# Patient Record
Sex: MALE | Race: BLACK OR AFRICAN AMERICAN | NOT HISPANIC OR LATINO | Employment: OTHER | URBAN - METROPOLITAN AREA
[De-identification: names, ages, dates, MRNs, and addresses within clinical notes are randomized per-mention and may not be internally consistent; named-entity substitution may affect disease eponyms.]

---

## 2017-04-06 ENCOUNTER — SURGERY - HEALTHEAST (OUTPATIENT)
Dept: GASTROENTEROLOGY | Facility: HOSPITAL | Age: 52
End: 2017-04-06

## 2017-04-06 ENCOUNTER — RECORDS - HEALTHEAST (OUTPATIENT)
Dept: ADMINISTRATIVE | Facility: OTHER | Age: 52
End: 2017-04-06

## 2017-04-06 ASSESSMENT — MIFFLIN-ST. JEOR
SCORE: 1377.86
SCORE: 1421.86

## 2017-04-10 ENCOUNTER — TELEPHONE (OUTPATIENT)
Dept: FAMILY MEDICINE | Facility: CLINIC | Age: 52
End: 2017-04-10

## 2017-04-10 NOTE — TELEPHONE ENCOUNTER
Date of discharge: 4/7/2017  Facility of discharge: [facility]  Patient concerns about condition: No concerns at this time.  Patient concerns about medications: No concerns at this time.  Full med reconciliation will be completed at clinic visit.  Patient concerns about transitioning: No concerns at this time.  Clinic office visit appointment date: 4/12/2017  Dr Carter and Estella  Patient reminded to bring all medications (prescription and over-the-counter) to clinic appointment: Yes   Discharging and preferred pharmacy added to [place]    Using the date of discharge as day 1, the 30th day post discharge is 5/7/2017.

## 2017-04-18 ENCOUNTER — OFFICE VISIT (OUTPATIENT)
Dept: FAMILY MEDICINE | Facility: CLINIC | Age: 52
End: 2017-04-18

## 2017-04-18 VITALS
HEART RATE: 94 BPM | DIASTOLIC BLOOD PRESSURE: 102 MMHG | TEMPERATURE: 97.8 F | SYSTOLIC BLOOD PRESSURE: 170 MMHG | BODY MASS INDEX: 20.46 KG/M2 | WEIGHT: 135 LBS | OXYGEN SATURATION: 97 % | HEIGHT: 68 IN

## 2017-04-18 DIAGNOSIS — F10.231 ALCOHOL DEPENDENCE WITH WITHDRAWAL DELIRIUM (H): Primary | ICD-10-CM

## 2017-04-18 DIAGNOSIS — R42 DIZZINESS: ICD-10-CM

## 2017-04-18 DIAGNOSIS — I16.0 HYPERTENSIVE URGENCY: ICD-10-CM

## 2017-04-18 LAB
ALBUMIN SERPL-MCNC: 4.1 G/DL (ref 3.3–4.6)
ALP SERPL-CCNC: 66 U/L (ref 40–150)
ALT SERPL-CCNC: 24 U/L (ref 0–45)
AST SERPL-CCNC: 31 U/L (ref 0–55)
BILIRUB SERPL-MCNC: 0.6 MG/DL (ref 0.2–1.3)
BUN SERPL-MCNC: 10 MG/DL (ref 7–30)
CALCIUM SERPL-MCNC: 9.7 MG/DL (ref 8.5–10.4)
CHLORIDE SERPLBLD-SCNC: 98 MMOL/L (ref 94–109)
CHOLEST SERPL-MCNC: 210 MG/DL
CHOLEST/HDLC SERPL: 2.3 RATIO
CO2 SERPL-SCNC: 28 MMOL/L (ref 20–32)
CREAT SERPL-MCNC: 1.2 MG/DL (ref 0.8–1.5)
EGFR CALCULATED (BLACK REFERENCE): 82.1 ML/MIN
EGFR CALCULATED (NON BLACK REFERENCE): 67.8 ML/MIN
GLUCOSE SERPL-MCNC: 100 MG/DL (ref 60–109)
HBA1C MFR BLD: 5.7 % (ref 4.1–5.7)
HCT VFR BLD AUTO: 40.4 % (ref 40–53)
HDLC SERPL-MCNC: 93 MG/DL
HEMOGLOBIN: 12.6 G/DL (ref 13.3–17.7)
LDLC SERPL CALC-MCNC: 104 MG/DL (ref 0–99)
MCH RBC QN AUTO: 31.8 PG (ref 26.5–35)
MCHC RBC AUTO-ENTMCNC: 31.2 G/DL (ref 32–36)
MCV RBC AUTO: 102 FL (ref 78–100)
PLATELET # BLD AUTO: 212 K/UL (ref 150–450)
POTASSIUM SERPL-SCNC: 3.7 MMOL/L (ref 3.4–5.3)
PROT SERPL-MCNC: 8.2 G/DL (ref 6.6–8.8)
RBC # BLD AUTO: 3.96 M/UL (ref 4.4–5.9)
SODIUM SERPL-SCNC: 138 MMOL/L (ref 133–144)
TRIGL SERPL-MCNC: 64 MG/DL
VLDL-CHOLESTEROL: 13 MG/DL (ref 7–32)
WBC # BLD AUTO: 7.2 K/UL (ref 4–11)

## 2017-04-18 RX ORDER — NALTREXONE HYDROCHLORIDE 50 MG/1
50 TABLET, FILM COATED ORAL
COMMUNITY
Start: 2017-04-07 | End: 2017-06-12

## 2017-04-18 RX ORDER — AMLODIPINE BESYLATE 10 MG/1
10 TABLET ORAL
COMMUNITY
Start: 2017-04-07 | End: 2017-08-25 | Stop reason: DRUGHIGH

## 2017-04-18 NOTE — MR AVS SNAPSHOT
After Visit Summary   4/18/2017    Piter Gaines    MRN: [id number]           Patient Information     Date Of Birth          1965        Visit Information        Provider Department      4/18/2017 10:20 AM Germania Stratton APRN CNP [facility]        Today's Diagnoses     Alcohol dependence with withdrawal delirium (H)    -  1    Hypertensive urgency        Dizziness          Care Instructions      Established High Blood Pressure    High blood pressure (hypertension) is a chronic disease. Often health care providers don t know what causes it. But it can be caused by certain health conditions and medicines.  If you have high blood pressure, you may not have any symptoms. If you do have symptoms, they may include headache, dizziness, changes in your vision, chest pain, and shortness of breath. But even without symptoms, high blood pressure that s not treated raises your risk for heart attack and stroke. High blood pressure is a serious health risk and shouldn t be ignored.  A blood pressure reading is made up of two numbers: a higher number over a lower number. The top number is the systolic pressure. The bottom number is the diastolic pressure. A normal blood pressure is less than 120 over less than 80.  High blood pressure is when either the top number is 140 or higher, or the bottom number is 90 or higher. This must be the result when taking your blood pressure a number of times. The blood pressures between normal and high are called prehypertension.  Home care  If you have high blood pressure, you should do what is listed below to lower your blood pressure. If you are taking medicines for high blood pressure, these methods may reduce or end your need for medicines in the future.    Begin a weight-loss program if you are overweight.    Cut back on how much salt you get in your diet. Here s how to do this:    Don t eat foods that have a lot of salt. These include olives, pickles,  smoked meats, and salted potato chips.    Don t add salt to your food at the table.    Use only small amounts of salt when cooking.    Begin an exercise program. Talk with your health care provider about the type of exercise program that would be best for you. It doesn't have to be hard. Even brisk walking for 20 minutes 3 times a week is a good form of exercise.    Don t take medicines that have heart stimulants. This includes many cold and sinus decongestant pills and sprays, as well as diet pills. Check the warnings about hypertension on the label. Stimulants such as amphetamine or cocaine could be lethal for someone with high blood pressure. Never take these.    Limit how much caffeine you get in your diet. Switch to caffeine-free products.    Stop smoking. If you are a long-time smoker, this can be hard. Enroll in a stop-smoking program to make it more likely that you will quit for good.    Learn how to handle stress. This is an important part of any program to lower blood pressure. Learn about relaxation methods like meditation, yoga, or biofeedback.    If your provider prescribed medicines, take them exactly as directed. Missing doses may cause your blood pressure get out of control.    Consider buying an automatic blood pressure machine. You can get one of these at most pharmacies. Use this to watch your blood pressure at home. Give the results to your provider.  Follow-up care  You will need to make regular visits to your health care provider. This is to check your blood pressure and to make changes to your medicines. Make a follow-up appointment as directed.  When to seek medical advice  Call your health care provider right away if any of these occur:    Chest pain or shortness of breath    Severe headache    Throbbing or rushing sound in the ears    Nosebleed    Sudden severe pain in your belly (abdomen)    Extreme drowsiness, confusion, or fainting    Dizziness or dizziness with a spinning sensation  "(vertigo)    Weakness of an arm or leg or one side of the face    You have problems speaking or seeing     4981-6745 [employer]. [address] [place] [address], [place] [address]. All rights reserved. This information is not intended as a substitute for professional medical care. Always follow your healthcare professional's instructions.        Alcohol Abuse  Alcoholic drinks are harmful when you have too many of them. There is no set number of drinks that defines too much. Drinking that disrupts your life or your health is called alcohol abuse. Alcohol abuse can hurt your relationships with others. You may lose friends, a spouse, or even your job. You may be abusing alcohol if any of the following are true for you:    Duties at home or with  suffer because of drinking.    Duties at work or in school suffer because of drinking.    You have missed work or school because of drinking.    You use alcohol while driving or operating machinery.    You have legal problems such as arrests due to drinking.    You keep drinking even though it causes serious problems in your life.  Health effects  Alcohol abuse causes health problems. Sometimes this can happen after only drinking a  little.\" There is no set number of drinks or amount of alcohol that defines too much. The more you drink at one time, and the more often you drink determine both the short-term and long-term health effects. It affects all parts of your body and your health, including your:    Brain. Alcohol is a central nervous system depressant. It can damage parts of the brain that affect your balance, memory, thinking, and emotions. It can cause memory loss, blackouts, depression, agitation, sleep cycle changes, and seizures. These changes may or may not be reversible.    Heart and vascular system. Alcohol affects multiple areas. It can damage heart muscle causing cardiomyopathy, which is a weakening and stretching of the heart muscle. This can " lead to trouble breathing, an irregular heartbeat, atrial fibrillation, leg swelling, and heart failure. Alcohol use makes the blood vessels stiffen causing hypertension (high blood pressure). All of these problems increase your risk of having heart attacks or strokes.    Liver. Alcohol causes fat to build up in the liver, affecting its normal function. This increases the risk for hepatitis, leading to abdominal pain, appetite loss, jaundice, bleeding problems, liver fibrosis, and cirrhosis. This, in turn, can affect your ability to fight off infections, and can cause diabetes. The liver changes prevent it from removing toxins in your blood that can cause encephalopathy which may show with confusion, altered level of consciousness, personality changes, memory loss, seizures, coma, and death.    Pancreas. Alcohol can cause inflammation of the pancreas, or pancreatitis. This can cause abdominal pain, fever, and diabetes.    Immune system. Alcohol weakens your immune system in a number of ways. It suppresses your immune system making it harder to fight infections and colds. It also increases the chance of getting pneumonia and tuberculosis.    Cancer. Alcohol is a risk factor for developing cancer of the mouth, esophagus, pharynx, larynx, liver, and breast.    Sexual function. Alcohol can lead to sexual problems.  Home care  The following guidelines will help you deal with alcohol abuse:    Admit you have a problem with alcohol.    Ask for help from your health care provider and trusted family members or close friends.    Get help from people trained in dealing with alcohol abuse. This may be individual counseling or group therapy, or it may be a supervised alcohol treatment program.    Join a self-help group for alcohol abuse such as Alcoholics Anonymous (AA).    Avoid people who abuse alcohol or tempt you to drink.  Follow-up care  Follow up as advised by the doctor or our staff. Contact these groups to get  help:    Alcoholics Anonymous (AA): Go to www.aa.org or check the phone book for meetings near you.    National Alcohol and Substance Abuse Information Center (NASAIC): 149.535.4051 www.addictionSynup.Eyenalyze    National [place] on Alcoholism and Drug Dependence (NCADD): 400-FDB-TKEL (158-5330) www.ncadd.org    Al-Anon: 672-4OT-MMCF (059-8145) www.al-anon.org  Call 911  Call 911 if any of these occur:    Trouble breathing or slow irregular breathing    Chest pain    Sudden weakness on one side of your body or sudden trouble speaking    Heavy bleeding or vomiting blood    Very drowsy or trouble awakening    Fainting or loss of consciousness    Rapid heart rate    Seizure  When to seek medical care  Get prompt medical attention if you have:    Confusion    Hallucinations (seeing, hearing, or feeling things that aren t there)    Pain in your upper abdomen that gets worse    Repeated vomiting or black or tarry stools    Severe shakiness    9396-4128 [employer]. [address] [place] [address]. All rights reserved. This information is not intended as a substitute for professional medical care. Always follow your healthcare professional's instructions.              Follow-ups after your visit        Follow-up notes from your care team     Return in about 1 week (around 4/25/2017) for BP Recheck.      Your next 10 appointments already scheduled     May 08, 2017  9:40 AM CDT   Return Visit with Castillo Carter MD   [facility]    [address] [place] [address]   [phone]              Future tests that were ordered for you today     Open Future Orders        Priority Expected Expires Ordered    Urinalysis([facility]) Routine  5/3/2017 4/25/2017    Fecal Occult Blood - FIT, iFOB ([facility]) Routine  5/2/2017 4/25/2017            Who to contact     Please call your clinic at [phone] to:    Ask questions about your health    Make or cancel  "appointments    Discuss your medicines    Learn about your test results    Speak to your doctor   If you have compliments or concerns about an experience at your clinic, or if you wish to file a complaint, please contact [facility] Patient Relations at [phone] or email us at [email or web address]         Additional Information About Your Visit        THE ICONIChart Information     [employer] is an electronic gateway that provides easy, online access to your medical records. With [employer], you can request a clinic appointment, read your test results, renew a prescription or communicate with your care team.     To sign up for [employer] visit the website at [email or web address]   You will be asked to enter the access code listed below, as well as some personal information. Please follow the directions to create your username and password.     Your access code is: [email or web address]  Expires: 2017  3:12 AM     Your access code will  in 90 days. If you need help or a new code, please contact your [facility] or call [phone] for assistance.        Care EveryWhere ID     This is your Care EveryWhere ID. This could be used by other organizations to access your [place] medical records  [id number]        Your Vitals Were     Pulse Temperature Height Pulse Oximetry BMI (Body Mass Index)       94 97.8  F (36.6  C) (Oral) 5' 8\" (172.7 cm) 97% 20.53 kg/m2        Blood Pressure from Last 3 Encounters:   17 (!) 149/104   17 (!) 170/102    Weight from Last 3 Encounters:   17 133 lb 6.4 oz (60.5 kg)   17 135 lb (61.2 kg)              We Performed the Following     CBC with Plt ([facility])     Comprehensive Metabolic Panel ([facility]) - results <1hr Protocol     EKG 12-lead complete w/read - Clinics     Hemoglobin A1c ([facility])     Lipid Panel ([facility])        Primary Care Provider Office Phone # Fax #    Castillo Carter MD " [phone]       [facility] [address] [facility] [address]        Thank you!     Thank you for choosing [facility]  for your care. Our goal is always to provide you with excellent care. Hearing back from our patients is one way we can continue to improve our services. Please take a few minutes to complete the written survey that you may receive in the mail after your visit with us. Thank you!             Your Updated Medication List - Protect others around you: Learn how to safely use, store and throw away your medicines at www.disposemymeds.org.          This list is accurate as of: 4/18/17 11:59 PM.  Always use your most recent med list.                   Brand Name Dispense Instructions for use    amLODIPine 10 MG tablet    NORVASC     Take 10 mg by mouth       naltrexone 50 MG tablet    DEPADE;REVIA     Take 50 mg by mouth       omeprazole 20 MG CR capsule    priLOSEC     Take 20 mg by mouth

## 2017-04-18 NOTE — PROGRESS NOTES
"HOSPITAL FOLLOW-UP: The patient presents today for follow-up of recent hospitalization at  [facility].17-17. Dehydration, Renal insufficiency, hematuria. Acute hyperactive alcohol withdrawal. Delirium. Discharge dx: Acute alcohol withdrawal delirium, Hematemesis with nausea, Acute kidney injury, GERD,   Esophagitis,HTN, tobacco use, moderate malnutrition.   Had an EGD-Dx Severe reflux erosive esophagitis related to ETOH abuse.  esophagus, no active bleeding, no varices.    Discharge on PPI, abstain from ETOH  JESSICA, FU PCP.    Medications ordered with Discharge 17:  Amlodipine 10 mg po daily  MVI 1 po daily  Omeprazole 20 mg 1 po BID  Naltrexone 50 mg 1 po daily  Clonidine HCL 0.2mg po BID    i PMH:PUD many years, esophageal varices in [place]. ETOH abuse.  Heart flutters and chest pain [place], was on a heart medicine.  Migraine headaches. JARRETT, trouble with stairs, long hx of this.  FH Father : renal failure. Has 2 brothers  before 50-MI, 2 dx heart disease and the other kidney failure  Here from [place], moved end of January. Here with Sister in law, has nieces and nephews. The patient was hospitalized with dizziness, diarrhea, vomiting and dehydrated, blood in the urine.Problem with kidney functionng of moderate severity. The patient was evaluated with  The patient was asked to follow up today.   Advised to abstain from ETOH.by hospital providers and Gastroenterology, At this time the patient reports some improvement of the original symptoms.   The patient reports the following new symptoms:-   Pain in left lower back and notes weakness and pain down his left leg/side of his body. Started to flare up a day or so ago.  Hads had ETOH treatment last about 6 years ago, had been through it before.  Is 50:50 on his willingness to abstain from ETOH. Vodka last \" a couple of days ago, a couple of shots.\"  Wants to do from habit. Naltrexone not helping with shakiness at " "all.=+4.CAGE-  Has been to the hospital \"a lot\" in the past 2 years. Vomiting blood , HTN, stress are the reasons.Alcohol may play a part.  Feels shaky and headache.Chest feels a little tight.  Headache is slight, frontal, hasn't/doesn't take medications.  Sometimes gets a headache behind his eye. Doesn't take OTC medications for headache as \"nothing helps.\"  Goal is to have regular check ups and have med refills, someone to call if he needs help    SH:   and assembly in factory, in past. Disabled x 2 years.  Smoker- cigarettes, 1/3-1 PPD x most of his adult life   marijuana       ROS:    Constitutional, HEENT, cardiovascular, pulmonary, gi and gu systems are negative, except as otherwise noted.      OBJECTIVE:                                                    BP (!) 170/102 (Cuff Size: Adult Regular)  Pulse 94  Temp 97.8  F (36.6  C) (Oral)  Ht 5' 8\" (172.7 cm)  Wt 135 lb (61.2 kg)  SpO2 97%  BMI 20.53 kg/m2    EYES: Eyes grossly normal to inspection, PERRL and conjunctivae and sclerae normal  HENT: ear canals and TM's normal, nose and mouth without ulcers or lesions  NECK: no adenopathy, no asymmetry, masses, or scars and thyroid normal to palpation  RESP: lungs clear to auscultation - no rales, or wheezes POSITIVE for: scattered rhonchi   CV: regular rate and rhythm, normal S1 S2, no S3 or S4, no murmur, click or rub, no peripheral edema and peripheral pulses strong  ABDOMEN: soft, generally tender,  no masses and bowel sounds normal  MS: no gross musculoskeletal defects noted, no edema    Diagnostic Test Results:  Results for orders placed or performed in visit on 04/18/17   Comprehensive Metabolic Panel ([facility]) - results <1hr Protocol   Result Value Ref Range    Glucose 100.0 60.0 - 109.0 mg/dL    Urea Nitrogen 10.0 7.0 - 30.0 mg/dL    Creatinine 1.2 0.8 - 1.5 mg/dL    Sodium 138.0 133.0 - 144.0 mmol/L    Potassium 3.7 3.4 - 5.3 mmol/L    Chloride 98.0 94.0 - 109.0 mmol/L    Carbon " Dioxide 28.0 20.0 - 32.0 mmol/L    Calcium 9.7 8.5 - 10.4 mg/dL    Protein Total 8.2 6.6 - 8.8 g/dL    Albumin 4.1 3.3 - 4.6 g/dL    Alkaline Phosphatase 66.0 40.0 - 150.0 U/L    ALT 24.0 0.0 - 45.0 U/L    AST 31.0 0.0 - 55.0 U/L    Bilirubin Total 0.6 0.2 - 1.3 mg/dL    eGFR Calculated (Non Black Reference) 67.8 >60.0 mL/min    eGFR Calculated (Black Reference) 82.1 >60.0 mL/min   CBC with Plt ([facility])   Result Value Ref Range    WBC 7.2 4.0 - 11.0 K/uL    RBC 3.96 (L) 4.40 - 5.90 M/uL    Hemoglobin 12.6 (L) 13.3 - 17.7 g/dL    Hematocrit 40.4 40.0 - 53.0 %    .0 (H) 78.0 - 100.0 fL    MCH 31.8 26.5 - 35.0 pg    MCHC 31.2 (L) 32.0 - 36.0 g/dL    Platelets 212.0 150.0 - 450.0 K/uL   Hemoglobin A1c ([facility])   Result Value Ref Range    Hemoglobin A1C 5.7 4.1 - 5.7 %   Lipid Panel ([facility])   Result Value Ref Range    Cholesterol 210.0 (H) <200.0 mg/dL    Triglycerides 64.0 <150.0 mg/dL    HDL Cholesterol 93.0 >40.0 mg/dL    VLDL-Cholesterol 13.0 7.0 - 32.0 mg/dL    LDL Cholesterol Direct 104.0 (H) 0.0 - 99.0 mg/dL    Cholesterol/HDL Ratio 2.3 <5.0 RATIO        ASSESSMENT/PLAN:                                                      (F10.231) Alcohol dependence with withdrawal delirium (H)  (primary encounter diagnosis)  Comment: Being treated with Naltrexone 50 mg po daily. Continues to feel dizzy and shaky  Plan: Recommend he continue his withdrawal medicine as directed.           Recommend supervision at home.Patient should be watched closely to insure his DTs do not worsen  Along with his symptoms including HTN  (I16.0) Hypertensive urgency  Comment: Patient needs to be compliant with his medicines.  Plan: Provided a med box and described how to fill, may well require assistance       (R42) Dizziness  Comment: Symptoms mild-moderate, worsens at times.No recent syncope.  Plan: Comprehensive Metabolic Panel ([facility]) -         results <1hr Protocol, CBC with Plt ([facility]),         Hemoglobin A1c ([facility]), Lipid  Panel ([facility]),         EKG 12-lead complete w/read - Clinics          Comment: Mild abnormalities in CBC-include mildly depressed Hemoglobin and RBC, normal hematocrit.  Additionally mildly elevated cholesterol and and LDL with protective HDL, borderline prediabetes noted in A1C  None explain dizziness or ongoing problems with refractory HTN.  Plan: Comprehensive Metabolic Panel ([facility]) -         results <1hr Protocol, CBC with Plt ([facility]),         Hemoglobin A1c ([facility]), Lipid Panel ([facility]),         EKG 12-lead complete w/read - Clinics       No acute changes noted-reviewed with Faculty Preceptor                                                                      MEDICATIONS:  Current medications reviewed-unclear whether patient taking his meds correctly  And he is unable to reassure provider he is, seems uncertain at the present time. Patient's Goals are to have a provider he  Can call on for refills and any acute or sudden needs he may develop. Does not state an intent or desire to stop drinking   In the near future.Encouraged him to avoid ETOH due to bleeding varices history and recent esophagitis, DTs, acute kidney damage   and effect on blood pressure. Also encouraged abstinence from cigarette smoking due to symptoms of chest tightness, dizziness and   HTN. Asking patient to return in 2-3 days to have his B/P rechecked and to bring his med bottles in for reconciliation with MRs.  Patient agreed to the above plan.    45 minutes was spent on this visit, >50% counseling and coordination of care re:alcohol withdrawal, and cigarette abstinence,med compliance, HTN      CASEY Dawson CNP [facility]

## 2017-04-25 ENCOUNTER — OFFICE VISIT (OUTPATIENT)
Dept: FAMILY MEDICINE | Facility: CLINIC | Age: 52
End: 2017-04-25

## 2017-04-25 VITALS
TEMPERATURE: 98 F | BODY MASS INDEX: 20.28 KG/M2 | OXYGEN SATURATION: 97 % | SYSTOLIC BLOOD PRESSURE: 149 MMHG | HEART RATE: 124 BPM | WEIGHT: 133.4 LBS | DIASTOLIC BLOOD PRESSURE: 104 MMHG

## 2017-04-25 DIAGNOSIS — Z76.89 ESTABLISHING CARE WITH NEW DOCTOR, ENCOUNTER FOR: ICD-10-CM

## 2017-04-25 DIAGNOSIS — F10.10 ALCOHOL ABUSE: ICD-10-CM

## 2017-04-25 DIAGNOSIS — I10 BENIGN ESSENTIAL HYPERTENSION: Primary | ICD-10-CM

## 2017-04-25 DIAGNOSIS — R31.9 HEMATURIA: ICD-10-CM

## 2017-04-25 DIAGNOSIS — K92.1 BLOOD IN STOOL: ICD-10-CM

## 2017-04-25 RX ORDER — LISINOPRIL 10 MG/1
10 TABLET ORAL DAILY
Qty: 30 TABLET | Refills: 1 | Status: SHIPPED | OUTPATIENT
Start: 2017-04-25 | End: 2017-06-12

## 2017-04-25 NOTE — MR AVS SNAPSHOT
After Visit Summary   4/25/2017    Piter Gaines    MRN: [id number]           Patient Information     Date Of Birth          1965        Visit Information        Provider Department      4/25/2017 10:00 AM Palla, Misbah, MD [facility]        Today's Diagnoses     Benign essential hypertension    -  1    Hematuria        Blood in stool        Alcohol abuse        Establishing care with new doctor, encounter for          Care Instructions    Referral for Gastroenterology faxed to [facility] and they will contact pt to schedule appointment.    LK        Follow-ups after your visit        Additional Services     GASTROENTEROLOGY ADULT REF CONSULT ONLY       Preferred Location: [facility] [phone]    H/o stage D esophagitis, bleeding ulcers.  Screening colonoscopy    Please be aware that coverage of these services is subject to the terms and limitations of your health insurance plan.  Call member services at your health plan with any benefit or coverage questions.  Any procedures must be performed at a [facility] facility OR coordinated by your clinic's referral office.    Please bring the following with you to your appointment:    (1) Any X-Rays, CTs or MRIs which have been performed.  Contact the facility where they were done to arrange for  prior to your scheduled appointment.    (2) List of current medications   (3) This referral request   (4) Any documents/labs given to you for this referral            Mental Health Referral - [facility]       Use this form for behavioral health consults and assessments. The referral coordinator will help to determine whether patients are best served by clinic behavioral health staff or by community providers.    Type of referral(s) requested (indicate all that apply):  Adult Psychotherapy--for diagnosis and non-pharmacological treatment    Reason for referral: Alcohol abuse    Currently receiving mental health services (if 'Yes', what services and  why today's referral?): No  Currently having suicidal thoughts: No  Previous psych hospitalization: No    Please provide data for below screening tools if available.    needed: No  Language: English                  Your next 10 appointments already scheduled     May 08, 2017  9:40 AM CDT   Return Visit with Castillo Carter MD   [facility] ([facility])    [address] [place] [address]   [phone]              Who to contact     Please call your clinic at [phone] to:    Ask questions about your health    Make or cancel appointments    Discuss your medicines    Learn about your test results    Speak to your doctor   If you have compliments or concerns about an experience at your clinic, or if you wish to file a complaint, please contact [facility] Patient Relations at [phone] or email us at [email or web address]         Additional Information About Your Visit        [employer] Information     [employer] is an electronic gateway that provides easy, online access to your medical records. With [employer], you can request a clinic appointment, read your test results, renew a prescription or communicate with your care team.     To sign up for [employer] visit the website at [email or web address]   You will be asked to enter the access code listed below, as well as some personal information. Please follow the directions to create your username and password.     Your access code is: [email or web address]  Expires: 2017  3:12 AM     Your access code will  in 90 days. If you need help or a new code, please contact your [facility] or call [phone] for assistance.        Care EveryWhere ID     This is your Care EveryWhere ID. This could be used by other organizations to access your [facility] medical records  [id number]        Your Vitals Were     Pulse Temperature Pulse Oximetry BMI (Body Mass Index)           124 98  F (36.7  C) (Oral) 97% 20.28 kg/m2         Blood Pressure from Last 3 Encounters:   04/25/17 (!) 149/104   04/18/17 (!) 170/102    Weight from Last 3 Encounters:   04/25/17 133 lb 6.4 oz (60.5 kg)   04/18/17 135 lb (61.2 kg)              We Performed the Following     GASTROENTEROLOGY ADULT REF CONSULT ONLY     Mental Health Referral - [facility]          Today's Medication Changes          These changes are accurate as of: 4/25/17 11:59 PM.  If you have any questions, ask your nurse or doctor.               Start taking these medicines.        Dose/Directions    lisinopril 10 MG tablet   Commonly known as:  PRINIVIL/ZESTRIL   Used for:  Benign essential hypertension   Started by:  Palla, Misbah, MD        Dose:  10 mg   Take 1 tablet (10 mg) by mouth daily   Quantity:  30 tablet   Refills:  1            Where to get your medicines      These medications were sent to [id number] - [place], [place] - [address], [place] [address]     Phone:  [phone]     lisinopril 10 MG tablet                Primary Care Provider Office Phone # Fax #    Castillo Carter [phone]       [facility] [address] [place] [address]        Thank you!     Thank you for choosing [facility]  for your care. Our goal is always to provide you with excellent care. Hearing back from our patients is one way we can continue to improve our services. Please take a few minutes to complete the written survey that you may receive in the mail after your visit with us. Thank you!             Your Updated Medication List - Protect others around you: Learn how to safely use, store and throw away your medicines at www.disposemymeds.org.          This list is accurate as of: 4/25/17 11:59 PM.  Always use your most recent med list.                   Brand Name Dispense Instructions for use     amLODIPine 10 MG tablet    NORVASC     Take 10 mg by mouth       lisinopril 10 MG tablet    PRINIVIL/ZESTRIL    30 tablet    Take 1 tablet (10 mg) by mouth daily       naltrexone 50 MG tablet    DEPADE;REVIA     Take 50 mg by mouth       omeprazole 20 MG CR capsule    priLOSEC     Take 20 mg by mouth

## 2017-04-25 NOTE — PROGRESS NOTES
"       HPI:       Piter Gaines is a 51 year old male presenting for:    1. Hypertension follow up   - headaches, blurry vision - 2-3x/week for last 2-3 years  - denies symptoms currently   - does feel chest congestion but does not have left sided chest pain  - no radiation to left arm, neck, or back  - does not endorse any palpitations  - no diaphoresis  - occasional numbness in fingers and toes (\"been happening for months now)  - no motor weakness    2. Blood in urine  - not sure if it is blood but says \"it looks orange-moses\"  - states he has noticed it 3-4 times in past year  - no dysuria, no difficulty initiating, no weak stream    3. Blood in stool  - h/o bleeding ulcers 2 years ago  - states he has noticed blood in his stool several times over last 3-4 months  - no abdominal pain       PMH  Medical conditions - anxiety, ulcers, kidney disease, hypertension, no h/o MI and/or CVA   (has not had a PCP, but has been diagnosed with above mentioned conditions through various ER visits)  Medications - was on several medications which he has not taken for months now  Surgeries - left arm surgery  Allergies - NKDA  Recent Hospitalizations - 1 month ago, [facility]  Mom - hypertension, breast cancer  Dad - gout, hypertension, renal failure     SH  Lives with sister in law. Smoking for last 5 years - \"sometimes 0 cigarettes to 1-1.5 ppd\". Alcohol - 3/4 days, 12 pack of beer through week (3-4 beers at a time). Will also have 1/2 pint.          Physical Exam:     Vitals:    04/25/17 1017 04/25/17 1034   BP: (!) 152/115 (!) 149/104   Pulse: 124    Temp: 98  F (36.7  C)    TempSrc: Oral    SpO2: 97%    Weight: 133 lb 6.4 oz (60.5 kg)      Body mass index is 20.28 kg/(m^2).    Vitals reviewed  General: No acute distress  Ears: canals patent, TM within normal limits  Eyes: EOMI, PERRLA, fundoscopic exam wnl  Nose: nasal mucosa moist, no rhinorrhea  Oral cavity: moist mucosa, no tonsillar exudates, no oropharyngeal " erythema/swelling  Neck: good ROM, supple, no apparent tracheal deviation  Respiratory: CTA bilaterally, no wheezes/rhonchi/rhales appreciated, no respiratory distress  Chest wall: No chest wall tenderness  Back: no paraspinal tenderness, no spinal tenderness, no vertebral step offs appreciated  Abdomen: soft, non-distended, non-tender, normoactive bowel sounds  Extremities: no cyanosis, no edema, capillary refill <2 seconds, well perfused  Neuro: no focal deficits noted, reflexes within normal limites  Psych: appropriate affect      Assessment and Plan   #Hypertension  - hypertensive, currently asymptomatic   - on amlodipine, will start lisinopril 10 mg in addition to amlodipine  - follow up in 2 weeks    - BMP pending     #Reports of blood in urine  - UA pending  - no signs/symptoms of obstruction and/or infection    #Hematochezia  - reports several instances over past several months  - continue PPI  - history of bleeding ulcer  - no abdominal pain   - referral to GI for screening colonoscopy, possible EGD  - CBC pending    #Health maintenance/Establish care  - lipid panel pending, CBC and BMP pending  - colonoscopy referral       Options for treatment and follow-up care were reviewed with the patient and/or guardian. Piter Gaines and/or guardian engaged in the decision making process and verbalized understanding of the options discussed and agreed with the final plan.    Misbah Palla, MD      Precepted today with: Gildardo Alejandre MD

## 2017-04-25 NOTE — PATIENT INSTRUCTIONS
Referral for Gastroenterology faxed to [facility] and they will contact pt to schedule appointment.    SILVIA

## 2017-04-26 DIAGNOSIS — I10 BENIGN ESSENTIAL HYPERTENSION: ICD-10-CM

## 2017-04-26 LAB
BILIRUBIN UR: ABNORMAL
BLOOD UR: NEGATIVE
GLUCOSE URINE: NEGATIVE
KETONES UR QL: ABNORMAL
LEUKOCYTE ESTERASE UR: NEGATIVE
NITRITE UR QL STRIP: NEGATIVE
PH UR STRIP: 6 [PH] (ref 5–7)
PROTEIN UR: ABNORMAL
SP GR UR STRIP: 1.02
UROBILINOGEN UR STRIP-ACNC: ABNORMAL

## 2017-04-26 NOTE — PATIENT INSTRUCTIONS
Established High Blood Pressure    High blood pressure (hypertension) is a chronic disease. Often health care providers don t know what causes it. But it can be caused by certain health conditions and medicines.  If you have high blood pressure, you may not have any symptoms. If you do have symptoms, they may include headache, dizziness, changes in your vision, chest pain, and shortness of breath. But even without symptoms, high blood pressure that s not treated raises your risk for heart attack and stroke. High blood pressure is a serious health risk and shouldn t be ignored.  A blood pressure reading is made up of two numbers: a higher number over a lower number. The top number is the systolic pressure. The bottom number is the diastolic pressure. A normal blood pressure is less than 120 over less than 80.  High blood pressure is when either the top number is 140 or higher, or the bottom number is 90 or higher. This must be the result when taking your blood pressure a number of times. The blood pressures between normal and high are called prehypertension.  Home care  If you have high blood pressure, you should do what is listed below to lower your blood pressure. If you are taking medicines for high blood pressure, these methods may reduce or end your need for medicines in the future.    Begin a weight-loss program if you are overweight.    Cut back on how much salt you get in your diet. Here s how to do this:    Don t eat foods that have a lot of salt. These include olives, pickles, smoked meats, and salted potato chips.    Don t add salt to your food at the table.    Use only small amounts of salt when cooking.    Begin an exercise program. Talk with your health care provider about the type of exercise program that would be best for you. It doesn't have to be hard. Even brisk walking for 20 minutes 3 times a week is a good form of exercise.    Don t take medicines that have heart stimulants. This includes many  cold and sinus decongestant pills and sprays, as well as diet pills. Check the warnings about hypertension on the label. Stimulants such as amphetamine or cocaine could be lethal for someone with high blood pressure. Never take these.    Limit how much caffeine you get in your diet. Switch to caffeine-free products.    Stop smoking. If you are a long-time smoker, this can be hard. Enroll in a stop-smoking program to make it more likely that you will quit for good.    Learn how to handle stress. This is an important part of any program to lower blood pressure. Learn about relaxation methods like meditation, yoga, or biofeedback.    If your provider prescribed medicines, take them exactly as directed. Missing doses may cause your blood pressure get out of control.    Consider buying an automatic blood pressure machine. You can get one of these at most pharmacies. Use this to watch your blood pressure at home. Give the results to your provider.  Follow-up care  You will need to make regular visits to your health care provider. This is to check your blood pressure and to make changes to your medicines. Make a follow-up appointment as directed.  When to seek medical advice  Call your health care provider right away if any of these occur:    Chest pain or shortness of breath    Severe headache    Throbbing or rushing sound in the ears    Nosebleed    Sudden severe pain in your belly (abdomen)    Extreme drowsiness, confusion, or fainting    Dizziness or dizziness with a spinning sensation (vertigo)    Weakness of an arm or leg or one side of the face    You have problems speaking or seeing     8350-9414 [employer]. [address] [place] [address], [place] [address]. All rights reserved. This information is not intended as a substitute for professional medical care. Always follow your healthcare professional's instructions.        Alcohol Abuse  Alcoholic drinks are harmful when you have too many of them. There is no  "set number of drinks that defines too much. Drinking that disrupts your life or your health is called alcohol abuse. Alcohol abuse can hurt your relationships with others. You may lose friends, a spouse, or even your job. You may be abusing alcohol if any of the following are true for you:    Duties at home or with  suffer because of drinking.    Duties at work or in school suffer because of drinking.    You have missed work or school because of drinking.    You use alcohol while driving or operating machinery.    You have legal problems such as arrests due to drinking.    You keep drinking even though it causes serious problems in your life.  Health effects  Alcohol abuse causes health problems. Sometimes this can happen after only drinking a  little.\" There is no set number of drinks or amount of alcohol that defines too much. The more you drink at one time, and the more often you drink determine both the short-term and long-term health effects. It affects all parts of your body and your health, including your:    Brain. Alcohol is a central nervous system depressant. It can damage parts of the brain that affect your balance, memory, thinking, and emotions. It can cause memory loss, blackouts, depression, agitation, sleep cycle changes, and seizures. These changes may or may not be reversible.    Heart and vascular system. Alcohol affects multiple areas. It can damage heart muscle causing cardiomyopathy, which is a weakening and stretching of the heart muscle. This can lead to trouble breathing, an irregular heartbeat, atrial fibrillation, leg swelling, and heart failure. Alcohol use makes the blood vessels stiffen causing hypertension (high blood pressure). All of these problems increase your risk of having heart attacks or strokes.    Liver. Alcohol causes fat to build up in the liver, affecting its normal function. This increases the risk for hepatitis, leading to abdominal pain, appetite loss, " jaundice, bleeding problems, liver fibrosis, and cirrhosis. This, in turn, can affect your ability to fight off infections, and can cause diabetes. The liver changes prevent it from removing toxins in your blood that can cause encephalopathy which may show with confusion, altered level of consciousness, personality changes, memory loss, seizures, coma, and death.    Pancreas. Alcohol can cause inflammation of the pancreas, or pancreatitis. This can cause abdominal pain, fever, and diabetes.    Immune system. Alcohol weakens your immune system in a number of ways. It suppresses your immune system making it harder to fight infections and colds. It also increases the chance of getting pneumonia and tuberculosis.    Cancer. Alcohol is a risk factor for developing cancer of the mouth, esophagus, pharynx, larynx, liver, and breast.    Sexual function. Alcohol can lead to sexual problems.  Home care  The following guidelines will help you deal with alcohol abuse:    Admit you have a problem with alcohol.    Ask for help from your health care provider and trusted family members or close friends.    Get help from people trained in dealing with alcohol abuse. This may be individual counseling or group therapy, or it may be a supervised alcohol treatment program.    Join a self-help group for alcohol abuse such as Alcoholics Anonymous (AA).    Avoid people who abuse alcohol or tempt you to drink.  Follow-up care  Follow up as advised by the doctor or our staff. Contact these groups to get help:    Alcoholics Anonymous (AA): Go to www.aa.org or check the phone book for meetings near you.    National Alcohol and Substance Abuse Information Center (NASAIC): 619.967.1066 www.addictioncareoptions.com    National [place] on Alcoholism and Drug Dependence (NCADD): 161-WCS-NTYC (133-1519) www.ncadd.org    Al-Anon: 326-6BC-QIJW (233-3091) www.al-anon.org  Call 911  Call 911 if any of these occur:    Trouble breathing or slow irregular  breathing    Chest pain    Sudden weakness on one side of your body or sudden trouble speaking    Heavy bleeding or vomiting blood    Very drowsy or trouble awakening    Fainting or loss of consciousness    Rapid heart rate    Seizure  When to seek medical care  Get prompt medical attention if you have:    Confusion    Hallucinations (seeing, hearing, or feeling things that aren t there)    Pain in your upper abdomen that gets worse    Repeated vomiting or black or tarry stools    Severe shakiness    8418-3972 [employer]. [address] [place] [address], [place] [address]. All rights reserved. This information is not intended as a substitute for professional medical care. Always follow your healthcare professional's instructions.

## 2017-04-27 NOTE — PROGRESS NOTES
"Preceptor Attestation:  Patient's case reviewed and discussed with Dr. Palla. Patient seen and discussed with the resident.\"}.  I agree with written assessment and plan of care.  Supervising Physician:  Gildardo Alejandre MD  [facility]    "

## 2017-05-02 ENCOUNTER — TELEPHONE (OUTPATIENT)
Dept: FAMILY MEDICINE | Facility: CLINIC | Age: 52
End: 2017-05-02

## 2017-05-02 NOTE — TELEPHONE ENCOUNTER
Called patient, specimen misplaced, I will mail him a new one today.  He plans to bring it with him to his next appointment.  Patient had question about his next appointment, if he should make an appointment with [facility] before he returns for follow-up here.  I advised him that if he is still having significant issues with bleeding that he should come into clinic otherwise he should call and schedule his referral appointment then follow-up with us after his consult. Lasha, CMA

## 2017-05-02 NOTE — TELEPHONE ENCOUNTER
[facility] phone call message- general phone call:    Reason for call: Pt has some concerns, and questions regarding a kit for PC Sample. Please call, and advise.    Return call needed: Yes    OK to leave a message on voice mail? Yes    Primary language: English      needed? No    Call taken on May 2, 2017 at 1:59 PM by Afshan Layne

## 2017-05-04 ENCOUNTER — DOCUMENTATION ONLY (OUTPATIENT)
Dept: FAMILY MEDICINE | Facility: CLINIC | Age: 52
End: 2017-05-04

## 2017-05-04 NOTE — PROGRESS NOTES
Procedure Requested        9035     Mental Health Referral - [facility]* [#[id number]]         Priority: Routine  Class: External referral         Comment:Use this form for behavioral health consults and assessments. The                  referral coordinator will help to determine whether patients are                  best served by clinic behavioral health staff or by community                  providers.                                    Type of referral(s) requested (indicate all that apply):                  Adult Psychotherapy--for diagnosis and non-pharmacological                   treatment                                    Reason for referral: Alcohol abuse                                    Currently receiving mental health services (if 'Yes', what                   services and why today's referral?): No                  Currently having suicidal thoughts: No                  Previous psych hospitalization: No                                    Please provide data for below screening tools if available.                    needed: No                  Language: English       Associated Diagnoses         F10.10 Alcohol abuse               NAYELY REGAN               [id number]               : 1965  M      [address]                                     PCP: [id number], ROBERT* [place] [address]                                CTR: [facility]

## 2017-05-04 NOTE — PROGRESS NOTES
Referral for (Test): Psychology   Location/Place/Provider: [facility] [address], [place] [address]  Date/Time:    Phone: [phone]  Fax:   Additional information/prep.: I called to help the pt set up this appointment, pt stated that he has some appointments set up.  Pt stated that he needs to check his schedule and see.  I told the pt that I can call to register the pt, and he can call them to schedule the appointment that works best for him.  Pt agreed to set it up himself, and I gave him Leidy phone number to call.  I told the pt that when I can done registering him, I will call him and he can then schedule the appointment.   Scheduled by: BRITTNEY Biswas

## 2017-05-08 NOTE — NURSING NOTE
30th day post-discharge.  Chart reviewed and patient called.    PT has not been readmitted in 30 day period  All protocol met for SHAMAR  Ok to radha Leung

## 2017-05-23 DIAGNOSIS — K92.1 BLOOD IN STOOL: ICD-10-CM

## 2017-05-23 LAB — HEMOCCULT STL QL IA: POSITIVE

## 2017-05-26 ENCOUNTER — TELEPHONE (OUTPATIENT)
Dept: FAMILY MEDICINE | Facility: CLINIC | Age: 52
End: 2017-05-26

## 2017-05-26 NOTE — TELEPHONE ENCOUNTER
Patient called clinic, not able to come in, no vehicle. Last bowel movement was 4 days ago, c/o lower, intermittent, cramping (rate cramping as pain 7-8 out 10) mid abdomen. No fevers/chills. No nausea or vomiting. Cramping when flares up lasts about 5-10 mins, relieved on its own. Drinks about 40 oz of fluid a day. Advised if Piter is not able to come into clinic, he should still be seen at Urgent Care or ED. Piter states understanding will seek medical evaluation when he is able. Beni LOPEZ

## 2017-05-26 NOTE — TELEPHONE ENCOUNTER
[facility] phone call message- general phone call:    Reason for call: Patient called stating he can't come in clinic anymore due to no car. He is having stomach pain and thinks it's because of not being able to go use the restroom. Patient is concerned and would like to speak with a nurse or doctor. Valorie LOPEZ informed and took call.     Return call needed: Yes    OK to leave a message on voice mail? Yes    Primary language: English      needed? No    Call taken on May 26, 2017 at 3:41 PM by Kelsey Patel

## 2017-05-26 NOTE — TELEPHONE ENCOUNTER
[facility] phone call message- general phone call:    Reason for call: Patient is complaining of constipation. Haven't had bowel movement since 5/23. He stated that he is having some stomach pain and believe it could be because he hasn't gone for days. I offered apt for him to come in today. He agree to come in. He will be seeing Dr. Dubose at 3:20. He isn't expecting a call back but if a doctor or nurse has any questions for him, feel free to call him before apt is okay.     Return call needed: Yes    OK to leave a message on voice mail? Yes    Primary language: English      needed? No    Call taken on May 26, 2017 at 1:28 PM by Kelsey Patel

## 2017-06-07 NOTE — PROGRESS NOTES
Please let patient know that his FIT test was positive. A referral to GI was placed on his last visit for screening colonoscopy and possible EGD given his history of bleeding ulcers. Please let patient know he should schedule his GI appointment at his earliest convenience.

## 2017-06-08 ENCOUNTER — TELEPHONE (OUTPATIENT)
Dept: FAMILY MEDICINE | Facility: CLINIC | Age: 52
End: 2017-06-08

## 2017-06-08 NOTE — TELEPHONE ENCOUNTER
[facility] phone call message- patient requesting results:    Test:     Date of test:     Additional Comments: Patient states he spoke to Cee NAQVI Yesterday about his results and he would like to speak to someone about it again. He states he wants to make sure it is what he heard from yesterday's phone call about his colonoscopy results. Please call and advise.     OK to leave a message on voice mail? Yes    Primary language: English      needed? No    Call taken on June 8, 2017 at 9:45 AM by Everette Wood

## 2017-06-08 NOTE — TELEPHONE ENCOUNTER
Spoke with Piter who was concerned as he had spoken to someone from the clinic and feared he had colon cancer. I had informed Piter his FIT screen came back positive, indicating there was blood in his stool. With this initial screening he will need to have further testing done, such as a colonoscopy and or EGD with his hx of bleeding ulcers. After our discussion Piter was more at ease in the mind. Would like to schedule a follow up in clinic as [facility] soonest availability was 4 more weeks out. Would like to come in and further discuss with physician. Beni LOPEZ

## 2017-06-12 ENCOUNTER — OFFICE VISIT (OUTPATIENT)
Dept: FAMILY MEDICINE | Facility: CLINIC | Age: 52
End: 2017-06-12

## 2017-06-12 VITALS
WEIGHT: 135 LBS | BODY MASS INDEX: 20.53 KG/M2 | HEART RATE: 101 BPM | SYSTOLIC BLOOD PRESSURE: 163 MMHG | OXYGEN SATURATION: 99 % | TEMPERATURE: 98.4 F | DIASTOLIC BLOOD PRESSURE: 123 MMHG

## 2017-06-12 DIAGNOSIS — F10.20 UNCOMPLICATED ALCOHOL DEPENDENCE (H): ICD-10-CM

## 2017-06-12 DIAGNOSIS — I10 BENIGN ESSENTIAL HYPERTENSION: Primary | ICD-10-CM

## 2017-06-12 DIAGNOSIS — K20.90 ESOPHAGITIS: ICD-10-CM

## 2017-06-12 PROBLEM — E46 CALORIC MALNUTRITION (H): Status: ACTIVE | Noted: 2017-04-07

## 2017-06-12 PROBLEM — K92.0 HEMATEMESIS: Status: ACTIVE | Noted: 2017-04-06

## 2017-06-12 LAB
BUN SERPL-MCNC: 11 MG/DL (ref 7–30)
CALCIUM SERPL-MCNC: 10.2 MG/DL (ref 8.5–10.4)
CHLORIDE SERPLBLD-SCNC: 95 MMOL/L (ref 94–109)
CO2 SERPL-SCNC: 30 MMOL/L (ref 20–32)
CREAT SERPL-MCNC: 1.2 MG/DL (ref 0.8–1.5)
EGFR CALCULATED (BLACK REFERENCE): 82.1 ML/MIN
EGFR CALCULATED (NON BLACK REFERENCE): 67.8 ML/MIN
GLUCOSE SERPL-MCNC: 100 MG/DL (ref 60–109)
POTASSIUM SERPL-SCNC: 4 MMOL/L (ref 3.4–5.3)
SODIUM SERPL-SCNC: 140 MMOL/L (ref 133–144)

## 2017-06-12 RX ORDER — NALTREXONE HYDROCHLORIDE 50 MG/1
50 TABLET, FILM COATED ORAL DAILY
Qty: 30 TABLET | Refills: 3 | Status: SHIPPED | OUTPATIENT
Start: 2017-06-12 | End: 2017-12-28

## 2017-06-12 RX ORDER — LISINOPRIL 20 MG/1
20 TABLET ORAL DAILY
Qty: 30 TABLET | Refills: 11 | Status: SHIPPED | OUTPATIENT
Start: 2017-06-12 | End: 2018-01-11 | Stop reason: ALTCHOICE

## 2017-06-12 RX ORDER — GABAPENTIN 300 MG/1
CAPSULE ORAL
Qty: 90 CAPSULE | Refills: 0 | Status: SHIPPED | OUTPATIENT
Start: 2017-06-12 | End: 2017-07-12

## 2017-06-12 NOTE — PROGRESS NOTES
Preceptor Attestation:  Patient's case reviewed and discussed with Castillo Carter MD Patient seen and discussed with the resident.. I agree with assessment and plan of care.  Supervising Physician:  Almas Thorpe MD  [facility]

## 2017-06-12 NOTE — PROGRESS NOTES
HPI:   Piter Gaines is a 51 year old male with PMH of alcohol abuse, recurrent UGIB d/t severe esophagitis who presents with:    F/U of a positive FIT test. He was seen here in clinic in April. I met him prior to that in the hospital for an episode of hematemesis and mild alcohol withdrawal. He had an EGD in April with [facility] and has LA Class D esophagitis. He does have f/u with [facility] for both repeat EGD and colonoscopy, so that will be his evaluation of his GI bleeding. He denies any current red blood in his stools.     Alcohol abuse: he was instructed to completely abstain from alcohol due to his esophagitis. However, he continues to drink but in less amounts. He reports having 1-3 beers and about a half pint of liquor per week. He was discharged from the hospital in April on Naltrexone to help with sobriety. He took this for about a month and didn't notice much effect. He has been to AA in the past and is not currently interested in going. He would like more medication help to quit drinking fully.     HTN: he was started on 10 mg Lisinopril in April. He has been taking this without side effects. He is asymptomatic with his HTN.          Review of Systems:   10 point ROS negative except as mentioned above              PMHX:     Patient Active Problem List   Diagnosis     Tobacco use     Caloric malnutrition (H)     Hematemesis     Essential hypertension     Esophagitis     Clostridium difficile infection     Alcohol abuse       Current Outpatient Prescriptions   Medication Sig Dispense Refill     omeprazole (PRILOSEC) 20 MG CR capsule Take 1 capsule (20 mg) by mouth 2 times daily 60 capsule 3     naltrexone (DEPADE;REVIA) 50 MG tablet Take 1 tablet (50 mg) by mouth daily 30 tablet 3     lisinopril (PRINIVIL/ZESTRIL) 20 MG tablet Take 1 tablet (20 mg) by mouth daily 30 tablet 11     gabapentin (NEURONTIN) 300 MG capsule Take 1 tablet (300 mg) every night for 1-3 days, then 1 tablet twice daily for 1-3 days,  "then 1 tablet three times daily 90 capsule 0     amLODIPine (NORVASC) 10 MG tablet Take 10 mg by mouth         No Known Allergies    Past medical, surgical, and family history reviewed and updated in Epic \"History\" tab and/or problem list.       Physical Exam:     Vitals:    06/12/17 1522 06/12/17 1528   BP: (!) 164/122 (!) 163/123   Pulse: 101    Temp: 98.4  F (36.9  C)    TempSrc: Oral    SpO2: 99%    Weight: 135 lb (61.2 kg)      Body mass index is 20.53 kg/(m^2).  VS reviewed    GEN: well appearing,  male, NAD  HEENT: well hydrated, NC  CV: RRR, no murmurs  RESP: CTAB  ABD: soft, NT, ND, thin  EXT: wwp, no edema  SKIN: clear  Psych: normal mood and affect, normal thought content.     Assessment and Plan     1. Benign essential hypertension: BP above goal today. Likely related to persistent alcohol abuse. We checked labs for monitoring on ACEI and they were normal. Recommended alcohol abstinence, healthy diet, and will increase lisinopril as below.   - lisinopril (PRINIVIL/ZESTRIL) 20 MG tablet; Take 1 tablet (20 mg) by mouth daily  Dispense: 30 tablet; Refill: 11  - Basic Metabolic Panel ([facility]) - Results < 1 hr    2. Uncomplicated alcohol dependence (H): He is motivated to quit drinking for health reasons. He was on naltrexone with side effects or perceived benefits and is OK with resuming. Will also add gabapentin as below.   - naltrexone (DEPADE;REVIA) 50 MG tablet; Take 1 tablet (50 mg) by mouth daily  Dispense: 30 tablet; Refill: 3  - gabapentin (NEURONTIN) 300 MG capsule; Take 1 tablet (300 mg) every night for 1-3 days, then 1 tablet twice daily for 1-3 days, then 1 tablet three times daily  Dispense: 90 capsule; Refill: 0    3. Esophagitis: LA class D. Continue PPI BID. F/U with [facility] as planned for repeat EGD sometime soon. Reviewed this pathology and the negative effects of alcohol in detail.   - omeprazole (PRILOSEC) 20 MG CR capsule; Take 1 capsule (20 mg) by mouth 2 times daily  " Dispense: 60 capsule; Refill: 3    F/U with me in about 2 weeks to recheck BP and alcoholism.     Options for treatment and follow-up care were reviewed with the patient and/or guardian. Piter Gaines and/or guardian engaged in the decision making process and verbalized understanding of the options discussed and agreed with the final plan.    Obed Carter MD  [facility] Resident  Pager # [phone]    Precepted with: Almas Thorpe MD

## 2017-06-12 NOTE — NURSING NOTE
FIT test done recently and was positive. Updated HM with this test. Will update again when colonoscopy done.   ---CThao,CMA

## 2017-06-12 NOTE — MR AVS SNAPSHOT
After Visit Summary   2017    Piter Gaines    MRN: [id number]           Patient Information     Date Of Birth          1965        Visit Information        Provider Department      2017 3:40 PM Castillo Carter MD [facility]        Today's Diagnoses     Benign essential hypertension    -  1    Uncomplicated alcohol dependence (H)        Esophagitis           Follow-ups after your visit        Who to contact     Please call your clinic at [phone] to:    Ask questions about your health    Make or cancel appointments    Discuss your medicines    Learn about your test results    Speak to your doctor   If you have compliments or concerns about an experience at your clinic, or if you wish to file a complaint, please contact [facility] Patient Relations at [phone] or email us at [email or web address]         Additional Information About Your Visit        MyChart Information     Storyzt is an electronic gateway that provides easy, online access to your medical records. With [employer], you can request a clinic appointment, read your test results, renew a prescription or communicate with your care team.     To sign up for Storyzt visit the website at [email or web address]   You will be asked to enter the access code listed below, as well as some personal information. Please follow the directions to create your username and password.     Your access code is: [email or web address]  Expires: 2017  3:12 AM     Your access code will  in 90 days. If you need help or a new code, please contact your [facility] or call [phone] for assistance.        Care EveryWhere ID     This is your Care EveryWhere ID. This could be used by other organizations to access your [place] medical records  [id number]        Your Vitals Were     Pulse Temperature Pulse Oximetry BMI (Body Mass Index)          101 98.4  F  (36.9  C) (Oral) 99% 20.53 kg/m2         Blood Pressure from Last 3 Encounters:   06/12/17 (!) 163/123   04/25/17 (!) 149/104   04/18/17 (!) 170/102    Weight from Last 3 Encounters:   06/12/17 135 lb (61.2 kg)   04/25/17 133 lb 6.4 oz (60.5 kg)   04/18/17 135 lb (61.2 kg)              We Performed the Following     Basic Metabolic Panel ([facility]) - Results < 1 hr          Today's Medication Changes          These changes are accurate as of: 6/12/17  4:23 PM.  If you have any questions, ask your nurse or doctor.               Start taking these medicines.        Dose/Directions    gabapentin 300 MG capsule   Commonly known as:  NEURONTIN   Used for:  Uncomplicated alcohol dependence (H)   Started by:  Castillo Carter MD        Take 1 tablet (300 mg) every night for 1-3 days, then 1 tablet twice daily for 1-3 days, then 1 tablet three times daily   Quantity:  90 capsule   Refills:  0         These medicines have changed or have updated prescriptions.        Dose/Directions    lisinopril 20 MG tablet   Commonly known as:  PRINIVIL/ZESTRIL   This may have changed:    - medication strength  - how much to take   Used for:  Benign essential hypertension   Changed by:  Castillo Carter MD        Dose:  20 mg   Take 1 tablet (20 mg) by mouth daily   Quantity:  30 tablet   Refills:  11       naltrexone 50 MG tablet   Commonly known as:  DEPADE;REVIA   This may have changed:  when to take this   Used for:  Uncomplicated alcohol dependence (H)   Changed by:  Castillo Carter MD        Dose:  50 mg   Take 1 tablet (50 mg) by mouth daily   Quantity:  30 tablet   Refills:  3       omeprazole 20 MG CR capsule   Commonly known as:  priLOSEC   This may have changed:  when to take this   Used for:  Esophagitis   Changed by:  Castillo Carter MD        Dose:  20 mg   Take 1 capsule (20 mg) by mouth 2 times daily   Quantity:  60 capsule   Refills:  3            Where to get your medicines      These medications  were sent to [id number] - [place], [place] - [address] AT [facility]  [address], [place] [address]     Phone:  [phone]     gabapentin 300 MG capsule    lisinopril 20 MG tablet    naltrexone 50 MG tablet    omeprazole 20 MG CR capsule                Primary Care Provider Office Phone # Fax #    Castillo Carter [phone]       [facility] [address] [facility] [address]        Thank you!     Thank you for choosing [facility]  for your care. Our goal is always to provide you with excellent care. Hearing back from our patients is one way we can continue to improve our services. Please take a few minutes to complete the written survey that you may receive in the mail after your visit with us. Thank you!             Your Updated Medication List - Protect others around you: Learn how to safely use, store and throw away your medicines at www.disposemymeds.org.          This list is accurate as of: 6/12/17  4:23 PM.  Always use your most recent med list.                   Brand Name Dispense Instructions for use    amLODIPine 10 MG tablet    NORVASC     Take 10 mg by mouth       gabapentin 300 MG capsule    NEURONTIN    90 capsule    Take 1 tablet (300 mg) every night for 1-3 days, then 1 tablet twice daily for 1-3 days, then 1 tablet three times daily       lisinopril 20 MG tablet    PRINIVIL/ZESTRIL    30 tablet    Take 1 tablet (20 mg) by mouth daily       naltrexone 50 MG tablet    DEPADE;REVIA    30 tablet    Take 1 tablet (50 mg) by mouth daily       omeprazole 20 MG CR capsule    priLOSEC    60 capsule    Take 1 capsule (20 mg) by mouth 2 times daily

## 2017-06-28 ENCOUNTER — CARE COORDINATION (OUTPATIENT)
Dept: FAMILY MEDICINE | Facility: CLINIC | Age: 52
End: 2017-06-28

## 2017-06-28 ENCOUNTER — OFFICE VISIT (OUTPATIENT)
Dept: FAMILY MEDICINE | Facility: CLINIC | Age: 52
End: 2017-06-28

## 2017-06-28 VITALS
HEART RATE: 80 BPM | SYSTOLIC BLOOD PRESSURE: 157 MMHG | OXYGEN SATURATION: 99 % | DIASTOLIC BLOOD PRESSURE: 105 MMHG | HEIGHT: 68 IN | BODY MASS INDEX: 21.98 KG/M2 | TEMPERATURE: 98.1 F | WEIGHT: 145 LBS

## 2017-06-28 DIAGNOSIS — K20.90 ESOPHAGITIS: ICD-10-CM

## 2017-06-28 DIAGNOSIS — F10.10 ALCOHOL ABUSE: ICD-10-CM

## 2017-06-28 DIAGNOSIS — I10 ESSENTIAL HYPERTENSION: Primary | ICD-10-CM

## 2017-06-28 DIAGNOSIS — F33.1 MODERATE EPISODE OF RECURRENT MAJOR DEPRESSIVE DISORDER (H): ICD-10-CM

## 2017-06-28 RX ORDER — CITALOPRAM HYDROBROMIDE 20 MG/1
TABLET ORAL
Qty: 30 TABLET | Refills: 3 | Status: SHIPPED | OUTPATIENT
Start: 2017-06-28 | End: 2017-08-25 | Stop reason: DRUGHIGH

## 2017-06-28 RX ORDER — AMLODIPINE BESYLATE 5 MG/1
5 TABLET ORAL DAILY
Qty: 90 TABLET | Refills: 3 | Status: SHIPPED | OUTPATIENT
Start: 2017-06-28 | End: 2017-12-27

## 2017-06-28 RX ORDER — MULTIPLE VITAMINS W/ MINERALS TAB 9MG-400MCG
1 TAB ORAL DAILY
Qty: 100 TABLET | Refills: 3 | Status: SHIPPED | OUTPATIENT
Start: 2017-06-28 | End: 2017-11-09

## 2017-06-28 NOTE — PROGRESS NOTES
Met with pt who had some questions about help with having a PCA  Offered pt assessment line phone number for [facility] - he currently has a friend who helps him, but wants to formalize it with a pca or paying friend for service    Needs help with cooking, cleaning, med reminders, laundry, and getting to appts    Talked about how he is getting medications now and was not using a med box, got him one for setting up meds 3x per day this could help him and pca    Also talked about his sobriety, so far he has not drank in 2 weeks, quit cold turkey, asked if he was in need of any supports- he said no, he has tried several support groups ect and would skip AA to go drinking  PCA( friend) is helping with presence, distraction, introducing new hobbies like gardening, he is choosing to stay away from people who he hung out with and drank with.  He watched his dad and brother go through dialysis and he himself is at risk. Right now is on track, but we talked about obstacles that can occur.  Encouraged him to call as needed for support or referrals.    Pt has an appt with [facility] next Thursday colonoscopy and another appt he reports    lbetz

## 2017-06-28 NOTE — PROGRESS NOTES
HPI:   Piter Gaines is a 51 year old male with PMH of alcoholism, HTN, esophagitis who presents with:    F/U of chronic problems. He was recently seen by me for alcoholism and HTN.     HTN: taking lisinopril without side effects. He complains of mild headaches, no other symptoms from HTN.     Alcoholism: Taking Naltrexone and gabapentin that were recently started. No alcohol for the past 2 weeks. He reports medications and his family friend is helping keep him sober. He is also tired of being sick often. He has been eating better and weight is up ten pounds. No further emesis.     Esophagitis: seeing [facility] July 14 for repeat EGD and colonoscopy. No signs of further bleeding.     He reports that he is depressed. He has decreased mood and anhedonia. No SI.     [place]          Review of Systems:   10 point ROS negative except as mentioned above              PMHX:     Patient Active Problem List   Diagnosis     Tobacco use     Caloric malnutrition (H)     Hematemesis     Essential hypertension     Esophagitis     Clostridium difficile infection     Alcohol abuse       Current Outpatient Prescriptions   Medication Sig Dispense Refill     citalopram (CELEXA) 20 MG tablet Take 1/2 tablet (10 mg) for 1-2 weeks, then increase to 1 tablet orally daily 30 tablet 3     multivitamin, therapeutic with minerals (MULTI-VITAMIN) TABS tablet Take 1 tablet by mouth daily 100 tablet 3     amLODIPine (NORVASC) 5 MG tablet Take 1 tablet (5 mg) by mouth daily 90 tablet 3     omeprazole (PRILOSEC) 20 MG CR capsule Take 1 capsule (20 mg) by mouth 2 times daily 60 capsule 3     naltrexone (DEPADE;REVIA) 50 MG tablet Take 1 tablet (50 mg) by mouth daily 30 tablet 3     lisinopril (PRINIVIL/ZESTRIL) 20 MG tablet Take 1 tablet (20 mg) by mouth daily 30 tablet 11     gabapentin (NEURONTIN) 300 MG capsule Take 1 tablet (300 mg) every night for 1-3 days, then 1 tablet twice daily for  "1-3 days, then 1 tablet three times daily 90 capsule 0     amLODIPine (NORVASC) 10 MG tablet Take 10 mg by mouth       SH: long history of alcohol and tobacco abuse. No use in past two weeks.     No Known Allergies    Past medical, surgical, and family history reviewed and updated in Epic \"History\" tab and/or problem list.       Physical Exam:     Vitals:    06/28/17 0946   BP: (!) 157/105   Pulse: 80   Temp: 98.1  F (36.7  C)   TempSrc: Oral   SpO2: 99%   Weight: 145 lb (65.8 kg)   Height: 5' 8\" (172.7 cm)     Body mass index is 22.05 kg/(m^2).  VS reviewed    GENERAL APPEARANCE: healthy, alert and no distress,  EYES: Eyes grossly normal to inspection,  PERRL  NECK: no adenopathy, no asymmetry, masses, or scars and thyroid normal to palpation  RESP: lungs clear to auscultation - no rales, rhonchi or wheezes  CV: regular rate and rhythm,  and no murmur, click,  rub or gallop  ABDOMEN: soft, nontender, without hepatosplenomegaly or masses  MS: extremities normal- no gross deformities noted  SKIN: no suspicious lesions or rashes  NEURO: Normal strength and tone, sensory exam grossly normal, mentation appears intact and speech normal  PSYCH: mood and affect normal/bright    Assessment and Plan     1. Essential hypertension: BP improved but still above goal. Add CCB to current regimen.   - amLODIPine (NORVASC) 5 MG tablet; Take 1 tablet (5 mg) by mouth daily  Dispense: 90 tablet; Refill: 3    2. Esophagitis: Continue f/u with [facility] for repeat EGD. Abstain from alcohol.     3. Alcohol abuse: Continue gabapentin and naltrexone. Recommended AA or similar support group. Will also have SW here provide resources for PCA services as able.   - multivitamin, therapeutic with minerals (MULTI-VITAMIN) TABS tablet; Take 1 tablet by mouth daily  Dispense: 100 tablet; Refill: 3    4. Moderate episode of recurrent major depressive disorder (H): Likely related to health problems and long term alcohol abuse. Start SSRI as below.   - " citalopram (CELEXA) 20 MG tablet; Take 1/2 tablet (10 mg) for 1-2 weeks, then increase to 1 tablet orally daily  Dispense: 30 tablet; Refill: 3    F/U here in about a month      Options for treatment and follow-up care were reviewed with the patient and/or guardian. Piter Gaines and/or guardian engaged in the decision making process and verbalized understanding of the options discussed and agreed with the final plan.    Obed Carter MD  [facility] Resident  Pager # [phone]    Precepted with: Rip Hagan MD

## 2017-06-28 NOTE — MR AVS SNAPSHOT
After Visit Summary   2017    Piter Gaines    MRN: [id number]           Patient Information     Date Of Birth          1965        Visit Information        Provider Department      2017 9:40 AM Castillo Carter MD [facility]        Today's Diagnoses     Essential hypertension    -  1    Esophagitis        Alcohol abuse        Moderate episode of recurrent major depressive disorder (H)           Follow-ups after your visit        Who to contact     Please call your clinic at [phone] to:    Ask questions about your health    Make or cancel appointments    Discuss your medicines    Learn about your test results    Speak to your doctor   If you have compliments or concerns about an experience at your clinic, or if you wish to file a complaint, please contact [facility] Patient Relations at [phone] or email us at [email or web address]         Additional Information About Your Visit        MyChart Information     [employer] is an electronic gateway that provides easy, online access to your medical records. With [employer], you can request a clinic appointment, read your test results, renew a prescription or communicate with your care team.     To sign up for [employer] visit the website at [email or web address]   You will be asked to enter the access code listed below, as well as some personal information. Please follow the directions to create your username and password.     Your access code is: [email or web address]  Expires: 2017  3:12 AM     Your access code will  in 90 days. If you need help or a new code, please contact your [facility] or call [phone] for assistance.        Care EveryWhere ID     This is your Care EveryWhere ID. This could be used by other organizations to access your [place] medical records  [id number]        Your Vitals Were     Pulse Temperature Height Pulse Oximetry  "BMI (Body Mass Index)       80 98.1  F (36.7  C) (Oral) 5' 8\" (172.7 cm) 99% 22.05 kg/m2        Blood Pressure from Last 3 Encounters:   06/28/17 (!) 157/105   06/12/17 (!) 163/123   04/25/17 (!) 149/104    Weight from Last 3 Encounters:   06/28/17 145 lb (65.8 kg)   06/12/17 135 lb (61.2 kg)   04/25/17 133 lb 6.4 oz (60.5 kg)              Today, you had the following     No orders found for display         Today's Medication Changes          These changes are accurate as of: 6/28/17 10:30 AM.  If you have any questions, ask your nurse or doctor.               Start taking these medicines.        Dose/Directions    citalopram 20 MG tablet   Commonly known as:  celeXA   Used for:  Moderate episode of recurrent major depressive disorder (H)   Started by:  Castillo Carter MD        Take 1/2 tablet (10 mg) for 1-2 weeks, then increase to 1 tablet orally daily   Quantity:  30 tablet   Refills:  3       multivitamin, therapeutic with minerals Tabs tablet   Used for:  Alcohol abuse, Esophagitis   Started by:  Castillo Carter MD        Dose:  1 tablet   Take 1 tablet by mouth daily   Quantity:  100 tablet   Refills:  3         These medicines have changed or have updated prescriptions.        Dose/Directions    * amLODIPine 10 MG tablet   Commonly known as:  NORVASC   This may have changed:  Another medication with the same name was added. Make sure you understand how and when to take each.   Changed by:  Germania Stratton APRN CNP        Dose:  10 mg   Take 10 mg by mouth   Refills:  0       * amLODIPine 5 MG tablet   Commonly known as:  NORVASC   This may have changed:  You were already taking a medication with the same name, and this prescription was added. Make sure you understand how and when to take each.   Used for:  Essential hypertension   Changed by:  Castillo Carter MD        Dose:  5 mg   Take 1 tablet (5 mg) by mouth daily   Quantity:  90 tablet   Refills:  3       * Notice:  This list " has 2 medication(s) that are the same as other medications prescribed for you. Read the directions carefully, and ask your doctor or other care provider to review them with you.         Where to get your medicines      These medications were sent to [id number] - [place], [place] - [address] [place] [address] AT [facility]  [address], [place] [address]     Phone:  [phone]     amLODIPine 5 MG tablet    citalopram 20 MG tablet    multivitamin, therapeutic with minerals Tabs tablet                Primary Care Provider Office Phone # Fax #    Castillo Raul Carter [phone]       [facility] [address] [facility] [address]        Equal Access to Services     NATHALIA CORDON : Hadii car ku hadasho Soomaali, waaxda luqadaha, qaybta kaalmada adeegyada, waxay idiin haybo younger. So [facility] [phone].    ATENCIÓN: Si habla español, tiene a castillo disposición servicios gratuitos de asistencia lingüística. [facility] [phone].    We comply with applicable federal civil rights laws and [place] laws. We do not discriminate on the basis of race, color, national origin, age, disability sex, sexual orientation or gender identity.            Thank you!     Thank you for choosing [facility]  for your care. Our goal is always to provide you with excellent care. Hearing back from our patients is one way we can continue to improve our services. Please take a few minutes to complete the written survey that you may receive in the mail after your visit with us. Thank you!             Your Updated Medication List - Protect others around you: Learn how to safely use, store and throw away your medicines at www.disposemymeds.org.          This list is accurate as of: 6/28/17 10:30 AM.  Always use your most recent med list.                   Brand Name Dispense Instructions for use Diagnosis    * amLODIPine 10 MG tablet    NORVASC      Take 10 mg by mouth        * amLODIPine 5 MG tablet    NORVASC    90 tablet    Take 1 tablet (5 mg) by mouth daily    Essential hypertension       citalopram 20 MG tablet    celeXA    30 tablet    Take 1/2 tablet (10 mg) for 1-2 weeks, then increase to 1 tablet orally daily    Moderate episode of recurrent major depressive disorder (H)       gabapentin 300 MG capsule    NEURONTIN    90 capsule    Take 1 tablet (300 mg) every night for 1-3 days, then 1 tablet twice daily for 1-3 days, then 1 tablet three times daily    Uncomplicated alcohol dependence (H)       lisinopril 20 MG tablet    PRINIVIL/ZESTRIL    30 tablet    Take 1 tablet (20 mg) by mouth daily    Benign essential hypertension       multivitamin, therapeutic with minerals Tabs tablet     100 tablet    Take 1 tablet by mouth daily    Alcohol abuse, Esophagitis       naltrexone 50 MG tablet    DEPADE;REVIA    30 tablet    Take 1 tablet (50 mg) by mouth daily    Uncomplicated alcohol dependence (H)       omeprazole 20 MG CR capsule    priLOSEC    60 capsule    Take 1 capsule (20 mg) by mouth 2 times daily    Esophagitis       * Notice:  This list has 2 medication(s) that are the same as other medications prescribed for you. Read the directions carefully, and ask your doctor or other care provider to review them with you.

## 2017-07-12 DIAGNOSIS — F10.20 UNCOMPLICATED ALCOHOL DEPENDENCE (H): ICD-10-CM

## 2017-07-12 RX ORDER — GABAPENTIN 300 MG/1
CAPSULE ORAL
Qty: 90 CAPSULE | Refills: 1 | Status: SHIPPED | OUTPATIENT
Start: 2017-07-12 | End: 2017-09-11

## 2017-07-12 NOTE — TELEPHONE ENCOUNTER
[facility] phone call message- patient requesting a refill:    Full Medication Name: Omeprazole 20mg, Naltrexone 50mg, & Gabapentin 300mg    Dose:     Pharmacy confirmed as   [id number] - [place], [place] - [address] AT [address] [place] [address]  Phone: [phone] Fax: [phone]  : Yes    Additional Comments: DON'T HAVE THE MED & ORDER OPTION. CHAIM ANDERSEN IS WORKING ON IT.      OK to leave a message on voice mail? Yes    Primary language: English      needed? No    Call taken on July 12, 2017 at 10:43 AM by Lizbeth Matias

## 2017-07-12 NOTE — TELEPHONE ENCOUNTER
Called and LM for patient to call back regarding refill and I did called and verify with [facility] and patient still has refill left for the Naltrexone 50 mg and Omeprazole at the pharmacy.The gabapentin 300 mg needs refill and will put through as refill and send to the provider to approve.

## 2017-07-24 ENCOUNTER — TRANSFERRED RECORDS (OUTPATIENT)
Dept: HEALTH INFORMATION MANAGEMENT | Facility: CLINIC | Age: 52
End: 2017-07-24

## 2017-07-25 ENCOUNTER — RECORDS - HEALTHEAST (OUTPATIENT)
Dept: ADMINISTRATIVE | Facility: OTHER | Age: 52
End: 2017-07-25

## 2017-07-25 ENCOUNTER — OFFICE VISIT (OUTPATIENT)
Dept: FAMILY MEDICINE | Facility: CLINIC | Age: 52
End: 2017-07-25

## 2017-07-25 VITALS
WEIGHT: 145.4 LBS | SYSTOLIC BLOOD PRESSURE: 138 MMHG | DIASTOLIC BLOOD PRESSURE: 91 MMHG | OXYGEN SATURATION: 95 % | BODY MASS INDEX: 20.81 KG/M2 | TEMPERATURE: 98.1 F | HEIGHT: 70 IN | HEART RATE: 83 BPM

## 2017-07-25 DIAGNOSIS — I10 ESSENTIAL HYPERTENSION: Primary | ICD-10-CM

## 2017-07-25 DIAGNOSIS — D64.9 ANEMIA, UNSPECIFIED TYPE: ICD-10-CM

## 2017-07-25 DIAGNOSIS — R07.9 CHEST PAIN, UNSPECIFIED TYPE: ICD-10-CM

## 2017-07-25 DIAGNOSIS — Z72.0 TOBACCO ABUSE: ICD-10-CM

## 2017-07-25 DIAGNOSIS — K20.90 ESOPHAGITIS: ICD-10-CM

## 2017-07-25 DIAGNOSIS — F33.9 RECURRENT MAJOR DEPRESSIVE DISORDER, REMISSION STATUS UNSPECIFIED (H): ICD-10-CM

## 2017-07-25 LAB
FOLATE SERPL-MCNC: 5.6 NG/ML
HCT VFR BLD AUTO: 38.5 % (ref 40–53)
HEMOGLOBIN: 12.2 G/DL (ref 13.3–17.7)
MCH RBC QN AUTO: 31.1 PG (ref 26.5–35)
MCHC RBC AUTO-ENTMCNC: 31.7 G/DL (ref 32–36)
MCV RBC AUTO: 98.1 FL (ref 78–100)
PLATELET # BLD AUTO: 164 K/UL (ref 150–450)
RBC # BLD AUTO: 3.92 M/UL (ref 4.4–5.9)
VIT B12 SERPL-MCNC: 431 PG/ML (ref 213–816)
WBC # BLD AUTO: 6 K/UL (ref 4–11)

## 2017-07-25 RX ORDER — NICOTINE 21 MG/24HR
1 PATCH, TRANSDERMAL 24 HOURS TRANSDERMAL EVERY 24 HOURS
Qty: 30 PATCH | Refills: 3 | Status: SHIPPED | OUTPATIENT
Start: 2017-07-25 | End: 2017-11-09

## 2017-07-25 NOTE — PROGRESS NOTES
HPI:       Piter Gaines is a 51 year old  male with a significant past medical history of depression, hypertension, alcoholism and tobacco abuse who presents for follow up of concern(s) listed below    Hypertension.   Hx of uncontrolled BP and was started on dual agent with Lisinopril 20mg and Amlodipine 5mg daily on 6/28. No issues with medications and has not had any more headaches that is typical with elevated BP. Denies any lightheadedness, dizziness, chest discomfort or other issues with medications. Does not check his BP regularly.    Depression.  Has been on Celexa 20mg since 6/28 without complications. Decrease mood and anhedonia which has unchanged. Denies any SI or HI. No issues with Celexa and denies any GI disturbances or intolerance to medications.    Smoking.  Smokes 1 pack every 2 days for the last year and has smoke for the last 7 years. Is interested in smoking cessation. Has tried Chantix but had difficulties due to nightmares and insomnia. Is willing to tried Chantix again but would like to try patches first. Does not like nicotine gum.    Chest Pain  Hx of two different type of chest pain. Intermittent chest pain associated with panic attack described as an achiness with dyspnea and tachycardiac. Would resolve without resolution of panic attack. No recent episodes. Secondary type of chest was described as intermittent sharp-achy pain localized to the left chest which last about 15-20 minutes and resolves with rest. Pain worsen with pressure on the chest wall and relief with abduction of the left shoulder. Is not associated with any panic attack or stressors. Has not tried any medications. Associated with exertional dyspnea and lightheadedness but no nausea, vomiting, left arm pain, numbness or weakness. Had a stress test in the past which did not shows any significance per Mr. Gaines. No history of heart disease but brother did die from MI at age 52. Smokes as detailed above.    EGD  Clearance  Was notified by Gastroenterology that he needed cardiac evaluation and possible stress test prior to undergoing EGD for ongoing GI bleed. Was diagnosed with esophagitis likely due to alcohol use. See details about chest pain as detailed above. Endorse ongoing dark-black stool without bright red blood or active bleeding. Has had normal looking stool but about 60% of them have been dark-blacken stools. Denies any hematemesis or hemoptysis. No lightheadedness without chest pain, no weakness or numbness.         PMHX:     Patient Active Problem List   Diagnosis     Tobacco use     Caloric malnutrition (H)     Hematemesis     Essential hypertension     Esophagitis     Clostridium difficile infection     Alcohol abuse       Current Outpatient Prescriptions   Medication Sig Dispense Refill     gabapentin (NEURONTIN) 300 MG capsule Take 1 tablet (300 mg) every night for 1-3 days, then 1 tablet twice daily for 1-3 days, then 1 tablet three times daily 90 capsule 1     citalopram (CELEXA) 20 MG tablet Take 1/2 tablet (10 mg) for 1-2 weeks, then increase to 1 tablet orally daily 30 tablet 3     multivitamin, therapeutic with minerals (MULTI-VITAMIN) TABS tablet Take 1 tablet by mouth daily 100 tablet 3     amLODIPine (NORVASC) 5 MG tablet Take 1 tablet (5 mg) by mouth daily 90 tablet 3     omeprazole (PRILOSEC) 20 MG CR capsule Take 1 capsule (20 mg) by mouth 2 times daily 60 capsule 3     naltrexone (DEPADE;REVIA) 50 MG tablet Take 1 tablet (50 mg) by mouth daily 30 tablet 3     lisinopril (PRINIVIL/ZESTRIL) 20 MG tablet Take 1 tablet (20 mg) by mouth daily 30 tablet 11     amLODIPine (NORVASC) 10 MG tablet Take 10 mg by mouth         Social History     Social History     Marital status:      Spouse name: N/A     Number of children: N/A     Years of education: N/A     Occupational History     Not on file.     Social History Main Topics     Smoking status: Current Some Day Smoker     Smokeless tobacco: Not on  "file     Alcohol use 8.4 oz/week     6 Cans of beer, 8 Shots of liquor per week     Drug use: No     Sexual activity: Not Currently     Other Topics Concern     Not on file     Social History Narrative        No Known Allergies    No results found for this or any previous visit (from the past 24 hour(s)).         Review of Systems:   C: NEGATIVE for fatigue, unexpected change in weight  I: NEGATIVE for worrisome rashes, moles or lesions  E: NEGATIVE for acute vision problems or changes  E/M: NEGATIVE for ear, mouth and throat problems  RESP: See HPI  CV: See HPI  GI: NEGATIVE for new onset or worsening nausea, vomiting or diarrhea  : NEGATIVE for frequency, dysuria, or hematuria  M: NEGATIVE for claudication, myalgias  N: NEGATIVE for weakness, dizziness, syncope, headaches  P: NEGATIVE for changes in mood or affect          Physical Exam:     Vitals:    07/25/17 0921   BP: (!) 138/91   Pulse: 83   Temp: 98.1  F (36.7  C)   TempSrc: Oral   SpO2: 95%   Weight: 145 lb 6.4 oz (66 kg)   Height: 5' 9.5\" (176.5 cm)     Body mass index is 21.16 kg/(m^2).    GENERAL APPEARANCE: healthy, alert and no distress,  EYES: Eyes grossly normal to inspection,  PERRL  NECK: no adenopathy, no asymmetry, masses, or scars and thyroid normal to palpation  RESP: lungs clear to auscultation - no rales, rhonchi or wheezes  CV: regular rate and rhythm,  and no murmur, click,  rub or gallop  ABDOMEN: soft, nontender, without hepatosplenomegaly or masses  MS: extremities normal- no gross deformities noted  SKIN: no suspicious lesions or rashes  NEURO: Normal strength and tone, sensory exam grossly normal, mentation appears intact and speech normal    Assessment and Plan     1. Essential hypertension  BP within goal today. Has tolerated antihypertensive (Lisinopril and Amlodipine) without complications. No side effects and denies any further episodes of headaches. Will continue current course and repeat BP at next visit.  - Continue " Lisinopril 20mg and Amlodipine 5mg daily    2. Recurrent major depressive disorder, remission status unspecified (H)  Endorse no significant changes in mood but denies any worsening episode or suicidal/homicidal ideations. Has been compliant with citalopram 20mg since initiated 4 weeks ago. PHQ9 score shows some improvement (3 points). Given good tolerance with some improvement objectively, will continue with current regiment and reassess in 4 weeks.  - Continue Citalopram 20mg daily  - Follow up in Clinic in 4 weeks for reassessment of depression  - May consider titration or changing to another SSRI for better treatment  - Provided Depression Self Care Plan and Survival Kit.    3. Chest pain, unspecified type  Likely related to panic attacks and musculoskeletal etiology given worsening with palpation and relief with particular left shoulder movements. MI or cardiac etiologies cannot be rule out and possible given exertional dyspnea and symptom relief with rest. Discuss with Mr. Cruz possibilities and plans for cardiac workup starting with Exercise stress test with close following afterwards.  - Exercise Stress test; Future    4. Esophagitis  Ongoing bleeding (melanoic stools) but subjectively improved. Has scheduled EGD but was deferred until cardiac workup is completed. Will plan on completing EGD following cardaic workup but hold off on rescheduling until results return. Continue to abstinence from EtOH use.   - Will referral to GI for EGD again once stress test is completed and cleared to procedure.    5. Anemia, unspecified type  Last CBC shows macrocytic anemia (hgb of 12.6 with MCV of 102). Likely due to nutritional deficiency secondary to long history of alcohol use. CBC, Vit B12 and Folate today shows proper nutritional levels of each with a normocytic anemia (hgb of 12.2) likely anemia of chronic disease with ongoing slow GI bleed secondary to esophagitis from alcohol use.  - Continue with plans for EGD  as above  - Continue to monitor for active bleed at each visit and repeat CBC.    6. Tobacco abuse  7 year smoking history with 0.5 ppd use for the last year. Would like to quit and has tried cessation in the past with nicotine gum and Chantix. While on chantix was having difficulties with nightmares and insomnia. Is willing to tried again but would like to try Nicotine replacement but the patch and not the gums. Provided support and encouragement with prescription for nicotine patches.  - nicotine (NICODERM CQ) 14 MG/24HR 24 hr patch; Place 1 patch onto the skin every 24 hours  Dispense: 30 patch; Refill: 3    Options for treatment and follow-up care were reviewed with the patient and/or guardian. Piter Gaines and/or guardian engaged in the decision making process and verbalized understanding of the options discussed and agreed with the final plan.      Edmund Dey MD  [facility] - PGY1  Pager: [phone]    Precepted today with: Shy Malloy MD

## 2017-07-25 NOTE — PATIENT INSTRUCTIONS
Follow up with Stress Test  Return to clinic 4 weeks  Will send letter about results    Depression Self Care Plan / Survival Kit    Self-Care for Depression  Here s the deal. Your body and mind are really not as separate as most people think.  What you do and think affects how you feel and how you feel influences what you do and think. This means if you do things that people who feel good do, it will help you feel better.  Sometimes this is all it takes.  There is also a place for medication and therapy depending on how severe your depression is, so be sure to consult with your medical provider and/ or Behavioral Health Consultant if your symptoms are worsening or not improving.     In order to better manage my stress, I will:    Exercise  Get some form of exercise, every day. This will help reduce pain and release endorphins, the  feel good  chemicals in your brain. This is almost as good as taking antidepressants!  This is not the same as joining a gym and then never going! (they count on that by the way ) It can be as simple as just going for a walk or doing some gardening, anything that will get you moving.      Hygiene   Maintain good hygiene (Get out of bed in the morning, Make your bed, Brush your teeth, Take a shower, and Get dressed like you were going to work, even if you are unemployed).  If your clothes don't fit try to get ones that do.    Diet  I will strive to eat foods that are good for me, drink plenty of water, and avoid excessive sugar, caffeine, alcohol, and other mood-altering substances.  Some foods that are helpful in depression are: complex carbohydrates, B vitamins, flaxseed, fish or fish oil, fresh fruits and vegetables.    Psychotherapy  I agree to participate in Individual Therapy (if recommended).    Medication  If prescribed medications, I agree to take them.  Missing doses can result in serious side effects.  I understand that drinking alcohol, or other illicit drug use, may cause  potential side effects.  I will not stop my medication abruptly without first discussing it with my provider.    Staying Connected With Others  I will stay in touch with my friends, family members, and my primary care provider/team.    Use your imagination  Be creative.  We all have a creative side; it doesn t matter if it s oil painting, sand castles, or mud pies! This will also kick up the endorphins.    Witness Beauty  (AKA stop and smell the roses) Take a look outside, even in mid-winter. Notice colors, textures. Watch the squirrels and birds.     Service to others  Be of service to others.  There is always someone else in need.  By helping others we can  get out of ourselves  and remember the really important things.  This also provides opportunities for practicing all the other parts of the program.    Humor  Laugh and be silly!  Adjust your TV habits for less news and crime-drama and more comedy.    Control your stress  Try breathing deep, massage therapy, biofeedback, and meditation. Find time to relax each day.     Referral for ( TEST )  :      Exercise Stress Test  LOCATION/PLACE/Provider :    [facility]  DATE & TIME :     8-1-2017 at 8:00am  PHONE :     [phone]  FAX :     [phone]  ADDITIONAL INFORMATION :     NPO 4 hours before test  Appointment made by clinic staff/:    SILVIA

## 2017-07-25 NOTE — LETTER
July 26, 2017      Piter Gaines  [address] [place] [address]        Dear Piter,    Thank you for visiting our clinic. The results of your blood work has return and was significant for low blood volume (anemia) which you chronic have due to history of alcohol use and ongoing gastrointestinal bleed. Your anemia is NOT any worse then previously testing. Your nutritional levels are also normal which indicates some improvement in your nutrition. Continue to stay away from alcohol use and follow up in our clinic as schedule.    Please see below for your test results.    Resulted Orders   CBC with Plt (P )   Result Value Ref Range    WBC 6.0 4.0 - 11.0 K/uL    RBC 3.92 (L) 4.40 - 5.90 M/uL    Hemoglobin 12.2 (L) 13.3 - 17.7 g/dL    Hematocrit 38.5 (L) 40.0 - 53.0 %    MCV 98.1 78.0 - 100.0 fL    MCH 31.1 26.5 - 35.0 pg    MCHC 31.7 (L) 32.0 - 36.0 g/dL    Platelets 164.0 150.0 - 450.0 K/uL   Vitamin B12 ([facility])   Result Value Ref Range    Vitamin B12 431 213 - 816 pg/mL    Narrative    Test performed by:  [facility] [address] [place] [address]   Folate  Serum ([facility])   Result Value Ref Range    Folate 5.6 >=3.5 ng/mL    Narrative    Test performed by:  [facility] [address] [place] [address]       If you have any questions, please call the clinic to make an appointment.    Sincerely,    Edmund Dey MD

## 2017-07-25 NOTE — PROGRESS NOTES
Preceptor Attestation:  Patient's case reviewed and discussed with  Patient seen and discussed with the resident..  I agree with written assessment and plan of care.  Supervising Physician:  Whit Malloy MD  [facility]

## 2017-07-25 NOTE — MR AVS SNAPSHOT
After Visit Summary   7/25/2017    Piter Gaines    MRN: [id number]           Patient Information     Date Of Birth          1965        Visit Information        Provider Department      7/25/2017 9:20 AM Edmund Dey MD [facility]        Today's Diagnoses     Essential hypertension    -  1    Tobacco abuse        Chest pain, unspecified type        Anemia, unspecified type          Care Instructions    Follow up with Stress Test  Return to clinic 4 weeks  Will send letter about results    Depression Self Care Plan / Survival Kit    Self-Care for Depression  Here s the deal. Your body and mind are really not as separate as most people think.  What you do and think affects how you feel and how you feel influences what you do and think. This means if you do things that people who feel good do, it will help you feel better.  Sometimes this is all it takes.  There is also a place for medication and therapy depending on how severe your depression is, so be sure to consult with your medical provider and/ or Behavioral Health Consultant if your symptoms are worsening or not improving.     In order to better manage my stress, I will:    Exercise  Get some form of exercise, every day. This will help reduce pain and release endorphins, the  feel good  chemicals in your brain. This is almost as good as taking antidepressants!  This is not the same as joining a gym and then never going! (they count on that by the way ) It can be as simple as just going for a walk or doing some gardening, anything that will get you moving.      Hygiene   Maintain good hygiene (Get out of bed in the morning, Make your bed, Brush your teeth, Take a shower, and Get dressed like you were going to work, even if you are unemployed).  If your clothes don't fit try to get ones that do.    Diet  I will strive to eat foods that are good for me, drink plenty of water, and avoid excessive sugar, caffeine, alcohol, and  other mood-altering substances.  Some foods that are helpful in depression are: complex carbohydrates, B vitamins, flaxseed, fish or fish oil, fresh fruits and vegetables.    Psychotherapy  I agree to participate in Individual Therapy (if recommended).    Medication  If prescribed medications, I agree to take them.  Missing doses can result in serious side effects.  I understand that drinking alcohol, or other illicit drug use, may cause potential side effects.  I will not stop my medication abruptly without first discussing it with my provider.    Staying Connected With Others  I will stay in touch with my friends, family members, and my primary care provider/team.    Use your imagination  Be creative.  We all have a creative side; it doesn t matter if it s oil painting, sand castles, or mud pies! This will also kick up the endorphins.    Witness Beauty  (AKA stop and smell the roses) Take a look outside, even in mid-winter. Notice colors, textures. Watch the squirrels and birds.     Service to others  Be of service to others.  There is always someone else in need.  By helping others we can  get out of ourselves  and remember the really important things.  This also provides opportunities for practicing all the other parts of the program.    Humor  Laugh and be silly!  Adjust your TV habits for less news and crime-drama and more comedy.    Control your stress  Try breathing deep, massage therapy, biofeedback, and meditation. Find time to relax each day.           Follow-ups after your visit        Follow-up notes from your care team     Return in about 4 weeks (around 8/22/2017), or Depression, Stress Test F/u.      Who to contact     Please call your clinic at [phone] to:    Ask questions about your health    Make or cancel appointments    Discuss your medicines    Learn about your test results    Speak to your doctor   If you have compliments or concerns about an experience at your clinic, or if you wish to  "file a complaint, please contact [facility] Patient Relations at [phone] or email us at [email or web address]         Additional Information About Your Visit        Berry Kitchen Information     Berry Kitchen is an electronic gateway that provides easy, online access to your medical records. With Berry Kitchen, you can request a clinic appointment, read your test results, renew a prescription or communicate with your care team.     To sign up for Berry Kitchen visit the website at [email or web address]   You will be asked to enter the access code listed below, as well as some personal information. Please follow the directions to create your username and password.     Your access code is: [email or web address]  Expires: 10/23/2017 10:27 AM     Your access code will  in 90 days. If you need help or a new code, please contact your [facility] or call [phone] for assistance.        Care EveryWhere ID     This is your Care EveryWhere ID. This could be used by other organizations to access your [facility] medical records  [id number]        Your Vitals Were     Pulse Temperature Height Pulse Oximetry BMI (Body Mass Index)       83 98.1  F (36.7  C) (Oral) 5' 9.5\" (176.5 cm) 95% 21.16 kg/m2        Blood Pressure from Last 3 Encounters:   17 (!) 138/91   17 (!) 157/105   17 (!) 163/123    Weight from Last 3 Encounters:   17 145 lb 6.4 oz (66 kg)   17 145 lb (65.8 kg)   17 135 lb (61.2 kg)              We Performed the Following     CBC with Plt ([facility])     Folate  Serum ([facility])     Vitamin B12 ([facility])          Today's Medication Changes          These changes are accurate as of: 17 10:29 AM.  If you have any questions, ask your nurse or doctor.               Start taking these medicines.        Dose/Directions    nicotine 14 MG/24HR 24 hr patch   Commonly known as:  NICODERM CQ   Used for:  Tobacco abuse   Started by:  " Edmund Dey MD        Dose:  1 patch   Place 1 patch onto the skin every 24 hours   Quantity:  30 patch   Refills:  3            Where to get your medicines      These medications were sent to [id number] - [place], [place] - [address] AT [facility]  [address], [place] [address]     Phone:  [phone]     nicotine 14 MG/24HR 24 hr patch                Primary Care Provider    Edmund Dey MD       No address on file        Equal Access to Services     [facility]: Hadii aad ku hadasho Soomaali, waaxda luqadaha, qaybta kaalmada adeegyada, waxay idiin hayaan adeeg kharaangel laVíctoraamariya . So [facility] [phone].    ATENCIÓN: Si habla español, tiene a catsillo disposición servicios gratuitos de asistencia lingüística. LlUniversity [facility] [phone].    We comply with applicable federal civil rights laws and [place] laws. We do not discriminate on the basis of race, color, national origin, age, disability sex, sexual orientation or gender identity.            Thank you!     Thank you for choosing [facility]  for your care. Our goal is always to provide you with excellent care. Hearing back from our patients is one way we can continue to improve our services. Please take a few minutes to complete the written survey that you may receive in the mail after your visit with us. Thank you!             Your Updated Medication List - Protect others around you: Learn how to safely use, store and throw away your medicines at www.disposemymeds.org.          This list is accurate as of: 7/25/17 10:29 AM.  Always use your most recent med list.                   Brand Name Dispense Instructions for use Diagnosis    * amLODIPine 10 MG tablet    NORVASC     Take 10 mg by mouth        * amLODIPine 5 MG tablet    NORVASC    90 tablet    Take 1 tablet (5 mg) by mouth daily    Essential hypertension       citalopram 20 MG tablet    celeXA    30 tablet    Take 1/2 tablet  (10 mg) for 1-2 weeks, then increase to 1 tablet orally daily    Moderate episode of recurrent major depressive disorder (H)       gabapentin 300 MG capsule    NEURONTIN    90 capsule    Take 1 tablet (300 mg) every night for 1-3 days, then 1 tablet twice daily for 1-3 days, then 1 tablet three times daily    Uncomplicated alcohol dependence (H)       lisinopril 20 MG tablet    PRINIVIL/ZESTRIL    30 tablet    Take 1 tablet (20 mg) by mouth daily    Benign essential hypertension       multivitamin, therapeutic with minerals Tabs tablet     100 tablet    Take 1 tablet by mouth daily    Alcohol abuse, Esophagitis       naltrexone 50 MG tablet    DEPADE;REVIA    30 tablet    Take 1 tablet (50 mg) by mouth daily    Uncomplicated alcohol dependence (H)       nicotine 14 MG/24HR 24 hr patch    NICODERM CQ    30 patch    Place 1 patch onto the skin every 24 hours    Tobacco abuse       omeprazole 20 MG CR capsule    priLOSEC    60 capsule    Take 1 capsule (20 mg) by mouth 2 times daily    Esophagitis       * Notice:  This list has 2 medication(s) that are the same as other medications prescribed for you. Read the directions carefully, and ask your doctor or other care provider to review them with you.

## 2017-07-28 ENCOUNTER — RECORDS - HEALTHEAST (OUTPATIENT)
Dept: ADMINISTRATIVE | Facility: OTHER | Age: 52
End: 2017-07-28

## 2017-08-01 ENCOUNTER — HOSPITAL ENCOUNTER (OUTPATIENT)
Dept: CARDIOLOGY | Facility: HOSPITAL | Age: 52
Discharge: HOME | End: 2017-08-01
Attending: STUDENT IN AN ORGANIZED HEALTH CARE EDUCATION/TRAINING PROGRAM

## 2017-08-01 DIAGNOSIS — R07.9 CHEST PAIN: ICD-10-CM

## 2017-08-01 LAB
CV STRESS CURRENT BP HE: NORMAL
CV STRESS CURRENT HR HE: 100
CV STRESS CURRENT HR HE: 102
CV STRESS CURRENT HR HE: 108
CV STRESS CURRENT HR HE: 110
CV STRESS CURRENT HR HE: 112
CV STRESS CURRENT HR HE: 114
CV STRESS CURRENT HR HE: 116
CV STRESS CURRENT HR HE: 116
CV STRESS CURRENT HR HE: 74
CV STRESS CURRENT HR HE: 79
CV STRESS CURRENT HR HE: 79
CV STRESS CURRENT HR HE: 80
CV STRESS CURRENT HR HE: 82
CV STRESS CURRENT HR HE: 83
CV STRESS CURRENT HR HE: 84
CV STRESS CURRENT HR HE: 86
CV STRESS CURRENT HR HE: 91
CV STRESS CURRENT HR HE: 95
CV STRESS CURRENT HR HE: 96
CV STRESS DEVIATION TIME HE: NORMAL
CV STRESS ECHO PERCENT HR HE: NORMAL
CV STRESS EXERCISE STAGE HE: NORMAL
CV STRESS EXERCISE STAGE REACHED HE: NORMAL
CV STRESS FINAL RESTING BP HE: NORMAL
CV STRESS FINAL RESTING HR HE: 82
CV STRESS MAX HR HE: 116
CV STRESS MAX TREADMILL GRADE HE: 12
CV STRESS MAX TREADMILL SPEED HE: 2.5
CV STRESS PEAK DIA BP HE: NORMAL
CV STRESS PEAK SYS BP HE: NORMAL
CV STRESS PHASE HE: NORMAL
CV STRESS PROTOCOL HE: NORMAL
CV STRESS RESTING PT POSITION HE: NORMAL
CV STRESS RESTING PT POSITION HE: NORMAL
CV STRESS ST DEVIATION AMOUNT HE: NORMAL
CV STRESS ST DEVIATION ELEVATION HE: NORMAL
CV STRESS ST EVELATION AMOUNT HE: NORMAL
CV STRESS TEST TYPE HE: NORMAL
CV STRESS TOTAL STAGE TIME MIN 1 HE: NORMAL
STRESS ECHO BASELINE BP: NORMAL
STRESS ECHO BASELINE HR: 82
STRESS ECHO CALCULATED PERCENT HR: 69 %
STRESS ECHO LAST STRESS BP: NORMAL
STRESS ECHO LAST STRESS HR: 112
STRESS ECHO POST ESTIMATED WORKLOAD: 4.6
STRESS ECHO POST EXERCISE DUR MIN: 3
STRESS ECHO POST EXERCISE DUR SEC: 7
STRESS ECHO TARGET HR: 116.61

## 2017-08-25 ENCOUNTER — OFFICE VISIT (OUTPATIENT)
Dept: FAMILY MEDICINE | Facility: CLINIC | Age: 52
End: 2017-08-25

## 2017-08-25 VITALS
DIASTOLIC BLOOD PRESSURE: 77 MMHG | WEIGHT: 149.8 LBS | HEART RATE: 89 BPM | RESPIRATION RATE: 18 BRPM | SYSTOLIC BLOOD PRESSURE: 128 MMHG | HEIGHT: 69 IN | BODY MASS INDEX: 22.19 KG/M2 | TEMPERATURE: 97.9 F | OXYGEN SATURATION: 97 %

## 2017-08-25 DIAGNOSIS — F33.1 MODERATE EPISODE OF RECURRENT MAJOR DEPRESSIVE DISORDER (H): Primary | ICD-10-CM

## 2017-08-25 DIAGNOSIS — R06.09 EXERTIONAL DYSPNEA: ICD-10-CM

## 2017-08-25 DIAGNOSIS — I10 ESSENTIAL HYPERTENSION: ICD-10-CM

## 2017-08-25 DIAGNOSIS — Z23 ENCOUNTER FOR IMMUNIZATION: ICD-10-CM

## 2017-08-25 DIAGNOSIS — R07.9 CHEST PAIN, UNSPECIFIED TYPE: ICD-10-CM

## 2017-08-25 RX ORDER — CITALOPRAM HYDROBROMIDE 40 MG/1
40 TABLET ORAL DAILY
Qty: 30 TABLET | Refills: 1 | Status: SHIPPED | OUTPATIENT
Start: 2017-08-25 | End: 2017-10-09

## 2017-08-25 NOTE — PROGRESS NOTES
Preceptor Attestation:  Patient's case reviewed and discussed with Edmund Dey MD resident and I evaluated the patient. I agree with written assessment and plan of care.  Supervising Physician:Ryan Rush MD    [facility]

## 2017-08-25 NOTE — PROGRESS NOTES
HPI:       Piter Gaines is a 51 year old  male with a significant past medical history of hypertension, depression,alcoholism, tobacco use, peptic ulcer disease, anemia and ongoing GI bleeding who presents for follow up of concern(s) listed below    Hypertension  BP has been stable and normal since starting Lisinopril 20mg and Amlodipine 5mg. No issues with taking medications. Denies any episode of elevated BP outside of stress test and any associated symptoms such as lightheadedness, dizziness, headaches, vision changes or hearing changes. No acute concerns.    Depression  Has been on Celexa for 2 months with no relief. Endorse persistent low mood with associated increase in sleep (more naps), intermittent feeling of guilt, decrease appetite and no motivation for daily activities. Denies any homicidal or suicidal ideations. No issues with medications. Has not been to counseling but is willing to try.    Chest Pain/SOB  Described one type of pain which is constant at baseline which is exacerbated by palpation or pressure of the left anterior chest. Has been taking tylenol without relief but has not tried any other medications. Has not tried ice/heat or alternative therapy. Denies any associated radiation of pain, nausea, vomiting, dizziness or lightheadedness. Did have some shortness of breath particularly with ambulating 1 city block. Has not improve since last visit. Had a stress EKG ordered but during testing, only lasted 2-3 minute before canceling due to mild dyspnea with severe bilateral lower extremity pain.     EGD Clearance  Was called from gastroenterology clinic and had schedule endoscopy and colonoscopy for upcoming month. Did notify them of the failed stress test and unclear why they wanted to proceed given instruction to have cardiac symptoms clear prior to procedure.         PMHX:     Patient Active Problem List   Diagnosis     Tobacco use     Caloric malnutrition (H)     Hematemesis      Essential hypertension     Esophagitis     Clostridium difficile infection     Alcohol abuse       Current Outpatient Prescriptions   Medication Sig Dispense Refill     nicotine (NICODERM CQ) 14 MG/24HR 24 hr patch Place 1 patch onto the skin every 24 hours 30 patch 3     gabapentin (NEURONTIN) 300 MG capsule Take 1 tablet (300 mg) every night for 1-3 days, then 1 tablet twice daily for 1-3 days, then 1 tablet three times daily 90 capsule 1     citalopram (CELEXA) 20 MG tablet Take 1/2 tablet (10 mg) for 1-2 weeks, then increase to 1 tablet orally daily 30 tablet 3     multivitamin, therapeutic with minerals (MULTI-VITAMIN) TABS tablet Take 1 tablet by mouth daily 100 tablet 3     amLODIPine (NORVASC) 5 MG tablet Take 1 tablet (5 mg) by mouth daily 90 tablet 3     omeprazole (PRILOSEC) 20 MG CR capsule Take 1 capsule (20 mg) by mouth 2 times daily 60 capsule 3     naltrexone (DEPADE;REVIA) 50 MG tablet Take 1 tablet (50 mg) by mouth daily 30 tablet 3     lisinopril (PRINIVIL/ZESTRIL) 20 MG tablet Take 1 tablet (20 mg) by mouth daily 30 tablet 11     amLODIPine (NORVASC) 10 MG tablet Take 10 mg by mouth         Social History     Social History     Marital status:      Spouse name: N/A     Number of children: N/A     Years of education: N/A     Occupational History     Not on file.     Social History Main Topics     Smoking status: Current Some Day Smoker     Smokeless tobacco: Not on file     Alcohol use 8.4 oz/week     6 Cans of beer, 8 Shots of liquor per week     Drug use: No     Sexual activity: Not Currently     Other Topics Concern     Not on file     Social History Narrative        No Known Allergies    No results found for this or any previous visit (from the past 24 hour(s)).         Review of Systems:   12-point system reviewed and is negative except for HPI.          Physical Exam:     Vitals:    08/25/17 0915   BP: 128/77   Pulse: 89   Resp: 18   Temp: 97.9  F (36.6  C)   TempSrc: Oral   SpO2: 97%  "  Weight: 149 lb 12.8 oz (67.9 kg)   Height: 5' 8.5\" (174 cm)     Body mass index is 22.45 kg/(m^2).    GENERAL APPEARANCE: healthy, alert and no distress,  NECK: no adenopathy, no asymmetry, masses, or scars and thyroid normal to palpation  RESP: lungs clear to auscultation - no rales, rhonchi or wheezes  CV: regular rate and rhythm,  and no murmur, click,  rub or gallop  ABDOMEN: soft, nontender, without hepatosplenomegaly or masses  MS: extremities normal- no gross deformities noted  SKIN: no suspicious lesions or rashes  NEURO: Normal strength and tone, sensory exam grossly normal, mentation appears intact and speech normal  PSYCH: mood and affect normal/bright    Assessment and Plan     1. Moderate episode of recurrent major depressive disorder (H)  Previously on 20mg of Citalopram for 2 month without relief of depressed mood. Endorse persistent low mood with associated no motivation, decrease appetite, increase sleep and intermittent guilt. Denies HI or SI. Discuss importance of counseling in addition to medication to help with depression. Willing to try counseling and okay with increasing medication before trying a different one.  - Increase Citalopram to 40mg daily  - Mental Health Referral    2. Chest pain, unspecified type.  Likely musculoskeletal in nature given worsening pain with palpation on exam and pressure at home. Worsen with certain arm movements and no radiation of pain. Has been taking tylenol without relief.  - Continue with tylenol as needed for discomfort  - Try alternating ice and heat therapy with some stretching    3. Exertional Dyspnea  Intermittent associated chest pain which worsen with palpation. No associated chest pain recently. Able to ambulated 1 city block before needing to stop for several minutes to catch his breath. Has exercise stress test which was not complete due to bilateral leg pain likely PVD. Given non-diagnostic stress test, stress cardiac MRI ordered. Will hold off on " GI procedure until stress test is complete. Patient instructed to call and update GI.   - MR Cardiac w/o Cont Flow Velcty & Stress; Future    4. Essential hypertension  BP normal and stable at today's visit. Has been compliant with Lisinopril and Amlodipine without complications.  - Continue Lisinopril 20mg and Amlodipine 5mg daily    5. Encounter for immunization  - ADMIN VACCINE, INITIAL  - TDAP VACCINE (BOOSTRIX)    Options for treatment and follow-up care were reviewed with the patient and/or guardian. Piter Gaines and/or guardian engaged in the decision making process and verbalized understanding of the options discussed and agreed with the final plan.    Edmund Dey MD  [facility]  Pager: [phone]    Precepted today with: Ryan Rush MD

## 2017-08-25 NOTE — MR AVS SNAPSHOT
After Visit Summary   8/25/2017    Piter Gaines    MRN: [id number]           Patient Information     Date Of Birth          1965        Visit Information        Provider Department      8/25/2017 9:20 AM Edmund Dey MD [facility]        Today's Diagnoses     Depression, unspecified depression type    -  1    Chest pain, unspecified type        Moderate episode of recurrent major depressive disorder (H)           Follow-ups after your visit        Additional Services     Mental Health Referral - [facility]       Use this form for behavioral health consults and assessments. The referral coordinator will help to determine whether patients are best served by clinic behavioral health staff or by community providers.    Type of referral(s) requested (indicate all that apply):  Adult Psychiatry--for diagnosis and medication management    Reason for referral: Depression    Currently receiving mental health services (if 'Yes', what services and why today's referral?): No  Currently having suicidal thoughts: No  Previous psych hospitalization: Unknown    Please provide data for below screening tools if available.      needed: No  Language: English                  Future tests that were ordered for you today     Open Future Orders        Priority Expected Expires Ordered    Dobutamine Stress Echocardiogram Routine  8/25/2018 8/25/2017            Who to contact     Please call your clinic at [phone] to:    Ask questions about your health    Make or cancel appointments    Discuss your medicines    Learn about your test results    Speak to your doctor   If you have compliments or concerns about an experience at your clinic, or if you wish to file a complaint, please contact [facility] Patient Relations at [phone] or email us at [email or web address]         Additional Information About Your Visit        MyChart Information      "Qminder is an electronic gateway that provides easy, online access to your medical records. With Qminder, you can request a clinic appointment, read your test results, renew a prescription or communicate with your care team.     To sign up for Qminder visit the website at [email or web address]   You will be asked to enter the access code listed below, as well as some personal information. Please follow the directions to create your username and password.     Your access code is: [email or web address]  Expires: 10/23/2017 10:27 AM     Your access code will  in 90 days. If you need help or a new code, please contact your [facility] or call [phone] for assistance.        Care EveryWhere ID     This is your Care EveryWhere ID. This could be used by other organizations to access your [place] medical records  [id number]        Your Vitals Were     Pulse Temperature Respirations Height Pulse Oximetry BMI (Body Mass Index)    89 97.9  F (36.6  C) (Oral) 18 5' 8.5\" (174 cm) 97% 22.45 kg/m2       Blood Pressure from Last 3 Encounters:   17 128/77   17 (!) 138/91   17 (!) 157/105    Weight from Last 3 Encounters:   17 149 lb 12.8 oz (67.9 kg)   17 145 lb 6.4 oz (66 kg)   17 145 lb (65.8 kg)              We Performed the Following     Mental Health Referral - [facility]          Today's Medication Changes          These changes are accurate as of: 17  9:59 AM.  If you have any questions, ask your nurse or doctor.               These medicines have changed or have updated prescriptions.        Dose/Directions    amLODIPine 5 MG tablet   Commonly known as:  NORVASC   This may have changed:  Another medication with the same name was removed. Continue taking this medication, and follow the directions you see here.   Used for:  Essential hypertension   Changed by:  Castillo Carter MD        Dose:  5 mg   Take 1 tablet (5 mg) by mouth daily "   Quantity:  90 tablet   Refills:  3       citalopram 40 MG tablet   Commonly known as:  celeXA   This may have changed:    - medication strength  - how much to take  - how to take this  - when to take this  - additional instructions   Used for:  Depression, unspecified depression type   Changed by:  Edmund Dey MD        Dose:  40 mg   Take 1 tablet (40 mg) by mouth daily   Quantity:  30 tablet   Refills:  1            Where to get your medicines      [id number] - [place], [place] - [address] AT [facility] & [address], [place] [address]     Phone:  [phone]     citalopram 40 MG tablet                Primary Care Provider Office Phone # Fax #    Edmund Dey [phone]       [address] [address]        Equal Access to Services     [facility] : Hadii aad ku hadasho Soomaali, waaxda luqadaha, qaybta kaalmada adeegyada, waxay idiin hayaan sean kharash blancan ah. So [facility] [phone].    ATENCIÓN: Si habla español, tiene a castillo disposición servicios gratuitos de asistencia lingüística. Tyra al [phone].    We comply with applicable federal civil rights laws and [place] laws. We do not discriminate on the basis of race, color, national origin, age, disability sex, sexual orientation or gender identity.            Thank you!     Thank you for choosing [facility]  for your care. Our goal is always to provide you with excellent care. Hearing back from our patients is one way we can continue to improve our services. Please take a few minutes to complete the written survey that you may receive in the mail after your visit with us. Thank you!             Your Updated Medication List - Protect others around you: Learn how to safely use, store and throw away your medicines at www.disposemymeds.org.          This list is accurate as of: 8/25/17  9:59  AM.  Always use your most recent med list.                   Brand Name Dispense Instructions for use Diagnosis    amLODIPine 5 MG tablet    NORVASC    90 tablet    Take 1 tablet (5 mg) by mouth daily    Essential hypertension       citalopram 40 MG tablet    celeXA    30 tablet    Take 1 tablet (40 mg) by mouth daily    Depression, unspecified depression type       gabapentin 300 MG capsule    NEURONTIN    90 capsule    Take 1 tablet (300 mg) every night for 1-3 days, then 1 tablet twice daily for 1-3 days, then 1 tablet three times daily    Uncomplicated alcohol dependence (H)       lisinopril 20 MG tablet    PRINIVIL/ZESTRIL    30 tablet    Take 1 tablet (20 mg) by mouth daily    Benign essential hypertension       multivitamin, therapeutic with minerals Tabs tablet     100 tablet    Take 1 tablet by mouth daily    Alcohol abuse, Esophagitis       naltrexone 50 MG tablet    DEPADE;REVIA    30 tablet    Take 1 tablet (50 mg) by mouth daily    Uncomplicated alcohol dependence (H)       nicotine 14 MG/24HR 24 hr patch    NICODERM CQ    30 patch    Place 1 patch onto the skin every 24 hours    Tobacco abuse       omeprazole 20 MG CR capsule    priLOSEC    60 capsule    Take 1 capsule (20 mg) by mouth 2 times daily    Esophagitis

## 2017-08-25 NOTE — PATIENT INSTRUCTIONS
- Continue current blood pressure medication  - Continue with Celexa (increase to 40mg daily)  - Follow up for stress echocardiogram  - Follow up for mental health referral       Referral for ( TEST )  :      Nuclear Med with Lexiscan stress test  LOCATION/PLACE/Provider :    [facility]  DATE & TIME :     9-1-2017 at 7:00am  PHONE :     [phone]  FAX :     [phone]  ADDITIONAL INFORMATION :       Appointment made by clinic staff/:    SILVIA

## 2017-08-28 ENCOUNTER — RECORDS - HEALTHEAST (OUTPATIENT)
Dept: ADMINISTRATIVE | Facility: OTHER | Age: 52
End: 2017-08-28

## 2017-08-31 ENCOUNTER — RECORDS - HEALTHEAST (OUTPATIENT)
Dept: ADMINISTRATIVE | Facility: OTHER | Age: 52
End: 2017-08-31

## 2017-08-31 ENCOUNTER — COMMUNICATION - HEALTHEAST (OUTPATIENT)
Dept: CARDIOLOGY | Facility: HOSPITAL | Age: 52
End: 2017-08-31

## 2017-09-01 ENCOUNTER — HOSPITAL ENCOUNTER (OUTPATIENT)
Dept: CARDIOLOGY | Facility: HOSPITAL | Age: 52
Discharge: HOME | End: 2017-09-01

## 2017-09-01 ENCOUNTER — HOSPITAL ENCOUNTER (OUTPATIENT)
Dept: NUCLEAR MEDICINE | Facility: HOSPITAL | Age: 52
Discharge: HOME | End: 2017-09-01

## 2017-09-01 DIAGNOSIS — R06.09 OTHER FORMS OF DYSPNEA: ICD-10-CM

## 2017-09-01 DIAGNOSIS — R06.09 EXERTIONAL DYSPNEA: ICD-10-CM

## 2017-09-01 DIAGNOSIS — R07.9 CHEST PAIN, UNSPECIFIED TYPE: ICD-10-CM

## 2017-09-01 LAB
CV STRESS CURRENT BP HE: NORMAL
CV STRESS CURRENT HR HE: 103
CV STRESS CURRENT HR HE: 103
CV STRESS CURRENT HR HE: 115
CV STRESS CURRENT HR HE: 115
CV STRESS CURRENT HR HE: 117
CV STRESS CURRENT HR HE: 126
CV STRESS CURRENT HR HE: 130
CV STRESS CURRENT HR HE: 130
CV STRESS CURRENT HR HE: 133
CV STRESS CURRENT HR HE: 137
CV STRESS CURRENT HR HE: 81
CV STRESS CURRENT HR HE: 82
CV STRESS CURRENT HR HE: 82
CV STRESS CURRENT HR HE: 89
CV STRESS CURRENT HR HE: 91
CV STRESS CURRENT HR HE: 93
CV STRESS CURRENT HR HE: 93
CV STRESS CURRENT HR HE: 94
CV STRESS DEVIATION TIME HE: NORMAL
CV STRESS ECHO PERCENT HR HE: NORMAL
CV STRESS EXERCISE STAGE HE: NORMAL
CV STRESS FINAL RESTING BP HE: NORMAL
CV STRESS FINAL RESTING HR HE: 89
CV STRESS MAX HR HE: 139
CV STRESS MAX TREADMILL GRADE HE: 0
CV STRESS MAX TREADMILL SPEED HE: 0
CV STRESS PEAK DIA BP HE: NORMAL
CV STRESS PEAK SYS BP HE: NORMAL
CV STRESS PHASE HE: NORMAL
CV STRESS PROTOCOL HE: NORMAL
CV STRESS RESTING PT POSITION HE: NORMAL
CV STRESS RESTING PT POSITION HE: NORMAL
CV STRESS ST DEVIATION AMOUNT HE: NORMAL
CV STRESS ST DEVIATION ELEVATION HE: NORMAL
CV STRESS ST EVELATION AMOUNT HE: NORMAL
CV STRESS TEST TYPE HE: NORMAL
CV STRESS TOTAL STAGE TIME MIN 1 HE: NORMAL
NUC STRESS EJECTION FRACTION: 68 %
STRESS ECHO BASELINE BP: NORMAL
STRESS ECHO BASELINE HR: 81
STRESS ECHO CALCULATED PERCENT HR: 82 %
STRESS ECHO LAST STRESS BP: NORMAL
STRESS ECHO LAST STRESS HR: 117

## 2017-09-05 DIAGNOSIS — R07.9 CHEST PAIN, UNSPECIFIED TYPE: ICD-10-CM

## 2017-09-05 DIAGNOSIS — R06.09 EXERTIONAL DYSPNEA: ICD-10-CM

## 2017-09-11 DIAGNOSIS — F10.20 UNCOMPLICATED ALCOHOL DEPENDENCE (H): ICD-10-CM

## 2017-09-11 RX ORDER — GABAPENTIN 300 MG/1
CAPSULE ORAL
Qty: 90 CAPSULE | Refills: 1 | Status: SHIPPED | OUTPATIENT
Start: 2017-09-11 | End: 2017-11-01

## 2017-09-22 ENCOUNTER — TELEPHONE (OUTPATIENT)
Dept: FAMILY MEDICINE | Facility: CLINIC | Age: 52
End: 2017-09-22

## 2017-09-22 NOTE — TELEPHONE ENCOUNTER
Called and spoke with Sherine. Pt has an upcoming appointment with DAWIT martinez for EGD. Informed Sherine only stool test result is FIT test back in May 2017. Sherine requested this be faxed over.   Faxed to [phone]

## 2017-09-26 DIAGNOSIS — K20.90 ESOPHAGITIS: ICD-10-CM

## 2017-09-27 ENCOUNTER — DOCUMENTATION ONLY (OUTPATIENT)
Dept: FAMILY MEDICINE | Facility: CLINIC | Age: 52
End: 2017-09-27

## 2017-09-27 NOTE — PROGRESS NOTES
Referral for (Test): Psychiatry  Location/Place/Provider: [facility], [address] [place] [address], [address]  [place] [address]  Date/Time:    Phone: [phone]  Fax:   Additional information/prep.: I registered the pt, he will call and make the appointment.  Scheduled by: BRITTNEY Biswas

## 2017-09-27 NOTE — PROGRESS NOTES
Procedure Requested        9035     Mental Health Referral - [facility] [#[id number]]         Priority: Routine  Class: External referral         Comment:Use this form for behavioral health consults and assessments. The                  referral coordinator will help to determine whether patients are                  best served by clinic behavioral health staff or by community                  providers.                                    Type of referral(s) requested (indicate all that apply):                  Adult Psychiatry--for diagnosis and medication management                                    Reason for referral: Depression                                    Currently receiving mental health services (if 'Yes', what                   services and why today's referral?): No                  Currently having suicidal thoughts: No                  Previous psych hospitalization: Unknown                                    Please provide data for below screening tools if available.                                      needed: No                  Language: English       Associated Diagnoses         F32.9 Depression, unspecified depression type               NAYELY REGAN               [id number]               : 1965  M      [address]                                     PCP: [id number] HERNANDEZ*     [place] [address]                                CTR: [facility]

## 2017-10-09 DIAGNOSIS — F32.A DEPRESSION, UNSPECIFIED DEPRESSION TYPE: Primary | ICD-10-CM

## 2017-10-09 RX ORDER — CITALOPRAM HYDROBROMIDE 40 MG/1
40 TABLET ORAL DAILY
Qty: 30 TABLET | Refills: 3 | Status: SHIPPED | OUTPATIENT
Start: 2017-10-09 | End: 2017-12-28

## 2017-10-13 ENCOUNTER — TELEPHONE (OUTPATIENT)
Dept: FAMILY MEDICINE | Facility: CLINIC | Age: 52
End: 2017-10-13

## 2017-10-13 NOTE — TELEPHONE ENCOUNTER
Ed follow up     Back pain  Siatica  Spasm      Pt feeling very little relief even with pain medications  He will see a little easing of pain but overall still a lot of pain and would like to see pcp again soon  Scheduled for tues 17th with his pcp    Madhu

## 2017-10-17 ENCOUNTER — AMBULATORY - HEALTHEAST (OUTPATIENT)
Dept: ADMINISTRATIVE | Facility: REHABILITATION | Age: 52
End: 2017-10-17

## 2017-10-17 ENCOUNTER — OFFICE VISIT (OUTPATIENT)
Dept: FAMILY MEDICINE | Facility: CLINIC | Age: 52
End: 2017-10-17

## 2017-10-17 VITALS
TEMPERATURE: 97.9 F | DIASTOLIC BLOOD PRESSURE: 108 MMHG | SYSTOLIC BLOOD PRESSURE: 151 MMHG | OXYGEN SATURATION: 98 % | HEART RATE: 81 BPM | WEIGHT: 164.4 LBS | BODY MASS INDEX: 24.63 KG/M2

## 2017-10-17 DIAGNOSIS — G89.29 CHRONIC BILATERAL LOW BACK PAIN WITH LEFT-SIDED SCIATICA: Primary | ICD-10-CM

## 2017-10-17 DIAGNOSIS — M54.42 CHRONIC BILATERAL LOW BACK PAIN WITH LEFT-SIDED SCIATICA: ICD-10-CM

## 2017-10-17 DIAGNOSIS — I10 ESSENTIAL HYPERTENSION: ICD-10-CM

## 2017-10-17 DIAGNOSIS — G89.29 CHRONIC BILATERAL LOW BACK PAIN WITH LEFT-SIDED SCIATICA: ICD-10-CM

## 2017-10-17 DIAGNOSIS — Z23 NEED FOR PROPHYLACTIC VACCINATION AND INOCULATION AGAINST INFLUENZA: ICD-10-CM

## 2017-10-17 DIAGNOSIS — M54.42 CHRONIC BILATERAL LOW BACK PAIN WITH LEFT-SIDED SCIATICA: Primary | ICD-10-CM

## 2017-10-17 RX ORDER — DIAZEPAM 5 MG
5 TABLET ORAL EVERY 6 HOURS PRN
COMMUNITY
Start: 2017-10-08 | End: 2017-10-17

## 2017-10-17 RX ORDER — CYCLOBENZAPRINE HCL 5 MG
5 TABLET ORAL 3 TIMES DAILY PRN
Qty: 30 TABLET | Refills: 0 | Status: SHIPPED | OUTPATIENT
Start: 2017-10-17 | End: 2017-11-09

## 2017-10-17 RX ORDER — NAPROXEN 500 MG/1
500 TABLET ORAL 2 TIMES DAILY PRN
COMMUNITY
Start: 2017-10-08 | End: 2017-10-17

## 2017-10-17 ASSESSMENT — PATIENT HEALTH QUESTIONNAIRE - PHQ9: SUM OF ALL RESPONSES TO PHQ QUESTIONS 1-9: 13

## 2017-10-17 NOTE — PATIENT INSTRUCTIONS
- Take flexeril as needed for back spasm  - Use ice and heat as needed on your back  - May try ibuprofen as needed  - Attend physical therapy  - Return to clinic in 1 week for back pain follow up  - Stay active and try to walk often      Referral for ( TEST )  :      Physical Therapy  LOCATION/PLACE/Provider :    [facility]  [place]   DATE & TIME :     10- at 9:15am  PHONE :     [phone]  FAX :     [phone]  ADDITIONAL INFORMATION :     NA  Appointment made by clinic staff/:    SILVIA

## 2017-10-17 NOTE — NURSING NOTE
"Injectable Influenza Immunization Documentation    1.  Has the patient received the information for the injectable influenza vaccine? YES     2. Is the patient 6 months of age or older? YES     3. Does the patient have any of the following contraindications?         Severe allergy to eggs? No     Severe allergic reaction to previous influenza vaccines? No   Severe allergy to latex? No       History of Guillain-Lyons syndrome? No     Currently have a temperature greater than 100.4F? No        4.  Severely egg allergic patients should have flu vaccine eligibility assessed by an MD, RN, or pharmacist, and those who received flu vaccine should be observed for 15 min by an MD, RN, Pharmacist, Medical Technician, or member of clinic staff.\": YES    5. Latex-allergic patients should be given latex-free influenza vaccine Yes. Please reference the Vaccine latex table to determine if your clinic s product is latex-containing.       Vaccination given by Aaron Babb, CMA        "

## 2017-10-17 NOTE — PROGRESS NOTES
HPI:       Piter Gaines is a 51 year old  male with a significant past medical history of chronic low back pain, hypertension, depression, alcoholism, tobacco use, peptic ulcer disease and anemia who presents for follow up of concern(s) listed below:    Patient was seen on 10/8 at [facility] Emergency Department for low back pain worsened over the last 3-4 day prior to presentation. Low back pain radiated to both legs but was worse on the left. Had no fever, chills or urinary/bowel symptoms. No imaging was done due to lack of red flag symptoms and patient was diagnosed with bilateral sciatica with muscle spasm/strain. He received a dose of IV Toradol and Valium with improvement in pain and was subsequently sent home with PRN Valium, Naproxen and a 5-day course of prednisone 40 mg daily. Instructed to follow up with PCP in clinic.  Today he reports that pain is slightly better since the ED visit but he doesn't think the valium, naproxen or prednisone have help significantly. The slight improvement in pain occurred right after receiving Toradol in the ED. He continues to endorse low back pain that radiates to the bilateral lower extremities, worse on the left. Pain is worse with walking, lying down or movement of any kind. He denies fever, chills or urinary/bowel symptoms. No numbness in his groin but numbness and weakness of the lateral left thigh, leg and foot. Has had more trouble with ambulation and more difficulties with standing up due to pain. Low back pain started about 20 years ago after a sledding incident and has acute exacerbation has been getting more frequently over the last several months. No triggers or reason for increase frequency. No trauma or recent injuries. No alcohol use over the last 5 months and take gabapentin as needed for alcohol craving.         PMHX:     Patient Active Problem List   Diagnosis     Tobacco use     Caloric malnutrition (H)     Hematemesis     Essential  hypertension     Esophagitis     Clostridium difficile infection     Alcohol abuse     Moderate episode of recurrent major depressive disorder (H)     Current Outpatient Prescriptions   Medication Sig Dispense Refill     cyclobenzaprine (FLEXERIL) 5 MG tablet Take 1 tablet (5 mg) by mouth 3 times daily as needed for muscle spasms 30 tablet 0     Menthol-Camphor (ICY HOT ADVANCED RELIEF) 16-11 % CREA Externally apply 1 Application topically 3 times daily as needed 56 g 1     citalopram (CELEXA) 40 MG tablet Take 1 tablet (40 mg) by mouth daily 30 tablet 3     omeprazole (PRILOSEC) 20 MG CR capsule Take 1 capsule (20 mg) by mouth 2 times daily 60 capsule 3     gabapentin (NEURONTIN) 300 MG capsule Take 1 tablet (300 mg) every night for 1-3 days, then 1 tablet twice daily for 1-3 days, then 1 tablet three times daily 90 capsule 1     nicotine (NICODERM CQ) 14 MG/24HR 24 hr patch Place 1 patch onto the skin every 24 hours 30 patch 3     multivitamin, therapeutic with minerals (MULTI-VITAMIN) TABS tablet Take 1 tablet by mouth daily 100 tablet 3     amLODIPine (NORVASC) 5 MG tablet Take 1 tablet (5 mg) by mouth daily 90 tablet 3     naltrexone (DEPADE;REVIA) 50 MG tablet Take 1 tablet (50 mg) by mouth daily 30 tablet 3     lisinopril (PRINIVIL/ZESTRIL) 20 MG tablet Take 1 tablet (20 mg) by mouth daily 30 tablet 11       Social History     Social History     Marital status:      Spouse name: N/A     Number of children: N/A     Years of education: N/A     Occupational History     Not on file.     Social History Main Topics     Smoking status: Current Some Day Smoker     Smokeless tobacco: Not on file     Alcohol use No     Drug use: No     Sexual activity: Not Currently     Other Topics Concern     Not on file     Social History Narrative        No Known Allergies    No results found for this or any previous visit (from the past 24 hour(s)).         Review of Systems:   C: NEGATIVE for fatigue, unexpected change in  weight  E: NEGATIVE for acute vision problems or changes  R: NEGATIVE for significant cough or shortness of breath  CV: NEGATIVE for chest pain, palpitations or new or worsening peripheral edema  GI: NEGATIVE for new onset or worsening nausea, vomiting or diarrhea  : NEGATIVE for frequency, dysuria, or hematuria  MUSCULOSKELETAL: Positive for low back pain  NEURO: positive for numbness in the left leg  P: NEGATIVE for changes in mood or affect          Physical Exam:     Vitals:    10/17/17 0957 10/17/17 1034   BP: (!) 145/95 (!) 151/108   Pulse: 86 81   Temp: 97.9  F (36.6  C)    TempSrc: Oral    SpO2: 98%    Weight: 164 lb 6.4 oz (74.6 kg)      Body mass index is 24.63 kg/(m^2).    GENERAL APPEARANCE: healthy, alert and no distress,  RESP: lungs clear to auscultation - no rales, rhonchi or wheezes  CV: regular rate and rhythm,  and no murmur, click,  rub or gallop  ABDOMEN: soft, nontender, without hepatosplenomegaly or masses  MS: tenderness to palpation in the lumbar paraspinal and gluteal muscles bilaterally and, no spinal tenderness or vertebral step off appreciated, pain to calf squeeze and paraspinal muscle squeeze, difficult with standing up from chair due to pain  NEURO: No objective findings of numbness but patient notes subjective numbness along the entire lateral side of the left leg.   Left leg- 4/5 strength with foot plantar flexion, dorsiflexion, knee flexion, knee extension and hip flexion. All movement noted above associated with lumbar pain  Right leg- 5/5 strength at all joints with associated lumbar pain    Assessment and Plan     1. Chronic bilateral low back pain with left-sided sciatica  Likely muscle strain/spasm given HPI and exam. Other possibilities includes spinal stenosis, vertebral compression fracture, disc herniation or neuropathy. No back imaging available. Valium, naproxen and 5-day prednisone course with no improvement. No inciting trauma, fever, chills, urinary/bowel symptoms,  new neurological symptoms or other systemic symptoms. NO saddle anesthesia or objective weakness. Will proceed with conservative management but have a low threshold to image the back if symptoms worsen or do not improve.   - Prescription sent for Flexeril 5mg TID for 10 days  - Icy Hot Advance relief (muscle rub) to be applied as needed  - Alternate heat/ice on back  - Referral sent for Physical therapy  - Return to clinic in 1 week for follow up    2. Essential hypertension  BP slightly elevated at 145/95 today. No concerning symptoms today. Likely due to pain from back. Has been taking Lisinopril and Amlodipine without complications.  - Repeat BP at next visit  - Continue Lisinopril 20mg and Amlodipine 5mg daily  3. Need for prophylactic vaccination and inoculation against influenza  - ADMIN VACCINE, INITIAL  - FLU VAC PRESRV FREE QUAD SPLIT VIR IM, 0.5 mL dosage    I, Leda Colon am acting as scribe for Dr. Edmund Dey MD. The medical student acted as a scribe and the encounter documented above was performed completely by me and the documentation accurately reflects the work I have performed today.      Options for treatment and follow-up care were reviewed with the patient and/or guardian. Piter Gaines and/or guardian engaged in the decision making process and verbalized understanding of the options discussed and agreed with the final plan.    Edmund Dey MD  [facility]  Pager: [phone]    Precepted today with: Shy Malloy MD

## 2017-10-17 NOTE — MR AVS SNAPSHOT
After Visit Summary   10/17/2017    Piter Gaines    MRN: [id number]           Patient Information     Date Of Birth          1965        Visit Information        Provider Department      10/17/2017 10:00 AM Edmund Dey MD [facility]        Today's Diagnoses     Need for prophylactic vaccination and inoculation against influenza    -  1    Chronic bilateral low back pain with left-sided sciatica          Care Instructions    - Take flexeril as needed for back spasm  - Use ice and heat as needed on your back  - May try ibuprofen as needed  - Attend physical therapy  - Return to clinic in 1 week for back pain follow up  - Stay active and try to walk often          Follow-ups after your visit        Additional Services     PHYSICAL THERAPY REFERRAL       PT/OT REFERRAL  Piter Gaines  1965  Phone #: [phone] (home)    needed: No  Language: English    PT/OT  Facility:   [facility], P: [phone], F: [phone]    History: 20yr of Low back pain, spasm was on valium    Precautions/Contraindications: None    Imaging Studies: None    Surgical Procedure/Test Results: None    Treatment Goals:   Increase: Flexibility, Strength and ROM    Prognosis: good    Visits: Up to 12    Evaluate: Evaluate and treat    Plan: Manual Therapy, Flexibility Exercise and Strength Exercise                  Follow-up notes from your care team     Return in about 1 week (around 10/24/2017).      Future tests that were ordered for you today     Open Future Orders        Priority Expected Expires Ordered    PHYSICAL THERAPY REFERRAL Routine  10/17/2018 10/17/2017            Who to contact     Please call your clinic at [phone] to:    Ask questions about your health    Make or cancel appointments    Discuss your medicines    Learn about your test results    Speak to your doctor   If you have compliments or concerns about an experience at your clinic, or if  you wish to file a complaint, please contact [facility] Patient Relations at [phone] or email us at [email or web address]         Additional Information About Your Visit        [email or web address] Information     [email or web address] is an electronic gateway that provides easy, online access to your medical records. With [email or web address]@, you can request a clinic appointment, read your test results, renew a prescription or communicate with your care team.     To sign up for [email or web address]@ visit the website at [email or web address]   You will be asked to enter the access code listed below, as well as some personal information. Please follow the directions to create your username and password.     Your access code is: [email or web address]  Expires: 10/23/2017 10:27 AM     Your access code will  in 90 days. If you need help or a new code, please contact your [facility] or call [phone] for assistance.        Care EveryWhere ID     This is your Care EveryWhere ID. This could be used by other organizations to access your [place] medical records  [id number]        Your Vitals Were     Pulse Temperature Pulse Oximetry BMI (Body Mass Index)          81 97.9  F (36.6  C) (Oral) 98% 24.63 kg/m2         Blood Pressure from Last 3 Encounters:   10/17/17 (!) 151/108   17 128/77   17 (!) 138/91    Weight from Last 3 Encounters:   10/17/17 164 lb 6.4 oz (74.6 kg)   17 149 lb 12.8 oz (67.9 kg)   17 145 lb 6.4 oz (66 kg)              We Performed the Following     ADMIN VACCINE, INITIAL     FLU VAC PRESRV FREE QUAD SPLIT VIR IM, 0.5 mL dosage          Today's Medication Changes          These changes are accurate as of: 10/17/17 10:53 AM.  If you have any questions, ask your nurse or doctor.               Start taking these medicines.        Dose/Directions    cyclobenzaprine 5 MG tablet   Commonly known as:  FLEXERIL   Used for:  Chronic bilateral low back pain with  left-sided sciatica   Started by:  Edmund Dey MD        Dose:  5 mg   Take 1 tablet (5 mg) by mouth 3 times daily as needed for muscle spasms   Quantity:  30 tablet   Refills:  0       Menthol-Camphor 16-11 % Crea   Commonly known as:  ICY HOT ADVANCED RELIEF   Used for:  Chronic bilateral low back pain with left-sided sciatica   Started by:  Edmund Dey MD        Dose:  1 Application   Externally apply 1 Application topically 3 times daily as needed   Quantity:  56 g   Refills:  1            Where to get your medicines      These medications were sent to [id number] - [place], [place] - [address] AT [facility] & [address], [place] [address]     Phone:  [phone]     cyclobenzaprine 5 MG tablet    Menthol-Camphor 16-11 % Crea                Primary Care Provider Office Phone # Fax #    Edmund Dey [phone]       [address] [address]        Equal Access to Services     [facility]: Hadii aad ku hadasho Soomaali, waaxda luqadaha, qaybta kaalmada adeegyada, waxay niyain haybo isaacs . So [facility] [phone].    ATENCIÓN: Si habla español, tiene a castillo disposición servicios gratuitos de asistencia lingüística. [facility] [phone].    We comply with applicable federal civil rights laws and [place] laws. We do not discriminate on the basis of race, color, national origin, age, disability, sex, sexual orientation, or gender identity.            Thank you!     Thank you for choosing [facility]  for your care. Our goal is always to provide you with excellent care. Hearing back from our patients is one way we can continue to improve our services. Please take a few minutes to complete the written survey that you may receive in the mail after your visit with us. Thank you!             Your Updated Medication List - Protect others around you: Learn how to  safely use, store and throw away your medicines at www.disposemymeds.org.          This list is accurate as of: 10/17/17 10:53 AM.  Always use your most recent med list.                   Brand Name Dispense Instructions for use Diagnosis    amLODIPine 5 MG tablet    NORVASC    90 tablet    Take 1 tablet (5 mg) by mouth daily    Essential hypertension       citalopram 40 MG tablet    celeXA    30 tablet    Take 1 tablet (40 mg) by mouth daily    Depression, unspecified depression type       cyclobenzaprine 5 MG tablet    FLEXERIL    30 tablet    Take 1 tablet (5 mg) by mouth 3 times daily as needed for muscle spasms    Chronic bilateral low back pain with left-sided sciatica       gabapentin 300 MG capsule    NEURONTIN    90 capsule    Take 1 tablet (300 mg) every night for 1-3 days, then 1 tablet twice daily for 1-3 days, then 1 tablet three times daily    Uncomplicated alcohol dependence (H)       lisinopril 20 MG tablet    PRINIVIL/ZESTRIL    30 tablet    Take 1 tablet (20 mg) by mouth daily    Benign essential hypertension       Menthol-Camphor 16-11 % Crea    ICY HOT ADVANCED RELIEF    56 g    Externally apply 1 Application topically 3 times daily as needed    Chronic bilateral low back pain with left-sided sciatica       multivitamin, therapeutic with minerals Tabs tablet     100 tablet    Take 1 tablet by mouth daily    Alcohol abuse, Esophagitis       naltrexone 50 MG tablet    DEPADE;REVIA    30 tablet    Take 1 tablet (50 mg) by mouth daily    Uncomplicated alcohol dependence (H)       nicotine 14 MG/24HR 24 hr patch    NICODERM CQ    30 patch    Place 1 patch onto the skin every 24 hours    Tobacco abuse       omeprazole 20 MG CR capsule    priLOSEC    60 capsule    Take 1 capsule (20 mg) by mouth 2 times daily    Esophagitis

## 2017-10-23 ENCOUNTER — OFFICE VISIT - HEALTHEAST (OUTPATIENT)
Dept: PHYSICAL THERAPY | Facility: REHABILITATION | Age: 52
End: 2017-10-23

## 2017-10-23 DIAGNOSIS — M54.42 CHRONIC MIDLINE LOW BACK PAIN WITH LEFT-SIDED SCIATICA: ICD-10-CM

## 2017-10-23 DIAGNOSIS — M62.81 GENERALIZED MUSCLE WEAKNESS: ICD-10-CM

## 2017-10-23 DIAGNOSIS — R29.3 POOR POSTURE: ICD-10-CM

## 2017-10-23 DIAGNOSIS — G89.29 CHRONIC MIDLINE LOW BACK PAIN WITH LEFT-SIDED SCIATICA: ICD-10-CM

## 2017-10-23 DIAGNOSIS — R26.89 ANTALGIC GAIT: ICD-10-CM

## 2017-10-24 ENCOUNTER — OFFICE VISIT (OUTPATIENT)
Dept: FAMILY MEDICINE | Facility: CLINIC | Age: 52
End: 2017-10-24

## 2017-10-24 ENCOUNTER — RECORDS - HEALTHEAST (OUTPATIENT)
Dept: ADMINISTRATIVE | Facility: OTHER | Age: 52
End: 2017-10-24

## 2017-10-24 VITALS
WEIGHT: 167.4 LBS | DIASTOLIC BLOOD PRESSURE: 100 MMHG | BODY MASS INDEX: 24.79 KG/M2 | SYSTOLIC BLOOD PRESSURE: 174 MMHG | HEART RATE: 94 BPM | HEIGHT: 69 IN | OXYGEN SATURATION: 97 % | TEMPERATURE: 98 F

## 2017-10-24 DIAGNOSIS — M54.42 LEFT-SIDED LOW BACK PAIN WITH LEFT-SIDED SCIATICA, UNSPECIFIED CHRONICITY: Primary | ICD-10-CM

## 2017-10-24 DIAGNOSIS — R03.0 ELEVATED BLOOD PRESSURE, SITUATIONAL: ICD-10-CM

## 2017-10-24 DIAGNOSIS — M79.662 PAIN OF LEFT CALF: ICD-10-CM

## 2017-10-24 NOTE — PROGRESS NOTES
HPI:       1. Low back pain  Patient had a recent exacerbation of his chronic low back pain with no identifiable triggers. He was evaluated in the ED about 2 weeks ago and received a dose of IV toradol with slight improvement in pain. He was sent home with PRN Valium, Naproxen and prednisone x 5days with no significant improvement while taking these medications. He was seen in clinic a week ago on 10/17/17, at which time his pain was mainly in the bilateral lumbar paraspinal muscles with radiation to both legs (L>R). He was started on flexeril 5 mg TID and a referral was also sent for physical therapy.     Today,   - Taking every 2 tablets of flexeril 5 mg three times a day with no improvement in back pain. Pain is still worse with any sort of movement and radiates to both legs (L>>R). He hasn't tried any other pain medications.   - Went to physical therapy for the first time yesterday. States that he doesn't see the benefit in going so he probably wont attend his next appointment.  - He notes new swelling in left thigh, knee and calf. He first noticed this about 1-2 hours after his physical therapy appointment. No new trauma to the leg He also endorses worsening pain in the calf and new tingling the left toes since yesterday.   - No fever, chills, urinary/bowel symptoms or saddle anesthesia         PMHX:     Patient Active Problem List   Diagnosis     Tobacco use     Caloric malnutrition (H)     Hematemesis     Essential hypertension     Esophagitis     Clostridium difficile infection     Alcohol abuse     Moderate episode of recurrent major depressive disorder (H)     Current Outpatient Prescriptions   Medication Sig Dispense Refill     cyclobenzaprine (FLEXERIL) 5 MG tablet Take 1 tablet (5 mg) by mouth 3 times daily as needed for muscle spasms 30 tablet 0     Menthol-Camphor (ICY HOT ADVANCED RELIEF) 16-11 % CREA Externally apply 1 Application topically 3 times daily as needed 56 g 1     citalopram  (CELEXA) 40 MG tablet Take 1 tablet (40 mg) by mouth daily 30 tablet 3     omeprazole (PRILOSEC) 20 MG CR capsule Take 1 capsule (20 mg) by mouth 2 times daily 60 capsule 3     gabapentin (NEURONTIN) 300 MG capsule Take 1 tablet (300 mg) every night for 1-3 days, then 1 tablet twice daily for 1-3 days, then 1 tablet three times daily 90 capsule 1     nicotine (NICODERM CQ) 14 MG/24HR 24 hr patch Place 1 patch onto the skin every 24 hours 30 patch 3     multivitamin, therapeutic with minerals (MULTI-VITAMIN) TABS tablet Take 1 tablet by mouth daily 100 tablet 3     amLODIPine (NORVASC) 5 MG tablet Take 1 tablet (5 mg) by mouth daily 90 tablet 3     naltrexone (DEPADE;REVIA) 50 MG tablet Take 1 tablet (50 mg) by mouth daily 30 tablet 3     lisinopril (PRINIVIL/ZESTRIL) 20 MG tablet Take 1 tablet (20 mg) by mouth daily 30 tablet 11       Social History     Social History     Marital status:      Spouse name: N/A     Number of children: N/A     Years of education: N/A     Occupational History     Not on file.     Social History Main Topics     Smoking status: Current Some Day Smoker     Smokeless tobacco: Not on file     Alcohol use No     Drug use: No     Sexual activity: Not Currently     Other Topics Concern     Not on file     Social History Narrative        No Known Allergies    No results found for this or any previous visit (from the past 24 hour(s)).         Review of Systems:   C: NEGATIVE for fatigue, unexpected change in weight  E: NEGATIVE for acute vision problems or changes  R: NEGATIVE for significant cough or shortness of breath  CV: NEGATIVE for chest pain, palpitations or new or worsening peripheral edema  GI: NEGATIVE for new onset or worsening nausea, vomiting or diarrhea  : NEGATIVE for frequency, dysuria, or hematuria  MUSCULOSKELETAL: Positive for low back pain  NEURO: positive for numbness and swelling in the left leg  P: NEGATIVE for changes in mood or affect          Physical Exam:  "    Vitals:    10/24/17 0939   BP: (!) 174/100   Pulse: 94   Temp: 98  F (36.7  C)   TempSrc: Oral   SpO2: 97%   Weight: 167 lb 6.4 oz (75.9 kg)   Height: 5' 9\" (175.3 cm)     Body mass index is 24.72 kg/(m^2).    GENERAL APPEARANCE: healthy, alert and no distress,  RESP: lungs clear to auscultation - no rales, rhonchi or wheezes  CV: regular rate and rhythm,  and no murmur, click,  rub or gallop  ABDOMEN: soft, nontender, without hepatosplenomegaly or masses  MSK: tenderness to palpation in the bilateral lumbar paraspinal muscles with some spinal tenderness. No vertebral step offs. Tenderness to palpation of the calf and thigh muscles on the left.   Left knee- moderate effusion over the medial joint line  NEURO:   Left leg- 3/5 strength with foot plantar flexion, dorsiflexion, knee flexion, knee extension and hip flexion. All above movement  associated with lumbar pain  Right leg- 4/5 strength at all joints with associated lumbar pain    Assessment and Plan     # Chronic bilateral low back pain with left-sided sciatica  History of low back pain with recent exacerbation of symptoms. No significant improvement with naproxen, valium, flexeril or 5-day prednisone course. No previous imaging on file. No red flag symptoms like fever, chills, urinary/bowel symptoms, saddle anesthesia. There is objective weakness on the left side today which could be due to poor effort but cannot rule out progression of neurological symptoms. Will obtain imaging of the back at this time.   - MRI lumbar spine w/o contrast  - Apply ice/heat to knee as needed    # Left lower extremity swelling   Patient noticed swelling in the left thigh, knee and calf 1-2 hours after physical therapy yesterday (10/23/17), associated with worsening of medial thigh and calf pain. Moderated effusion noted at the left medial knee joint on exam today. Likely represents localized swelling after physical therapy exercises but need to rule out DVT given acute, " unilateral nature of swelling and worsening of calf pain. Patient has also been less mobile as a result of his back pain which increases risk for DVT.    - US Lower Extremity Venous Duplex Left    # Essential hypertension  BP today 174/100, was slightly elevated at 145/95 a week ago. No concerning symptoms today. Likely 2/2 ongoing pain.   - Repeat BP at next visit  - Continue Lisinopril 20mg and Amlodipine 5mg daily likely 2/2 pain.       I, Leda Colon am acting as scribe for Dr. Edmund Dey MD. The medical student acted as a scribe and the encounter documented above was performed completely by me and the documentation accurately reflects the work I have performed today.      Options for treatment and follow-up care were reviewed with the patient and/or guardian. Piter Gaines and/or guardian engaged in the decision making process and verbalized understanding of the options discussed and agreed with the final plan.    Edmund Dey MD  [facility]  Pager: [phone]    Precepted today with: Shy Malloy MD

## 2017-10-24 NOTE — PATIENT INSTRUCTIONS
- Continue with Physical therapy  - Use ice over the knee  - Follow up after imaging  - Take Gabapentin 300mg every 8 hours until reevaluation      Referral for ( TEST )  :      US Lower Extremity Venous Duplex Left and MRI Lumbar Spine W/O Contrast   LOCATION/PLACE/Provider :    [facility]   DATE & TIME :     11-1-2017 at 5:45pm  PHONE :     [phone]  FAX :     [phone]  ADDITIONAL INFORMATION :     NA  Appointment made by clinic staff/:    SILVIA

## 2017-10-24 NOTE — PROGRESS NOTES
HPI:       Piter Gaines is a 51 year old male with a significant history of hypertension, depression, chronic lower back pain, tobacco abuse and alcohol abuse presenting for follow up of:    1. Low back pain  Patient had a recent exacerbation of his chronic low back pain with no identifiable triggers. He was evaluated in the ED about 2 weeks ago and received a dose of IV toradol with slight improvement in pain. He was sent home with PRN Valium, Naproxen and prednisone x 5days with no significant improvement while taking these medications. He was seen in clinic a week ago on 10/17/17, at which time his pain was mainly in the bilateral lumbar paraspinal muscles with radiation to both legs (L>R). He was started on flexeril 5 mg TID and a referral was also sent for physical therapy.     Today, He noted worsening of his back pain. Stated that pain worsened with any sort of movement and radiates to both legs (left > right). He had been taking 10mg of Flexeril multiple times per day without relief. Has been taking gabapentin without relief. Attended physical therapy for the first time where he states that the pulled on his leg stretching out his muscle but did not endorse rigorous exercise.  Denies any recent injuries or fall.  Did noted that his left thigh, knee and calf looks slightly more swollen and unclear if this occur before or after physical therapy. Denies any trauma or injuries. He also endorses worsening pain in the calf and new tingling the left toes since yesterday. No fever, chills, urinary/bowel symptoms or saddle anesthesia.          PMHX:     Patient Active Problem List   Diagnosis     Tobacco use     Caloric malnutrition (H)     Hematemesis     Essential hypertension     Esophagitis     Clostridium difficile infection     Alcohol abuse     Moderate episode of recurrent major depressive disorder (H)     Current Outpatient Prescriptions   Medication Sig Dispense Refill     cyclobenzaprine (FLEXERIL) 5  MG tablet Take 1 tablet (5 mg) by mouth 3 times daily as needed for muscle spasms 30 tablet 0     Menthol-Camphor (ICY HOT ADVANCED RELIEF) 16-11 % CREA Externally apply 1 Application topically 3 times daily as needed 56 g 1     citalopram (CELEXA) 40 MG tablet Take 1 tablet (40 mg) by mouth daily 30 tablet 3     omeprazole (PRILOSEC) 20 MG CR capsule Take 1 capsule (20 mg) by mouth 2 times daily 60 capsule 3     gabapentin (NEURONTIN) 300 MG capsule Take 1 tablet (300 mg) every night for 1-3 days, then 1 tablet twice daily for 1-3 days, then 1 tablet three times daily 90 capsule 1     nicotine (NICODERM CQ) 14 MG/24HR 24 hr patch Place 1 patch onto the skin every 24 hours 30 patch 3     multivitamin, therapeutic with minerals (MULTI-VITAMIN) TABS tablet Take 1 tablet by mouth daily 100 tablet 3     amLODIPine (NORVASC) 5 MG tablet Take 1 tablet (5 mg) by mouth daily 90 tablet 3     naltrexone (DEPADE;REVIA) 50 MG tablet Take 1 tablet (50 mg) by mouth daily 30 tablet 3     lisinopril (PRINIVIL/ZESTRIL) 20 MG tablet Take 1 tablet (20 mg) by mouth daily 30 tablet 11       Social History     Social History     Marital status:      Spouse name: N/A     Number of children: N/A     Years of education: N/A     Occupational History     Not on file.     Social History Main Topics     Smoking status: Current Some Day Smoker     Smokeless tobacco: Not on file     Alcohol use No     Drug use: No     Sexual activity: Not Currently     Other Topics Concern     Not on file     Social History Narrative        No Known Allergies    No results found for this or any previous visit (from the past 24 hour(s)).         Review of Systems:   C: NEGATIVE for fatigue, unexpected change in weight  E: NEGATIVE for acute vision problems or changes  R: NEGATIVE for significant cough or shortness of breath  CV: NEGATIVE for chest pain, palpitations or new or worsening peripheral edema  GI: NEGATIVE for new onset or worsening nausea,  "vomiting or diarrhea  : NEGATIVE for frequency, dysuria, or hematuria  MUSCULOSKELETAL: Positive for low back pain: see HPI  NEURO: positive for numbness and swelling in the left leg: See HPI  P: NEGATIVE for changes in mood or affect          Physical Exam:     Vitals:    10/24/17 0939   BP: (!) 174/100   Pulse: 94   Temp: 98  F (36.7  C)   TempSrc: Oral   SpO2: 97%   Weight: 167 lb 6.4 oz (75.9 kg)   Height: 5' 9\" (175.3 cm)     Body mass index is 24.72 kg/(m^2).    GENERAL APPEARANCE: healthy, alert and no distress,  RESP: lungs clear to auscultation - no rales, rhonchi or wheezes  CV: regular rate and rhythm,  and no murmur, click,  rub or gallop  ABDOMEN: soft, nontender, without hepatosplenomegaly or masses  MSK: tenderness to palpation in the bilateral lumbar paraspinal muscles with spinal tenderness of the lumbar region. Point of max pain was near the left SI joint. No vertebral step offs. Tenderness to palpation of the calf (positive squeeze) and thigh muscles on the left. Tense muscle of the left extremity. No edema of the feet or ankle. Left knee moderate effusion over the medial joint line without erythema.  NEURO: Left leg- 3/5 strength with foot plantar flexion, dorsiflexion, knee flexion, knee extension and hip flexion. All above movement  associated with lumbar pain  Right leg- 4/5 strength at all joints with associated lumbar pain    Assessment and Plan     # Chronic bilateral low back pain with left-sided sciatica  History of low back pain with recent exacerbation of symptoms and presenting with worsening pain and radiculopathy. Endorse associated numbness and tingling of the posterior left lower leg. No improvement with flexeril. Recently tried naproxen, valium, flexeril or 5-day prednisone course without relief. No previous imaging. Unlikley cauda equina syndrome given no saddle anesthesia or urinary symptoms. There is objective weakness on the left side today which could be due to poor effort " but cannot rule out progression of neurological symptoms. Will obtain imaging of the back at this time.   - MRI lumbar spine w/o contrast  - Apply ice/heat to knee as needed  - Return to clinic after imaging completed    # Left lower extremity swelling   Patient noticed swelling in the left thigh, knee and calf. Moderated effusion noted at the left medial knee joint on exam today. Likely represents localized swelling after physical therapy exercises but cannot rule out DVT given unilateral nature of swelling and worsening of calf pain. Patient has also been less mobile as a result of his back pain which increases risk for DVT. Unlikely septic joint given no erythema or skin lesions. Unlikely trauma given no history.  - US Lower Extremity Venous Duplex Left    # Essential hypertension  BP today 174/100, was slightly elevated at 145/95 a week ago. Likely 2/2 ongoing pain.   - Repeat BP at next visit  - Continue Lisinopril 20mg and Amlodipine 5mg daily    I, Leda Colon am acting as scribe for Dr. Edmund Dey MD. The medical student acted as a scribe and the encounter documented above was performed completely by me and the documentation accurately reflects the work I have performed today.    Options for treatment and follow-up care were reviewed with the patient and/or guardian. Piter Tuttlekins and/or guardian engaged in the decision making process and verbalized understanding of the options discussed and agreed with the final plan.    Edmund Dey MD  [facility]  Pager: [phone]    Precepted today with: Shy Malloy MD

## 2017-10-24 NOTE — MR AVS SNAPSHOT
After Visit Summary   10/24/2017    Piter Gaines    MRN: [id number]           Patient Information     Date Of Birth          1965        Visit Information        Provider Department      10/24/2017 9:40 AM Edmund Dey MD [facility]        Today's Diagnoses     Left-sided low back pain with left-sided sciatica, unspecified chronicity    -  1    Pain of left calf          Care Instructions    - Continue with Physical therapy  - Use ice over the knee  - Follow up after imaging  - Take Gabapentin 300mg every 8 hours until reevaluation          Follow-ups after your visit        Future tests that were ordered for you today     Open Future Orders        Priority Expected Expires Ordered    MRI LUMBAR SPINE W/O CONTRAST Routine  10/24/2018 10/24/2017    US Lower Extremity Venous Duplex Left Routine  10/24/2018 10/24/2017            Who to contact     Please call your clinic at [phone] to:    Ask questions about your health    Make or cancel appointments    Discuss your medicines    Learn about your test results    Speak to your doctor   If you have compliments or concerns about an experience at your clinic, or if you wish to file a complaint, please contact [facility] Patient Relations at [phone] or email us at [email or web address]         Additional Information About Your Visit        MyChart Information     [employer] is an electronic gateway that provides easy, online access to your medical records. With [employer], you can request a clinic appointment, read your test results, renew a prescription or communicate with your care team.     To sign up for [employer] visit the website at [email or web address]   You will be asked to enter the access code listed below, as well as some personal information. Please follow the directions to create your username and password.     Your access code is: [email or web address]  Expires: 1/22/2018 10:26 AM    "  Your access code will  in 90 days. If you need help or a new code, please contact your [facility] or call [phone] for assistance.        Care EveryWhere ID     This is your Care EveryWhere ID. This could be used by other organizations to access your [facility] medical records  [id number]        Your Vitals Were     Pulse Temperature Height Pulse Oximetry BMI (Body Mass Index)       94 98  F (36.7  C) (Oral) 5' 9\" (175.3 cm) 97% 24.72 kg/m2        Blood Pressure from Last 3 Encounters:   10/24/17 (!) 174/100   10/17/17 (!) 151/108   17 128/77    Weight from Last 3 Encounters:   10/24/17 167 lb 6.4 oz (75.9 kg)   10/17/17 164 lb 6.4 oz (74.6 kg)   17 149 lb 12.8 oz (67.9 kg)               Primary Care Provider Office Phone # Fax #    Edmund Yousif Dey [phone]       [address] [address]        Equal Access to Services     YEIMI CORDON AH: Hadii aad ku hadasho Soomaali, waaxda luqadaha, qaybta kaalmada adeegyada, leslie cavazos haysarahn sean isaacs . So [facility] [phone].    ATENCIÓN: Si habla español, tiene a castillo disposición servicios gratuitos de asistencia lingüística. Llame al [phone].    We comply with applicable federal civil rights laws and [place] laws. We do not discriminate on the basis of race, color, national origin, age, disability, sex, sexual orientation, or gender identity.            Thank you!     Thank you for choosing [facility]  for your care. Our goal is always to provide you with excellent care. Hearing back from our patients is one way we can continue to improve our services. Please take a few minutes to complete the written survey that you may receive in the mail after your visit with us. Thank you!             Your Updated Medication List - Protect others around you: Learn how to safely use, store and throw away your medicines at www.cliniq.lyemymeds.org.        "   This list is accurate as of: 10/24/17 10:26 AM.  Always use your most recent med list.                   Brand Name Dispense Instructions for use Diagnosis    amLODIPine 5 MG tablet    NORVASC    90 tablet    Take 1 tablet (5 mg) by mouth daily    Essential hypertension       citalopram 40 MG tablet    celeXA    30 tablet    Take 1 tablet (40 mg) by mouth daily    Depression, unspecified depression type       cyclobenzaprine 5 MG tablet    FLEXERIL    30 tablet    Take 1 tablet (5 mg) by mouth 3 times daily as needed for muscle spasms    Chronic bilateral low back pain with left-sided sciatica       gabapentin 300 MG capsule    NEURONTIN    90 capsule    Take 1 tablet (300 mg) every night for 1-3 days, then 1 tablet twice daily for 1-3 days, then 1 tablet three times daily    Uncomplicated alcohol dependence (H)       lisinopril 20 MG tablet    PRINIVIL/ZESTRIL    30 tablet    Take 1 tablet (20 mg) by mouth daily    Benign essential hypertension       Menthol-Camphor 16-11 % Crea    ICY HOT ADVANCED RELIEF    56 g    Externally apply 1 Application topically 3 times daily as needed    Chronic bilateral low back pain with left-sided sciatica       multivitamin, therapeutic with minerals Tabs tablet     100 tablet    Take 1 tablet by mouth daily    Alcohol abuse, Esophagitis       naltrexone 50 MG tablet    DEPADE;REVIA    30 tablet    Take 1 tablet (50 mg) by mouth daily    Uncomplicated alcohol dependence (H)       nicotine 14 MG/24HR 24 hr patch    NICODERM CQ    30 patch    Place 1 patch onto the skin every 24 hours    Tobacco abuse       omeprazole 20 MG CR capsule    priLOSEC    60 capsule    Take 1 capsule (20 mg) by mouth 2 times daily    Esophagitis

## 2017-10-26 ENCOUNTER — OFFICE VISIT - HEALTHEAST (OUTPATIENT)
Dept: PHYSICAL THERAPY | Facility: REHABILITATION | Age: 52
End: 2017-10-26

## 2017-10-26 DIAGNOSIS — R29.3 POOR POSTURE: ICD-10-CM

## 2017-10-26 DIAGNOSIS — M62.81 GENERALIZED MUSCLE WEAKNESS: ICD-10-CM

## 2017-10-26 DIAGNOSIS — M54.42 CHRONIC MIDLINE LOW BACK PAIN WITH LEFT-SIDED SCIATICA: ICD-10-CM

## 2017-10-26 DIAGNOSIS — G89.29 CHRONIC MIDLINE LOW BACK PAIN WITH LEFT-SIDED SCIATICA: ICD-10-CM

## 2017-10-26 DIAGNOSIS — R26.89 ANTALGIC GAIT: ICD-10-CM

## 2017-11-01 ENCOUNTER — HOSPITAL ENCOUNTER (OUTPATIENT)
Dept: ULTRASOUND IMAGING | Facility: HOSPITAL | Age: 52
Discharge: HOME | End: 2017-11-01
Attending: STUDENT IN AN ORGANIZED HEALTH CARE EDUCATION/TRAINING PROGRAM

## 2017-11-01 ENCOUNTER — HOSPITAL ENCOUNTER (OUTPATIENT)
Dept: MRI IMAGING | Facility: HOSPITAL | Age: 52
Discharge: HOME | End: 2017-11-01
Attending: STUDENT IN AN ORGANIZED HEALTH CARE EDUCATION/TRAINING PROGRAM

## 2017-11-01 DIAGNOSIS — M54.41 LEFT-SIDED LOW BACK PAIN WITH BILATERAL SCIATICA: ICD-10-CM

## 2017-11-01 DIAGNOSIS — M79.662 PAIN OF LEFT CALF: ICD-10-CM

## 2017-11-01 DIAGNOSIS — M54.42 LEFT-SIDED LOW BACK PAIN WITH BILATERAL SCIATICA: ICD-10-CM

## 2017-11-01 DIAGNOSIS — F10.20 UNCOMPLICATED ALCOHOL DEPENDENCE (H): ICD-10-CM

## 2017-11-01 RX ORDER — GABAPENTIN 300 MG/1
CAPSULE ORAL
Qty: 90 CAPSULE | Refills: 1 | Status: SHIPPED | OUTPATIENT
Start: 2017-11-01 | End: 2017-11-09

## 2017-11-09 ENCOUNTER — OFFICE VISIT (OUTPATIENT)
Dept: FAMILY MEDICINE | Facility: CLINIC | Age: 52
End: 2017-11-09

## 2017-11-09 VITALS
BODY MASS INDEX: 23.91 KG/M2 | WEIGHT: 167 LBS | OXYGEN SATURATION: 100 % | HEART RATE: 83 BPM | TEMPERATURE: 98 F | SYSTOLIC BLOOD PRESSURE: 163 MMHG | DIASTOLIC BLOOD PRESSURE: 110 MMHG | HEIGHT: 70 IN | RESPIRATION RATE: 20 BRPM

## 2017-11-09 DIAGNOSIS — F33.1 MODERATE EPISODE OF RECURRENT MAJOR DEPRESSIVE DISORDER (H): ICD-10-CM

## 2017-11-09 DIAGNOSIS — I10 BENIGN ESSENTIAL HYPERTENSION: ICD-10-CM

## 2017-11-09 DIAGNOSIS — I10 ESSENTIAL HYPERTENSION: ICD-10-CM

## 2017-11-09 DIAGNOSIS — G89.29 CHRONIC BILATERAL LOW BACK PAIN WITH LEFT-SIDED SCIATICA: Primary | ICD-10-CM

## 2017-11-09 DIAGNOSIS — M54.42 CHRONIC BILATERAL LOW BACK PAIN WITH LEFT-SIDED SCIATICA: Primary | ICD-10-CM

## 2017-11-09 DIAGNOSIS — F10.20 UNCOMPLICATED ALCOHOL DEPENDENCE (H): ICD-10-CM

## 2017-11-09 RX ORDER — GABAPENTIN 300 MG/1
300 CAPSULE ORAL 3 TIMES DAILY
Qty: 90 CAPSULE | Refills: 1 | Status: SHIPPED | OUTPATIENT
Start: 2017-11-09 | End: 2017-12-19

## 2017-11-09 RX ORDER — CYCLOBENZAPRINE HCL 5 MG
10 TABLET ORAL 3 TIMES DAILY PRN
Qty: 60 TABLET | Refills: 0 | Status: SHIPPED | OUTPATIENT
Start: 2017-11-09 | End: 2017-12-28

## 2017-11-09 NOTE — PROGRESS NOTES
HPI:       Piter Gaines is a 51 year old male with a significant past medical history hypertension, depression, alcoholism, tobacco use, peptic ulcer disease who presents for follow up of concern(s) listed below    Chronic bilateral low back pain with left-sided sciatica  Was last seen in clinic on 10/24/17 for ongoing back pain which has not gotten any better since. Pain most prominent at the left lower back which radiates to the left leg and worsen with movement. He was given flexeril at the last visit which did not seem to help. Was given Valium, Toradol, and steroid several weeks ago with some relief but very short-lived. Was told to increase his gabapentin during the lat visit which he did but has been taking more than instructed. He has been taking 300mg 5x per day for the last several days due to pain. Has not tried any OTC. Had one visit with physical therapy but given worsening knee pain likely due to increase activity has not been back. He had an MRI of his lumbar spin which was remarkable for very mild central canal stenosis and bilateral lateral recess stenosis due to degenerative change and a small shallow disc protrusion on 11/1. Has localized weakness due to pain but denies any numbness, recent injuries, fall, MVA, saddle anesthesia, changes in urination or bowel movement.    Left Knee Pain:  Attended physical therapy once and noted that his left thigh, knee and calf looks slightly more swollen the days following PT.  Now he notices that his left knee is still swollen but improved since onset. Pain worsen with walking. Denies any trauma or injuries.  No fever, chills, urinary/bowel symptoms or saddle anesthesia. Had US DVT at [place] which was negative. No issues with the right knee.    Depression:  Piter states that his mood has not improved. Attributes his mood to the pain. Still enjoys doing puzzles but less motivated for other hobbies and feels more isolated. No suicidal or homicidal  ideations. He is currently taking 40 mg Celexa for several weeks and 300 mg of Gabapentin 5x per day for several days.         PMHX:     Patient Active Problem List   Diagnosis     Tobacco use     Caloric malnutrition (H)     Hematemesis     Essential hypertension     Esophagitis     Clostridium difficile infection     Alcohol abuse     Moderate episode of recurrent major depressive disorder (H)     Family History   Problem Relation Age of Onset     Bone Cancer Mother      Breast Cancer Mother      DIABETES Father      KIDNEY DISEASE Father      DIABETES Sister      CEREBROVASCULAR DISEASE Brother          Current Outpatient Prescriptions   Medication Sig Dispense Refill     gabapentin (NEURONTIN) 300 MG capsule Take 1 tablet (300 mg) every night for 1-3 days, then 1 tablet twice daily for 1-3 days, then 1 tablet three times daily (Patient taking differently: 3 times daily Take 1 tablet (300 mg) every night for 1-3 days, then 1 tablet twice daily for 1-3 days, then 1 tablet three times daily) 90 capsule 1     cyclobenzaprine (FLEXERIL) 5 MG tablet Take 1 tablet (5 mg) by mouth 3 times daily as needed for muscle spasms 30 tablet 0     Menthol-Camphor (ICY HOT ADVANCED RELIEF) 16-11 % CREA Externally apply 1 Application topically 3 times daily as needed 56 g 1     citalopram (CELEXA) 40 MG tablet Take 1 tablet (40 mg) by mouth daily 30 tablet 3     omeprazole (PRILOSEC) 20 MG CR capsule Take 1 capsule (20 mg) by mouth 2 times daily 60 capsule 3     amLODIPine (NORVASC) 5 MG tablet Take 1 tablet (5 mg) by mouth daily 90 tablet 3     naltrexone (DEPADE;REVIA) 50 MG tablet Take 1 tablet (50 mg) by mouth daily 30 tablet 3     lisinopril (PRINIVIL/ZESTRIL) 20 MG tablet Take 1 tablet (20 mg) by mouth daily 30 tablet 11     nicotine (NICODERM CQ) 14 MG/24HR 24 hr patch Place 1 patch onto the skin every 24 hours (Patient not taking: Reported on 11/9/2017) 30 patch 3     multivitamin, therapeutic with minerals (MULTI-VITAMIN)  "TABS tablet Take 1 tablet by mouth daily (Patient not taking: Reported on 11/9/2017) 100 tablet 3       Social History     Social History     Marital status:      Spouse name: N/A     Number of children: N/A     Years of education: N/A     Occupational History     Not on file.     Social History Main Topics     Smoking status: Current Some Day Smoker     Smokeless tobacco: Not on file      Comment: 1/2 pack a day, currently on patches.       Alcohol use No     Drug use: No     Sexual activity: Not Currently     Other Topics Concern     Not on file     Social History Narrative        No Known Allergies    No results found for this or any previous visit (from the past 24 hour(s)).         Review of Systems:   See HPI          Physical Exam:     Vitals:    11/09/17 1354 11/09/17 1402   BP: (!) 157/105 (!) 163/110   Pulse: 83    Resp: 20    Temp: 98  F (36.7  C)    TempSrc: Oral    SpO2: 100%    Weight: 167 lb (75.8 kg)    Height: 5' 9.5\" (176.5 cm)      Body mass index is 24.31 kg/(m^2).    GENERAL APPEARANCE: healthy, alert and no distress,  EYES: Eyes grossly normal to inspection,  PERRL  HENT: ear canals and TM's normal and nose and mouth without ulcers or lesions  RESP: lungs clear to auscultation - no rales, rhonchi or wheezes  CV: regular rate and rhythm,  and no murmur, click,  rub or gallop  ABDOMEN: soft, nontender, without hepatosplenomegaly or masses  MS: normal muscle tone and decreased range of motion due to pain. Pain in back at 60 deg right leg and 20 deg left leg. Positive left leg raise test given reproducible pain in lumbar back. Tenderness to palpation on left medial knee. Slight effusion of the left knee with erythema.   NEURO: Normal strength and tone, sensory exam grossly normal, mentation appears intact and speech normal  PSYCH: mood and affect normal/bright    MRI 11/1 Spine  CONCLUSION:  1.  Degenerative changes in the lumbar spine as described above.  2.  At L3-L4 there is very mild " central canal stenosis and bilateral lateral recess stenosis due to degenerative change and a small shallow disc protrusion.  3.  No high-grade central canal or foraminal stenosis. No clear evidence of neural impingement.    US Venous Leg Left  CONCLUSION:  1.  Left leg veins are negative for DVT.    Assessment and Plan     1. Chronic bilateral low back pain with left-sided sciatica  Ongoing chronic back pain unchanged despite flexeril. MRI show no overt reason for back pain. Had attend physical therapy but has not been back due to knee pain. Discuss option of pool therapy with patient was interested and will try after returning from vacation in [place].  - Decrease Gabapentin use to what was prescribed (300mg 3x per day and no more)  - Increase cyclobenzaprine (FLEXERIL) to 10mg TID, refills given  - PHYSICAL THERAPY REFERRAL, with indication for pool therapy  - F/u after returning from [place]    2. Knee Pain, Left Sided  Unclear etiology but likely due to injury associated with physical therapy/increase activity. No trauma or recent falls. Has improved without treatment over the last several weeks. US DVT was negative for DVT  - Continue with supportive treatment  - Pool physical therapy    3. Depression  Currently on 40mg citalopram with no improvement in mood. Per history, depression likely worsen by uncontrolled chronic pain.  Discuss importance of therapy/counseling which patient declines at this time but will think about it. Will be addressed at next visit.   - Continue 40 mg citalopram  - May consider transition to Prozac or other SSRI if not improved with pain control  - Would benefit greatly from therapy, continue discussions at next visit    4. Hypertension.  Elevated BP during today visit. Likely due to uncontrolled chronic pain. Currently on Amlodipine 5 mg daily and Lisinopril 20mg daily. Will not change anti-hypertensive medication at this time in the setting of uncontrolled pain, but will  reassess at next visit.  - Consider increasing lisinopril for hypertension if BP remain elevated despite pain controlled    Options for treatment and follow-up care were reviewed with the patient and/or guardian. Piter Gaines and/or guardian engaged in the decision making process and verbalized understanding of the options discussed and agreed with the final plan.    This note was scribed by Joey Larson, MS3, on behalf of Dr. Edmund Dey MD. The medical student acted as a scribe and the encounter documented above was performed completely by me and the documentation accurately reflects the work I have performed today.      Edmund Dey MD  [facility]  Pager: [phone]    Precepted today with: Lupillo Epstein MD

## 2017-11-09 NOTE — PROGRESS NOTES
Preceptor Attestation:  Patient's case reviewed and discussed with Edmund Dey MD Patient seen and discussed with the resident.. I agree with assessment and plan of care.  Supervising Physician:  Lupillo Epstein MD  [facility]

## 2017-11-09 NOTE — MR AVS SNAPSHOT
After Visit Summary   11/9/2017    Piter Gaines    MRN: [id number]           Patient Information     Date Of Birth          1965        Visit Information        Provider Department      11/9/2017 2:00 PM Edmund Dey MD [facility]        Today's Diagnoses     Uncomplicated alcohol dependence (H)        Chronic bilateral low back pain with left-sided sciatica          Care Instructions    - Start Pool therapy when returning from [place]  - Take flexeril 3x a day  - Stay active  - Continue taking 300 mg gabapentin x3 a day with meals  - f/u after returning from [place]            Follow-ups after your visit        Additional Services     PHYSICAL THERAPY REFERRAL       PT/OT REFERRAL  Piter Gaines  1965  Phone #: [phone] (home)    needed: No  Language: English    PT/OT  Facility:   [facility], P: [phone], F: [phone]    History: Back Pain needs pool therapy    Precautions/Contraindications: None    Imaging Studies: MRI    Surgical Procedure/Test Results: None    Treatment Goals:   Increase: Flexibility, Strength and ROM    Prognosis: good    Visits: Up to 12    Evaluate: Evaluate and treat    Plan: Manual Therapy, Flexibility Exercise, Strength Exercise and pool therapy                  Future tests that were ordered for you today     Open Future Orders        Priority Expected Expires Ordered    PHYSICAL THERAPY REFERRAL Routine  11/9/2018 11/9/2017            Who to contact     Please call your clinic at [phone] to:    Ask questions about your health    Make or cancel appointments    Discuss your medicines    Learn about your test results    Speak to your doctor   If you have compliments or concerns about an experience at your clinic, or if you wish to file a complaint, please contact [facility] Relations at [phone] or email us at [email or web address]          "Additional Information About Your Visit        [employer] Information     [employer] is an electronic gateway that provides easy, online access to your medical records. With [employer], you can request a clinic appointment, read your test results, renew a prescription or communicate with your care team.     To sign up for [employer] visit the website at [email or web address]   You will be asked to enter the access code listed below, as well as some personal information. Please follow the directions to create your username and password.     Your access code is: [email or web address]  Expires: 2018  9:26 AM     Your access code will  in 90 days. If you need help or a new code, please contact your [facility] or call [phone] for assistance.        Care EveryWhere ID     This is your Care EveryWhere ID. This could be used by other organizations to access your [place] medical records  [id number]        Your Vitals Were     Pulse Temperature Respirations Height Pulse Oximetry BMI (Body Mass Index)    83 98  F (36.7  C) (Oral) 20 5' 9.5\" (176.5 cm) 100% 24.31 kg/m2       Blood Pressure from Last 3 Encounters:   17 (!) 163/110   10/24/17 (!) 174/100   10/17/17 (!) 151/108    Weight from Last 3 Encounters:   17 167 lb (75.8 kg)   10/24/17 167 lb 6.4 oz (75.9 kg)   10/17/17 164 lb 6.4 oz (74.6 kg)                 Today's Medication Changes          These changes are accurate as of: 17  2:45 PM.  If you have any questions, ask your nurse or doctor.               These medicines have changed or have updated prescriptions.        Dose/Directions    cyclobenzaprine 5 MG tablet   Commonly known as:  FLEXERIL   This may have changed:  how much to take   Used for:  Chronic bilateral low back pain with left-sided sciatica   Changed by:  Edmund Dey MD        Dose:  10 mg   Take 2 tablets (10 mg) by mouth 3 times daily as needed for muscle spasms   Quantity:  60 tablet "   Refills:  0       gabapentin 300 MG capsule   Commonly known as:  NEURONTIN   This may have changed:    - when to take this  - additional instructions   Used for:  Uncomplicated alcohol dependence (H)        Take 1 tablet (300 mg) every night for 1-3 days, then 1 tablet twice daily for 1-3 days, then 1 tablet three times daily   Quantity:  90 capsule   Refills:  1            Where to get your medicines      These medications were sent to [id number] - [place], [place] - [address] AT [facility] & [address], [place] [address]     Phone:  [phone]     cyclobenzaprine 5 MG tablet                Primary Care Provider Office Phone # Fax #    Edmund Dey [phone]       [address] [address]        Equal Access to Services     NATHALIA CORDON : Hadii aad ku hadasho Soyohan, waaxda luqadaha, qaybta kaalmada adeegyada, leslie younger. So [facility] [phone].    ATENCIÓN: Si habla español, tiene a castillo disposición servicios gratuitos de asistencia lingüística. Tyra al [phone].    We comply with applicable federal civil rights laws and [place] laws. We do not discriminate on the basis of race, color, national origin, age, disability, sex, sexual orientation, or gender identity.            Thank you!     Thank you for choosing [facility]  for your care. Our goal is always to provide you with excellent care. Hearing back from our patients is one way we can continue to improve our services. Please take a few minutes to complete the written survey that you may receive in the mail after your visit with us. Thank you!             Your Updated Medication List - Protect others around you: Learn how to safely use, store and throw away your medicines at www.disposemymeds.org.          This list is accurate as of: 11/9/17  2:45 PM.  Always use your most recent med list.                    Brand Name Dispense Instructions for use Diagnosis    amLODIPine 5 MG tablet    NORVASC    90 tablet    Take 1 tablet (5 mg) by mouth daily    Essential hypertension       citalopram 40 MG tablet    celeXA    30 tablet    Take 1 tablet (40 mg) by mouth daily    Depression, unspecified depression type       cyclobenzaprine 5 MG tablet    FLEXERIL    60 tablet    Take 2 tablets (10 mg) by mouth 3 times daily as needed for muscle spasms    Chronic bilateral low back pain with left-sided sciatica       gabapentin 300 MG capsule    NEURONTIN    90 capsule    Take 1 tablet (300 mg) every night for 1-3 days, then 1 tablet twice daily for 1-3 days, then 1 tablet three times daily    Uncomplicated alcohol dependence (H)       lisinopril 20 MG tablet    PRINIVIL/ZESTRIL    30 tablet    Take 1 tablet (20 mg) by mouth daily    Benign essential hypertension       Menthol-Camphor 16-11 % Crea    ICY HOT ADVANCED RELIEF    56 g    Externally apply 1 Application topically 3 times daily as needed    Chronic bilateral low back pain with left-sided sciatica       multivitamin, therapeutic with minerals Tabs tablet     100 tablet    Take 1 tablet by mouth daily    Alcohol abuse, Esophagitis       naltrexone 50 MG tablet    DEPADE;REVIA    30 tablet    Take 1 tablet (50 mg) by mouth daily    Uncomplicated alcohol dependence (H)       nicotine 14 MG/24HR 24 hr patch    NICODERM CQ    30 patch    Place 1 patch onto the skin every 24 hours    Tobacco abuse       omeprazole 20 MG CR capsule    priLOSEC    60 capsule    Take 1 capsule (20 mg) by mouth 2 times daily    Esophagitis

## 2017-11-09 NOTE — PATIENT INSTRUCTIONS
- Start Pool therapy when returning from [place]  - Take flexeril 3x a day  - Stay active  - Continue taking 300 mg gabapentin x3 a day with meals  - f/u after returning from [place]      Referral for Pool Therapy faxed to [facility] and pt will be contacted to schedule appointment.

## 2017-12-19 DIAGNOSIS — F10.20 UNCOMPLICATED ALCOHOL DEPENDENCE (H): ICD-10-CM

## 2017-12-19 RX ORDER — GABAPENTIN 300 MG/1
300 CAPSULE ORAL 3 TIMES DAILY
Qty: 90 CAPSULE | Refills: 3 | Status: SHIPPED | OUTPATIENT
Start: 2017-12-19 | End: 2018-05-22

## 2017-12-27 DIAGNOSIS — I10 ESSENTIAL HYPERTENSION: ICD-10-CM

## 2017-12-27 RX ORDER — AMLODIPINE BESYLATE 5 MG/1
5 TABLET ORAL DAILY
Qty: 90 TABLET | Refills: 3 | Status: SHIPPED | OUTPATIENT
Start: 2017-12-27 | End: 2017-12-28

## 2017-12-28 ENCOUNTER — OFFICE VISIT (OUTPATIENT)
Dept: FAMILY MEDICINE | Facility: CLINIC | Age: 52
End: 2017-12-28

## 2017-12-28 VITALS
WEIGHT: 171.8 LBS | TEMPERATURE: 97.7 F | OXYGEN SATURATION: 98 % | HEIGHT: 70 IN | BODY MASS INDEX: 24.6 KG/M2 | HEART RATE: 81 BPM | SYSTOLIC BLOOD PRESSURE: 142 MMHG | DIASTOLIC BLOOD PRESSURE: 99 MMHG | RESPIRATION RATE: 18 BRPM

## 2017-12-28 DIAGNOSIS — M25.562 LEFT KNEE PAIN, UNSPECIFIED CHRONICITY: ICD-10-CM

## 2017-12-28 DIAGNOSIS — I10 ESSENTIAL HYPERTENSION: ICD-10-CM

## 2017-12-28 DIAGNOSIS — R55 COUGH SYNCOPE: Primary | ICD-10-CM

## 2017-12-28 DIAGNOSIS — R05.4 COUGH SYNCOPE: Primary | ICD-10-CM

## 2017-12-28 DIAGNOSIS — M54.42 LEFT-SIDED LOW BACK PAIN WITH LEFT-SIDED SCIATICA, UNSPECIFIED CHRONICITY: ICD-10-CM

## 2017-12-28 RX ORDER — BENZONATATE 200 MG/1
200 CAPSULE ORAL 3 TIMES DAILY PRN
Qty: 21 CAPSULE | Refills: 0 | Status: SHIPPED | OUTPATIENT
Start: 2017-12-28 | End: 2018-02-28

## 2017-12-28 RX ORDER — AMLODIPINE BESYLATE 5 MG/1
10 TABLET ORAL DAILY
Qty: 90 TABLET | Refills: 3 | Status: SHIPPED | OUTPATIENT
Start: 2017-12-28 | End: 2018-06-18

## 2017-12-28 NOTE — PROGRESS NOTES
HPI:       Piter Gaines is a 52 year old  male with a significant past medical history of hypertension, depression, alcoholism, tobacco use, peptic ulcer disease who presents for follow up of concern(s) listed below    Coughing/Syncope  Approximately 2 months ago, still has noted that whenever he has a very hard cough, he would feel lightheadedness and feels like he was about to pass out.  On several occasion has woken up and did not know exactly what happened. Occurs once every 2 days.  Endorse associated lightheadedness and chest Lodder but denies any nausea, vomiting, chest pain, shortness of breath, dizziness, neck pain, falls, injuries, abdominal pain, lost urinary continence or bowel continence.  Never had anything like this before.  Unclear what is happening now.  No prior history of syncope in the past. Still continue to smoking but has cut back to 1 pack per 2 days.    Knee pain  Noted that his knee pain which started several months ago after attending physical therapy for first time has been getting worse.  Noted that over the holiday weekend, he had worsening swelling in his knee but has since improved and is currently back to baseline.  Endorsed persistent pain which remains unchanged despite medication such as Flexeril.  Has not taking any over-the-counter medications such as ibuprofen or Tylenol given no relief and does not like taking medication for no reason.  Noted associated stiffness of the left leg as well as difficulty walking.  Lower back pain has been persistent with radiation down the left leg. Has upcoming physical therapy appointment but did miss two appointment and was told that he was no longer welcome.  Denies any recent injury, numbness, weakness, knee instability.  Did try using a knee sleeve which provided some relief.           PMHX:     Patient Active Problem List   Diagnosis     Tobacco use     Caloric malnutrition (H)     Hematemesis     Essential hypertension      Esophagitis     Clostridium difficile infection     Alcohol abuse     Moderate episode of recurrent major depressive disorder (H)       Current Outpatient Prescriptions   Medication Sig Dispense Refill     amLODIPine (NORVASC) 5 MG tablet Take 1 tablet (5 mg) by mouth daily 90 tablet 3     gabapentin (NEURONTIN) 300 MG capsule Take 1 capsule (300 mg) by mouth 3 times daily 90 capsule 3     citalopram (CELEXA) 40 MG tablet Take 1 tablet (40 mg) by mouth daily 30 tablet 3     omeprazole (PRILOSEC) 20 MG CR capsule Take 1 capsule (20 mg) by mouth 2 times daily 60 capsule 3     lisinopril (PRINIVIL/ZESTRIL) 20 MG tablet Take 1 tablet (20 mg) by mouth daily 30 tablet 11     cyclobenzaprine (FLEXERIL) 5 MG tablet Take 2 tablets (10 mg) by mouth 3 times daily as needed for muscle spasms (Patient not taking: Reported on 12/28/2017) 60 tablet 0     Menthol-Camphor (ICY HOT ADVANCED RELIEF) 16-11 % CREA Externally apply 1 Application topically 3 times daily as needed (Patient not taking: Reported on 12/28/2017) 56 g 1     naltrexone (DEPADE;REVIA) 50 MG tablet Take 1 tablet (50 mg) by mouth daily (Patient not taking: Reported on 12/28/2017) 30 tablet 3       Social History     Social History     Marital status:      Spouse name: N/A     Number of children: N/A     Years of education: N/A     Occupational History     Not on file.     Social History Main Topics     Smoking status: Current Some Day Smoker     Smokeless tobacco: Never Used      Comment: 1/2 pack a day, currently on patches.       Alcohol use No     Drug use: No     Sexual activity: Not Currently     Other Topics Concern     Not on file     Social History Narrative      No Known Allergies    No results found for this or any previous visit (from the past 24 hour(s)).         Review of Systems:   C: NEGATIVE for fatigue, unexpected change in weight  E: NEGATIVE for acute vision problems or changes  E/M: NEGATIVE for ear, mouth and throat problems  RESP:  "coughing: see HPI  CV: NEGATIVE for chest pain, palpitations or new or worsening peripheral edema  GI: NEGATIVE for new onset or worsening nausea, vomiting or diarrhea  : NEGATIVE for frequency, dysuria, or hematuria  MUSCULOSKELETAL: knee pain: See HPI  NEURO: syncope: see HPI   P: NEGATIVE for changes in mood or affect          Physical Exam:     Vitals:    12/28/17 1317   BP: (!) 142/99   BP Location: Right arm   Patient Position: Sitting   Cuff Size: Adult Regular   Pulse: 81   Resp: 18   Temp: 97.7  F (36.5  C)   TempSrc: Oral   SpO2: 98%   Weight: 171 lb 12.8 oz (77.9 kg)   Height: 5' 9.5\" (176.5 cm)     Body mass index is 25.01 kg/(m^2).    GENERAL APPEARANCE: healthy, alert and no distress,  RESP: lungs clear to auscultation - no rales, rhonchi or wheezes  CV: regular rate and rhythm,  and no murmur, click,  rub or gallop  BACK/EXTREMITIES: Tenderness to deep palpation of the left SI joint, positive straight leg raise with crossover, significant discomfort in the knee with knee flexion, no left knee effusion, patella hypermobile laterally and medially, difficulty with gait due to discomfort, no tenderness on palpation of the right back and no acute issues with the right leg, pelvic rotated  NEURO: mentation intact and speech normal  PSYCH: mood and affect normal/bright      Assessment and Plan     1. Cough syncope  Given history. Unlikely seizure disorder or cardiac ischemia etiology given no lost of bowel/urinary continence with no chest pain. Recent NM stress test was negative.  Discussed with patient likely etiology and plans for symptomatic treatment including cough suppressant.  Discussed with patient that if syncope continues despite coughing being under control, would consider other etiology including cardiac arrhythmia.  - Continue with good hydration  - benzonatate (TESSALON) 200 MG capsule; Take 1 capsule (200 mg) by mouth 3 times daily as needed for cough  Dispense: 21 capsule; Refill: 0  - " Follow up if worsening    2. Left knee pain, unspecified chronicity/3. Left-sided low back pain with left-sided sciatica, unspecified chronicity  Likely secondary to lower back pain and rotated pelvis given exam. NO recent injury or effusion on exam concerning for ligamentous injury of the knee. Recent MRI of the back shows mild degenerative disk disease but no overt reason for back pain. Discuss benefits of physical therapy and hopefully with repositioning of the pelvis, back pain and knee pain would improve. Has tried Flexeril in the past with no relief. Continue with support therapy and return to physical therapy.   - PHYSICAL THERAPY REFERRAL; Future    4. Essential hypertension  Elevated at today's visit. Has compliant with amlodipine and lisinopril. Discuss importance of blood pressure control.  - Increase amlodipine from 5mg to 10mg daily  - amLODIPine (NORVASC) 5 MG tablet; Take 2 tablets (10 mg) by mouth daily  Dispense: 90 tablet; Refill: 3  - Follow up in 2 weeks for BP recheck    Options for treatment and follow-up care were reviewed with the patient and/or guardian. Piter Gaines and/or guardian engaged in the decision making process and verbalized understanding of the options discussed and agreed with the final plan.    Edmund Dey MD  [facility]  Pager: [phone]    Precepted today with: Lupillo Epstein MD

## 2017-12-28 NOTE — PATIENT INSTRUCTIONS
- Take Tessalon Perles for coughing and syncope  - Follow up if syncope worsen  - Follow up with Physical Therapy  - Start staking Amlodipine 10 mg      Referral for Physical Therapy faxed to [facility] and they will contact pt to set up appointment.  [place]

## 2017-12-28 NOTE — MR AVS SNAPSHOT
After Visit Summary   12/28/2017    Piter Gaines    MRN: [id number]           Patient Information     Date Of Birth          1965        Visit Information        Provider Department      12/28/2017 1:40 PM Edmund Dey MD [facility]        Today's Diagnoses     Cough syncope    -  1    Essential hypertension        Left knee pain, unspecified chronicity        Left-sided low back pain with left-sided sciatica, unspecified chronicity          Care Instructions    - Take Tessalon Perles for coughing and syncope  - Follow up if syncope worsen  - Follow up with Physical Therapy  - Start staking Amlodipine 10 mg          Follow-ups after your visit        Additional Services     PHYSICAL THERAPY REFERRAL       PT/OT REFERRAL  Piter Gaines  1965  Phone #: [phone] (home)    needed: No  Language: English    PT/OT  Facility:   [facility], P: [phone], F: [phone]    History: Lower back pain with knee pain, focus on realigning pelvic    Precautions/Contraindications: None    Imaging Studies: MRI    Surgical Procedure/Test Results: None    Treatment Goals:   Increase: Flexibility, Strength and ROM    Prognosis: excellent    Visits: Up to 12    Evaluate: Evaluate and treat    Plan: Manual Therapy, Flexibility Exercise and Strength Exercise                  Follow-up notes from your care team     Return in about 1 week (around 1/4/2018).      Future tests that were ordered for you today     Open Future Orders        Priority Expected Expires Ordered    PHYSICAL THERAPY REFERRAL Routine  12/28/2018 12/28/2017            Who to contact     Please call your clinic at [phone] to:    Ask questions about your health    Make or cancel appointments    Discuss your medicines    Learn about your test results    Speak to your doctor   If you have compliments or concerns about an experience at your clinic, or if you wish to file a complaint,  "please contact [facility] Patient Relations at [phone] or email us at [email or web address]         Additional Information About Your Visit        Mobile2Win India[employer] Information     [employer] is an electronic gateway that provides easy, online access to your medical records. With [employer]oc [employer], you can request a clinic appointment, read your test results, renew a prescription or communicate with your care team.     To sign up for [employer]oc [employer] visit the website at [email or web address]   You will be asked to enter the access code listed below, as well as some personal information. Please follow the directions to create your username and password.     Your access code is: [email or web address]  Expires: 2018  9:26 AM     Your access code will  in 90 days. If you need help or a new code, please contact your [facility] or call [phone] for assistance.        Care EveryWhere ID     This is your Care EveryWhere ID. This could be used by other organizations to access your [facility] medical records  [id number]        Your Vitals Were     Pulse Temperature Respirations Height Pulse Oximetry BMI (Body Mass Index)    81 97.7  F (36.5  C) (Oral) 18 5' 9.5\" (176.5 cm) 98% 25.01 kg/m2       Blood Pressure from Last 3 Encounters:   17 (!) 142/99   17 (!) 163/110   10/24/17 (!) 174/100    Weight from Last 3 Encounters:   17 171 lb 12.8 oz (77.9 kg)   17 167 lb (75.8 kg)   10/24/17 167 lb 6.4 oz (75.9 kg)                 Today's Medication Changes          These changes are accurate as of: 17  1:50 PM.  If you have any questions, ask your nurse or doctor.               Start taking these medicines.        Dose/Directions    benzonatate 200 MG capsule   Commonly known as:  TESSALON   Used for:  Cough syncope   Started by:  Edmund Dey MD        Dose:  200 mg   Take 1 capsule (200 mg) by mouth 3 times daily as needed for cough "   Quantity:  21 capsule   Refills:  0         These medicines have changed or have updated prescriptions.        Dose/Directions    amLODIPine 5 MG tablet   Commonly known as:  NORVASC   This may have changed:  how much to take   Used for:  Essential hypertension   Changed by:  Edmund Dey MD        Dose:  10 mg   Take 2 tablets (10 mg) by mouth daily   Quantity:  90 tablet   Refills:  3            Where to get your medicines      These medications were sent to [id number] - [place], [place] - [address] AT [address], [place] [address]     Phone:  [phone]     amLODIPine 5 MG tablet    benzonatate 200 MG capsule                Primary Care Provider Office Phone # Fax #    Edmund Dey [phone]       [address] [address]        Equal Access to Services     [facility]: Hadii aad ku hadasho Soomaali, waaxda luqadaha, qaybta kaalmada adeegyada, waxay idiin hayaan adeeg kharaangel isaacs . So [facility] [phone].    ATENCIÓN: Si habla español, tiene a castillo disposición servicios gratuitos de asistencia lingüística. Tyra al [phone].    We comply with applicable federal civil rights laws and [place] laws. We do not discriminate on the basis of race, color, national origin, age, disability, sex, sexual orientation, or gender identity.            Thank you!     Thank you for choosing [facility]  for your care. Our goal is always to provide you with excellent care. Hearing back from our patients is one way we can continue to improve our services. Please take a few minutes to complete the written survey that you may receive in the mail after your visit with us. Thank you!             Your Updated Medication List - Protect others around you: Learn how to safely use, store and throw away your medicines at www.disposemymeds.org.          This list is accurate as of:  12/28/17  1:50 PM.  Always use your most recent med list.                   Brand Name Dispense Instructions for use Diagnosis    amLODIPine 5 MG tablet    NORVASC    90 tablet    Take 2 tablets (10 mg) by mouth daily    Essential hypertension       benzonatate 200 MG capsule    TESSALON    21 capsule    Take 1 capsule (200 mg) by mouth 3 times daily as needed for cough    Cough syncope       gabapentin 300 MG capsule    NEURONTIN    90 capsule    Take 1 capsule (300 mg) by mouth 3 times daily    Uncomplicated alcohol dependence (H)       lisinopril 20 MG tablet    PRINIVIL/ZESTRIL    30 tablet    Take 1 tablet (20 mg) by mouth daily    Benign essential hypertension       omeprazole 20 MG CR capsule    priLOSEC    60 capsule    Take 1 capsule (20 mg) by mouth 2 times daily    Esophagitis

## 2017-12-29 ENCOUNTER — AMBULATORY - HEALTHEAST (OUTPATIENT)
Dept: ADMINISTRATIVE | Facility: REHABILITATION | Age: 52
End: 2017-12-29

## 2017-12-29 ENCOUNTER — TELEPHONE (OUTPATIENT)
Dept: FAMILY MEDICINE | Facility: CLINIC | Age: 52
End: 2017-12-29

## 2017-12-29 DIAGNOSIS — M25.562 LEFT KNEE PAIN: ICD-10-CM

## 2017-12-29 DIAGNOSIS — M54.42 LEFT-SIDED LOW BACK PAIN WITH LEFT-SIDED SCIATICA: ICD-10-CM

## 2017-12-29 RX ORDER — CYCLOBENZAPRINE HCL 5 MG
5 TABLET ORAL 3 TIMES DAILY PRN
Qty: 30 TABLET | Status: CANCELLED | OUTPATIENT
Start: 2017-12-29

## 2017-12-29 ASSESSMENT — PATIENT HEALTH QUESTIONNAIRE - PHQ9: SUM OF ALL RESPONSES TO PHQ QUESTIONS 1-9: 12

## 2017-12-29 NOTE — TELEPHONE ENCOUNTER
[facility] phone call message- medication clarification/question:    Full Medication Name: Cyclobenzaprine  Question: Pt would like this medication re prescribed for his knee pain, and hip pain.    Pharmacy confirmed as [id number] - [place], [place] - [address] [facility]: Yes    OK to leave a message on voice mail? Yes    Primary language: English      needed? No    Call taken on December 29, 2017 at 10:54 AM by Afshan Layne

## 2017-12-29 NOTE — TELEPHONE ENCOUNTER
Please verify this medication D/C in 12/28/17 stopped by patient and now patient is requesting again.

## 2017-12-29 NOTE — TELEPHONE ENCOUNTER
Patient is calling back to see if Dr. Dey is going to prescribe him medication below. Told him that the doctor is not in clinic today and that it could take up to two business days for a response. Patient uses [facility] by the clinic.

## 2018-01-02 NOTE — TELEPHONE ENCOUNTER
During his last clinic visit, patient was stating that Flexeril did not help and he has not taking it in some time without significant changes. At this time, I would like him to follow up with physical therapy and see how he does. If he feels that he wants the flexeril to help at night, I am okay with it but would like him to try physical therapy first.

## 2018-01-03 ENCOUNTER — OFFICE VISIT - HEALTHEAST (OUTPATIENT)
Dept: PHYSICAL THERAPY | Facility: REHABILITATION | Age: 53
End: 2018-01-03

## 2018-01-03 DIAGNOSIS — M54.42 CHRONIC MIDLINE LOW BACK PAIN WITH LEFT-SIDED SCIATICA: ICD-10-CM

## 2018-01-03 DIAGNOSIS — G89.29 CHRONIC MIDLINE LOW BACK PAIN WITH LEFT-SIDED SCIATICA: ICD-10-CM

## 2018-01-03 DIAGNOSIS — R29.3 POOR POSTURE: ICD-10-CM

## 2018-01-03 DIAGNOSIS — R26.89 ANTALGIC GAIT: ICD-10-CM

## 2018-01-03 DIAGNOSIS — M62.81 GENERALIZED MUSCLE WEAKNESS: ICD-10-CM

## 2018-01-11 ENCOUNTER — OFFICE VISIT (OUTPATIENT)
Dept: FAMILY MEDICINE | Facility: CLINIC | Age: 53
End: 2018-01-11
Payer: COMMERCIAL

## 2018-01-11 VITALS
HEART RATE: 85 BPM | OXYGEN SATURATION: 100 % | SYSTOLIC BLOOD PRESSURE: 128 MMHG | BODY MASS INDEX: 25.71 KG/M2 | DIASTOLIC BLOOD PRESSURE: 92 MMHG | TEMPERATURE: 97.9 F | WEIGHT: 176.6 LBS

## 2018-01-11 DIAGNOSIS — R05.9 COUGH: ICD-10-CM

## 2018-01-11 DIAGNOSIS — I10 ESSENTIAL HYPERTENSION: Primary | ICD-10-CM

## 2018-01-11 DIAGNOSIS — N52.9 ERECTILE DYSFUNCTION, UNSPECIFIED ERECTILE DYSFUNCTION TYPE: ICD-10-CM

## 2018-01-11 DIAGNOSIS — M54.9 RIGHT-SIDED BACK PAIN, UNSPECIFIED BACK LOCATION, UNSPECIFIED CHRONICITY: ICD-10-CM

## 2018-01-11 DIAGNOSIS — M25.562 LEFT KNEE PAIN, UNSPECIFIED CHRONICITY: ICD-10-CM

## 2018-01-11 DIAGNOSIS — R05.4 SYNCOPE, TUSSIVE: ICD-10-CM

## 2018-01-11 DIAGNOSIS — R55 SYNCOPE, TUSSIVE: ICD-10-CM

## 2018-01-11 RX ORDER — LOSARTAN POTASSIUM 50 MG/1
50 TABLET ORAL DAILY
Qty: 30 TABLET | Refills: 1 | Status: SHIPPED | OUTPATIENT
Start: 2018-01-11 | End: 2018-03-01

## 2018-01-11 RX ORDER — CYCLOBENZAPRINE HCL 5 MG
5 TABLET ORAL 3 TIMES DAILY PRN
Qty: 42 TABLET | Refills: 0 | Status: SHIPPED | OUTPATIENT
Start: 2018-01-11 | End: 2018-06-07

## 2018-01-11 RX ORDER — GUAIFENESIN/DEXTROMETHORPHAN 100-10MG/5
5 SYRUP ORAL EVERY 4 HOURS PRN
Qty: 560 ML | Refills: 0 | Status: SHIPPED | OUTPATIENT
Start: 2018-01-11 | End: 2018-02-28

## 2018-01-11 RX ORDER — SILDENAFIL CITRATE 20 MG/1
TABLET ORAL
Qty: 5 TABLET | Refills: 0 | Status: SHIPPED | OUTPATIENT
Start: 2018-01-11 | End: 2018-02-28

## 2018-01-11 NOTE — PATIENT INSTRUCTIONS
- Stop Lisinopril and start Losartan  - Continue with the rest of your medications  - Take Robitussin-DM for cough  - Take Flexeril for muscle cramps and pain  - Follow up with physical therapy

## 2018-01-11 NOTE — MR AVS SNAPSHOT
After Visit Summary   2018    Piter Gaines    MRN: [id number]           Patient Information     Date Of Birth          1965        Visit Information        Provider Department      2018 2:40 PM Edmund Dye MD [facility]        Today's Diagnoses     Cough    -  1    Essential hypertension        Right-sided back pain, unspecified back location, unspecified chronicity          Care Instructions    - Stop Lisinopril and start Losartan  - Continue with the rest of your medications  - Take Robitussin-DM for cough  - Take Flexeril for muscle cramps and pain  - Follow up with physical therapy          Follow-ups after your visit        Follow-up notes from your care team     Return in about 4 weeks (around 2018).      Who to contact     Please call your clinic at [phone] to:    Ask questions about your health    Make or cancel appointments    Discuss your medicines    Learn about your test results    Speak to your doctor   If you have compliments or concerns about an experience at your clinic, or if you wish to file a complaint, please contact [facility] Patient Relations at [phone] or email us at [email or web address]         Additional Information About Your Visit        MyChart Information     [email or web address].[email or web address] is an electronic gateway that provides easy, online access to your medical records. With [employer]il[employer], you can request a clinic appointment, read your test results, renew a prescription or communicate with your care team.     To sign up for [email or web address] visit the website at [email or web address]   You will be asked to enter the access code listed below, as well as some personal information. Please follow the directions to create your username and password.     Your access code is: [email or web address]  Expires: 2018  9:26 AM     Your access code will  in 90 days. If you need help or a new code, please contact your [facility] or call [phone] for assistance.        Care EveryWhere ID     This is your Care EveryWhere ID. This could be used by other organizations to access your [facility] medical records  [id number]        Your Vitals Were     Pulse Temperature Pulse Oximetry BMI (Body Mass Index)          85 97.9  F (36.6  C) (Oral) 100% 25.71 kg/m2         Blood Pressure from Last 3 Encounters:   01/11/18 (!) 128/92   12/28/17 (!) 142/99   11/09/17 (!) 163/110    Weight from Last 3 Encounters:   01/11/18 176 lb 9.6 oz (80.1 kg)   12/28/17 171 lb 12.8 oz (77.9 kg)   11/09/17 167 lb (75.8 kg)              Today, you had the following     No orders found for display         Today's Medication Changes          These changes are accurate as of: 1/11/18  3:18 PM.  If you have any questions, ask your nurse or doctor.               Start taking these medicines.        Dose/Directions    cyclobenzaprine 5 MG tablet   Commonly known as:  FLEXERIL   Used for:  Right-sided back pain, unspecified back location, unspecified chronicity   Started by:  Edmund Dey MD        Dose:  5 mg   Take 1 tablet (5 mg) by mouth 3 times daily as needed for muscle spasms   Quantity:  42 tablet   Refills:  0       guaiFENesin-dextromethorphan 100-10 MG/5ML syrup   Commonly known as:  ROBITUSSIN DM   Used for:  Cough   Started by:  Edmund Dey MD        Dose:  5 mL   Take 5 mLs by mouth every 4 hours as needed for cough   Quantity:  560 mL   Refills:  0       losartan 50 MG tablet   Commonly known as:  COZAAR   Used for:  Essential hypertension   Started by:  Edmund Dey MD        Dose:  50 mg   Take 1 tablet (50 mg) by mouth daily   Quantity:  30 tablet   Refills:  1         Stop taking these medicines if you haven't already. Please contact your care team if you have questions.     lisinopril 20 MG tablet   Commonly known as:  PRINIVIL/ZESTRIL   Stopped by:  Edmund Dey MD                Where to get  your medicines      These medications were sent to [id number] - [place], [place] - [address] [place] [address] AT [facility]  [address] [place], [place] [address] , [place] [address]     Phone:  [phone]     cyclobenzaprine 5 MG tablet    guaiFENesin-dextromethorphan 100-10 MG/5ML syrup    losartan 50 MG tablet                Primary Care Provider Office Phone # Fax #    Edmundpan Dey [phone]       [address] [address]        Equal Access to Services     [facility]: Hadii aad ku hadasho Soomaali, waaxda luqadaha, qaybta kaalmada adeegyada, waxay idiin hayaan adeeg khchristiano isaacs . So [facility] [phone].    ATENCIÓN: Si habla español, tiene a castillo disposición servicios gratuitos de asistencia lingüística. [facility] [phone].    We comply with applicable federal civil rights laws and [place] laws. We do not discriminate on the basis of race, color, national origin, age, disability, sex, sexual orientation, or gender identity.            Thank you!     Thank you for choosing [facility]  for your care. Our goal is always to provide you with excellent care. Hearing back from our patients is one way we can continue to improve our services. Please take a few minutes to complete the written survey that you may receive in the mail after your visit with us. Thank you!             Your Updated Medication List - Protect others around you: Learn how to safely use, store and throw away your medicines at www.disposemymeds.org.          This list is accurate as of: 1/11/18  3:18 PM.  Always use your most recent med list.                   Brand Name Dispense Instructions for use Diagnosis    amLODIPine 5 MG tablet    NORVASC    90 tablet    Take 2 tablets (10 mg) by mouth daily    Essential hypertension       benzonatate 200 MG capsule    TESSALON    21 capsule    Take 1 capsule (200 mg) by mouth 3 times daily as needed for cough     Cough syncope       cyclobenzaprine 5 MG tablet    FLEXERIL    42 tablet    Take 1 tablet (5 mg) by mouth 3 times daily as needed for muscle spasms    Right-sided back pain, unspecified back location, unspecified chronicity       gabapentin 300 MG capsule    NEURONTIN    90 capsule    Take 1 capsule (300 mg) by mouth 3 times daily    Uncomplicated alcohol dependence (H)       guaiFENesin-dextromethorphan 100-10 MG/5ML syrup    ROBITUSSIN DM    560 mL    Take 5 mLs by mouth every 4 hours as needed for cough    Cough       losartan 50 MG tablet    COZAAR    30 tablet    Take 1 tablet (50 mg) by mouth daily    Essential hypertension       omeprazole 20 MG CR capsule    priLOSEC    60 capsule    Take 1 capsule (20 mg) by mouth 2 times daily    Esophagitis

## 2018-01-11 NOTE — PROGRESS NOTES
Preceptor Attestation:  Patient's case reviewed and discussed with Edmund Dey MD Patient seen and discussed with the resident.. I agree with assessment and plan of care.  Supervising Physician:  Almas Thorpe MD  [facility]

## 2018-01-11 NOTE — PROGRESS NOTES
HPI:       Piter Gaines is a 52 year old  male with a significant past medical history of hypertension, depression, alcohol abuse, tobacco abuse, peptic, peptic ulcer disease who presents for follow up of concern(s) listed below    Hypertension  No acute concerns. Has been compliant with antihypertensive (Amlodipine 10 mg and Lisinopril 20 mg daily). No complication with medications. Denies any associated lightheadedness, dizziness, chest pain, dyspnea, changes in urination, weakness or numbness. Does not normally check his blood pressure at home due to busy schedule.    Coughing/Posttussive Syncope  Chronic cough which started about 3 months ago which has not gotten any better. Was given a prescription for Tessalon Perles which he did not  due to insurance non-coverage. Stated that he experience 3-4 episode of post-tussive presyncope which passes fairly quickly but since his last clinic visit, he has had 2 episode of post-tussive syncope where he had a severe coughing episode and blacked out. Next thing he knew, he was being help up by his friends. Denies any injuries or prolong confusion. Denies any lost of urinary or bowel continence, lightheadedness, chest pain or dyspnea prior to incident. No other associated symptoms.    Back Pain/Leg Pain  Ongoing chronic back pain with associated left knee pain. Was doing physical therapy in the past and restarted therapy several weeks ago. Noted that since then, pain has been unchanged but did developed new right sided upper back pain described as a cramping sensation. Started several days after therapy. Has not tried any medication for pain. Pain is relieved with positional changes and worsen with activities. No numbness or weakness. Denied any recent trauma or injuries. Has only been given exercise for knee and left lower back pain. Used Flexeril in the past without relief but willing to try again.    Erectile Dysfunction  Ongoing issues for years and would  like medication to help. Had used Cialis in the past and would like a refill. No complication when taking Cialis in the past.         PMHX:     Patient Active Problem List   Diagnosis     Tobacco use     Caloric malnutrition (H)     Hematemesis     Essential hypertension     Esophagitis     Clostridium difficile infection     Alcohol abuse     Moderate episode of recurrent major depressive disorder (H)     Current Outpatient Prescriptions   Medication Sig Dispense Refill     amLODIPine (NORVASC) 5 MG tablet Take 2 tablets (10 mg) by mouth daily 90 tablet 3     gabapentin (NEURONTIN) 300 MG capsule Take 1 capsule (300 mg) by mouth 3 times daily 90 capsule 3     omeprazole (PRILOSEC) 20 MG CR capsule Take 1 capsule (20 mg) by mouth 2 times daily 60 capsule 3     lisinopril (PRINIVIL/ZESTRIL) 20 MG tablet Take 1 tablet (20 mg) by mouth daily 30 tablet 11     benzonatate (TESSALON) 200 MG capsule Take 1 capsule (200 mg) by mouth 3 times daily as needed for cough (Patient not taking: Reported on 1/11/2018) 21 capsule 0     Social History     Social History     Marital status:      Spouse name: N/A     Number of children: N/A     Years of education: N/A     Occupational History     Not on file.     Social History Main Topics     Smoking status: Current Some Day Smoker     Smokeless tobacco: Never Used      Comment: 1/2 pack a day, currently on patches.       Alcohol use No     Drug use: No     Sexual activity: Not Currently     Other Topics Concern     Not on file     Social History Narrative        No Known Allergies    No results found for this or any previous visit (from the past 24 hour(s)).         Review of Systems:   E: NEGATIVE for acute vision problems or changes  E/M: NEGATIVE for ear, mouth and throat problems  RESP: Coughing: See HPI  CV: NEGATIVE for chest pain, palpitations or new or worsening peripheral edema  GI: NEGATIVE for new onset or worsening nausea, vomiting or diarrhea  : NEGATIVE for  frequency, dysuria, or hematuria   male : Erectile Dysfunction  MUSCULOSKELETAL: Back Pain: See HPI  NEURO: Syncope  P: NEGATIVE for changes in mood or affect          Physical Exam:     Vitals:    01/11/18 1445   BP: (!) 165/97   Pulse: 85   Temp: 97.9  F (36.6  C)   TempSrc: Oral   SpO2: 100%   Weight: 80.1 kg (176 lb 9.6 oz)     Body mass index is 25.71 kg/(m^2).    GENERAL APPEARANCE: alert, mild distress and cooperative  RESP: lungs clear to auscultation - no rales, rhonchi or wheezes  CV: regular rate and rhythm,  and no murmur, click,  rub or gallop  ABDOMEN: soft, nontender, without hepatosplenomegaly or masses  MS: Tenderness to exam of the right back (muscle tightness palpable), tenderness to palpation of the Left SI joint, reproducible pain with straight leg raise, no knee effusion and good ROM of bilateral knees.  SKIN: no suspicious lesions or rashes  NEURO: Normal strength and tone, sensory exam grossly normal, mentation appears intact and speech normal  PSYCH: mood and affect normal/bright      Assessment and Plan     1. Essential hypertension  Within target goal at today's visit. Given complication with cough and associated post-tussive syncope, may benefit from discontinuation of Lisinopril given association with cough. Discuss with patient good control but risk of medication and plans to transition to ARBs which is less likely to cause coughs which patient has agreed to.  - losartan (COZAAR) 50 MG tablet; Take 1 tablet (50 mg) by mouth daily  Dispense: 30 tablet; Refill: 1  - Return in 4 weeks for BP recheck    2. Cough   3. Syncope, tussive  Unclear what coughing is due to but given duration is concerning. Etiologies include medication (lisinopril use), underlying COPD/Asthma, post-viral syndrome, uncontrolled GERD or vocal cord dysfunction. Discuss with patient importance of controlling cough to help prevent further episode of syncope/presyncope. Was given Tessalon Perles but due to insurance  coverage, unable to pick them up.  - guaiFENesin-dextromethorphan (ROBITUSSIN DM) 100-10 MG/5ML syrup; Take 5 mLs by mouth every 4 hours as needed for cough  Dispense: 560 mL; Refill: 0  - Follow up with Pharmacy to determined coverage for alternatives to Tessalon Perles    4. Right-sided back pain, unspecified back location, unspecified chronicity  5. Left Knee Pain, unspecified chronicity  Likely multifactorial including pelvis misalignment and musculoskeletal strain from physical therapy. Discuss importance of stretching and following with physical therapy. Patient had tried Flexeril in the past without relief but would like to retried medication.  - Trial Flexeril (avoid taking it with Robitussin DM given sedation)   - cyclobenzaprine (FLEXERIL) 5 MG tablet; Take 1 tablet (5 mg) by mouth 3 times daily as needed for muscle spasms  Dispense: 42 tablet; Refill: 0  - Follow up with Physical Therapy for exercise to help with knee and back  - Follow up in clinic as needed.    6. Erectile dysfunction, unspecified erectile dysfunction type  Has been on Cialis in the past and would like refills. Some concerns about cost and thus will provide generic version. Discuss with patient that dysfunction is likely secondary to acute illness and uncontrolled chronic issues and would resolved with better controlled. Patient understood but would like to tried medication to help with immediate concerns. Discuss safety with use of medication and patient has agrees to avoid using vasodilating agents such as nitroglycerine.  - sildenafil (REVATIO) 20 MG tablet; Take 1 tablet (20 mg) by mouth three times daily for pulmonary hypertension.  Never use with nitroglycerin, terazosin or doxazosin.  Dispense: 5 tablet; Refill: 0    Options for treatment and follow-up care were reviewed with the patient and/or guardian. Piter Gaines and/or guardian engaged in the decision making process and verbalized understanding of the options discussed and  agreed with the final plan.    Edmund Dey MD  [facility]  Pager: [phone]    Precepted today with: Almas Thorpe MD

## 2018-01-15 DIAGNOSIS — K20.90 ESOPHAGITIS: ICD-10-CM

## 2018-01-17 ENCOUNTER — TRANSFERRED RECORDS (OUTPATIENT)
Dept: HEALTH INFORMATION MANAGEMENT | Facility: CLINIC | Age: 53
End: 2018-01-17

## 2018-02-23 ENCOUNTER — OFFICE VISIT (OUTPATIENT)
Dept: FAMILY MEDICINE | Facility: CLINIC | Age: 53
End: 2018-02-23
Payer: COMMERCIAL

## 2018-02-23 VITALS
RESPIRATION RATE: 20 BRPM | HEIGHT: 70 IN | BODY MASS INDEX: 25.25 KG/M2 | SYSTOLIC BLOOD PRESSURE: 126 MMHG | TEMPERATURE: 97.9 F | DIASTOLIC BLOOD PRESSURE: 87 MMHG | WEIGHT: 176.4 LBS | HEART RATE: 86 BPM | OXYGEN SATURATION: 96 %

## 2018-02-23 DIAGNOSIS — I10 ESSENTIAL HYPERTENSION: ICD-10-CM

## 2018-02-23 DIAGNOSIS — M79.642 PAIN OF LEFT HAND: Primary | ICD-10-CM

## 2018-02-23 LAB
CRP SERPL-MCNC: 0.6 MG/DL (ref 0–0.8)
ERYTHROCYTE [SEDIMENTATION RATE] IN BLOOD: 19 MM/HR (ref 0–15)
RHEUMATOID FACT SERPL-ACNC: <15 IU/ML (ref 0–30)
URATE SERPL-MCNC: 7.9 MG/DL (ref 3–8)

## 2018-02-23 RX ORDER — PREDNISONE 20 MG/1
40 TABLET ORAL DAILY
Qty: 10 TABLET | Refills: 0 | Status: SHIPPED | OUTPATIENT
Start: 2018-02-23 | End: 2018-02-28

## 2018-02-23 NOTE — MR AVS SNAPSHOT
"              After Visit Summary   2/23/2018    Piter Gaines    MRN: [id number]           Patient Information     Date Of Birth          1965        Visit Information        Provider Department      2/23/2018 4:20 PM Waleska Thorpe MD [facility]        Today's Diagnoses     Pain of left hand    -  1    Essential hypertension           Follow-ups after your visit        Who to contact     Please call your clinic at [phone] to:    Ask questions about your health    Make or cancel appointments    Discuss your medicines    Learn about your test results    Speak to your doctor            Additional Information About Your Visit        Care EveryWhere ID     This is your Care EveryWhere ID. This could be used by other organizations to access your [facility] medical records  [id number]        Your Vitals Were     Pulse Temperature Respirations Height Pulse Oximetry BMI (Body Mass Index)    86 97.9  F (36.6  C) (Oral) 20 5' 9.5\" (176.5 cm) 96% 25.68 kg/m2       Blood Pressure from Last 3 Encounters:   02/28/18 138/89   02/23/18 126/87   01/11/18 (!) 128/92    Weight from Last 3 Encounters:   02/28/18 176 lb 9.6 oz (80.1 kg)   02/23/18 176 lb 6.4 oz (80 kg)   01/11/18 176 lb 9.6 oz (80.1 kg)              We Performed the Following     Antinuclear Ab [place] ([facility])     C-Reactive Protein ([facility])     Erythrocyte Sed Rate ([facility])     Rheumatoid Factor Quant ([facility])     Uric Acid ([facility])          Today's Medication Changes          These changes are accurate as of 2/23/18 11:59 PM.  If you have any questions, ask your nurse or doctor.               Start taking these medicines.        Dose/Directions    predniSONE 20 MG tablet   Commonly known as:  DELTASONE   Used for:  Pain of left hand   Started by:  Waleska Thopre MD        Dose:  40 mg   Take 2 tablets (40 mg) by mouth daily for 5 days   Quantity:  10 tablet   Refills:  0            Where to get your " medicines      These medications were sent to [id number] - [place], [place] - [address] [facility]  [address] [place] [address], [place] [address]     Phone:  [phone]     predniSONE 20 MG tablet                Primary Care Provider Office Phone # Fax #    Edmund oYusif Dey [phone]       [address] [address]        Equal Access to Services     NATHALIA CORDON : Hadii aad ku hadasho Soomaali, waaxda luqadaha, qaybta kaalmada adeegyada, waxay idiin hayaan adeeg kharash la'bo younger. So [facility] [phone].    ATENCIÓN: Si habla español, tiene a castillo disposición servicios gratuitos de asistencia lingüística. [facility] [phone].    We comply with applicable federal civil rights laws and [place] laws. We do not discriminate on the basis of race, color, national origin, age, disability, sex, sexual orientation, or gender identity.            Thank you!     Thank you for choosing [facility]  for your care. Our goal is always to provide you with excellent care. Hearing back from our patients is one way we can continue to improve our services. Please take a few minutes to complete the written survey that you may receive in the mail after your visit with us. Thank you!             Your Updated Medication List - Protect others around you: Learn how to safely use, store and throw away your medicines at www.disposemymeds.org.          This list is accurate as of 2/23/18 11:59 PM.  Always use your most recent med list.                   Brand Name Dispense Instructions for use Diagnosis    amLODIPine 5 MG tablet    NORVASC    90 tablet    Take 2 tablets (10 mg) by mouth daily    Essential hypertension       cyclobenzaprine 5 MG tablet    FLEXERIL    42 tablet    Take 1 tablet (5 mg) by mouth 3 times daily as needed for muscle spasms    Right-sided back pain, unspecified back location, unspecified chronicity        gabapentin 300 MG capsule    NEURONTIN    90 capsule    Take 1 capsule (300 mg) by mouth 3 times daily    Uncomplicated alcohol dependence (H)       omeprazole 20 MG CR capsule    priLOSEC    60 capsule    Take 1 capsule (20 mg) by mouth 2 times daily    Esophagitis       predniSONE 20 MG tablet    DELTASONE    10 tablet    Take 2 tablets (40 mg) by mouth daily for 5 days    Pain of left hand

## 2018-02-23 NOTE — LETTER
February 28, 2018      Piter Gaines  [address] [place] [address]        Dear Piter,    At your recent clinic visit, we checked your labs for inflammatory markers. Overall these were very reassuring.   The uric acid level is on the high end of normal. This can actually be falsely lowered in a gout flare, so I recommend we recheck this in 1 month to see where the uric acid level sits normally. You may benefit from medication that lowers this level.   The CRP level is normal. The sedimentation rate (ESR) is just mildly elevated which we would expect with the acute gout flare you are experiencing. The HAYDER and rheumatoid factor screens are normal, making it less likely that you have an autoimmune type joint problem such as rheumatoid arthritis.     Please call our clinic with any questions. We look forward to seeing you again in about 1 month.     Please see below for your test results.    Resulted Orders   Erythrocyte Sed Rate ([facility])   Result Value Ref Range    Sed Rate 19 (H) 0 - 15 mm/hr    Narrative    Test performed by:  [facility]  [address] [place] [address]   C-Reactive Protein ([facility])   Result Value Ref Range    C-Reactive Protein 0.6 0.0 - 0.8 mg/dL    Narrative    Test performed by:  [facility] [address] [place] [address]   Antinuclear Ab [place] ([facility])   Result Value Ref Range    HAYDER Screen [place] 0.2 <=2.9 U    Narrative    Test performed by:  [facility] [address] [place] [address]  <1.0 negative  1.1-2.9 weakly positive  3.0-5.9 positive ( reflex)  > or=6.0 strongly positive   Uric Acid ([facility])   Result Value Ref Range    Uric Acid 7.9 3.0 - 8.0 mg/dL    Narrative    Test performed by:  [facility] [address] [place] [address]   Rheumatoid Factor Quant ([facility])   Result Value Ref Range    RA,Quantitative <15.0 0 - 30 IU/mL    Narrative    Test performed by:  [facility]  [facility]  [address] [place] [address]       If you have any questions, please call the clinic to make an appointment.    Sincerely,    Waleska Thorpe MD

## 2018-02-23 NOTE — PROGRESS NOTES
"[facility]        Subjective     HPI:  Piter Gaines is a 52 year old right handed male pt of Dr. Dey's who presents for the following concerns:    Constant left hand pain. For past 2 weeks, worse in past week. Describes an extreme sensitivity to any light touch if anything touches his hand from the wrist distally to the fingers- such as sheets, holding a cigarette, or trying to  things. No particular area of max pain.    Very sensitive to touch.   Feels numb/achy.   No fevers, chills.   No hand swelling or redness, though sometimes will be slightly swollen.    Tylenol doesn't work. Ibuprofen doesn't help. Takes muscle relaxer for back but causes him to sleep.     He is right handed.   Rheumatoid arthritis and Gout runs in the family in several family members each.    Has had this happen in both hands several times in the past (maybe 5 times total). Also happens in his ankles. No specific knee pains. Not usually associated with swelling. Just pain. Usually just rests the hand and takes OTC pain meds.     No bug bites, no injury to the hand.     BP f/u: BPs look good today and when at his friend's house, but doesn't bring BP recordings. Tolerating the his losartan at 50mg now per Dr. Dey. Also taking amlodipine 10 daily.  No LH/dizziness. No LE edema.       ROS: constitutional, cardiovascular, respiratory, GI, , MSK systems review  ed and negative except as noted above.    Social:  Smokes 1/2 ppd. On disability for BP/bleeding ulcers per pt.           Objective   /87  Pulse 86  Temp 97.9  F (36.6  C) (Oral)  Resp 20  Ht 5' 9.5\" (176.5 cm)  Wt 176 lb 6.4 oz (80 kg)  SpO2 96%  BMI 25.68 kg/m2 Body mass index is 25.68 kg/(m^2).    Wt Readings from Last 3 Encounters:   02/23/18 176 lb 6.4 oz (80 kg)   01/11/18 176 lb 9.6 oz (80.1 kg)   12/28/17 171 lb 12.8 oz (77.9 kg)       Gen: healthy, alert and no distress  HEENT: No asymmetry, MMM, No erythema of oropharynx.   CV: RRR, no " murmurs. 2+ peripheral pulses  Lungs: CTAB, no wheezing or crackles, normal effort  Extremities: left hand from wrist to distal fingertips is held gingerly during entire visit; mild hyperpigmentation c/w very faint erythema on  skin tone; very subtle swelling affects the entire left hand, 1st MTP mildly swollen. Tenderness to palpation without any area of maximum pain. Reduced ROM due to pain. Normal pulses. No synovitis of the MTPs or wrist noted.       Labs/Imaging this visit:  Recent Results (from the past 100 hour(s))   Erythrocyte Sed Rate ([facility])    Collection Time: 02/23/18  4:54 PM   Result Value Ref Range    Sed Rate 19 (H) 0 - 15 mm/hr   C-Reactive Protein ([facility])    Collection Time: 02/23/18  4:54 PM   Result Value Ref Range    C-Reactive Protein 0.6 0.0 - 0.8 mg/dL   Uric Acid ([facility])    Collection Time: 02/23/18  4:54 PM   Result Value Ref Range    Uric Acid 7.9 3.0 - 8.0 mg/dL   Rheumatoid Factor Quant ([facility])    Collection Time: 02/23/18  4:54 PM   Result Value Ref Range    RA,Quantitative <15.0 0 - 30 IU/mL            Assessment & Plan     52 year old male with:  To pain  1. Pain of left hand  We discussed given family hx and his clinical sx of extreme sensitivity, pain, mild swelling and slight erythema this is most likely c/w with a gout flare vs rheumatoid arthritis (though asymmetrical and unilateral variable sjoint makes this unusual presentation). Since this is the first time he has presented this concern to medical attention, we will begin with basic labs understanding that uric acid may be falsely lower in setting of acute flare. We will also provide a steroid course to treat his pain sx. Avoiding indomethacin/nsaids due to his HTN and has h/o GI bleed.   - Erythrocyte Sed Rate ([facility])  - C-Reactive Protein ([facility])  - Start predniSONE (DELTASONE) 20 MG tablet; Take 2 tablets (40 mg) by mouth daily for 5 days  Dispense: 10 tablet; Refill: 0  -  Antinuclear Ab [place] ([facility])  - Uric Acid ([facility])  - Rheumatoid Factor Quant ([facility])    2. Essential hypertension  Ok to continue meds as prescribed per Dr. Dey. Would not change regimen at this time based on above acute complaint and well controlled BPs. He will attempt to check BPs at home for better data analysis at next visit.        Follow up: 1 month to recheck uric acid post flare up, consider allopurinol   Return to care as needed if symptoms worsen or fail to improve.    Waleska Thorpe MD    Precepted today with: Opal Mohr MD        -------  PMH:  Patient Active Problem List   Diagnosis     Tobacco use     Caloric malnutrition (H)     Hematemesis     Essential hypertension     Esophagitis     Clostridium difficile infection     Alcohol abuse     Moderate episode of recurrent major depressive disorder (H)      Current Outpatient Prescriptions   Medication     predniSONE (DELTASONE) 20 MG tablet     omeprazole (PRILOSEC) 20 MG CR capsule     guaiFENesin-dextromethorphan (ROBITUSSIN DM) 100-10 MG/5ML syrup     losartan (COZAAR) 50 MG tablet     cyclobenzaprine (FLEXERIL) 5 MG tablet     sildenafil (REVATIO) 20 MG tablet     amLODIPine (NORVASC) 5 MG tablet     benzonatate (TESSALON) 200 MG capsule     gabapentin (NEURONTIN) 300 MG capsule     No current facility-administered medications for this visit.       ALLERGIES: Review of patient's allergies indicates no known allergies.    Options for treatment and follow-up care were reviewed with the patient. Piter Gaines engaged in the decision making process and verbalized understanding of the options discussed and agreed with the final plan.

## 2018-02-26 LAB — ANA SER QL: 0.2 U

## 2018-02-27 NOTE — PROGRESS NOTES
Please send result letter. (and include scanned imaging/report if it pertains).    Dear Piter    At your recent clinic visit, we checked your labs for inflammatory markers. Overall these were very reassuring.   The uric acid level is on the high end of normal. This can actually be falsely lowered in a gout flare, so I recommend we recheck this in 1 month to see where the uric acid level sits normally. You may benefit from medication that lowers this level.   The CRP level is normal. The sedimentation rate (ESR) is just mildly elevated which we would expect with the acute gout flare you are experiencing. The HAYDER and rheumatoid factor screens are normal, making it less likely that you have an autoimmune type joint problem such as rheumatoid arthritis.     Please call our clinic with any questions. We look forward to seeing you again in about 1 month.        Sincerely,   Waleska Thorpe MD  Family Medicine Resident, PGY-3    [facility] [address] [place] [address]  [phone]

## 2018-02-28 ENCOUNTER — RECORDS - HEALTHEAST (OUTPATIENT)
Dept: ADMINISTRATIVE | Facility: OTHER | Age: 53
End: 2018-02-28

## 2018-02-28 ENCOUNTER — OFFICE VISIT (OUTPATIENT)
Dept: FAMILY MEDICINE | Facility: CLINIC | Age: 53
End: 2018-02-28
Payer: COMMERCIAL

## 2018-02-28 ENCOUNTER — AMBULATORY - HEALTHEAST (OUTPATIENT)
Dept: PHYSICAL MEDICINE AND REHAB | Facility: CLINIC | Age: 53
End: 2018-02-28

## 2018-02-28 VITALS
HEART RATE: 91 BPM | WEIGHT: 176.6 LBS | SYSTOLIC BLOOD PRESSURE: 138 MMHG | TEMPERATURE: 98.2 F | HEIGHT: 70 IN | BODY MASS INDEX: 25.28 KG/M2 | OXYGEN SATURATION: 98 % | DIASTOLIC BLOOD PRESSURE: 89 MMHG | RESPIRATION RATE: 20 BRPM

## 2018-02-28 DIAGNOSIS — R29.898 LEFT HAND WEAKNESS: Primary | ICD-10-CM

## 2018-02-28 DIAGNOSIS — R29.898 WEAKNESS OF LEFT HAND: ICD-10-CM

## 2018-02-28 DIAGNOSIS — N52.9 ERECTILE DYSFUNCTION, UNSPECIFIED ERECTILE DYSFUNCTION TYPE: ICD-10-CM

## 2018-02-28 DIAGNOSIS — R05.8 POST-TUSSIVE SYNCOPE: ICD-10-CM

## 2018-02-28 RX ORDER — SILDENAFIL CITRATE 20 MG/1
TABLET ORAL
Qty: 5 TABLET | Refills: 0 | Status: SHIPPED | OUTPATIENT
Start: 2018-02-28 | End: 2018-05-25

## 2018-02-28 NOTE — MR AVS SNAPSHOT
"              After Visit Summary   2/28/2018    Piter Gaines    MRN: [id number]           Patient Information     Date Of Birth          1965        Visit Information        Provider Department      2/28/2018 9:20 AM Edmund Dey MD [facility]        Today's Diagnoses     Left hand weakness    -  1    Post-tussive syncope        Erectile dysfunction, unspecified erectile dysfunction type          Care Instructions    - Continue to take your coughing medication  - Follow up for EMG  - Follow up for event Monitoring      Referral for ( TEST )  :      Cardiac Event Monitor  LOCATION/PLACE/Provider :    [facility]  DATE & TIME :     3-7-2018 at 8:30am  PHONE :     [phone]  FAX :     [phone]  Appointment made by clinic staff/:    Germania            Follow-ups after your visit        Who to contact     Please call your clinic at [phone] to:    Ask questions about your health    Make or cancel appointments    Discuss your medicines    Learn about your test results    Speak to your doctor            Additional Information About Your Visit        Care EveryWhere ID     This is your Care EveryWhere ID. This could be used by other organizations to access your [facility] medical records  [id number]        Your Vitals Were     Pulse Temperature Respirations Height Pulse Oximetry BMI (Body Mass Index)    91 98.2  F (36.8  C) (Oral) 20 5' 9.5\" (176.5 cm) 98% 25.71 kg/m2       Blood Pressure from Last 3 Encounters:   02/28/18 138/89   02/23/18 126/87   01/11/18 (!) 128/92    Weight from Last 3 Encounters:   02/28/18 176 lb 9.6 oz (80.1 kg)   02/23/18 176 lb 6.4 oz (80 kg)   01/11/18 176 lb 9.6 oz (80.1 kg)              We Performed the Following     MOTOR NERVE CONDUCT TEST (FL)     XR HAND LT G/E 3 VW          Where to get your medicines      These medications were sent to [id number] - [place], [place] - [address] AT [facility]  " [address], [place] [address]     Phone:  [phone]     sildenafil 20 MG tablet          Primary Care Provider Office Phone # Fax #    Edmund Dey [phone]       [address] [address]        Equal Access to Services     NATHALIA CORDON : Hadii aad ku hadasho Soomaali, waaxda luqadaha, qaybta kaalmada adeegyada, waxay idiin hayaan adeeg kharash la'aan . So [facility] [phone].    ATENCIÓN: Si habla español, tiene a castillo disposición servicios gratuitos de asistencia lingüística. Tyra al [phone].    We comply with applicable federal civil rights laws and [place] laws. We do not discriminate on the basis of race, color, national origin, age, disability, sex, sexual orientation, or gender identity.            Thank you!     Thank you for choosing [facility]  for your care. Our goal is always to provide you with excellent care. Hearing back from our patients is one way we can continue to improve our services. Please take a few minutes to complete the written survey that you may receive in the mail after your visit with us. Thank you!             Your Updated Medication List - Protect others around you: Learn how to safely use, store and throw away your medicines at www.disposemymeds.org.          This list is accurate as of 2/28/18 11:59 PM.  Always use your most recent med list.                   Brand Name Dispense Instructions for use Diagnosis    amLODIPine 5 MG tablet    NORVASC    90 tablet    Take 2 tablets (10 mg) by mouth daily    Essential hypertension       cyclobenzaprine 5 MG tablet    FLEXERIL    42 tablet    Take 1 tablet (5 mg) by mouth 3 times daily as needed for muscle spasms    Right-sided back pain, unspecified back location, unspecified chronicity       gabapentin 300 MG capsule    NEURONTIN    90 capsule    Take 1 capsule (300 mg) by mouth 3 times daily    Uncomplicated alcohol dependence (H)       omeprazole  20 MG CR capsule    priLOSEC    60 capsule    Take 1 capsule (20 mg) by mouth 2 times daily    Esophagitis       sildenafil 20 MG tablet    REVATIO    5 tablet    Take 1 tablet (20 mg) by mouth three times daily for pulmonary hypertension.  Never use with nitroglycerin, terazosin or doxazosin.    Erectile dysfunction, unspecified erectile dysfunction type

## 2018-02-28 NOTE — PROGRESS NOTES
HPI:       Piter Gaines is a 52 year old  male with a significant past medical history of hypertension, tobacco abuse, alcohol abuse and depression who presents for follow up of concern(s) listed below    Left Hand Weakness  Started several weeks ago and has been persistent. Was seen in clinic on 2/23/18 and diagnosed with possible gout. Was given 5 day course of Prednisone which he completed and did not seem to help. His workup for RA was unremarkable. Since onset, he has not been able to  a bottle to open the cap and hand movement would cause significant pain. Has not tried any other medication and nothings to make it better or worse. Endorsed more numbness but denies any more swelling. Pain was diffusely throughout the whole hand and radiated up his distal forearm. Denied any trauma or falls. No pain with right hand but has been getting more weak after using it to write. Never had anything likely this before.    Post-Tussive Syncope  Continue to have episode of syncope lasting several seconds associated with coughing. No episode without coughing. Since his last visit with myself in January, he has had two more episode. He is able to predict when they would occur based on his symptoms and would try to sit down so he doesn't fall. Denied any lost of urinary continence or bowel continence, tongue biting, confusion, dizziness or vertigo, chest pain, shortness of breath or irregular heart rhythm.         PMHX:     Patient Active Problem List   Diagnosis     Tobacco use     Caloric malnutrition (H)     Hematemesis     Essential hypertension     Esophagitis     Clostridium difficile infection     Alcohol abuse     Moderate episode of recurrent major depressive disorder (H)       Current Outpatient Prescriptions   Medication Sig Dispense Refill     omeprazole (PRILOSEC) 20 MG CR capsule Take 1 capsule (20 mg) by mouth 2 times daily 60 capsule 1     losartan (COZAAR) 50 MG tablet Take 1 tablet (50 mg) by mouth  daily 30 tablet 1     cyclobenzaprine (FLEXERIL) 5 MG tablet Take 1 tablet (5 mg) by mouth 3 times daily as needed for muscle spasms 42 tablet 0     amLODIPine (NORVASC) 5 MG tablet Take 2 tablets (10 mg) by mouth daily 90 tablet 3     gabapentin (NEURONTIN) 300 MG capsule Take 1 capsule (300 mg) by mouth 3 times daily 90 capsule 3     predniSONE (DELTASONE) 20 MG tablet Take 2 tablets (40 mg) by mouth daily for 5 days (Patient not taking: Reported on 2/28/2018) 10 tablet 0     guaiFENesin-dextromethorphan (ROBITUSSIN DM) 100-10 MG/5ML syrup Take 5 mLs by mouth every 4 hours as needed for cough (Patient not taking: Reported on 2/28/2018) 560 mL 0     sildenafil (REVATIO) 20 MG tablet Take 1 tablet (20 mg) by mouth three times daily for pulmonary hypertension.  Never use with nitroglycerin, terazosin or doxazosin. (Patient not taking: Reported on 2/28/2018) 5 tablet 0     benzonatate (TESSALON) 200 MG capsule Take 1 capsule (200 mg) by mouth 3 times daily as needed for cough (Patient not taking: Reported on 1/11/2018) 21 capsule 0       Social History     Social History     Marital status:      Spouse name: N/A     Number of children: N/A     Years of education: N/A     Occupational History     Not on file.     Social History Main Topics     Smoking status: Current Some Day Smoker     Smokeless tobacco: Never Used      Comment: 1/2 pack a day       Alcohol use No     Drug use: No     Sexual activity: Not Currently     Other Topics Concern     Not on file     Social History Narrative        No Known Allergies    No results found for this or any previous visit (from the past 24 hour(s)).         Review of Systems:   C: NEGATIVE for fatigue, unexpected change in weight  E: NEGATIVE for acute vision problems or changes  E/M: NEGATIVE for ear, mouth and throat problems  R: NEGATIVE for significant cough or shortness of breath  CV: NEGATIVE for chest pain, palpitations or new or worsening peripheral edema  GI:  "NEGATIVE for new onset or worsening nausea, vomiting or diarrhea  : NEGATIVE for frequency, dysuria, or hematuria  MUSCULOSKELETAL: see HPI  NEURO:  See HPI  P: NEGATIVE for changes in mood or affect          Physical Exam:     Vitals:    02/28/18 0856 02/28/18 0858   BP: (!) 142/91 138/89   Pulse: 91    Resp: 20    Temp: 98.2  F (36.8  C)    TempSrc: Oral    SpO2: 98%    Weight: 176 lb 9.6 oz (80.1 kg)    Height: 5' 9.5\" (176.5 cm)      Body mass index is 25.71 kg/(m^2).    GENERAL APPEARANCE: healthy, alert and no distress,  EYES: Eyes grossly normal to inspection,  PERRL  RESP: lungs clear to auscultation - no rales, rhonchi or wheezes  CV: regular rate and rhythm,  and no murmur, click,  rub or gallop  MS: Pain with palpation of the left hand diffuse, no edema, no skin changes,  NEURO: sensory exam grossly normal, mentation appears intact and speech normal, 1+ strength of the left hand to all motion but full ROM on passive movement, good strength of the right hand  PSYCH: mood and affect normal/bright      Assessment and Plan     1. Left hand weakness  Unclear etiology but possible muscle weakness vs nerve injury/neuropathy given history and exam. Possible gout given recent visit but no improvement with prednisone burst makes it less likely. Unlikely RA given negative testing. X-ray was negative for fracture and no concerns about bone density.  - MOTOR NERVE CONDUCT TEST (FL)  - XR HAND LT G/E 3 VW    2. Post-tussive syncope  Ongoing issues for several months. Likely vasovagal in nature due to episodes of coughs. Unclear whether there is a component of cardiac arrhythmias. Recent stress test and cardiac testing was negative. Will continue with cardiac rhythm assessment. May consider cardiology referral if worsening rhythm issues. If cardiac work up is negative, may consider neurology referral for further evaluation.  - Cardiac Event Monitor - Peds/Adult; Future    3. Erectile dysfunction, unspecified erectile " dysfunction type  - sildenafil (REVATIO) 20 MG tablet; Take 1 tablet (20 mg) by mouth three times daily for pulmonary hypertension.  Never use with nitroglycerin, terazosin or doxazosin.  Dispense: 5 tablet; Refill: 0    Options for treatment and follow-up care were reviewed with the patient and/or guardian. Piter Gaines and/or guardian engaged in the decision making process and verbalized understanding of the options discussed and agreed with the final plan.    Edmund Dey MD  [facility]  Pager: [phone]    Precepted today with: Edu Hagan MD

## 2018-02-28 NOTE — PATIENT INSTRUCTIONS
- Continue to take your coughing medication  - Follow up for EMG  - Follow up for event Monitoring      Referral for ( TEST )  :      Cardiac Event Monitor  LOCATION/PLACE/Provider :    [facility]  DATE & TIME :     3-7-2018 at 8:30am  PHONE :     [phone]  FAX :     [phone]  Appointment made by clinic staff/:    Germania

## 2018-03-01 DIAGNOSIS — I10 ESSENTIAL HYPERTENSION: ICD-10-CM

## 2018-03-01 RX ORDER — LOSARTAN POTASSIUM 50 MG/1
50 TABLET ORAL DAILY
Qty: 30 TABLET | Refills: 3 | Status: SHIPPED | OUTPATIENT
Start: 2018-03-01 | End: 2018-05-18

## 2018-03-02 NOTE — PROGRESS NOTES
Preceptor Attestation:  Patient's case reviewed and discussed with Waleska Thorpe MD.  Patient seen and discussed with the resident.  I agree with assessment and plan of care.  Supervising Physician:  Opal Mohr MD  [facility]

## 2018-03-07 ENCOUNTER — HOSPITAL ENCOUNTER (OUTPATIENT)
Dept: CARDIOLOGY | Facility: HOSPITAL | Age: 53
Discharge: HOME | End: 2018-03-07

## 2018-03-07 DIAGNOSIS — R55 SYNCOPE, TUSSIVE: ICD-10-CM

## 2018-03-07 DIAGNOSIS — R05.4 SYNCOPE, TUSSIVE: ICD-10-CM

## 2018-03-07 NOTE — PROGRESS NOTES
Preceptor Attestation:  Patient's case reviewed and discussed with Edmund Dey MD resident and I evaluated the patient. I agree with written assessment and plan of care.  Supervising Physician:  Edu Hagan MD MD  [facility]

## 2018-03-08 ENCOUNTER — TELEPHONE (OUTPATIENT)
Dept: FAMILY MEDICINE | Facility: CLINIC | Age: 53
End: 2018-03-08

## 2018-03-08 NOTE — TELEPHONE ENCOUNTER
Called and spoke with pt to inform him of information below. Pt states understanding which he already purchase Rx last week since he was . --Roxanne, CMA

## 2018-03-08 NOTE — TELEPHONE ENCOUNTER
Received notification from pharmacy/insurance that Sildenafil will not be covered.     Recommend not pursuing prior authorization as generally will not be covered for indications other than pulmonary hypertension.     If medication desired, may purchase out of pocket. Resources to help reduce cost may include coupons through [employer]      Requested precribers delegate PCS call patient relaying above information.       Estella Ibarra March 8, 2018 at 10:44 AM

## 2018-03-12 DIAGNOSIS — K20.90 ESOPHAGITIS: ICD-10-CM

## 2018-03-12 NOTE — TELEPHONE ENCOUNTER
[facility] phone call message- patient requesting a refill:    Full Medication Name: omeprazole    Dose: 20mg    Pharmacy confirmed as   [id number] - [place], [place] - [address] AT [address] [place] [address]  Phone: [phone] Fax: [phone]  : Yes    Additional Comments:      OK to leave a message on voice mail? Yes    Primary language: English      needed? No    Call taken on March 12, 2018 at 9:06 AM by Lizbeth Matias

## 2018-04-05 DIAGNOSIS — K20.90 ESOPHAGITIS: ICD-10-CM

## 2018-05-18 DIAGNOSIS — I10 ESSENTIAL HYPERTENSION: ICD-10-CM

## 2018-05-18 RX ORDER — LOSARTAN POTASSIUM 50 MG/1
50 TABLET ORAL DAILY
Qty: 30 TABLET | Refills: 3 | Status: SHIPPED | OUTPATIENT
Start: 2018-05-18 | End: 2018-08-02

## 2018-05-22 DIAGNOSIS — F10.20 UNCOMPLICATED ALCOHOL DEPENDENCE (H): ICD-10-CM

## 2018-05-22 RX ORDER — GABAPENTIN 300 MG/1
300 CAPSULE ORAL 3 TIMES DAILY
Qty: 90 CAPSULE | Refills: 3 | Status: SHIPPED | OUTPATIENT
Start: 2018-05-22 | End: 2018-08-14

## 2018-05-22 NOTE — TELEPHONE ENCOUNTER
[facility] phone call message- medication clarification/question:    Full Medication Name: gabapentin (NEURONTIN) 300 MG capsule    Question: Need refill.    Pharmacy confirmed as [id number] - [place], [place] - [address] AT [facility]: Yes    OK to leave a message on voice mail? Yes    Primary language: English      needed? No    Call taken on May 22, 2018 at 10:33 AM by Kelsey Patel

## 2018-05-25 ENCOUNTER — RECORDS - HEALTHEAST (OUTPATIENT)
Dept: ADMINISTRATIVE | Facility: OTHER | Age: 53
End: 2018-05-25

## 2018-05-25 ENCOUNTER — OFFICE VISIT (OUTPATIENT)
Dept: FAMILY MEDICINE | Facility: CLINIC | Age: 53
End: 2018-05-25
Payer: COMMERCIAL

## 2018-05-25 VITALS
WEIGHT: 172.6 LBS | HEART RATE: 80 BPM | TEMPERATURE: 97.8 F | DIASTOLIC BLOOD PRESSURE: 90 MMHG | HEIGHT: 69 IN | BODY MASS INDEX: 25.56 KG/M2 | OXYGEN SATURATION: 96 % | SYSTOLIC BLOOD PRESSURE: 132 MMHG

## 2018-05-25 DIAGNOSIS — N52.9 ERECTILE DYSFUNCTION, UNSPECIFIED ERECTILE DYSFUNCTION TYPE: ICD-10-CM

## 2018-05-25 DIAGNOSIS — R05.8 POST-TUSSIVE SYNCOPE: ICD-10-CM

## 2018-05-25 DIAGNOSIS — R06.09 DYSPNEA ON EXERTION: Primary | ICD-10-CM

## 2018-05-25 DIAGNOSIS — F32.A DEPRESSION, UNSPECIFIED DEPRESSION TYPE: ICD-10-CM

## 2018-05-25 DIAGNOSIS — K20.90 ESOPHAGITIS: ICD-10-CM

## 2018-05-25 RX ORDER — SILDENAFIL CITRATE 20 MG/1
TABLET ORAL
Qty: 50 TABLET | Refills: 1 | Status: SHIPPED | OUTPATIENT
Start: 2018-05-25 | End: 2019-12-23

## 2018-05-25 RX ORDER — CITALOPRAM HYDROBROMIDE 40 MG/1
40 TABLET ORAL DAILY
Qty: 30 TABLET | Refills: 3 | Status: SHIPPED | OUTPATIENT
Start: 2018-05-25 | End: 2018-08-27

## 2018-05-25 NOTE — PATIENT INSTRUCTIONS
Referral for ( TEST )  :      Cardiology  LOCATION/PLACE/Provider :    [facility]  DATE & TIME :     6-1-2018 at 8:30am  PHONE :     [phone]  FAX :     [phone]  Appointment made by clinic staff/:    Germania    6.8.18 [facility] Cardiology consult notes 6.1.18 to Dr. Vail. CXiong, RMA.

## 2018-05-25 NOTE — PROGRESS NOTES
"[facility]        Subjective     CC: Patient presents with:  Hypertension: heart monitor resutls and refills BP check       HPI: Piter Gaines is a 52 year old male with history of tobacco use, HTN, alcohol use, depression:    1) Post-tussive syncope: would like to review monitor results. With a hard sneeze or a hard cough he will pass out. Says he head feels \"spongy\" and his eyes roll back in his head - if he is standing up he can't stop it, but if he is sitting down sometimes he can. Happened as recently as yesterday.     2) Dyspnea: reports significant dyspnea with only 1 block of ambulation; has to stop and rest. He has a remote history of asthma but does not use any inhalers for this. He says nebulizers and inhalers make his cough worse.     ROS: constitutional, cardiovascular, respiratory, GI, , MSK systems reviewed and negative except as noted above.    Social:  Stopped drinking last year on fathers day.      Social History     Social History     Marital status:      Spouse name: N/A     Number of children: N/A     Years of education: N/A     Occupational History     Not on file.     Social History Main Topics     Smoking status: Current Some Day Smoker     Smokeless tobacco: Never Used      Comment: 1/2 pack a day       Alcohol use No     Drug use: No     Sexual activity: Not Currently     Other Topics Concern     Not on file     Social History Narrative            Objective   /90  Pulse 80  Temp 97.8  F (36.6  C) (Oral)  Ht 5' 9\" (175.3 cm)  Wt 172 lb 9.6 oz (78.3 kg)  SpO2 96%  BMI 25.49 kg/m2 Body mass index is 25.49 kg/(m^2).  Gen: appears older than stated age  HEENT: no adenopathy, no asymmetry, masses, or scars  Pulm: no wheezing today but prolonged expiratory phase  CV: regular rate and rhythm,  and no murmur, click,  rub or gallop  Abd: soft, nontender, without hepatosplenomegaly or masses  Back: nontender to palpation  Neuro: Normal strength and tone, sensory " exam grossly normal, mentation appears intact and speech normal  Psych: mood and affect normal/bright         Assessment & Plan   1. Dyspnea on exertion  Normal stress test last year. This is likely pulmonary in etiology. Unable to complete any spirometery here unfortunately - he coughed too much (even with albuterol). Will have him be seen by pulmonary so they can do PFTS.  - Spirometry Pre/Post Admin (Bronchodilation Response)  - PULMONARY MEDICINE REFERRAL  - INHALATION/NEBULIZER TREATMENT, INITIAL    2. Depression, unspecified depression type  Refilled.  - citalopram (CELEXA) 40 MG tablet; Take 1 tablet (40 mg) by mouth daily  Dispense: 30 tablet; Refill: 3    3. Erectile dysfunction, unspecified erectile dysfunction type  - sildenafil (REVATIO) 20 MG tablet; Take 1-2 tablet (20 mg) as needed once daily.  Never use with nitroglycerin, terazosin or doxazosin.  Dispense: 50 tablet; Refill: 1    4. Post-tussive syncope  Reviewed event monitor results with him. Given significance of his symptoms will have him see cardiology - per my reading beta blockers are not helpful here so will wait and see what they say.  - CARDIOLOGY EVAL ADULT REFERRAL; Future    Precepted today with: MD Michael Malone MD    -------  PMH:  Patient Active Problem List   Diagnosis     Tobacco use     Caloric malnutrition (H)     Hematemesis     Essential hypertension     Esophagitis     Clostridium difficile infection     Alcohol abuse     Moderate episode of recurrent major depressive disorder (H)      Current Outpatient Prescriptions   Medication     amLODIPine (NORVASC) 5 MG tablet     cyclobenzaprine (FLEXERIL) 5 MG tablet     gabapentin (NEURONTIN) 300 MG capsule     losartan (COZAAR) 50 MG tablet     omeprazole (PRILOSEC) 20 MG CR capsule     sildenafil (REVATIO) 20 MG tablet     No current facility-administered medications for this visit.       ALLERGIES: Review of patient's allergies indicates no known allergies.

## 2018-05-25 NOTE — PROGRESS NOTES
Preceptor Attestation:   Patient seen, evaluated and discussed with the resident. I have verified the content of the note, which accurately reflects my assessment of the patient and the plan of care.  Supervising Physician:Flor Agudelo MD  [facility]

## 2018-05-25 NOTE — MR AVS SNAPSHOT
After Visit Summary   5/25/2018    Piter Gaines    MRN: [id number]           Patient Information     Date Of Birth          1965        Visit Information        Provider Department      5/25/2018 10:00 AM Michael Vail MD [facility]        Today's Diagnoses     Dyspnea on exertion    -  1    Depression, unspecified depression type        Erectile dysfunction, unspecified erectile dysfunction type        Post-tussive syncope          Care Instructions    Referral for ( TEST )  :      Cardiology  LOCATION/PLACE/Provider :    [facility]  DATE & TIME :     6-1-2018 at 8:30am  PHONE :     [phone]  FAX :     [phone]  Appointment made by clinic staff/:    Germania            Follow-ups after your visit        Additional Services     CARDIOLOGY EVAL ADULT REFERRAL       Patient to stop at the [facility] Desk    Reason for Referral: reflex syncope, persistent  Referral Location: [facility] ([facility]): [phone], Fax: [phone]     needed: No  Language: English    May leave message on voicemail: No    ([facility] Only) Referral should be tracked (Yes/No)?            PULMONARY MEDICINE REFERRAL       Patient to stop at the [facility] Desk    Diagnosis/Reason for Referral: Persistent dyspnea on exertion, history of asthma, negative stress test last year, unable to complete spirometry in clinic due to cough (has post-tussive syncope). Try to schedule same day as cardiology visit.      needed: No  Language: English  May leave message on voicemail: No    Referral should be tracked? No                  Future tests that were ordered for you today     Open Future Orders        Priority Expected Expires Ordered    Fecal Occult Blood - FIT, iFOB ([facility]) Routine  6/14/2018 6/7/2018    PHYSICAL THERAPY REFERRAL Routine  6/7/2019 6/7/2018            Who to contact     Please call your clinic at [phone] to:    Ask questions  "about your health    Make or cancel appointments    Discuss your medicines    Learn about your test results    Speak to your doctor            Additional Information About Your Visit        Care EveryWhere ID     This is your Care EveryWhere ID. This could be used by other organizations to access your [place] medical records  [id number]        Your Vitals Were     Pulse Temperature Height Pulse Oximetry BMI (Body Mass Index)       80 97.8  F (36.6  C) (Oral) 5' 9\" (175.3 cm) 96% 25.49 kg/m2        Blood Pressure from Last 3 Encounters:   06/07/18 138/88   05/25/18 132/90   02/28/18 138/89    Weight from Last 3 Encounters:   06/07/18 175 lb 6.4 oz (79.6 kg)   05/25/18 172 lb 9.6 oz (78.3 kg)   02/28/18 176 lb 9.6 oz (80.1 kg)              We Performed the Following     INHALATION/NEBULIZER TREATMENT, INITIAL     PULMONARY MEDICINE REFERRAL     Spirometry Pre/Post Admin (Bronchodilation Response)          Today's Medication Changes          These changes are accurate as of 5/25/18 11:59 PM.  If you have any questions, ask your nurse or doctor.               Start taking these medicines.        Dose/Directions    citalopram 40 MG tablet   Commonly known as:  celeXA   Used for:  Depression, unspecified depression type   Started by:  Michael Vail MD        Dose:  40 mg   Take 1 tablet (40 mg) by mouth daily   Quantity:  30 tablet   Refills:  3         These medicines have changed or have updated prescriptions.        Dose/Directions    sildenafil 20 MG tablet   Commonly known as:  REVATIO   This may have changed:  additional instructions   Used for:  Erectile dysfunction, unspecified erectile dysfunction type   Changed by:  Michael Vail MD        Take 1-2 tablet (20 mg) as needed once daily.  Never use with nitroglycerin, terazosin or doxazosin.   Quantity:  50 tablet   Refills:  1            Where to get your medicines      These medications were sent to [id number] - [place], [place] - " [address], [place] [address]     Phone:  [phone]     citalopram 40 MG tablet    omeprazole 20 MG CR capsule    sildenafil 20 MG tablet                Primary Care Provider Office Phone # Fax #    Edmund Yousif Dey [phone]       [address] [address]        Equal Access to Services     NATHALIA CORDON : Hadii aad ku hadasho Soomaali, waaxda luqadaha, qaybta kaalmada adeegyada, waxay idiin hayaan adeeg kharash la'bo ah. So [facility] [phone].    ATENCIÓN: Si habla español, tiene a castillo disposición servicios gratuitos de asistencia lingüística. [facility] [phone].    We comply with applicable federal civil rights laws and [place] laws. We do not discriminate on the basis of race, color, national origin, age, disability, sex, sexual orientation, or gender identity.            Thank you!     Thank you for choosing [facility]  for your care. Our goal is always to provide you with excellent care. Hearing back from our patients is one way we can continue to improve our services. Please take a few minutes to complete the written survey that you may receive in the mail after your visit with us. Thank you!             Your Updated Medication List - Protect others around you: Learn how to safely use, store and throw away your medicines at www.disposemymeds.org.          This list is accurate as of 5/25/18 11:59 PM.  Always use your most recent med list.                   Brand Name Dispense Instructions for use Diagnosis    amLODIPine 5 MG tablet    NORVASC    90 tablet    Take 2 tablets (10 mg) by mouth daily    Essential hypertension       citalopram 40 MG tablet    celeXA    30 tablet    Take 1 tablet (40 mg) by mouth daily    Depression, unspecified depression type       gabapentin 300 MG capsule    NEURONTIN    90 capsule    Take 1 capsule (300 mg) by mouth 3 times daily     Uncomplicated alcohol dependence (H)       losartan 50 MG tablet    COZAAR    30 tablet    Take 1 tablet (50 mg) by mouth daily    Essential hypertension       omeprazole 20 MG CR capsule    priLOSEC    60 capsule    Take 1 capsule (20 mg) by mouth 2 times daily    Esophagitis       sildenafil 20 MG tablet    REVATIO    50 tablet    Take 1-2 tablet (20 mg) as needed once daily.  Never use with nitroglycerin, terazosin or doxazosin.    Erectile dysfunction, unspecified erectile dysfunction type

## 2018-05-25 NOTE — NURSING NOTE
The following nebulizer treatment was given:     MEDICATION: Albuterol Sulfate 2.5mg  : [employer]  LOT #: [id number]  EXPIRATION DATE:  05/2019  NDC #     administered by Codi carcamo CMA

## 2018-05-29 ENCOUNTER — AMBULATORY - HEALTHEAST (OUTPATIENT)
Dept: PULMONOLOGY | Facility: OTHER | Age: 53
End: 2018-05-29

## 2018-05-29 ENCOUNTER — COMMUNICATION - HEALTHEAST (OUTPATIENT)
Dept: PULMONOLOGY | Facility: OTHER | Age: 53
End: 2018-05-29

## 2018-05-29 DIAGNOSIS — R06.00 DYSPNEA: ICD-10-CM

## 2018-05-30 ENCOUNTER — TELEPHONE (OUTPATIENT)
Dept: FAMILY MEDICINE | Facility: CLINIC | Age: 53
End: 2018-05-30

## 2018-05-30 NOTE — TELEPHONE ENCOUNTER
We received notification from your pharmacy/insurance that Sildenafil will not be covered.     Our clinic policy is to not pursue prior authorization as these requests are generally denied. We recommend you purchase out of pocket if medicine desired.     Resources to help reduce cost may include coupons through [employer]    Plan:   1) Routed to delegate PCS to educate patient of above information.   2) Chart routed to prescriber as ARISTIDES.    Magali Spring May 30, 2018 at 11:29 AM

## 2018-06-01 ENCOUNTER — TRANSFERRED RECORDS (OUTPATIENT)
Dept: HEALTH INFORMATION MANAGEMENT | Facility: CLINIC | Age: 53
End: 2018-06-01

## 2018-06-01 ENCOUNTER — OFFICE VISIT - HEALTHEAST (OUTPATIENT)
Dept: CARDIOLOGY | Facility: CLINIC | Age: 53
End: 2018-06-01

## 2018-06-01 DIAGNOSIS — Z72.0 TOBACCO ABUSE: ICD-10-CM

## 2018-06-01 DIAGNOSIS — R06.09 DYSPNEA ON EXERTION: ICD-10-CM

## 2018-06-01 DIAGNOSIS — R55 SYNCOPE: ICD-10-CM

## 2018-06-01 LAB
ATRIAL RATE - MUSE: 78 BPM
DIASTOLIC BLOOD PRESSURE - MUSE: NORMAL MMHG
INTERPRETATION ECG - MUSE: NORMAL
P AXIS - MUSE: 51 DEGREES
PR INTERVAL - MUSE: 186 MS
QRS DURATION - MUSE: 92 MS
QT - MUSE: 406 MS
QTC - MUSE: 462 MS
R AXIS - MUSE: 48 DEGREES
SYSTOLIC BLOOD PRESSURE - MUSE: NORMAL MMHG
T AXIS - MUSE: 45 DEGREES
VENTRICULAR RATE- MUSE: 78 BPM

## 2018-06-01 ASSESSMENT — MIFFLIN-ST. JEOR: SCORE: 1597.63

## 2018-06-07 ENCOUNTER — OFFICE VISIT (OUTPATIENT)
Dept: FAMILY MEDICINE | Facility: CLINIC | Age: 53
End: 2018-06-07

## 2018-06-07 VITALS
OXYGEN SATURATION: 96 % | DIASTOLIC BLOOD PRESSURE: 88 MMHG | WEIGHT: 175.4 LBS | TEMPERATURE: 98.1 F | HEIGHT: 69 IN | RESPIRATION RATE: 20 BRPM | HEART RATE: 80 BPM | BODY MASS INDEX: 25.98 KG/M2 | SYSTOLIC BLOOD PRESSURE: 138 MMHG

## 2018-06-07 DIAGNOSIS — G89.29 CHRONIC LEFT-SIDED LOW BACK PAIN WITH LEFT-SIDED SCIATICA: Primary | ICD-10-CM

## 2018-06-07 DIAGNOSIS — M54.42 CHRONIC LEFT-SIDED LOW BACK PAIN WITH LEFT-SIDED SCIATICA: Primary | ICD-10-CM

## 2018-06-07 DIAGNOSIS — Z12.11 SCREENING FOR COLON CANCER: ICD-10-CM

## 2018-06-07 RX ORDER — CYCLOBENZAPRINE HCL 5 MG
5 TABLET ORAL
Qty: 30 TABLET | Refills: 1 | Status: SHIPPED | OUTPATIENT
Start: 2018-06-07 | End: 2018-11-15

## 2018-06-07 RX ORDER — NAPROXEN 500 MG/1
500 TABLET ORAL 2 TIMES DAILY WITH MEALS
Qty: 60 TABLET | Refills: 1 | Status: SHIPPED | OUTPATIENT
Start: 2018-06-07 | End: 2018-07-31

## 2018-06-07 NOTE — PROGRESS NOTES
Subjective       Piter Gaines is a 52 year old  male with a significant past medical history of HTN who presents for    Chief Complaint   Patient presents with     Pain     on left side hip     Back Pain  - Left sided back pain  - Onset about 4 years ago after hurting his back while sledding (hit ice patch hard)  - Not constant, but intermittent  - Progressively becoming worse, more frequent  - In addition, starting to have radiating pain down his left thigh to both his knee and left pinky toe  - Denies saddle anesthesia, urinary or stool incontinence, or weight loss  - Not currently working    Pertinent ROS: Constitutional, HEENT, cardiovascular, pulmonary, gi and gu systems are negative, except as otherwise noted.    Social History:   History   Smoking Status     Current Some Day Smoker   Smokeless Tobacco     Never Used     Comment: 1/2 pack a day         Current Medications:   Current Outpatient Prescriptions   Medication Sig Dispense Refill     amLODIPine (NORVASC) 5 MG tablet Take 2 tablets (10 mg) by mouth daily 90 tablet 3     citalopram (CELEXA) 40 MG tablet Take 1 tablet (40 mg) by mouth daily 30 tablet 3     cyclobenzaprine (FLEXERIL) 5 MG tablet Take 1 tablet (5 mg) by mouth nightly as needed for muscle spasms 30 tablet 1     gabapentin (NEURONTIN) 300 MG capsule Take 1 capsule (300 mg) by mouth 3 times daily 90 capsule 3     losartan (COZAAR) 50 MG tablet Take 1 tablet (50 mg) by mouth daily 30 tablet 3     naproxen (NAPROSYN) 500 MG tablet Take 1 tablet (500 mg) by mouth 2 times daily (with meals) 60 tablet 1     omeprazole (PRILOSEC) 20 MG CR capsule Take 1 capsule (20 mg) by mouth 2 times daily 60 capsule 1     sildenafil (REVATIO) 20 MG tablet Take 1-2 tablet (20 mg) as needed once daily.  Never use with nitroglycerin, terazosin or doxazosin. 50 tablet 1            Objective     Vitals:    06/07/18 0841   BP: 138/88   Pulse: 80   Resp: 20   Temp: 98.1  F (36.7  C)   SpO2: 96%   Weight: 175 lb  "6.4 oz (79.6 kg)   Height: 5' 9\" (175.3 cm)     Body mass index is 25.9 kg/(m^2).    GENERAL APPEARANCE: healthy, alert and no distress  NECK: no adenopathy, no asymmetry, masses, or scars and thyroid normal to palpation  RESP: lungs clear to auscultation - no rales, rhonchi or wheezes  CV: regular rates and rhythm, normal S1 S2, no S3 or S4, no murmur, click or rub, no peripheral edema and peripheral pulses strong  BACK: Tender to palpation over left paraspinal muscles, + straight leg test on left side, + slump test on left side  SKIN: no suspicious lesions or rashes  NEURO: Strength 5/5 in lower extremities. Sensation intact.         Assessment/Plan       (M54.42,  G89.29) Chronic left-sided low back pain with left-sided sciatica  (primary encounter diagnosis)  Comment:   Plan: naproxen (NAPROSYN) 500 MG tablet,         cyclobenzaprine (FLEXERIL) 5 MG tablet,         PHYSICAL THERAPY REFERRAL        Exam consistent with left sided back pain with sciatica. Likely some miscommunication regarding \"steroid shots\" to be done at this clinic. After further discussion, Piter isn't ready to have any epidural corticosteroid injections. Considering he hasn't started any conservative treatments, will start with naproxen 500mg BID, flexeril at bedtime, and start physical therapy. RTC in 6-8 weeks.    (Z12.11) Screening for colon cancer  Comment:   Plan: Fecal Occult Blood - FIT, iFOB ([facility])            Options for treatment and follow-up care were reviewed with the patient and/or guardian. Piter Gaines and/or guardian engaged in the decision making process and verbalized understanding of the options discussed and agreed with the final plan.    Michael Rhodes MD      Precepted today with: Adebayo Dubose MD    This note was created using Dragon Dictation software. Any grammatical errors or word substitutions are unintentional, despite proofreading.      Preceptor Attestation:  I saw and evaluated the patient.  I reviewed " the resident physician's history, exam, and treatment plan; and I agree with the documentation by the resident physician.  Supervising Physician:  Adebayo Dubose MD

## 2018-06-07 NOTE — MR AVS SNAPSHOT
After Visit Summary   6/7/2018    Piter Gaines    MRN: [id number]           Patient Information     Date Of Birth          1965        Visit Information        Provider Department      6/7/2018 8:40 AM Michael Rhodes MD [facility]        Today's Diagnoses     Chronic left-sided low back pain with left-sided sciatica    -  1    Screening for colon cancer           Follow-ups after your visit        Additional Services     PHYSICAL THERAPY REFERRAL       PT/OT REFERRAL  Piter Gaines  1965  Phone #: [phone] (home)    needed: No  Language: English    PT/OT  Facility:   [facility], P: [phone], F: [phone]    History: Chronic low back pain after an injury 4 years ago. Has left sided back pain that radiates to both his knee and left pinky toe. Has only done 2 visits with physical therapy in the past, no home exercises. Only 2 SI joint provocation tests positive, so I do not think SI joint dysfunction is the main problem, though can be contributing. Leg length seemed symmetrical to me today.    Precautions/Contraindications: None    Imaging Studies: none    Surgical Procedure/Test Results: None    Treatment Goals:   Increase: Strength and ROM  Decrease: Pain and Spasm    Prognosis: good    Visits: Up to 12    Evaluate: Evaluate and treat    Plan: Manual Therapy, Flexibility Exercise and Strength Exercise                  Follow-up notes from your care team     Return in about 6 weeks (around 7/19/2018).      Who to contact     Please call your clinic at [phone] to:    Ask questions about your health    Make or cancel appointments    Discuss your medicines    Learn about your test results    Speak to your doctor            Additional Information About Your Visit        Care EveryWhere ID     This is your Care EveryWhere ID. This could be used by other organizations to access your [place] medical records  [id number]        Your Vitals  "Were     Pulse Temperature Respirations Height Pulse Oximetry BMI (Body Mass Index)    80 98.1  F (36.7  C) 20 5' 9\" (175.3 cm) 96% 25.9 kg/m2       Blood Pressure from Last 3 Encounters:   06/07/18 138/88   05/25/18 132/90   02/28/18 138/89    Weight from Last 3 Encounters:   06/07/18 175 lb 6.4 oz (79.6 kg)   05/25/18 172 lb 9.6 oz (78.3 kg)   02/28/18 176 lb 9.6 oz (80.1 kg)                 Today's Medication Changes          These changes are accurate as of 6/7/18 11:59 PM.  If you have any questions, ask your nurse or doctor.               Start taking these medicines.        Dose/Directions    naproxen 500 MG tablet   Commonly known as:  NAPROSYN   Used for:  Chronic left-sided low back pain with left-sided sciatica   Started by:  Michael Rhodes MD        Dose:  500 mg   Take 1 tablet (500 mg) by mouth 2 times daily (with meals)   Quantity:  60 tablet   Refills:  1         These medicines have changed or have updated prescriptions.        Dose/Directions    cyclobenzaprine 5 MG tablet   Commonly known as:  FLEXERIL   This may have changed:  when to take this   Used for:  Chronic left-sided low back pain with left-sided sciatica   Changed by:  Michael Rhodes MD        Dose:  5 mg   Take 1 tablet (5 mg) by mouth nightly as needed for muscle spasms   Quantity:  30 tablet   Refills:  1            Where to get your medicines      These medications were sent to [id number] - [place], [place] - [address], [place] [address]     Phone:  [phone]     cyclobenzaprine 5 MG tablet    naproxen 500 MG tablet                Primary Care Provider Office Phone # Fax #    Edmund Dey [phone]       [address] [address]        Equal Access to Services     NATHALIA CORDON AH: Hadii car gerbero Soomaali, waaxda luqadaha, qaybta kaalmada adeegyada, leslie cavazos " yadiel siaroc christianchristiano lakylemariya ah. So [facility] [phone].    ATENCIÓN: Si edouardla aleksey, tiene a castillo disposición servicios gratuitos de asistencia lingüística. [facility] [phone].    We comply with applicable federal civil rights laws and [place] laws. We do not discriminate on the basis of race, color, national origin, age, disability, sex, sexual orientation, or gender identity.            Thank you!     Thank you for choosing [facility]  for your care. Our goal is always to provide you with excellent care. Hearing back from our patients is one way we can continue to improve our services. Please take a few minutes to complete the written survey that you may receive in the mail after your visit with us. Thank you!             Your Updated Medication List - Protect others around you: Learn how to safely use, store and throw away your medicines at www.disposemymeds.org.          This list is accurate as of 6/7/18 11:59 PM.  Always use your most recent med list.                   Brand Name Dispense Instructions for use Diagnosis    amLODIPine 5 MG tablet    NORVASC    90 tablet    Take 2 tablets (10 mg) by mouth daily    Essential hypertension       citalopram 40 MG tablet    celeXA    30 tablet    Take 1 tablet (40 mg) by mouth daily    Depression, unspecified depression type       cyclobenzaprine 5 MG tablet    FLEXERIL    30 tablet    Take 1 tablet (5 mg) by mouth nightly as needed for muscle spasms    Chronic left-sided low back pain with left-sided sciatica       gabapentin 300 MG capsule    NEURONTIN    90 capsule    Take 1 capsule (300 mg) by mouth 3 times daily    Uncomplicated alcohol dependence (H)       losartan 50 MG tablet    COZAAR    30 tablet    Take 1 tablet (50 mg) by mouth daily    Essential hypertension       naproxen 500 MG tablet    NAPROSYN    60 tablet    Take 1 tablet (500 mg) by mouth 2 times daily (with meals)    Chronic left-sided low back pain with left-sided sciatica        omeprazole 20 MG CR capsule    priLOSEC    60 capsule    Take 1 capsule (20 mg) by mouth 2 times daily    Esophagitis       sildenafil 20 MG tablet    REVATIO    50 tablet    Take 1-2 tablet (20 mg) as needed once daily.  Never use with nitroglycerin, terazosin or doxazosin.    Erectile dysfunction, unspecified erectile dysfunction type

## 2018-06-08 PROBLEM — G89.29 CHRONIC LEFT-SIDED LOW BACK PAIN WITH LEFT-SIDED SCIATICA: Status: ACTIVE | Noted: 2018-06-08

## 2018-06-08 PROBLEM — M54.42 CHRONIC LEFT-SIDED LOW BACK PAIN WITH LEFT-SIDED SCIATICA: Status: RESOLVED | Noted: 2018-06-08 | Resolved: 2018-06-08

## 2018-06-08 PROBLEM — G89.29 CHRONIC LEFT-SIDED LOW BACK PAIN WITH LEFT-SIDED SCIATICA: Status: RESOLVED | Noted: 2018-06-08 | Resolved: 2018-06-08

## 2018-06-08 PROBLEM — M54.42 CHRONIC LEFT-SIDED LOW BACK PAIN WITH LEFT-SIDED SCIATICA: Status: ACTIVE | Noted: 2018-06-08

## 2018-06-18 DIAGNOSIS — I10 ESSENTIAL HYPERTENSION: ICD-10-CM

## 2018-06-18 DIAGNOSIS — K20.90 ESOPHAGITIS: ICD-10-CM

## 2018-06-18 RX ORDER — AMLODIPINE BESYLATE 5 MG/1
10 TABLET ORAL DAILY
Qty: 90 TABLET | Refills: 1 | Status: SHIPPED | OUTPATIENT
Start: 2018-06-18 | End: 2018-11-08 | Stop reason: ALTCHOICE

## 2018-07-17 ENCOUNTER — RECORDS - HEALTHEAST (OUTPATIENT)
Dept: ADMINISTRATIVE | Facility: OTHER | Age: 53
End: 2018-07-17

## 2018-07-17 ENCOUNTER — RECORDS - HEALTHEAST (OUTPATIENT)
Dept: PULMONOLOGY | Facility: OTHER | Age: 53
End: 2018-07-17

## 2018-07-17 DIAGNOSIS — R06.00 DYSPNEA, UNSPECIFIED: ICD-10-CM

## 2018-07-19 ENCOUNTER — OFFICE VISIT - HEALTHEAST (OUTPATIENT)
Dept: PULMONOLOGY | Facility: OTHER | Age: 53
End: 2018-07-19

## 2018-07-19 ENCOUNTER — TRANSFERRED RECORDS (OUTPATIENT)
Dept: HEALTH INFORMATION MANAGEMENT | Facility: CLINIC | Age: 53
End: 2018-07-19

## 2018-07-19 DIAGNOSIS — R06.09 DYSPNEA ON EXERTION: ICD-10-CM

## 2018-07-19 DIAGNOSIS — J44.9 COPD (CHRONIC OBSTRUCTIVE PULMONARY DISEASE) (H): ICD-10-CM

## 2018-07-19 DIAGNOSIS — J98.4 RESTRICTIVE LUNG DISEASE: ICD-10-CM

## 2018-07-19 DIAGNOSIS — F17.200 TOBACCO DEPENDENCE SYNDROME: ICD-10-CM

## 2018-07-31 DIAGNOSIS — G89.29 CHRONIC LEFT-SIDED LOW BACK PAIN WITH LEFT-SIDED SCIATICA: ICD-10-CM

## 2018-07-31 DIAGNOSIS — M54.42 CHRONIC LEFT-SIDED LOW BACK PAIN WITH LEFT-SIDED SCIATICA: ICD-10-CM

## 2018-07-31 RX ORDER — NAPROXEN 500 MG/1
500 TABLET ORAL 2 TIMES DAILY WITH MEALS
Qty: 60 TABLET | Refills: 1 | Status: SHIPPED | OUTPATIENT
Start: 2018-07-31 | End: 2018-09-28

## 2018-08-02 DIAGNOSIS — I10 ESSENTIAL HYPERTENSION: ICD-10-CM

## 2018-08-02 RX ORDER — LOSARTAN POTASSIUM 50 MG/1
50 TABLET ORAL DAILY
Qty: 30 TABLET | Refills: 1 | Status: SHIPPED | OUTPATIENT
Start: 2018-08-02 | End: 2018-11-08

## 2018-08-13 RX ORDER — NICOTINE 21 MG/24HR
1 PATCH, TRANSDERMAL 24 HOURS TRANSDERMAL DAILY
COMMUNITY
Start: 2018-07-19 | End: 2019-06-04

## 2018-08-13 RX ORDER — BUPROPION HYDROCHLORIDE 150 MG/1
150 TABLET, EXTENDED RELEASE ORAL 2 TIMES DAILY
COMMUNITY
Start: 2018-07-19 | End: 2019-02-04

## 2018-08-14 ENCOUNTER — OFFICE VISIT (OUTPATIENT)
Dept: FAMILY MEDICINE | Facility: CLINIC | Age: 53
End: 2018-08-14

## 2018-08-14 VITALS
WEIGHT: 177 LBS | OXYGEN SATURATION: 97 % | TEMPERATURE: 98.4 F | HEART RATE: 77 BPM | SYSTOLIC BLOOD PRESSURE: 127 MMHG | BODY MASS INDEX: 26.22 KG/M2 | DIASTOLIC BLOOD PRESSURE: 86 MMHG | RESPIRATION RATE: 16 BRPM | HEIGHT: 69 IN

## 2018-08-14 DIAGNOSIS — I10 ESSENTIAL HYPERTENSION: ICD-10-CM

## 2018-08-14 DIAGNOSIS — Z86.2 HISTORY OF ANEMIA: ICD-10-CM

## 2018-08-14 DIAGNOSIS — D23.5 BENIGN NEOPLASM OF SKIN OF TRUNK, EXCEPT SCROTUM: Primary | ICD-10-CM

## 2018-08-14 DIAGNOSIS — F10.20 UNCOMPLICATED ALCOHOL DEPENDENCE (H): ICD-10-CM

## 2018-08-14 DIAGNOSIS — N52.9 ERECTILE DYSFUNCTION, UNSPECIFIED ERECTILE DYSFUNCTION TYPE: ICD-10-CM

## 2018-08-14 LAB
BUN SERPL-MCNC: 16 MG/DL (ref 7–30)
CALCIUM SERPL-MCNC: 9.2 MG/DL (ref 8.5–10.4)
CHLORIDE SERPLBLD-SCNC: 105 MMOL/L (ref 94–109)
CO2 SERPL-SCNC: 26 MMOL/L (ref 20–32)
CREAT SERPL-MCNC: 1.6 MG/DL (ref 0.8–1.5)
EGFR CALCULATED (BLACK REFERENCE): 58.7 ML/MIN
EGFR CALCULATED (NON BLACK REFERENCE): 48.5 ML/MIN
GLUCOSE SERPL-MCNC: 112 MG/DL (ref 60–109)
HCT VFR BLD AUTO: 37.3 % (ref 40–53)
HEMOGLOBIN: 11.8 G/DL (ref 13.3–17.7)
MCH RBC QN AUTO: 27.5 PG (ref 26.5–35)
MCHC RBC AUTO-ENTMCNC: 31.6 G/DL (ref 32–36)
MCV RBC AUTO: 86.9 FL (ref 78–100)
PLATELET # BLD AUTO: 169 K/UL (ref 150–450)
POTASSIUM SERPL-SCNC: 3.7 MMOL/L (ref 3.4–5.3)
RBC # BLD AUTO: 4.29 M/UL (ref 4.4–5.9)
SODIUM SERPL-SCNC: 139 MMOL/L (ref 133–144)
WBC # BLD AUTO: 5.5 K/UL (ref 4–11)

## 2018-08-14 RX ORDER — GABAPENTIN 300 MG/1
300 CAPSULE ORAL 3 TIMES DAILY
Qty: 90 CAPSULE | Refills: 3 | Status: SHIPPED | OUTPATIENT
Start: 2018-08-14 | End: 2019-02-05

## 2018-08-14 ASSESSMENT — ANXIETY QUESTIONNAIRES
GAD7 TOTAL SCORE: 15
3. WORRYING TOO MUCH ABOUT DIFFERENT THINGS: MORE THAN HALF THE DAYS
5. BEING SO RESTLESS THAT IT IS HARD TO SIT STILL: MORE THAN HALF THE DAYS
7. FEELING AFRAID AS IF SOMETHING AWFUL MIGHT HAPPEN: MORE THAN HALF THE DAYS
IF YOU CHECKED OFF ANY PROBLEMS ON THIS QUESTIONNAIRE, HOW DIFFICULT HAVE THESE PROBLEMS MADE IT FOR YOU TO DO YOUR WORK, TAKE CARE OF THINGS AT HOME, OR GET ALONG WITH OTHER PEOPLE: SOMEWHAT DIFFICULT
1. FEELING NERVOUS, ANXIOUS, OR ON EDGE: NEARLY EVERY DAY
6. BECOMING EASILY ANNOYED OR IRRITABLE: MORE THAN HALF THE DAYS
2. NOT BEING ABLE TO STOP OR CONTROL WORRYING: MORE THAN HALF THE DAYS

## 2018-08-14 ASSESSMENT — PATIENT HEALTH QUESTIONNAIRE - PHQ9: 5. POOR APPETITE OR OVEREATING: MORE THAN HALF THE DAYS

## 2018-08-14 NOTE — PROGRESS NOTES
[facility] - Family Medicine    Piter Gaines is a 52 year old  male with past medical history significant of alcohol abuse, tobacco use, esophagitis/hematemesis, HTN, and MDD presenting for chief complaint of:     Patient presents with:  Abdominal Swelling: bump on right side of abdomen  Medication Reconciliation: complete. would like to try something new in replacement of sildenafil  Refill Request: gabapentin      SUBJECTIVE:        HPI:    BUMP -   - Dark pigmented lesion right inguinal region   - Started off as small smooth spot, has increased in size and now rough textured  - Doesn't have any other spots   - Started 4-5 months ago    GABAPENTIN -   - States takes this for his anxiety and leg pain - helpful for anxiety, less so for left sided radicular symptoms  - GAD7: 15/21, somewhat difficult, finds gabapentin helpful    SILDENAFIL -   - Isn't working for him  - Would like to try something different  - unable to get an erection with or without medication   - denies exertional chest pain or claudication    HX ANEMIA -   - Denies any recurrence of hematemesis or dark tarry stools    HEALTH MAINTENANCE -   - has FIT test at home, has not yet completed   - states has had HIV and hep c testing in the past   - PHQ-9: 12/27, somewhat difficult, no active suicidal ideation    History provided by Piter FRY:   See HPI above.     HISTORY:     Patient Active Problem List   Diagnosis     Tobacco use     Caloric malnutrition (H)     Hematemesis     Essential hypertension     Esophagitis     Alcohol abuse     Moderate episode of recurrent major depressive disorder (H)        Past Medical History:   Diagnosis Date     Depression      Hypertension      Tobacco abuse        No past surgical history on file.       Allergies   Allergen Reactions     No Known Allergies        Current Outpatient Prescriptions   Medication     amLODIPine (NORVASC) 5 MG tablet     buPROPion (WELLBUTRIN SR) 150 MG 12 hr tablet      "citalopram (CELEXA) 40 MG tablet     cyclobenzaprine (FLEXERIL) 5 MG tablet     fluticasone-salmeterol (ADVAIR DISKUS) 250-50 MCG/DOSE diskus inhaler     gabapentin (NEURONTIN) 300 MG capsule     losartan (COZAAR) 50 MG tablet     naproxen (NAPROSYN) 500 MG tablet     nicotine (NICODERM CQ) 21 MG/24HR 24 hr patch     omeprazole (PRILOSEC) 20 MG CR capsule     sildenafil (REVATIO) 20 MG tablet     No current facility-administered medications for this visit.      OBJECTIVE:      PHYSICAL EXAM:  VITALS: /86  Pulse 77  Temp 98.4  F (36.9  C) (Oral)  Resp 16  Ht 5' 9\" (175.3 cm)  Wt 177 lb (80.3 kg)  SpO2 97%  BMI 26.14 kg/m2   GENERAL: Piter is a pleasant middle aged black man, appears stated age, in no acute distress.   HEENT: Sclera white, face symmetric  HEART: Regular rate and rhythm, no murmurs.   LUNGS: Clear to auscultation bilaterally, unlabored.  DERM: 3mm x 6mm oval shaped brown pigmented raised papule with rough texture, uniform color located on right inguinal region   PSYCH:  Normal affect, good eye contact, appropriately groomed     LABS:  BMP: Na 139 / K 3.7 / Cl 105 / CO2 26 / BUN 16 / Cr 1.6 / GFR 58.7 / Glu 112 / Ca 9.2  CBC: Hgb 11.8 / MCV 86.9 / WBC 5.5 /        ASSESSMENT & PLAN:      Piter Gaines is a 52 year old  male who presented for multiple complaints. Discussed that this is a superficial overview, and each topic deserves greater attention to detail at upcoming appointments.      1. Seborrheic Keratosis  Discussed benign nature. If changing, may return for further evaluation. If bothersome, may return for removal.     2. Uncomplicated alcohol dependence (H)  - gabapentin (NEURONTIN) 300 MG capsule; Take 1 capsule (300 mg) by mouth 3 times daily  Dispense: 90 capsule; Refill: 3    Requests refill for gabapentin, is helpful for \"anxiety\". Will renew today. Needs to follow up to address current alcohol use and anxiety. May consider referral for psychotherapy to further address " anxiety symptoms.     3. Erectile dysfunction, unspecified erectile dysfunction type  - UROLOGY ADULT REFERRAL; Future    Does not have symptoms consistent with peripheral vascular disease such as exertional chest pain or peripheral vascular disease. Reportedly has failed sildenafil. Discussed urology may be able to offer alternative treatments such as injectables, vacuums, and prosthetics.     Unclear if this has been formally worked up. May need to consider alternative etiology such as psychologic. Consider further workup with PCP.     Referral for ( TEST )  :      Urology   LOCATION/PLACE/Provider :    [facility] [address] [facility] [place], [place]   DATE & TIME :     9-6-2018 at 10:10am    4. Essential hypertension  - Basic Metabolic Panel ([facility]) - Results < 1 hr    Blood pressure is at goal. Continue losartan and amlodipine. Continue GFR has decreased. Will need to recheck renal function.     5. History of anemia  - CBC with Plt ([facility])     Hemoglobin continues to trend downwards. Is normocytic. Recommend completing FIT test per below. Return to clinic for further evaluation.     Health Maintenance: Encouraged to bring FIT test back for colon cancer screening. Reports completing HIV and Hep C screening in the past and declines today.     RTC: 3 months - blood pressure follow up    - Will get Piter back in sooner to follow up on anxiety, erectile dysfunction, and anemia.     Options for treatment and follow-up care were reviewed with the patient and/or guardian. Piter Gaines and/or guardian engaged in the decision making process and verbalized understanding of the options discussed and agreed with the final plan.    Precepted today with: Dr. Rufino Mendoza DO   [facility] Family Medicine Resident, PGY-3

## 2018-08-14 NOTE — MR AVS SNAPSHOT
After Visit Summary   8/14/2018    Piter Gaines    MRN: [id number]           Patient Information     Date Of Birth          1965        Visit Information        Provider Department      8/14/2018 8:40 AM Shaina Mendoza,  [facility]        Today's Diagnoses     Essential hypertension    -  1    Uncomplicated alcohol dependence (H)        History of anemia        Erectile dysfunction, unspecified erectile dysfunction type        Benign neoplasm of skin of trunk, except scrotum          Care Instructions      Understanding Seborrheic Keratosis  Seborrheic keratoses are wart-like growths on the skin. These growths are not cancer. Many people get them, especially after age 30.  How to say it  [id number]   What causes seborrheic keratoses?  Doctors do not know what causes seborrheic keratoses. They may run in families. In addition, they become more common as people get older.  What are the symptoms of seborrheic keratoses?  Here is what seborrheic keratoses often look like:    They tend to be round or oval in shape. They can be very small or about as big across as a quarter.    They can appear singly or in clusters.    They tend to be tan, brown, or black in color.    The edges may be scalloped or uneven-looking.    They can have a waxy or scaly look.    They can be a bit raised, appearing to be  stuck on  the skin.    They may become red and irritated if scratched or rubbed by clothing  Sebhorreic keratoses are not painful, but they may be itchy. They can become irritated if they are continually rubbed by skin or clothing. Seborrheic keratoses most often appear on the face, arms, chest, back, or belly.  How are seborrheic keratoses treated?  Seborrheic keratoses don t need to be treated unless they bother you. You may choose to have them removed because you find them unattractive. You may also want them removed because they get irritated and uncomfortable. A healthcare  provider can remove them in an office visit. Ways that seborrheic keratoses can be removed include:    Freezing them off with liquid nitrogen    Cutting them off with a scalpel    Burning them off with electricity  What are the complications of seborrheic keratoses?  Seborrheic keratoses are not cancer, but they can look like some types of skin cancer. So it s a good idea to ask your healthcare provider to check any new skin growths. Ask your healthcare provider about any skin problem that concerns you.  When should I call my healthcare provider?  Call your healthcare provider right away if any of these occur:    You develop a lot of seborrheic keratoses very quickly    You have a sore that does not heal within a few weeks, or heals and then comes back    You have a mole or skin growth that is changing in size, shape, or color    You have a mole or skin growth that looks different on one side from the other    You have a mole or skin growth that is not the same color throughout   Date Last Reviewed: 5/1/2016 2000-2017 [employer]. [address] [place] [address]. All rights reserved. This information is not intended as a substitute for professional medical care. Always follow your healthcare professional's instructions.                Follow-ups after your visit        Additional Services     UROLOGY ADULT REFERRAL       Reason for Referral: erectile dysfunction evaluation, reports failing sildenafil     needed: No  Language: English    May leave message on voicemail: ask patient    ([facility] Only) Referral should be tracked (Yes/No)? - NO                  Future tests that were ordered for you today     Open Future Orders        Priority Expected Expires Ordered    UROLOGY ADULT REFERRAL Routine  8/14/2019 8/14/2018            Who to contact     Please call your clinic at [phone] to:    Ask questions about your health    Make or cancel appointments    Discuss your  "medicines    Learn about your test results    Speak to your doctor            Additional Information About Your Visit        Care EveryWhere ID     This is your Care EveryWhere ID. This could be used by other organizations to access your [facility] medical records  [id number]        Your Vitals Were     Pulse Temperature Respirations Height Pulse Oximetry BMI (Body Mass Index)    77 98.4  F (36.9  C) (Oral) 16 5' 9\" (175.3 cm) 97% 26.14 kg/m2       Blood Pressure from Last 3 Encounters:   08/14/18 127/86   06/07/18 138/88   05/25/18 132/90    Weight from Last 3 Encounters:   08/14/18 177 lb (80.3 kg)   06/07/18 175 lb 6.4 oz (79.6 kg)   05/25/18 172 lb 9.6 oz (78.3 kg)              We Performed the Following     Basic Metabolic Panel ([facility]) - Results < 1 hr     CBC with Plt ([facility])          Where to get your medicines      These medications were sent to [id number] - [place], [place] - [address], [place] [address]     Phone:  [phone]     gabapentin 300 MG capsule          Primary Care Provider Office Phone # Fax #    Edmund Yousif Dey [phone]       [address] [address]        Equal Access to Services     NATHALIA CORDON AH: Hadii aad ku hadasho Sokatiuskaali, waaxda luqadaha, qaybta kaalmada adeegyada, leslie isaacs . So [facility] [phone].    ATENCIÓN: Si habla español, tiene a castillo disposición servicios gratuitos de asistencia lingüística. Tyra al [phone].    We comply with applicable federal civil rights laws and [place] laws. We do not discriminate on the basis of race, color, national origin, age, disability, sex, sexual orientation, or gender identity.            Thank you!     Thank you for choosing [facility]  for your care. Our goal is always to provide you with excellent care. Hearing back from our patients is one way we " can continue to improve our services. Please take a few minutes to complete the written survey that you may receive in the mail after your visit with us. Thank you!             Your Updated Medication List - Protect others around you: Learn how to safely use, store and throw away your medicines at www.disposemymeds.org.          This list is accurate as of 8/14/18  9:18 AM.  Always use your most recent med list.                   Brand Name Dispense Instructions for use Diagnosis    ADVAIR DISKUS 250-50 MCG/DOSE diskus inhaler   Generic drug:  fluticasone-salmeterol      Inhale 1 puff into the lungs 2 times daily        amLODIPine 5 MG tablet    NORVASC    90 tablet    Take 2 tablets (10 mg) by mouth daily    Essential hypertension       buPROPion 150 MG 12 hr tablet    WELLBUTRIN SR     Take 150 mg by mouth 2 times daily        citalopram 40 MG tablet    celeXA    30 tablet    Take 1 tablet (40 mg) by mouth daily    Depression, unspecified depression type       cyclobenzaprine 5 MG tablet    FLEXERIL    30 tablet    Take 1 tablet (5 mg) by mouth nightly as needed for muscle spasms    Chronic left-sided low back pain with left-sided sciatica       gabapentin 300 MG capsule    NEURONTIN    90 capsule    Take 1 capsule (300 mg) by mouth 3 times daily    Uncomplicated alcohol dependence (H)       losartan 50 MG tablet    COZAAR    30 tablet    Take 1 tablet (50 mg) by mouth daily    Essential hypertension       naproxen 500 MG tablet    NAPROSYN    60 tablet    Take 1 tablet (500 mg) by mouth 2 times daily (with meals)    Chronic left-sided low back pain with left-sided sciatica       nicotine 21 MG/24HR 24 hr patch    NICODERM CQ     Place 1 patch onto the skin daily        omeprazole 20 MG CR capsule    priLOSEC    60 capsule    Take 1 capsule (20 mg) by mouth 2 times daily    Esophagitis       sildenafil 20 MG tablet    REVATIO    50 tablet    Take 1-2 tablet (20 mg) as needed once daily.  Never use with  nitroglycerin, terazosin or doxazosin.    Erectile dysfunction, unspecified erectile dysfunction type

## 2018-08-14 NOTE — PROGRESS NOTES
Please call Piter with his lab results. Please also schedule Piter an appointment with his primary physician within the next few weeks to discuss his kidney function, anemia, anxiety, and erectile dysfunction in greater detail. Thank you!    Juan Dumas,  Thanks for coming in to clinic today. Your lab results have returned.   1. Your kidney function has decreased compared to normal. We should recheck this lab in the next few weeks to make sure this decrease is real and follow up to further evaluate.   2. Your anemia, although mild, is continuing to worsen. We need to further evaluate this at the same appointment to discuss your kidney function. Please complete the FIT test as we discussed, as this will help in our workup for your anemia.   Please let me know if you have any questions or concerns.   Shaina Mendoza, DO

## 2018-08-14 NOTE — PATIENT INSTRUCTIONS
Understanding Seborrheic Keratosis  Seborrheic keratoses are wart-like growths on the skin. These growths are not cancer. Many people get them, especially after age 30.  How to say it  [id number]   What causes seborrheic keratoses?  Doctors do not know what causes seborrheic keratoses. They may run in families. In addition, they become more common as people get older.  What are the symptoms of seborrheic keratoses?  Here is what seborrheic keratoses often look like:    They tend to be round or oval in shape. They can be very small or about as big across as a quarter.    They can appear singly or in clusters.    They tend to be tan, brown, or black in color.    The edges may be scalloped or uneven-looking.    They can have a waxy or scaly look.    They can be a bit raised, appearing to be  stuck on  the skin.    They may become red and irritated if scratched or rubbed by clothing  Sebhorreic keratoses are not painful, but they may be itchy. They can become irritated if they are continually rubbed by skin or clothing. Seborrheic keratoses most often appear on the face, arms, chest, back, or belly.  How are seborrheic keratoses treated?  Seborrheic keratoses don t need to be treated unless they bother you. You may choose to have them removed because you find them unattractive. You may also want them removed because they get irritated and uncomfortable. A healthcare provider can remove them in an office visit. Ways that seborrheic keratoses can be removed include:    Freezing them off with liquid nitrogen    Cutting them off with a scalpel    Burning them off with electricity  What are the complications of seborrheic keratoses?  Seborrheic keratoses are not cancer, but they can look like some types of skin cancer. So it s a good idea to ask your healthcare provider to check any new skin growths. Ask your healthcare provider about any skin problem that concerns you.  When should I call my healthcare  provider?  Call your healthcare provider right away if any of these occur:    You develop a lot of seborrheic keratoses very quickly    You have a sore that does not heal within a few weeks, or heals and then comes back    You have a mole or skin growth that is changing in size, shape, or color    You have a mole or skin growth that looks different on one side from the other    You have a mole or skin growth that is not the same color throughout   Date Last Reviewed: 5/1/2016 2000-2017 [employer]. [address] [place] [address]. All rights reserved. This information is not intended as a substitute for professional medical care. Always follow your healthcare professional's instructions.      Referral for ( TEST )  :      Urology   LOCATION/PLACE/Provider :    [facility] [address] [place], [place] [id number] [place], [place]   DATE & TIME :     9-6-2018 at 10:10am  PHONE :     [phone]  FAX :     [phone]  Appointment made by clinic staff/:    [place]    9/17/18 [facility] consult notes 9/6/18 to Dr. Dey. MESERET Mora

## 2018-08-14 NOTE — PROGRESS NOTES
Preceptor Attestation:   Patient seen, evaluated and discussed with the resident. I have verified the content of the note, which accurately reflects my assessment of the patient and the plan of care.  Supervising Physician:Rufino Marrufo MD  [facility]

## 2018-08-14 NOTE — LETTER
August 14, 2018      Piter Gaines  [address] [place] [address]        Dear Piter,  Thanks for coming in to clinic today. Your lab results have returned.   1. Your kidney function has decreased compared to normal. We should recheck this lab in the next few weeks to make sure this decrease is real and follow up to further evaluate.   2. Your anemia, although mild, is continuing to worsen. We need to further evaluate this at the same appointment to discuss your kidney function. Please complete the FIT test as we discussed, as this will help in our workup for your anemia.    Please call to set an appointment with your primary physician within the next few weeks to discuss your kidney function, anemia, anxiety, and erectile dysfunction in greater detail. Thank you!   Please let me know if you have any questions or concerns.   Shaina   Please see below for your test results.    Resulted Orders   CBC with Plt ([facility])   Result Value Ref Range    WBC 5.5 4.0 - 11.0 K/uL    RBC 4.29 (L) 4.40 - 5.90 M/uL    Hemoglobin 11.8 (L) 13.3 - 17.7 g/dL    Hematocrit 37.3 (L) 40.0 - 53.0 %    MCV 86.9 78.0 - 100.0 fL    MCH 27.5 26.5 - 35.0 pg    MCHC 31.6 (L) 32.0 - 36.0 g/dL    Platelets 169.0 150.0 - 450.0 K/uL   Basic Metabolic Panel ([facility]) - Results < 1 hr   Result Value Ref Range    Glucose 112.0 (H) 60.0 - 109.0 mg/dL    Urea Nitrogen 16.0 7.0 - 30.0 mg/dL    Creatinine 1.6 (H) 0.8 - 1.5 mg/dL    Sodium 139.0 133.0 - 144.0 mmol/L    Potassium 3.7 3.4 - 5.3 mmol/L    Chloride 105.0 94.0 - 109.0 mmol/L    Carbon Dioxide 26.0 20.0 - 32.0 mmol/L    Calcium 9.2 8.5 - 10.4 mg/dL    eGFR Calculated (Non Black Reference) 48.5 (L) >60.0 mL/min    eGFR Calculated (Black Reference) 58.7 (L) >60.0 mL/min       If you have any questions, please call the clinic to make an appointment.    Sincerely,    Shaina Mendoza, DO

## 2018-08-16 ASSESSMENT — ANXIETY QUESTIONNAIRES: GAD7 TOTAL SCORE: 15

## 2018-08-16 ASSESSMENT — PATIENT HEALTH QUESTIONNAIRE - PHQ9: SUM OF ALL RESPONSES TO PHQ QUESTIONS 1-9: 12

## 2018-08-27 DIAGNOSIS — F32.A DEPRESSION, UNSPECIFIED DEPRESSION TYPE: ICD-10-CM

## 2018-08-27 RX ORDER — CITALOPRAM HYDROBROMIDE 40 MG/1
40 TABLET ORAL DAILY
Qty: 30 TABLET | Refills: 1 | Status: SHIPPED | OUTPATIENT
Start: 2018-08-27 | End: 2018-11-13

## 2018-09-06 ENCOUNTER — TRANSFERRED RECORDS (OUTPATIENT)
Dept: HEALTH INFORMATION MANAGEMENT | Facility: CLINIC | Age: 53
End: 2018-09-06

## 2018-09-06 ENCOUNTER — TELEPHONE (OUTPATIENT)
Dept: FAMILY MEDICINE | Facility: CLINIC | Age: 53
End: 2018-09-06

## 2018-09-06 NOTE — TELEPHONE ENCOUNTER
Ed visit to [facility]    Called to see how his sore throat and cough are and if he is feeling better     Had to leave a message - encouraged a call back, educated on 24 hr line and left our number    lbetz

## 2018-09-06 NOTE — TELEPHONE ENCOUNTER
Ed follow up    Pt returned my call to say he is much better, has gotten a lot of sleep and the sore throat and cough have improved.  He did not feel it was necessary to see dr at this time.  Will call if anything is needed.    joe

## 2018-09-28 DIAGNOSIS — G89.29 CHRONIC LEFT-SIDED LOW BACK PAIN WITH LEFT-SIDED SCIATICA: ICD-10-CM

## 2018-09-28 DIAGNOSIS — M54.42 CHRONIC LEFT-SIDED LOW BACK PAIN WITH LEFT-SIDED SCIATICA: ICD-10-CM

## 2018-09-28 NOTE — TELEPHONE ENCOUNTER
Message to physician:     Date of last visit: 8/14/2018     Date of next visit if scheduled: Visit date not found       Last Comprehensive Metabolic Panel:  Sodium   Date Value Ref Range Status   08/14/2018 139.0 133.0 - 144.0 mmol/L Final     Potassium   Date Value Ref Range Status   08/14/2018 3.7 3.4 - 5.3 mmol/L Final     Chloride   Date Value Ref Range Status   08/14/2018 105.0 94.0 - 109.0 mmol/L Final     Carbon Dioxide   Date Value Ref Range Status   08/14/2018 26.0 20.0 - 32.0 mmol/L Final     Glucose   Date Value Ref Range Status   08/14/2018 112.0 (H) 60.0 - 109.0 mg/dL Final     Urea Nitrogen   Date Value Ref Range Status   08/14/2018 16.0 7.0 - 30.0 mg/dL Final     Creatinine   Date Value Ref Range Status   08/14/2018 1.6 (H) 0.8 - 1.5 mg/dL Final     Calcium   Date Value Ref Range Status   08/14/2018 9.2 8.5 - 10.4 mg/dL Final       BP Readings from Last 3 Encounters:   08/14/18 127/86   06/07/18 138/88   05/25/18 132/90       Lab Results   Component Value Date    A1C 5.7 04/18/2017                Please complete refill and CLOSE ENCOUNTER.  Closing the encounter signifies the refill is complete.

## 2018-09-29 RX ORDER — NAPROXEN 500 MG/1
500 TABLET ORAL 2 TIMES DAILY WITH MEALS
Qty: 60 TABLET | Refills: 1 | Status: SHIPPED | OUTPATIENT
Start: 2018-09-29 | End: 2018-11-29

## 2018-11-08 ENCOUNTER — OFFICE VISIT (OUTPATIENT)
Dept: FAMILY MEDICINE | Facility: CLINIC | Age: 53
End: 2018-11-08

## 2018-11-08 VITALS
HEIGHT: 69 IN | WEIGHT: 183.2 LBS | OXYGEN SATURATION: 96 % | SYSTOLIC BLOOD PRESSURE: 138 MMHG | BODY MASS INDEX: 27.13 KG/M2 | TEMPERATURE: 97.9 F | HEART RATE: 83 BPM | RESPIRATION RATE: 18 BRPM | DIASTOLIC BLOOD PRESSURE: 88 MMHG

## 2018-11-08 DIAGNOSIS — R19.5 POSITIVE FIT (FECAL IMMUNOCHEMICAL TEST): ICD-10-CM

## 2018-11-08 DIAGNOSIS — R55 VASOVAGAL SYNCOPE: ICD-10-CM

## 2018-11-08 DIAGNOSIS — Z23 NEEDS FLU SHOT: ICD-10-CM

## 2018-11-08 DIAGNOSIS — Z48.02 ENCOUNTER FOR REMOVAL OF SUTURES: Primary | ICD-10-CM

## 2018-11-08 DIAGNOSIS — I10 ESSENTIAL HYPERTENSION: ICD-10-CM

## 2018-11-08 RX ORDER — LOSARTAN POTASSIUM 50 MG/1
50 TABLET ORAL DAILY
Qty: 30 TABLET | Refills: 1 | Status: SHIPPED | OUTPATIENT
Start: 2018-11-08 | End: 2019-01-03

## 2018-11-08 RX ORDER — AMLODIPINE BESYLATE 10 MG/1
10 TABLET ORAL DAILY
Qty: 90 TABLET | Refills: 1 | Status: SHIPPED | OUTPATIENT
Start: 2018-11-08 | End: 2019-03-13

## 2018-11-08 NOTE — NURSING NOTE
11/8/2018 [facility] Previsit Plan     DUE FOR:  Labs due: Offer HIV and Hep C screening?  - Declined   FIT test + in 2017----> Needs Colonoscopy - Declined   Flu shot - Agreed  Ernie Truong CMA      Injectable influenza vaccine documentation    1. Has the patient received the information for the influenza vaccine? YES    2. Does the patient have a severe allergy to eggs (Patients with a severe egg allergy should be assessed by a medical provider, RN, or clinical pharmacist. If they receive the influenza vaccine, please have them observed for 15 minutes.)? No    3. Has the patient had an allergic reaction to previous influenza vaccines? No    4. Has the patient had any severe allergic reactions to past influenza vaccines ? No       5. Does patient have a history of Guillain-[place] syndrome? No      Based on responses above, I administered the influenza vaccine.  Letty Leal

## 2018-11-08 NOTE — PROGRESS NOTES
Chief Complaint   Patient presents with     Suture Removal     Above right eyebrow x1 week, pt. was seen at [facility] then saw ENT ([facility] [place]) due to bone crushed near corner of eye     Medication Reconciliation     Needs attention, no changes per patient.             HPI:       Piter Gaines is a 52 year old  male with a significant past medical history of hypertension who presents for follow up of concern(s) listed below    1. Suture removal: Was seen at [place] on 11-1-18, it appears 5 simple sutures were placed in the laceration, has healed well, no complaints    2. Syncopal episodes: He states if he coughs or sneezes he will pass out, this is what occurred the night he needed sutures. He had been sitting on a bar stool and feel to the ground. He silvio suffered on infraorbital fracture about 6 months ago, he did see ENT and there is nothing that can be done with the injury at this point.  He did see cardiology in 6/2018. Per patient report he had a normal echocardiogram but I'm not sure this was completed. He also had a negative stress test in 7/2017    3. Elevated blood pressure: his BP was high at the hospital, it is better today and he has not had his medication yet today. He is on amlodipine and losartan without adverse side effects.    4. Positive FIT test is 2017. He is agreeable to colonoscopy and will schedule for the near future.    Labs from ER visit were reviewed, he does have Hgb of 11.7, Cr was 1.29 with normal electrolytes         PMHX:     Patient Active Problem List   Diagnosis     Tobacco use     Caloric malnutrition (H)     Hematemesis     Essential hypertension     Esophagitis     Alcohol abuse     Moderate episode of recurrent major depressive disorder (H)     Seborrheic Keratosis       Current Outpatient Prescriptions   Medication Sig Dispense Refill     amLODIPine (NORVASC) 10 MG tablet Take 1 tablet (10 mg) by mouth daily 90 tablet 1     losartan  (COZAAR) 50 MG tablet Take 1 tablet (50 mg) by mouth daily 30 tablet 1     buPROPion (WELLBUTRIN SR) 150 MG 12 hr tablet Take 150 mg by mouth 2 times daily       citalopram (CELEXA) 40 MG tablet Take 1 tablet (40 mg) by mouth daily 30 tablet 1     cyclobenzaprine (FLEXERIL) 5 MG tablet Take 1 tablet (5 mg) by mouth nightly as needed for muscle spasms 30 tablet 1     fluticasone-salmeterol (ADVAIR DISKUS) 250-50 MCG/DOSE diskus inhaler Inhale 1 puff into the lungs 2 times daily       gabapentin (NEURONTIN) 300 MG capsule Take 1 capsule (300 mg) by mouth 3 times daily 90 capsule 3     naproxen (NAPROSYN) 500 MG tablet Take 1 tablet (500 mg) by mouth 2 times daily (with meals) 60 tablet 1     nicotine (NICODERM CQ) 21 MG/24HR 24 hr patch Place 1 patch onto the skin daily       omeprazole (PRILOSEC) 20 MG CR capsule Take 1 capsule (20 mg) by mouth 2 times daily 60 capsule 3     sildenafil (REVATIO) 20 MG tablet Take 1-2 tablet (20 mg) as needed once daily.  Never use with nitroglycerin, terazosin or doxazosin. 50 tablet 1     [DISCONTINUED] amLODIPine (NORVASC) 5 MG tablet Take 2 tablets (10 mg) by mouth daily 90 tablet 1     [DISCONTINUED] losartan (COZAAR) 50 MG tablet Take 1 tablet (50 mg) by mouth daily 30 tablet 1       Social History     Social History     Marital status:      Spouse name: N/A     Number of children: N/A     Years of education: N/A     Occupational History     Not on file.     Social History Main Topics     Smoking status: Current Some Day Smoker     Smokeless tobacco: Never Used      Comment: 1/2 pack a day       Alcohol use No     Drug use: No     Sexual activity: Not Currently     Other Topics Concern     Not on file     Social History Narrative          Allergies   Allergen Reactions     No Known Allergies        No results found for this or any previous visit (from the past 24 hour(s)).         Review of Systems:   C: NEGATIVE for fatigue, unexpected change in weight  CV: NEGATIVE for  "chest pain, palpitations or new or worsening peripheral edema  GI: Positive for shortness of breath  P: NEGATIVE for changes in mood or affect          Physical Exam:     Vitals:    11/08/18 0905   BP: 138/88   Pulse: 83   Resp: 18   Temp: 97.9  F (36.6  C)   TempSrc: Oral   SpO2: 96%   Weight: 183 lb 3.2 oz (83.1 kg)   Height: 5' 8.75\" (174.6 cm)     Body mass index is 27.25 kg/(m^2).    GENERAL APPEARANCE: healthy, alert and no distress,  RESP: lungs clear to auscultation - no rales, rhonchi or wheezes  CV: regular rate and rhythm,  and no murmur, click,  rub or gallop  MS: extremities normal- no gross deformities noted  SKIN: linear laceration above right brow, has healed well, 3 sutures visualized    Piter Gaines presents to the clinic for removal of sutures and sutures,staples, steri strips. The patient has had sutures in place for 7 days. There has been no patient reported signs or symptoms of infection or drainage. 3  sutures and sutures,staples, staple, steri strips are seen and located on the forehead. All sutures were easily removed today.     Assessment and Plan     1. Encounter for removal of sutures  3 simple sutures removed. 2 sutures appeared to be missing    2. Vasovagal syncope  Discussed should still have the echocardiogram if never done in the past. If starting to sneeze of cough, should sit down    3. Essential hypertension  - losartan (COZAAR) 50 MG tablet; Take 1 tablet (50 mg) by mouth daily  Dispense: 30 tablet; Refill: 1  - amLODIPine (NORVASC) 10 MG tablet; Take 1 tablet (10 mg) by mouth daily  Dispense: 90 tablet; Refill: 1    4. Needs flu shot  - ADMIN VACCINE, INITIAL  - FLU VAC PRESRV FREE QUAD SPLIT VIR IM, 0.5 mL dosage    5. Positive FIT (fecal immunochemical test)  - GASTROENTEROLOGY ADULT REF PROCEDURE ONLY Other ([facility] w/ Dr. Rawls)      Options for treatment and follow-up care were reviewed with the patient and/or guardian. Piter Gaines and/or guardian " engaged in the decision making process and verbalized understanding of the options discussed and agreed with the final plan.    Waleska Loza, DO

## 2018-11-08 NOTE — MR AVS SNAPSHOT
After Visit Summary   11/8/2018    Piter Gaines    MRN: [id number]           Patient Information     Date Of Birth          1965        Visit Information        Provider Department      11/8/2018 9:20 AM Budd, Jennifer, DO [facility]        Today's Diagnoses     Encounter for removal of sutures    -  1    Vasovagal syncope        Essential hypertension        Needs flu shot        Positive FIT (fecal immunochemical test)          Care Instructions    Referral for ( TEST )  :      Colonoscopy   LOCATION/PLACE/Provider :    [facility]   DATE & TIME :     11- at 9:30 arriving by 8:30am  PHONE :     [phone]  FAX :     [phone]  Appointment made by clinic staff/:    Germania            Follow-ups after your visit        Additional Services     GASTROENTEROLOGY ADULT REF PROCEDURE ONLY Other ([facility] w/ Dr. Rawls)       Last Lab Result: Creatinine (mg/dL)       Date                     Value                 08/14/2018               1.6 (H)          ----------  Body mass index is 27.25 kg/(m^2).     Needed:  No  Language:  English    Patient will be contacted to schedule procedure.     Please be aware that coverage of these services is subject to the terms and limitations of your health insurance plan.  Call member services at your health plan with any benefit or coverage questions.  Any procedures must be performed at a [facility] facility OR coordinated by your clinic's referral office.    Please bring the following with you to your appointment:    (1) Any X-Rays, CTs or MRIs which have been performed.  Contact the facility where they were done to arrange for  prior to your scheduled appointment.    (2) List of current medications   (3) This referral request   (4) Any documents/labs given to you for this referral                  Who to contact     Please call your clinic at [phone] to:    Ask questions about your  "health    Make or cancel appointments    Discuss your medicines    Learn about your test results    Speak to your doctor            Additional Information About Your Visit        Care EveryWhere ID     This is your Care EveryWhere ID. This could be used by other organizations to access your [facility] medical records  [id number]        Your Vitals Were     Pulse Temperature Respirations Height Pulse Oximetry BMI (Body Mass Index)    83 97.9  F (36.6  C) (Oral) 18 5' 8.75\" (174.6 cm) 96% 27.25 kg/m2       Blood Pressure from Last 3 Encounters:   11/08/18 138/88   08/14/18 127/86   06/07/18 138/88    Weight from Last 3 Encounters:   11/08/18 183 lb 3.2 oz (83.1 kg)   08/14/18 177 lb (80.3 kg)   06/07/18 175 lb 6.4 oz (79.6 kg)              We Performed the Following     ADMIN VACCINE, INITIAL     FLU VAC PRESRV FREE QUAD SPLIT VIR IM, 0.5 mL dosage     GASTROENTEROLOGY ADULT REF PROCEDURE ONLY Other ([facility] w/ Dr. Rawls)     Suture Removal Only, charge          Today's Medication Changes          These changes are accurate as of 11/8/18 12:52 PM.  If you have any questions, ask your nurse or doctor.               These medicines have changed or have updated prescriptions.        Dose/Directions    amLODIPine 10 MG tablet   Commonly known as:  NORVASC   This may have changed:  medication strength   Used for:  Essential hypertension   Changed by:  Waleska Loza DO        Dose:  10 mg   Take 1 tablet (10 mg) by mouth daily   Quantity:  90 tablet   Refills:  1            Where to get your medicines      These medications were sent to [id number] - [place], [place] - [address], [place] [address]     Phone:  [phone]     amLODIPine 10 MG tablet    losartan 50 MG tablet                Primary Care Provider Office Phone # Fax #    Edmund Dey [phone]       [address] " [employer] [address]        Equal Access to Services     NATHALIA CORDON : Hadii car serrano buzzekta Soyohan, waaxda luqadaha, qaybta kaalmaleslie hobbsshenaangel younger. So [facility] [phone].    ATENCIÓN: Si habla español, tiene a castillo disposición servicios gratuitos de asistencia lingüística. Guruame al [phone].    We comply with applicable federal civil rights laws and [place] laws. We do not discriminate on the basis of race, color, national origin, age, disability, sex, sexual orientation, or gender identity.            Thank you!     Thank you for choosing [facility]  for your care. Our goal is always to provide you with excellent care. Hearing back from our patients is one way we can continue to improve our services. Please take a few minutes to complete the written survey that you may receive in the mail after your visit with us. Thank you!             Your Updated Medication List - Protect others around you: Learn how to safely use, store and throw away your medicines at www.disposemymeds.org.          This list is accurate as of 11/8/18 12:52 PM.  Always use your most recent med list.                   Brand Name Dispense Instructions for use Diagnosis    ADVAIR DISKUS 250-50 MCG/DOSE diskus inhaler   Generic drug:  fluticasone-salmeterol      Inhale 1 puff into the lungs 2 times daily        amLODIPine 10 MG tablet    NORVASC    90 tablet    Take 1 tablet (10 mg) by mouth daily    Essential hypertension       buPROPion 150 MG 12 hr tablet    WELLBUTRIN SR     Take 150 mg by mouth 2 times daily        citalopram 40 MG tablet    celeXA    30 tablet    Take 1 tablet (40 mg) by mouth daily    Depression, unspecified depression type       cyclobenzaprine 5 MG tablet    FLEXERIL    30 tablet    Take 1 tablet (5 mg) by mouth nightly as needed for muscle spasms    Chronic left-sided low back pain with left-sided sciatica       gabapentin 300 MG capsule    NEURONTIN    90  capsule    Take 1 capsule (300 mg) by mouth 3 times daily    Uncomplicated alcohol dependence (H)       losartan 50 MG tablet    COZAAR    30 tablet    Take 1 tablet (50 mg) by mouth daily    Essential hypertension       naproxen 500 MG tablet    NAPROSYN    60 tablet    Take 1 tablet (500 mg) by mouth 2 times daily (with meals)    Chronic left-sided low back pain with left-sided sciatica       nicotine 21 MG/24HR 24 hr patch    NICODERM CQ     Place 1 patch onto the skin daily        omeprazole 20 MG CR capsule    priLOSEC    60 capsule    Take 1 capsule (20 mg) by mouth 2 times daily    Esophagitis       sildenafil 20 MG tablet    REVATIO    50 tablet    Take 1-2 tablet (20 mg) as needed once daily.  Never use with nitroglycerin, terazosin or doxazosin.    Erectile dysfunction, unspecified erectile dysfunction type

## 2018-11-08 NOTE — PATIENT INSTRUCTIONS
Referral for ( TEST )  :      Colonoscopy   LOCATION/PLACE/Provider :    [facility]   DATE & TIME :     11- at 9:30 arriving by 8:30am  PHONE :     [phone]  FAX :     [phone]  Appointment made by clinic staff/:    Germania

## 2018-11-13 ENCOUNTER — HEALTH MAINTENANCE LETTER (OUTPATIENT)
Age: 53
End: 2018-11-13

## 2018-11-13 DIAGNOSIS — F32.A DEPRESSION, UNSPECIFIED DEPRESSION TYPE: ICD-10-CM

## 2018-11-13 RX ORDER — CITALOPRAM HYDROBROMIDE 40 MG/1
40 TABLET ORAL DAILY
Qty: 30 TABLET | Refills: 1 | Status: SHIPPED | OUTPATIENT
Start: 2018-11-13 | End: 2019-01-11

## 2018-11-13 NOTE — TELEPHONE ENCOUNTER
Message to physician:     Date of last visit: 11/8/2018     Date of next visit if scheduled: Visit date 11/15/18    Last Comprehensive Metabolic Panel:  Sodium   Date Value Ref Range Status   08/14/2018 139.0 133.0 - 144.0 mmol/L Final     Potassium   Date Value Ref Range Status   08/14/2018 3.7 3.4 - 5.3 mmol/L Final     Chloride   Date Value Ref Range Status   08/14/2018 105.0 94.0 - 109.0 mmol/L Final     Carbon Dioxide   Date Value Ref Range Status   08/14/2018 26.0 20.0 - 32.0 mmol/L Final     Glucose   Date Value Ref Range Status   08/14/2018 112.0 (H) 60.0 - 109.0 mg/dL Final     Urea Nitrogen   Date Value Ref Range Status   08/14/2018 16.0 7.0 - 30.0 mg/dL Final     Creatinine   Date Value Ref Range Status   08/14/2018 1.6 (H) 0.8 - 1.5 mg/dL Final     Calcium   Date Value Ref Range Status   08/14/2018 9.2 8.5 - 10.4 mg/dL Final       BP Readings from Last 3 Encounters:   11/08/18 138/88   08/14/18 127/86   06/07/18 138/88       Lab Results   Component Value Date    A1C 5.7 04/18/2017                Please complete refill and CLOSE ENCOUNTER.  Closing the encounter signifies the refill is complete.

## 2018-11-15 ENCOUNTER — OFFICE VISIT (OUTPATIENT)
Dept: FAMILY MEDICINE | Facility: CLINIC | Age: 53
End: 2018-11-15

## 2018-11-15 ENCOUNTER — RECORDS - HEALTHEAST (OUTPATIENT)
Dept: ADMINISTRATIVE | Facility: OTHER | Age: 53
End: 2018-11-15

## 2018-11-15 VITALS
RESPIRATION RATE: 18 BRPM | HEIGHT: 69 IN | TEMPERATURE: 97.9 F | DIASTOLIC BLOOD PRESSURE: 90 MMHG | WEIGHT: 183.2 LBS | BODY MASS INDEX: 27.13 KG/M2 | SYSTOLIC BLOOD PRESSURE: 136 MMHG | HEART RATE: 84 BPM | OXYGEN SATURATION: 96 %

## 2018-11-15 DIAGNOSIS — R55 VASOVAGAL SYNCOPE: ICD-10-CM

## 2018-11-15 DIAGNOSIS — Z72.0 TOBACCO ABUSE: ICD-10-CM

## 2018-11-15 DIAGNOSIS — I10 ESSENTIAL HYPERTENSION: Primary | ICD-10-CM

## 2018-11-15 RX ORDER — NICOTINE 21 MG/24HR
1 PATCH, TRANSDERMAL 24 HOURS TRANSDERMAL EVERY 24 HOURS
Qty: 30 PATCH | Refills: 11 | Status: SHIPPED | OUTPATIENT
Start: 2018-11-15 | End: 2019-07-03

## 2018-11-15 NOTE — MR AVS SNAPSHOT
"              After Visit Summary   11/15/2018    Piter Gaines    MRN: [id number]           Patient Information     Date Of Birth          1965        Visit Information        Provider Department      11/15/2018 10:00 AM Edmund Dey MD [facility]        Today's Diagnoses     Essential hypertension    -  1    Tobacco abuse        Vasovagal syncope          Care Instructions    Elevated Blood Pressure  - Continue taking you Amlodipine and Losartan  - Resume taking your blood pressure everyday and recording the results   - Bring the recordings to your next visit     Fainting Episodes  - Get an Echocardiogram at [facility] so that we can see the structure of your heart   - This will give us more information on why you might be passing out    Smoking Use   - Reduce your smoking from half-pack a day to none a day   - Resume your Wellbutrin  - Take your Nicotine Patches to help quit smoking        Referral for ( TEST )  :      Echocardiogram  LOCATION/PLACE/Provider :    [facility]  DATE & TIME :     11- at 8:00am  PHONE :     [phone]  FAX :     [phone]  Appointment made by clinic staff/:    Germania            Follow-ups after your visit        Follow-up notes from your care team     Return in about 2 weeks (around 11/29/2018).      Who to contact     Please call your clinic at [phone] to:    Ask questions about your health    Make or cancel appointments    Discuss your medicines    Learn about your test results    Speak to your doctor            Additional Information About Your Visit        Care EveryWhere ID     This is your Care EveryWhere ID. This could be used by other organizations to access your [facility] medical records  [id number]        Your Vitals Were     Pulse Temperature Respirations Height Pulse Oximetry BMI (Body Mass Index)    84 97.9  F (36.6  C) (Oral) 18 5' 8.7\" (174.5 cm) 96% 27.29 kg/m2       Blood Pressure from Last 3 Encounters:   11/15/18 " 136/90   11/08/18 138/88   08/14/18 127/86    Weight from Last 3 Encounters:   11/15/18 183 lb 3.2 oz (83.1 kg)   11/08/18 183 lb 3.2 oz (83.1 kg)   08/14/18 177 lb (80.3 kg)                 Today's Medication Changes          These changes are accurate as of 11/15/18 11:59 PM.  If you have any questions, ask your nurse or doctor.               These medicines have changed or have updated prescriptions.        Dose/Directions    * nicotine 21 MG/24HR 24 hr patch   Commonly known as:  NICODERM CQ   This may have changed:  Another medication with the same name was added. Make sure you understand how and when to take each.   Changed by:  Edmund Dey MD        Dose:  1 patch   Place 1 patch onto the skin daily   Refills:  0       * nicotine 14 MG/24HR 24 hr patch   Commonly known as:  NICODERM CQ   This may have changed:  You were already taking a medication with the same name, and this prescription was added. Make sure you understand how and when to take each.   Used for:  Tobacco abuse   Changed by:  Edmund Dey MD        Dose:  1 patch   Place 1 patch onto the skin every 24 hours   Quantity:  30 patch   Refills:  11       * Notice:  This list has 2 medication(s) that are the same as other medications prescribed for you. Read the directions carefully, and ask your doctor or other care provider to review them with you.      Stop taking these medicines if you haven't already. Please contact your care team if you have questions.     cyclobenzaprine 5 MG tablet   Commonly known as:  FLEXERIL   Stopped by:  Edmund Dey MD                Where to get your medicines      These medications were sent to [id number] - [place], [place] - [address] AT [facility] [address], [place] [address]     Phone:  [phone]     nicotine 14 MG/24HR 24 hr patch                Primary Care Provider Office Phone # Fax #    Edmund Dey MD  [phone]       [address] [facility] [address]        Equal Access to Services     NATHALIA CORDON : Hadii car serrano ascencion [place], wamarlenada luqtiffanie, qaybta kaashwinda cristal, leslie niyain hayaamariya stovallroc umanzor shital younger. So [facility] [phone].    ATENCIÓN: Si habla español, tiene a castillo disposición servicios gratuitos de asistencia lingüística. Tyra al [phone].    We comply with applicable federal civil rights laws and [place] laws. We do not discriminate on the basis of race, color, national origin, age, disability, sex, sexual orientation, or gender identity.            Thank you!     Thank you for choosing [facility]  for your care. Our goal is always to provide you with excellent care. Hearing back from our patients is one way we can continue to improve our services. Please take a few minutes to complete the written survey that you may receive in the mail after your visit with us. Thank you!             Your Updated Medication List - Protect others around you: Learn how to safely use, store and throw away your medicines at www.disposemymeds.org.          This list is accurate as of 11/15/18 11:59 PM.  Always use your most recent med list.                   Brand Name Dispense Instructions for use Diagnosis    ADVAIR DISKUS 250-50 MCG/DOSE diskus inhaler   Generic drug:  fluticasone-salmeterol      Inhale 1 puff into the lungs 2 times daily        amLODIPine 10 MG tablet    NORVASC    90 tablet    Take 1 tablet (10 mg) by mouth daily    Essential hypertension       buPROPion 150 MG 12 hr tablet    WELLBUTRIN SR     Take 150 mg by mouth 2 times daily        citalopram 40 MG tablet    celeXA    30 tablet    Take 1 tablet (40 mg) by mouth daily    Depression, unspecified depression type       gabapentin 300 MG capsule    NEURONTIN    90 capsule    Take 1 capsule (300 mg) by mouth 3 times daily    Uncomplicated alcohol dependence (H)       losartan 50 MG tablet    COZAAR    30  tablet    Take 1 tablet (50 mg) by mouth daily    Essential hypertension       naproxen 500 MG tablet    NAPROSYN    60 tablet    Take 1 tablet (500 mg) by mouth 2 times daily (with meals)    Chronic left-sided low back pain with left-sided sciatica       * nicotine 21 MG/24HR 24 hr patch    NICODERM CQ     Place 1 patch onto the skin daily        * nicotine 14 MG/24HR 24 hr patch    NICODERM CQ    30 patch    Place 1 patch onto the skin every 24 hours    Tobacco abuse       omeprazole 20 MG CR capsule    priLOSEC    60 capsule    Take 1 capsule (20 mg) by mouth 2 times daily    Esophagitis       sildenafil 20 MG tablet    REVATIO    50 tablet    Take 1-2 tablet (20 mg) as needed once daily.  Never use with nitroglycerin, terazosin or doxazosin.    Erectile dysfunction, unspecified erectile dysfunction type       * Notice:  This list has 2 medication(s) that are the same as other medications prescribed for you. Read the directions carefully, and ask your doctor or other care provider to review them with you.

## 2018-11-15 NOTE — PROGRESS NOTES
"       HPI       Piter Gaines is a 52 year old male who presents for:  Chief Complaint   Patient presents with     RECHECK     Follow up B/P     Hypertension   - BP readings at home in the 130s-140s, correctly taken, but has not check it in months   - Takes amlodipine 10 mg and losarton 50 mg daily and compliant with medications without missed doses  - Reproducible chest soreness with increased activity like walking up stairs, history of musculoskeletal chest pain with no significant CAD disease  - Stress test in 09/2017 was negative  - Has a history of passing out and hitting his chest   - Has headaches but no more than baseline     Syncopal Episodes  - Continues to have syncopal episodes whenever he has severe coughing or sneezing   - Most recent episode was yesterday while he was sitting but having coughing episode  - Had a 1 month event monitor that was reviewed by cardiology and found no abnormalities despite having syncopal episodes  - Was recommended echocardiogram but has completed it yet    Smoking Abuse   - Currently smoking 0.5 pack a day   - Has Wellbutrin to help quit, but has only been intermittently using it   - Previously used the Patch that was helpful   - Pulm had found that he has restrictive lung disease with dyspnea on exertion and was counseled to quit smoking  - Is motivated to quit     ROS: Complete 6 point ROS completed and negative other than stated above.         Physical Exam:     Vitals:    11/15/18 0955 11/15/18 1031   BP: (!) 150/110 136/90   Pulse: 84    Resp: 18    Temp: 97.9  F (36.6  C)    TempSrc: Oral    SpO2: 96%    Weight: 183 lb 3.2 oz (83.1 kg)    Height: 5' 8.7\" (174.5 cm)      Body mass index is 27.29 kg/(m^2).  Vital signs normal except First BP reading was elevated 150/110.     General: Alert and orientated in NAD. Pleasant and cooperative.   Chest: Reproducible chest pain when palpation  Cards: RRR, no murmurs, rubs or gallops. No lower extremity edema  Resp: clear " vesicular breath sounds bilaterally, no crackles or wheezes. No increased work of breathing  Psych: mood good, affect congruent    Assessment and Plan     1. Tobacco abuse  Patient has reduced smoking from 1 pack to 0.5 packs. He is motivated to stop smoking. Tobacco cessation will help with chronic cough and association with vasovagal syncope. He has Wellbutrin and plans on using that as prescribed. Is requesting nicotine patch to help with cessation.  - Encouraged smoking cessation  - Provided nicotine replacement therapy  - nicotine (NICODERM CQ) 14 MG/24HR 24 hr patch; Place 1 patch onto the skin every 24 hours  Dispense: 30 patch; Refill: 11    2. Vasovagal syncope  Patient has been having episodes of syncopal that is triggered whenever he has severe coughing episodes or sneezing. This is likely a vasovagal response. Previous cardiac event monitor was negative for any rhythm abnormalities. Cardiology had recommended getting a echocardiogram which has not been completed per chart review. If abnormal, will need to follow up with Cardiology.  - Echocardiogram Complete; Future  - Follow up in 2 weeks for reassessment    3. Essential Hypertension   Within target goal on repeat BP check  - Continue with home losartan and amlodipine  - Follow up as needed    Options for treatment and follow-up care were reviewed with the patient. Piter Gaines  engaged in the decision making process and verbalized understanding of the options discussed and agreed with the final plan.    Precepted with: MD Panchito Ricketts, MS3  [employer]     The medical student acted as a scribe and the encounter documented above was performed completely by me and the documentation accurately reflects the work I have performed today.      Edmund Dey MD  [facility]  Pager: [phone]

## 2018-11-15 NOTE — PATIENT INSTRUCTIONS
Elevated Blood Pressure  - Continue taking you Amlodipine and Losartan  - Resume taking your blood pressure everyday and recording the results   - Bring the recordings to your next visit     Fainting Episodes  - Get an Echocardiogram at [facility] so that we can see the structure of your heart   - This will give us more information on why you might be passing out    Smoking Use   - Reduce your smoking from half-pack a day to none a day   - Resume your Wellbutrin  - Take your Nicotine Patches to help quit smoking        Referral for ( TEST )  :      Echocardiogram  LOCATION/PLACE/Provider :    [facility]  DATE & TIME :     11- at 8:00am  PHONE :     [phone]  FAX :     [phone]  Appointment made by clinic staff/:    Germania

## 2018-11-20 NOTE — PROGRESS NOTES
I have personally reviewed the history and examination as documented by Dr. Dey.  I was present during key portions of the visit and agree with the assessment and plan as documented for 52 yr old male with hx of vasovagal syncope, HTN  here for follow-up. BP stable. Echo pending for syncopal episodes. Smoking cessation discussed. Precautions given. Anticipatory guidance given.     Shahzad Aponte MD  November 20, 2018  7:53 AM

## 2018-11-27 ENCOUNTER — HOSPITAL ENCOUNTER (OUTPATIENT)
Dept: CARDIOLOGY | Facility: HOSPITAL | Age: 53
Discharge: HOME | End: 2018-11-27

## 2018-11-27 DIAGNOSIS — R55 VASOVAGAL SYNCOPE: ICD-10-CM

## 2018-11-27 LAB
AORTIC ROOT: 3 CM
AORTIC VALVE MEAN VELOCITY: 87.5 CM/S
ASCENDING AORTA: 3.8 CM
AV DIMENSIONLESS INDEX VTI: 0.7
AV MEAN GRADIENT: 3 MMHG
AV PEAK GRADIENT: 5.6 MMHG
AV VALVE AREA: 3 CM2
BSA FOR ECHO PROCEDURE: 1.96 M2
CV BLOOD PRESSURE: ABNORMAL MMHG
CV ECHO HEIGHT: 69 IN
CV ECHO WEIGHT: 175 LBS
DOP CALC AO PEAK VEL: 118 CM/S
DOP CALC AO VTI: 27 CM
DOP CALC LVOT AREA: 4.15 CM2
DOP CALC LVOT DIAMETER: 2.3 CM
DOP CALC LVOT STROKE VOLUME: 81 CM3
DOP CALCLVOT PEAK VEL VTI: 19.5 CM
EJECTION FRACTION: 69 % (ref 55–75)
FRACTIONAL SHORTENING: 55.3 % (ref 28–44)
INTERVENTRICULAR SEPTUM IN END DIASTOLE: 1.3 CM (ref 0.6–1)
IVS/PW RATIO: 1.1
LA AREA 1: 21 CM2
LA AREA 2: 22.4 CM2
LEFT ATRIUM LENGTH: 5.68 CM
LEFT ATRIUM SIZE: 3.6 CM
LEFT ATRIUM VOLUME INDEX: 35.9 ML/M2
LEFT ATRIUM VOLUME: 70.4 ML
LEFT VENTRICLE CARDIAC INDEX: 3.1 L/MIN/M2
LEFT VENTRICLE CARDIAC OUTPUT: 6 L/MIN
LEFT VENTRICLE DIASTOLIC VOLUME INDEX: 33.2 CM3/M2 (ref 34–74)
LEFT VENTRICLE DIASTOLIC VOLUME: 65 CM3 (ref 62–150)
LEFT VENTRICLE HEART RATE: 74 BPM
LEFT VENTRICLE MASS INDEX: 83.2 G/M2
LEFT VENTRICLE SYSTOLIC VOLUME INDEX: 10.2 CM3/M2 (ref 11–31)
LEFT VENTRICLE SYSTOLIC VOLUME: 20 CM3 (ref 21–61)
LEFT VENTRICULAR INTERNAL DIMENSION IN DIASTOLE: 3.8 CM (ref 4.2–5.8)
LEFT VENTRICULAR INTERNAL DIMENSION IN SYSTOLE: 1.7 CM (ref 2.5–4)
LEFT VENTRICULAR MASS: 163 G
LEFT VENTRICULAR OUTFLOW TRACT MEAN GRADIENT: 2 MMHG
LEFT VENTRICULAR OUTFLOW TRACT MEAN VELOCITY: 71.9 CM/S
LEFT VENTRICULAR POSTERIOR WALL IN END DIASTOLE: 1.2 CM (ref 0.6–1)
LV STROKE VOLUME INDEX: 41.3 ML/M2
MITRAL VALVE E/A RATIO: 1
MV AVERAGE E/E' RATIO: 17.1 CM/S
MV DECELERATION TIME: 204 MS
MV E'TISSUE VEL-LAT: 5.46 CM/S
MV E'TISSUE VEL-MED: 4.29 CM/S
MV LATERAL E/E' RATIO: 15.3
MV MEDIAL E/E' RATIO: 19.4
MV PEAK A VELOCITY: 79.5 CM/S
MV PEAK E VELOCITY: 83.4 CM/S
NUC REST DIASTOLIC VOLUME INDEX: 2800 LBS
NUC REST SYSTOLIC VOLUME INDEX: 69 IN
PR MAX PG: 3 MMHG
PR PEAK VELOCITY: 80.8 CM/S
TRICUSPID REGURGITATION PEAK PRESSURE GRADIENT: 21 MMHG
TRICUSPID VALVE ANULAR PLANE SYSTOLIC EXCURSION: 1.9 CM
TRICUSPID VALVE PEAK REGURGITANT VELOCITY: 229 CM/S

## 2018-11-27 ASSESSMENT — MIFFLIN-ST. JEOR: SCORE: 1619.17

## 2018-11-28 DIAGNOSIS — R55 VASOVAGAL SYNCOPE: ICD-10-CM

## 2018-11-28 NOTE — LETTER
December 10, 2018      Piter Gaines  [address] [place] [address]        Dear Piter,    Thank you for visiting our clinic on Nov 28, 2018.    The result of your recent imaging (Echocardiogram) has returned and was remarkable for left ventricular hypertrophy and increase filling pressure. This indicates that the left side of you heart is slightly bigger compared to normal and may be a manifestation of long standing hypertension (high blood pressure). This would not cause your passing out episode. There are no signs of heart damage or any issues with heart functions. There is no need to follow up on this specifically but we look forward to seeing you at your next scheduled appointment.    If you have any questions or concerns, please feel free to give us a call.  If you have any questions, please call the clinic to make an appointment.        Sincerely,      Dr. Dey

## 2018-11-29 ENCOUNTER — RECORDS - HEALTHEAST (OUTPATIENT)
Dept: ADMINISTRATIVE | Facility: OTHER | Age: 53
End: 2018-11-29

## 2018-11-29 DIAGNOSIS — M54.42 CHRONIC LEFT-SIDED LOW BACK PAIN WITH LEFT-SIDED SCIATICA: ICD-10-CM

## 2018-11-29 DIAGNOSIS — G89.29 CHRONIC LEFT-SIDED LOW BACK PAIN WITH LEFT-SIDED SCIATICA: ICD-10-CM

## 2018-11-29 RX ORDER — NAPROXEN 500 MG/1
500 TABLET ORAL 2 TIMES DAILY WITH MEALS
Qty: 60 TABLET | Refills: 1 | Status: SHIPPED | OUTPATIENT
Start: 2018-11-29 | End: 2019-07-03

## 2018-11-29 NOTE — TELEPHONE ENCOUNTER
Message to physician: N/A    Date of last visit: 11/15/2018     Date of next visit if scheduled: Visit date not found      Last Comprehensive Metabolic Panel:  Sodium   Date Value Ref Range Status   08/14/2018 139.0 133.0 - 144.0 mmol/L Final     Potassium   Date Value Ref Range Status   08/14/2018 3.7 3.4 - 5.3 mmol/L Final     Chloride   Date Value Ref Range Status   08/14/2018 105.0 94.0 - 109.0 mmol/L Final     Carbon Dioxide   Date Value Ref Range Status   08/14/2018 26.0 20.0 - 32.0 mmol/L Final     Glucose   Date Value Ref Range Status   08/14/2018 112.0 (H) 60.0 - 109.0 mg/dL Final     Urea Nitrogen   Date Value Ref Range Status   08/14/2018 16.0 7.0 - 30.0 mg/dL Final     Creatinine   Date Value Ref Range Status   08/14/2018 1.6 (H) 0.8 - 1.5 mg/dL Final     Calcium   Date Value Ref Range Status   08/14/2018 9.2 8.5 - 10.4 mg/dL Final       BP Readings from Last 3 Encounters:   11/15/18 136/90   11/08/18 138/88   08/14/18 127/86       Lab Results   Component Value Date    A1C 5.7 04/18/2017                Please complete refill and CLOSE ENCOUNTER.  Closing the encounter signifies the refill is complete.

## 2018-12-07 DIAGNOSIS — K20.90 ESOPHAGITIS: ICD-10-CM

## 2018-12-07 NOTE — TELEPHONE ENCOUNTER
Message to physician: N/A    Date of last visit: 11/15/2018     Date of next visit if scheduled: Visit date not found      Last Comprehensive Metabolic Panel:  Sodium   Date Value Ref Range Status   08/14/2018 139.0 133.0 - 144.0 mmol/L Final     Potassium   Date Value Ref Range Status   08/14/2018 3.7 3.4 - 5.3 mmol/L Final     Chloride   Date Value Ref Range Status   08/14/2018 105.0 94.0 - 109.0 mmol/L Final     Carbon Dioxide   Date Value Ref Range Status   08/14/2018 26.0 20.0 - 32.0 mmol/L Final     Glucose   Date Value Ref Range Status   08/14/2018 112.0 (H) 60.0 - 109.0 mg/dL Final     Urea Nitrogen   Date Value Ref Range Status   08/14/2018 16.0 7.0 - 30.0 mg/dL Final     Creatinine   Date Value Ref Range Status   08/14/2018 1.6 (H) 0.8 - 1.5 mg/dL Final     Calcium   Date Value Ref Range Status   08/14/2018 9.2 8.5 - 10.4 mg/dL Final       BP Readings from Last 3 Encounters:   11/15/18 136/90   11/08/18 138/88   08/14/18 127/86       Lab Results   Component Value Date    A1C 5.7 04/18/2017                Please complete refill and CLOSE ENCOUNTER.  Closing the encounter signifies the refill is complete.  
Oriented - self; Oriented - place; Oriented - time

## 2018-12-19 ENCOUNTER — COMMUNICATION - HEALTHEAST (OUTPATIENT)
Dept: FAMILY MEDICINE | Facility: CLINIC | Age: 53
End: 2018-12-19

## 2019-01-03 DIAGNOSIS — I10 ESSENTIAL HYPERTENSION: ICD-10-CM

## 2019-01-03 RX ORDER — LOSARTAN POTASSIUM 50 MG/1
50 TABLET ORAL DAILY
Qty: 30 TABLET | Refills: 1 | Status: SHIPPED | OUTPATIENT
Start: 2019-01-03 | End: 2019-04-29

## 2019-01-11 DIAGNOSIS — F32.A DEPRESSION, UNSPECIFIED DEPRESSION TYPE: ICD-10-CM

## 2019-01-11 RX ORDER — CITALOPRAM HYDROBROMIDE 40 MG/1
40 TABLET ORAL DAILY
Qty: 30 TABLET | Refills: 1 | Status: SHIPPED | OUTPATIENT
Start: 2019-01-11 | End: 2019-02-28

## 2019-02-04 ENCOUNTER — OFFICE VISIT (OUTPATIENT)
Dept: FAMILY MEDICINE | Facility: CLINIC | Age: 54
End: 2019-02-04
Payer: COMMERCIAL

## 2019-02-04 VITALS
RESPIRATION RATE: 18 BRPM | WEIGHT: 181 LBS | DIASTOLIC BLOOD PRESSURE: 88 MMHG | HEIGHT: 69 IN | TEMPERATURE: 98 F | HEART RATE: 78 BPM | BODY MASS INDEX: 26.81 KG/M2 | OXYGEN SATURATION: 98 % | SYSTOLIC BLOOD PRESSURE: 137 MMHG

## 2019-02-04 DIAGNOSIS — R55 VASOVAGAL SYNCOPE: Primary | ICD-10-CM

## 2019-02-04 DIAGNOSIS — D49.2 SKIN GROWTH: ICD-10-CM

## 2019-02-04 DIAGNOSIS — M62.830 BACK MUSCLE SPASM: ICD-10-CM

## 2019-02-04 DIAGNOSIS — Z12.11 SCREEN FOR COLON CANCER: ICD-10-CM

## 2019-02-04 RX ORDER — CYCLOBENZAPRINE HCL 5 MG
5 TABLET ORAL
Qty: 15 TABLET | Refills: 0 | Status: SHIPPED | OUTPATIENT
Start: 2019-02-04 | End: 2019-06-05

## 2019-02-04 RX ORDER — NALTREXONE HYDROCHLORIDE 50 MG/1
50 TABLET, FILM COATED ORAL
COMMUNITY
Start: 2017-04-07 | End: 2019-11-18

## 2019-02-04 RX ORDER — SUCRALFATE ORAL 1 G/10ML
1 SUSPENSION ORAL
COMMUNITY
Start: 2016-11-13 | End: 2019-11-18

## 2019-02-04 ASSESSMENT — MIFFLIN-ST. JEOR: SCORE: 1648.51

## 2019-02-04 NOTE — PROCEDURES
Skin Biopsy Procedure Note    Piter Gaines is a patient of Edmund Jacome here for Skin Growth    Consent: Affirmation of informed consent was signed and scanned into the medical record. Risks, benefits and alternatives were discussed. Patient's questions were elicited and answered.   Procedure safety checklist was completed:  Yes  Time Out (Pause for the Cause) completed: Yes    Preoperative Diagnosis: Skin Growth   Location: Right Inguinal Crease   Size:  7 mm  Postoperative Diagnosis: same    Technique:   Skin prep Betadine  Anesthesia .75 cc 1% lidocaine, with epi   Biopsy size 7 mm  Biopsy taken via (shave, punch, incisional) shaved  Suture   None  Hemostasis  Pressure    EBL:    Minimal  Complications:  No  Tolerance:   Pt tolerated procedure well and was in stable condition.   Pathology sent Yes    Instructions:    Pt should keep dressing in place for 24 hours, then may change and apply antibiotic ointment and simple bandage. May shower after 24 hours.  Pt was instructed to call if bleeding, severe pain or foul smell.       Resident: Edmund Dey  Faculty: Adebayo Dubose MD present for and supervised this entire procedure.

## 2019-02-04 NOTE — PATIENT INSTRUCTIONS
CONTINUE YOUR MEDICATIONS      Wound Care Instructions    1.  Keep area dry today.    2.  Starting tomorrow wash gently with soap and water once daily.      3.  Apply Vaseline or antibiotic ointment to the area and cover with a bandage if desired.  Do not let it dry out and form a scab as this will make the resulting scar more noticeable.    4. Protect the area from sun for up to one year afterward as the scar is continuing to remodel.  Sun exposure will also make the resulting scar more noticeable.    5.  Call if the area is very red, tender, has a discharge or is very itchy while healing, or if you have any other questions.  These may be signs of early infection or allergy.

## 2019-02-04 NOTE — PROGRESS NOTES
HPI:       Piter Gaines is a 53 year old male with a significant past medical history of vasovagal syncope, hypertension, alcohol abuse and depression who presents for follow up of concern(s) listed below    Vasovagal Syncope  - Returning today after completing his echocardiogram for evaluation of recurrent syncope  - Continue to occur regularly approximately every 2 weeks and only with strong cough or sneezing  - Had been on tessalon perles in the past without relief  - Last episode was about 2 weeks ago while he was sitting  - No recent head injuries or hospitalization    Skin Growth  - Notice a small growth on his right inguinal crease that has been about the same as it was 3 months ago at his last clinic visit  - Irritated from time to time with his cloths, but no discharge or bleeding  - No other skin growth elsewhere  - Would like it to be removed.    Back Spasm  - Continue to experience back spasm regularly  - No acute changes but would like refills on his flexeril which he has had in the past  - Would like flexeril to help with sleep at night  - No falls or recent injuries to the back  - Feel like it causes him to walk a little differently compared to normal         PMHX:     Patient Active Problem List   Diagnosis     Tobacco use     Caloric malnutrition (H)     Hematemesis     Essential hypertension     Esophagitis     Alcohol abuse     Moderate episode of recurrent major depressive disorder (H)     Seborrheic Keratosis       Current Outpatient Medications   Medication Sig Dispense Refill     amLODIPine (NORVASC) 10 MG tablet Take 1 tablet (10 mg) by mouth daily 90 tablet 1     citalopram (CELEXA) 40 MG tablet Take 1 tablet (40 mg) by mouth daily 30 tablet 1     cyclobenzaprine (FLEXERIL) 5 MG tablet Take 1 tablet (5 mg) by mouth every evening as needed for muscle spasms 15 tablet 0     gabapentin (NEURONTIN) 300 MG capsule Take 1 capsule (300 mg) by mouth 3 times daily 90 capsule 3     losartan  (COZAAR) 50 MG tablet Take 1 tablet (50 mg) by mouth daily 30 tablet 1     naltrexone (DEPADE/REVIA) 50 MG tablet Take 50 mg by mouth       naproxen (NAPROSYN) 500 MG tablet Take 1 tablet (500 mg) by mouth 2 times daily (with meals) 60 tablet 1     omeprazole (PRILOSEC) 20 MG DR capsule Take 1 capsule (20 mg) by mouth 2 times daily 60 capsule 1     sildenafil (REVATIO) 20 MG tablet Take 1-2 tablet (20 mg) as needed once daily.  Never use with nitroglycerin, terazosin or doxazosin. 50 tablet 1     sucralfate (CARAFATE) 1 GM/10ML suspension Take 1 g by mouth       nicotine (NICODERM CQ) 14 MG/24HR 24 hr patch Place 1 patch onto the skin every 24 hours (Patient not taking: Reported on 2/4/2019) 30 patch 11     nicotine (NICODERM CQ) 21 MG/24HR 24 hr patch Place 1 patch onto the skin daily       Social History     Socioeconomic History     Marital status:      Spouse name: Not on file     Number of children: Not on file     Years of education: Not on file     Highest education level: Not on file   Social Needs     Financial resource strain: Not on file     Food insecurity - worry: Not on file     Food insecurity - inability: Not on file     Transportation needs - medical: Not on file     Transportation needs - non-medical: Not on file   Occupational History     Not on file   Tobacco Use     Smoking status: Current Every Day Smoker     Types: Cigarettes     Smokeless tobacco: Never Used     Tobacco comment: 1/2 pack a day     Substance and Sexual Activity     Alcohol use: No     Alcohol/week: 8.4 oz     Types: 6 Cans of beer, 8 Shots of liquor per week     Comment: Quit in 2017 in June     Drug use: No     Sexual activity: Yes     Partners: Female     Birth control/protection: Condom   Other Topics Concern     Parent/sibling w/ CABG, MI or angioplasty before 65F 55M? Not Asked   Social History Narrative     Not on file       Family History   Problem Relation Age of Onset     Bone Cancer Mother      Breast Cancer  "Mother      Diabetes Father      Kidney Disease Father      Diabetes Sister      Cerebrovascular Disease Brother      Allergies   Allergen Reactions     No Known Allergies      No results found for this or any previous visit (from the past 24 hour(s)).         Review of Systems:   C: NEGATIVE for fatigue, unexpected change in weight  E: NEGATIVE for acute vision problems or changes  R: NEGATIVE for significant cough or shortness of breath  CV: NEGATIVE for chest pain, palpitations or new or worsening peripheral edema  GI: NEGATIVE for new onset or worsening nausea, vomiting or diarrhea  : NEGATIVE for frequency, dysuria, or hematuria  MUSCULOSKELETAL:See HPI  NEURO: See HPI   P: NEGATIVE for changes in mood or affect          Physical Exam:     Vitals:    02/04/19 1534   BP: 137/88   Pulse: 78   Resp: 18   Temp: 98  F (36.7  C)   TempSrc: Oral   SpO2: 98%   Weight: 82.1 kg (181 lb)   Height: 1.74 m (5' 8.5\")     Body mass index is 27.12 kg/m .    GENERAL APPEARANCE: healthy, alert and no distress,  RESP: lungs clear to auscultation - no rales, rhonchi or wheezes  CV: regular rate and rhythm,  and no murmur, click,  rub or gallop  MS: extremities normal- no gross deformities noted  SKIN: Small hyperpigmented skin growth over the right inguinal canal, not irritated  NEURO: Normal strength and tone, sensory exam grossly normal, mentation appears intact and speech normal  PSYCH: mood and affect normal/bright      Assessment and Plan     1. Vasovagal syncope  Patient wanted to go over his echocardiogram which showed some increased filling pressure but normal EF and no wall motion abnormalities. Discuss controlling symptoms leading to syncope which patient agrees with.  - Continue to control coughs as much as possible  - Follow up as needed    2. Skin growth  Likely irritated skin tag/growth. Was shaved biopsied (see procedure note). Discuss possible etiology and will follow up with path specimen. Discuss wound care.  - " Pathology Specimen ([facility])  - EXC BENIGN SKIN LESION TRUNK/ARM/LEG 0.6-1.0 CM    3. Screen for colon cancer  - GASTROENTEROLOGY ADULT REF PROCEDURE ONLY    4. Back muscle spasm  Requesting medication to help with sleep when experiencing spasm. Was on Flexeril in the past with some relief. Would like to restart. Okay to start low dose at night to help with sleep but discuss weaning which he is agreeable to.  - cyclobenzaprine (FLEXERIL) 5 MG tablet; Take 1 tablet (5 mg) by mouth every evening as needed for muscle spasms  Dispense: 15 tablet; Refill: 0    Options for treatment and follow-up care were reviewed with the patient and/or guardian. Piter Gaines and/or guardian engaged in the decision making process and verbalized understanding of the options discussed and agreed with the final plan.    Edmund Dey MD  [facility]  Pager: [phone]    Precepted today with: Adebayo Dubose MD    Preceptor Attestation:  I saw and evaluated the patient.  I was present for and supervised the entire procedure.  I reviewed the resident physician's history, exam, and treatment plan; and I agree with the documentation by the resident physician.  Supervising Physician:  Adebayo Dubose MD

## 2019-02-04 NOTE — NURSING NOTE
"Chief Complaint   Patient presents with     Follow Up     skin lesion on right side abdomen- increasing in size and pants rubbing pulls on it.      Results     echo f/u,      Medication Reconciliation     completed.        /88   Pulse 78   Temp 98  F (36.7  C) (Oral)   Resp 18   Ht 1.74 m (5' 8.5\")   Wt 82.1 kg (181 lb)   SpO2 98%   BMI 27.12 kg/m        [place] ordered  ~ Ernie BECKHAM CMA ([place])    "

## 2019-02-05 DIAGNOSIS — K20.90 ESOPHAGITIS: ICD-10-CM

## 2019-02-05 DIAGNOSIS — F10.20 UNCOMPLICATED ALCOHOL DEPENDENCE (H): ICD-10-CM

## 2019-02-06 RX ORDER — GABAPENTIN 300 MG/1
300 CAPSULE ORAL 3 TIMES DAILY
Qty: 90 CAPSULE | Refills: 1 | Status: SHIPPED | OUTPATIENT
Start: 2019-02-06 | End: 2019-03-13

## 2019-02-08 ENCOUNTER — TELEPHONE (OUTPATIENT)
Dept: FAMILY MEDICINE | Facility: CLINIC | Age: 54
End: 2019-02-08

## 2019-02-08 NOTE — TELEPHONE ENCOUNTER
Calling regarding a fax that was faxed yesterday for Dr. Dey about mislabeled for Patient, needs him to fill it out asap before they can do an amended, She states they spoke to Dr. Dey yesterday about this. She states she will refax it again. Called lab but Javier was with a Patient. Please call and advise.

## 2019-02-12 ENCOUNTER — RECORDS - HEALTHEAST (OUTPATIENT)
Dept: ADMINISTRATIVE | Facility: OTHER | Age: 54
End: 2019-02-12

## 2019-02-12 LAB
LAB AP DIAGNOSIS COMMENT: NORMAL
LAB AP SPECIAL STAINS: NORMAL
Lab: NORMAL
PATH REPORT.COMMENTS IMP SPEC: NORMAL
PATH REPORT.FINAL DX SPEC: NORMAL
PATH REPORT.GROSS SPEC: NORMAL
PATH REPORT.MICROSCOPIC SPEC OTHER STN: NORMAL
PATH REPORT.RELEVANT HX SPEC: NORMAL

## 2019-02-28 DIAGNOSIS — F32.A DEPRESSION, UNSPECIFIED DEPRESSION TYPE: ICD-10-CM

## 2019-03-01 RX ORDER — CITALOPRAM HYDROBROMIDE 40 MG/1
40 TABLET ORAL DAILY
Qty: 30 TABLET | Refills: 1 | Status: SHIPPED | OUTPATIENT
Start: 2019-03-01 | End: 2019-04-29

## 2019-03-13 DIAGNOSIS — K20.90 ESOPHAGITIS: ICD-10-CM

## 2019-03-13 DIAGNOSIS — F10.20 UNCOMPLICATED ALCOHOL DEPENDENCE (H): ICD-10-CM

## 2019-03-13 DIAGNOSIS — I10 ESSENTIAL HYPERTENSION: ICD-10-CM

## 2019-03-13 RX ORDER — AMLODIPINE BESYLATE 10 MG/1
10 TABLET ORAL DAILY
Qty: 90 TABLET | Refills: 1 | Status: SHIPPED | OUTPATIENT
Start: 2019-03-13 | End: 2020-02-11

## 2019-03-13 RX ORDER — GABAPENTIN 300 MG/1
300 CAPSULE ORAL 3 TIMES DAILY
Qty: 90 CAPSULE | Refills: 1 | Status: SHIPPED | OUTPATIENT
Start: 2019-03-13 | End: 2019-07-03

## 2019-04-29 DIAGNOSIS — I10 ESSENTIAL HYPERTENSION: ICD-10-CM

## 2019-04-29 DIAGNOSIS — F32.A DEPRESSION, UNSPECIFIED DEPRESSION TYPE: ICD-10-CM

## 2019-04-29 RX ORDER — LOSARTAN POTASSIUM 50 MG/1
50 TABLET ORAL DAILY
Qty: 30 TABLET | Refills: 3 | Status: SHIPPED | OUTPATIENT
Start: 2019-04-29 | End: 2019-09-03

## 2019-04-29 RX ORDER — CITALOPRAM HYDROBROMIDE 40 MG/1
40 TABLET ORAL DAILY
Qty: 30 TABLET | Refills: 3 | Status: SHIPPED | OUTPATIENT
Start: 2019-04-29 | End: 2019-09-03

## 2019-06-04 ENCOUNTER — OFFICE VISIT (OUTPATIENT)
Dept: FAMILY MEDICINE | Facility: CLINIC | Age: 54
End: 2019-06-04
Payer: COMMERCIAL

## 2019-06-04 VITALS
TEMPERATURE: 97.5 F | OXYGEN SATURATION: 98 % | DIASTOLIC BLOOD PRESSURE: 85 MMHG | HEART RATE: 78 BPM | HEIGHT: 69 IN | SYSTOLIC BLOOD PRESSURE: 131 MMHG | BODY MASS INDEX: 26.93 KG/M2 | RESPIRATION RATE: 24 BRPM | WEIGHT: 181.8 LBS

## 2019-06-04 DIAGNOSIS — R20.0 NUMBNESS AND TINGLING IN LEFT ARM: Primary | ICD-10-CM

## 2019-06-04 DIAGNOSIS — R20.2 NUMBNESS AND TINGLING IN LEFT ARM: Primary | ICD-10-CM

## 2019-06-04 DIAGNOSIS — R19.5 POSITIVE FECAL IMMUNOCHEMICAL TEST: ICD-10-CM

## 2019-06-04 ASSESSMENT — MIFFLIN-ST. JEOR: SCORE: 1667.76

## 2019-06-04 ASSESSMENT — PATIENT HEALTH QUESTIONNAIRE - PHQ9: SUM OF ALL RESPONSES TO PHQ QUESTIONS 1-9: 8

## 2019-06-04 NOTE — PROGRESS NOTES
Preceptor Attestation:   Patient seen, evaluated and discussed with the resident. I have verified the content of the note, which accurately reflects my assessment of the patient and the plan of care.  Supervising Physician:Ermelinda Garcia MD  [facility]

## 2019-06-04 NOTE — PATIENT INSTRUCTIONS
Rest elbow, no trama to elbow, cancel EMG if elbow got better    EMG referral was faxed to [phone], patient will be called to schedule.

## 2019-06-04 NOTE — PROGRESS NOTES
HPI:       Piter Gaines is a 53 year old  male with a significant past medical history of hypertension, vasovagal syncope, depression and polysubstance abuse who presents for the new concern(s) of    Left Arm Tingling and Numbness  - Started about 1 week ago, with intense tingling and numbness over 3 days with residual numbness persistent since  - Severe numbness would occur intermittent, 3 times per day and last about 15-30 min before spontaneous resolving  - Numbness and tingling occurs of the 4th and 5th digit of the left hand, radiating up the ulnar aspect of the forearm and arm up to the left shoulder to behind his left ear  - When severe, he would be unable to  or move his left upper extremities  - No difficulties moving it today  - No issues with the right upper extremities and never had anything like this before  - No falls, injuries or events which may have trigger his symptoms  - Nothing seems to make it better or worse  - No chest pain, shortness of breath, bowel or urinary incontinence, nausea, vomiting, fevers, slurred speech, facial weakness, headaches, lightheadedness or dizziness         PMHX:     Patient Active Problem List   Diagnosis     Tobacco use     Caloric malnutrition (H)     Hematemesis     Essential hypertension     Esophagitis     Alcohol abuse     Moderate episode of recurrent major depressive disorder (H)     Seborrheic Keratosis     Vasovagal syncope       Current Outpatient Medications   Medication Sig Dispense Refill     amLODIPine (NORVASC) 10 MG tablet Take 1 tablet (10 mg) by mouth daily 90 tablet 1     citalopram (CELEXA) 40 MG tablet Take 1 tablet (40 mg) by mouth daily 30 tablet 3     gabapentin (NEURONTIN) 300 MG capsule Take 1 capsule (300 mg) by mouth 3 times daily 90 capsule 1     losartan (COZAAR) 50 MG tablet Take 1 tablet (50 mg) by mouth daily 30 tablet 3     naproxen (NAPROSYN) 500 MG tablet Take 1 tablet (500 mg) by mouth 2 times daily (with meals) 60 tablet  1     nicotine (NICODERM CQ) 14 MG/24HR 24 hr patch Place 1 patch onto the skin every 24 hours 30 patch 11     omeprazole (PRILOSEC) 20 MG DR capsule Take 1 capsule (20 mg) by mouth 2 times daily 60 capsule 1     sildenafil (REVATIO) 20 MG tablet Take 1-2 tablet (20 mg) as needed once daily.  Never use with nitroglycerin, terazosin or doxazosin. 50 tablet 1     naltrexone (DEPADE/REVIA) 50 MG tablet Take 50 mg by mouth       sucralfate (CARAFATE) 1 GM/10ML suspension Take 1 g by mouth         Social History     Socioeconomic History     Marital status:      Spouse name: Not on file     Number of children: Not on file     Years of education: Not on file     Highest education level: Not on file   Occupational History     Not on file   Social Needs     Financial resource strain: Not on file     Food insecurity:     Worry: Not on file     Inability: Not on file     Transportation needs:     Medical: Not on file     Non-medical: Not on file   Tobacco Use     Smoking status: Current Every Day Smoker     Types: Cigarettes     Smokeless tobacco: Never Used     Tobacco comment: 1/2 pack a day     Substance and Sexual Activity     Alcohol use: No     Alcohol/week: 8.4 oz     Types: 6 Cans of beer, 8 Shots of liquor per week     Comment: Quit in 2017 in June     Drug use: No     Sexual activity: Yes     Partners: Female     Birth control/protection: Condom     Family History   Problem Relation Age of Onset     Bone Cancer Mother      Breast Cancer Mother      Diabetes Father      Kidney Disease Father      Diabetes Sister      Cerebrovascular Disease Brother      Allergies   Allergen Reactions     No Known Allergies        No results found for this or any previous visit (from the past 24 hour(s)).         Review of Systems:   C: NEGATIVE for fatigue, unexpected change in weight  E: NEGATIVE for acute vision problems or changes  E/M: NEGATIVE for ear, mouth and throat problems  R: NEGATIVE for significant cough or  "shortness of breath  CV: NEGATIVE for chest pain, palpitations or new or worsening peripheral edema  GI: NEGATIVE for new onset or worsening nausea, vomiting or diarrhea  : NEGATIVE for frequency, dysuria, or hematuria  MUSCULOSKELETAL: See HPI  NEURO: See HPI  P: NEGATIVE for changes in mood or affect          Physical Exam:     Vitals:    06/04/19 0914   BP: 131/85   Pulse: 78   Resp: 24   Temp: 97.5  F (36.4  C)   TempSrc: Oral   SpO2: 98%   Weight: 82.5 kg (181 lb 12.8 oz)   Height: 1.765 m (5' 9.49\")     Body mass index is 26.47 kg/m .    GENERAL APPEARANCE: healthy, alert and no distress,  EYES: Eyes grossly normal to inspection,  PERRL  NECK: no adenopathy, no asymmetry, masses, or scars and thyroid normal to palpation  RESP: lungs clear to auscultation - no rales, rhonchi or wheezes  CV: regular rate and rhythm,  and no murmur, click,  rub or gallop  MS: extremities normal- no gross deformities noted  NEURO: Normal strength and tone, sensory exam normal, mentation appears intact and speech normal,  PSYCH: mood and affect normal/bright    Assessment and Plan     1. Numbness and tingling in left arm  Likely Ulnar neuropathy given history and described of pain. Unusual to radiate to the posterior aspect of the ear. Possible TIA/Stroke given inability to move arm but less likely given not significant risk factor for stroke and no neurological deficit on exam. Discuss with patient possible causes and options including monitoring vs EMG vs MRI Head. Patient was agreeable to pursuing EMG.  - EMG was ordered  - Patient instructed to monitor and rest his left arm (supportive care for Ulnar neuropathy), if improved or resolved should cancel EMG  - Return to clinic if worsening    2. Positive fecal immunochemical test  Missed scheduled Colonoscopy. Would like MAC sedation when performed and preferred referral to [facility] for procedure.  - GASTROENTEROLOGY ADULT REF PROCEDURE ONLY Other; [facility] [phone]    Options " for treatment and follow-up care were reviewed with the patient and/or guardian. Piter Tuttlekins and/or guardian engaged in the decision making process and verbalized understanding of the options discussed and agreed with the final plan.    Edmund Dey MD  [facility]  Pager: [phone]    Precepted today with: Ermelinda Garcia MD

## 2019-07-03 ENCOUNTER — OFFICE VISIT (OUTPATIENT)
Dept: FAMILY MEDICINE | Facility: CLINIC | Age: 54
End: 2019-07-03
Payer: COMMERCIAL

## 2019-07-03 VITALS
HEIGHT: 70 IN | HEART RATE: 83 BPM | TEMPERATURE: 98 F | RESPIRATION RATE: 18 BRPM | OXYGEN SATURATION: 96 % | SYSTOLIC BLOOD PRESSURE: 129 MMHG | WEIGHT: 174.8 LBS | DIASTOLIC BLOOD PRESSURE: 89 MMHG | BODY MASS INDEX: 25.03 KG/M2

## 2019-07-03 DIAGNOSIS — R20.2 NUMBNESS AND TINGLING IN LEFT HAND: ICD-10-CM

## 2019-07-03 DIAGNOSIS — L65.9 HAIR LOSS: Primary | ICD-10-CM

## 2019-07-03 DIAGNOSIS — G89.29 CHRONIC LEFT-SIDED LOW BACK PAIN WITH LEFT-SIDED SCIATICA: ICD-10-CM

## 2019-07-03 DIAGNOSIS — K20.90 ESOPHAGITIS: ICD-10-CM

## 2019-07-03 DIAGNOSIS — M54.42 CHRONIC LEFT-SIDED LOW BACK PAIN WITH LEFT-SIDED SCIATICA: ICD-10-CM

## 2019-07-03 DIAGNOSIS — F10.20 UNCOMPLICATED ALCOHOL DEPENDENCE (H): ICD-10-CM

## 2019-07-03 DIAGNOSIS — R20.0 NUMBNESS AND TINGLING IN LEFT HAND: ICD-10-CM

## 2019-07-03 RX ORDER — GABAPENTIN 300 MG/1
300 CAPSULE ORAL 3 TIMES DAILY
Qty: 90 CAPSULE | Refills: 1 | Status: SHIPPED | OUTPATIENT
Start: 2019-07-03 | End: 2019-09-03

## 2019-07-03 RX ORDER — NAPROXEN 500 MG/1
500 TABLET ORAL 2 TIMES DAILY WITH MEALS
Qty: 30 TABLET | Refills: 0 | Status: SHIPPED | OUTPATIENT
Start: 2019-07-03 | End: 2019-09-05

## 2019-07-03 ASSESSMENT — MIFFLIN-ST. JEOR: SCORE: 1639.14

## 2019-07-03 NOTE — PROGRESS NOTES
HPI:       Piter Gaines is a 53 year old  male with a significant past medical history of alcohol abuse, hypertension, vasovagal syncope and depression who presents for the new concern(s) of    Scalp/Hair Loss  - Started several weeks ago and noticed hair loss around the temples occurring in patches with burning sensation  - No new exposure, shampoos, soap, moisturizer  - Has not tried any medication because he did not if anything would help  - No fevers, weight changes, constipation, vomiting or nausea, vision or hearing changes  - No new chemical or protective equipment  - No family history of male pattern baldness (none in mother or father side)  - Denied any hair pulling behavior or new life stressors  - On disability, not employed    Left hand 4th and 5th digit numbness/weakness  - Improved since his last visit but continue to report persistent numbness  - No longer having shooting pain up his left proximal extremities  - Does not protect his elbow as well as he should  - No trigger for worsening symptoms  - Has EMG ordered but has not been contacted about it         PMHX:     Patient Active Problem List   Diagnosis     Tobacco use     Caloric malnutrition (H)     Hematemesis     Essential hypertension     Esophagitis     Alcohol abuse     Moderate episode of recurrent major depressive disorder (H)     Seborrheic Keratosis     Vasovagal syncope       Current Outpatient Medications   Medication Sig Dispense Refill     amLODIPine (NORVASC) 10 MG tablet Take 1 tablet (10 mg) by mouth daily 90 tablet 1     citalopram (CELEXA) 40 MG tablet Take 1 tablet (40 mg) by mouth daily 30 tablet 3     gabapentin (NEURONTIN) 300 MG capsule Take 1 capsule (300 mg) by mouth 3 times daily 90 capsule 1     losartan (COZAAR) 50 MG tablet Take 1 tablet (50 mg) by mouth daily 30 tablet 3     naltrexone (DEPADE/REVIA) 50 MG tablet Take 50 mg by mouth       naproxen (NAPROSYN) 500 MG tablet Take 1 tablet (500 mg) by mouth 2 times  daily (with meals) 60 tablet 1     omeprazole (PRILOSEC) 20 MG DR capsule Take 1 capsule (20 mg) by mouth 2 times daily 60 capsule 1     sildenafil (REVATIO) 20 MG tablet Take 1-2 tablet (20 mg) as needed once daily.  Never use with nitroglycerin, terazosin or doxazosin. 50 tablet 1     nicotine (NICODERM CQ) 14 MG/24HR 24 hr patch Place 1 patch onto the skin every 24 hours (Patient not taking: Reported on 7/3/2019) 30 patch 11     sucralfate (CARAFATE) 1 GM/10ML suspension Take 1 g by mouth         Social History     Socioeconomic History     Marital status:      Spouse name: Not on file     Number of children: Not on file     Years of education: Not on file     Highest education level: Not on file   Occupational History     Not on file   Social Needs     Financial resource strain: Not on file     Food insecurity:     Worry: Not on file     Inability: Not on file     Transportation needs:     Medical: Not on file     Non-medical: Not on file   Tobacco Use     Smoking status: Current Every Day Smoker     Types: Cigarettes     Smokeless tobacco: Never Used     Tobacco comment: 1/2 pack a day     Substance and Sexual Activity     Alcohol use: No     Alcohol/week: 8.4 oz     Types: 6 Cans of beer, 8 Shots of liquor per week     Comment: Quit in 2017 in June     Drug use: Yes     Types: Marijuana     Sexual activity: Yes     Partners: Female     Birth control/protection: Condom   Lifestyle     Physical activity:     Days per week: Not on file     Minutes per session: Not on file     Stress: Not on file   Relationships     Social connections:     Talks on phone: Not on file     Gets together: Not on file     Attends Yazidi service: Not on file     Active member of club or organization: Not on file     Attends meetings of clubs or organizations: Not on file     Relationship status: Not on file     Intimate partner violence:     Fear of current or ex partner: Not on file     Emotionally abused: Not on file      "Physically abused: Not on file     Forced sexual activity: Not on file   Other Topics Concern     Parent/sibling w/ CABG, MI or angioplasty before 65F 55M? Not Asked   Social History Narrative     Not on file       Family History   Problem Relation Age of Onset     Bone Cancer Mother      Breast Cancer Mother      Diabetes Father      Kidney Disease Father      Diabetes Sister      Cerebrovascular Disease Brother           Allergies   Allergen Reactions     No Known Allergies        No results found for this or any previous visit (from the past 24 hour(s)).         Review of Systems:   C: NEGATIVE for fatigue, unexpected change in weight  INTEGUMENTARY/SKIN: See HPI  E: NEGATIVE for acute vision problems or changes  R: NEGATIVE for significant cough or shortness of breath  CV: NEGATIVE for chest pain, palpitations or new or worsening peripheral edema  GI: NEGATIVE for new onset or worsening nausea, vomiting or diarrhea  : NEGATIVE for frequency, dysuria, or hematuria  M: NEGATIVE for claudication, myalgias  NEURO: See HPI  P: NEGATIVE for changes in mood or affect          Physical Exam:     Vitals:    07/03/19 1103   BP: 129/89   Pulse: 83   Resp: 18   Temp: 98  F (36.7  C)   TempSrc: Oral   SpO2: 96%   Weight: 79.3 kg (174 lb 12.8 oz)   Height: 1.77 m (5' 9.69\")     Body mass index is 25.31 kg/m .    GENERAL APPEARANCE: healthy, alert and no distress,  NECK: no adenopathy, no asymmetry, masses, or scars and thyroid normal to palpation  RESP: lungs clear to auscultation - no rales, rhonchi or wheezes  CV: regular rate and rhythm,  and no murmur, click,  rub or gallop  MS: extremities normal- no gross deformities noted  NEURO: decrease sensation over the 5th and 4th digit of the left hand, weakness of the 5th digit but good strength of the 4th digit, rest of left hand with good strength and range of motion, no reproducible tenderness of the elbow  PSYCH: mood and affect normal/bright    Assessment and Plan     1. " Hair loss  Unclear cause and denied any exposure or new equipment. No hair pulling behavior or stressors in life. Will screen for other causes of hair loss such as hypothyroidism, iron deficiency and syphilis.  - TSH, RPR/VLDL, Ferritin and Iron ordered  - Follow up with Dermatologist for further evaluation    2. Numbness and tingling in left hand  Likely ulnar neuropathy given improvement without treatment. EMG ordered but has not been completed. Discuss likely etiology and recommendation for supportive care including avoiding trauma to the left elbow.  - Continue with protecting elbow  - Follow up with EMG  - Follow up in clinic if worsening    Options for treatment and follow-up care were reviewed with the patient and/or guardian. Piter Gaines and/or guardian engaged in the decision making process and verbalized understanding of the options discussed and agreed with the final plan.    Edmund Dey MD  [facility]  Pager: [phone]    Precepted today with: Arnaldo Rawls MD

## 2019-07-03 NOTE — NURSING NOTE
I, Hlea Her, CMA have observed and reviewed Roberto Gaytan in rooming and ordering labs, vaccines, and referrals as needed for this visit per MD request.     --Roxanne, CMA

## 2019-07-09 NOTE — PROGRESS NOTES
Preceptor Attestation:   Patient seen, evaluated and discussed with the resident. I have verified the content of the note, which accurately reflects my assessment of the patient and the plan of care.    Supervising Physician:Arnaldo Rawls MD    [facility]

## 2019-08-05 DIAGNOSIS — K20.90 ESOPHAGITIS: ICD-10-CM

## 2019-08-06 DIAGNOSIS — K20.90 ESOPHAGITIS: ICD-10-CM

## 2019-08-06 NOTE — TELEPHONE ENCOUNTER
Message to physician: THE PATIENT IS REQUESTING TO DISPENSE A 90 DAY SUPPLY    Date of last visit: 7/3/2019     Date of next visit if scheduled: Visit date not found      Last Comprehensive Metabolic Panel:  Sodium   Date Value Ref Range Status   08/14/2018 139.0 133.0 - 144.0 mmol/L Final     Potassium   Date Value Ref Range Status   08/14/2018 3.7 3.4 - 5.3 mmol/L Final     Chloride   Date Value Ref Range Status   08/14/2018 105.0 94.0 - 109.0 mmol/L Final     Carbon Dioxide   Date Value Ref Range Status   08/14/2018 26.0 20.0 - 32.0 mmol/L Final     Glucose   Date Value Ref Range Status   08/14/2018 112.0 (H) 60.0 - 109.0 mg/dL Final     Urea Nitrogen   Date Value Ref Range Status   08/14/2018 16.0 7.0 - 30.0 mg/dL Final     Creatinine   Date Value Ref Range Status   08/14/2018 1.6 (H) 0.8 - 1.5 mg/dL Final     Calcium   Date Value Ref Range Status   08/14/2018 9.2 8.5 - 10.4 mg/dL Final       BP Readings from Last 3 Encounters:   07/03/19 129/89   06/04/19 131/85   02/04/19 137/88       Lab Results   Component Value Date    A1C 5.7 04/18/2017                Please complete refill and CLOSE ENCOUNTER.  Closing the encounter signifies the refill is complete.

## 2019-08-14 ENCOUNTER — TELEPHONE (OUTPATIENT)
Dept: FAMILY MEDICINE | Facility: CLINIC | Age: 54
End: 2019-08-14

## 2019-08-14 NOTE — TELEPHONE ENCOUNTER
Pt no show lab 7/9 and 8/9/19.  Unable to reach patient, left vm again.  Pending future labs. CXiong, RMA

## 2019-09-03 DIAGNOSIS — F32.A DEPRESSION, UNSPECIFIED DEPRESSION TYPE: ICD-10-CM

## 2019-09-03 DIAGNOSIS — I10 ESSENTIAL HYPERTENSION: ICD-10-CM

## 2019-09-03 DIAGNOSIS — F10.20 UNCOMPLICATED ALCOHOL DEPENDENCE (H): ICD-10-CM

## 2019-09-03 DIAGNOSIS — G89.29 CHRONIC LEFT-SIDED LOW BACK PAIN WITH LEFT-SIDED SCIATICA: ICD-10-CM

## 2019-09-03 DIAGNOSIS — L65.9 HAIR LOSS: ICD-10-CM

## 2019-09-03 DIAGNOSIS — M54.42 CHRONIC LEFT-SIDED LOW BACK PAIN WITH LEFT-SIDED SCIATICA: ICD-10-CM

## 2019-09-03 LAB
FERRITIN SERPL-MCNC: 23 NG/ML (ref 27–300)
IRON SERPL-MCNC: 40 UG/DL (ref 42–175)
TSH SERPL DL<=0.05 MIU/L-ACNC: 1.36 UIU/ML (ref 0.3–5)

## 2019-09-04 LAB — TREPONEMA ANTIBODY (SYPHILIS): NEGATIVE

## 2019-09-06 RX ORDER — GABAPENTIN 300 MG/1
300 CAPSULE ORAL 3 TIMES DAILY
Qty: 90 CAPSULE | Refills: 1 | Status: SHIPPED | OUTPATIENT
Start: 2019-09-06 | End: 2019-12-13

## 2019-09-06 RX ORDER — CITALOPRAM HYDROBROMIDE 40 MG/1
40 TABLET ORAL DAILY
Qty: 30 TABLET | Refills: 1 | Status: SHIPPED | OUTPATIENT
Start: 2019-09-06 | End: 2019-09-09

## 2019-09-06 RX ORDER — NAPROXEN 500 MG/1
500 TABLET ORAL 2 TIMES DAILY PRN
Qty: 30 TABLET | Refills: 0 | Status: SHIPPED | OUTPATIENT
Start: 2019-09-06 | End: 2019-11-18

## 2019-09-06 RX ORDER — LOSARTAN POTASSIUM 50 MG/1
50 TABLET ORAL DAILY
Qty: 30 TABLET | Refills: 90 | Status: SHIPPED | OUTPATIENT
Start: 2019-09-06 | End: 2019-09-09

## 2019-09-09 DIAGNOSIS — I10 ESSENTIAL HYPERTENSION: ICD-10-CM

## 2019-09-09 DIAGNOSIS — F32.A DEPRESSION, UNSPECIFIED DEPRESSION TYPE: ICD-10-CM

## 2019-09-16 RX ORDER — CITALOPRAM HYDROBROMIDE 40 MG/1
40 TABLET ORAL DAILY
Qty: 90 TABLET | Refills: 1 | Status: SHIPPED | OUTPATIENT
Start: 2019-09-16 | End: 2020-04-01

## 2019-09-16 RX ORDER — LOSARTAN POTASSIUM 50 MG/1
50 TABLET ORAL DAILY
Qty: 90 TABLET | Refills: 1 | Status: SHIPPED | OUTPATIENT
Start: 2019-09-16 | End: 2020-04-01

## 2019-09-17 NOTE — TELEPHONE ENCOUNTER
Awais Richter is a 4 year old female presenting with   Chief Complaint   Patient presents with   • Well Child       FEEDING: Milk - <8 oz./day of skim                    Diet - a picky eater  SLEEPING: 10, 11 hours at night  URINATION:  no enuresis or daytime incontinence  STOOLS: intermittent constipation  CONCERNS:  Minor concerns (ie a bug bite on arm)   Patient had appt on 1.11.2018 and medication addressed then

## 2019-11-11 DIAGNOSIS — K20.90 ESOPHAGITIS: ICD-10-CM

## 2019-11-18 ENCOUNTER — OFFICE VISIT (OUTPATIENT)
Dept: FAMILY MEDICINE | Facility: CLINIC | Age: 54
End: 2019-11-18
Payer: COMMERCIAL

## 2019-11-18 VITALS
OXYGEN SATURATION: 96 % | SYSTOLIC BLOOD PRESSURE: 156 MMHG | WEIGHT: 184.4 LBS | HEART RATE: 75 BPM | RESPIRATION RATE: 18 BRPM | DIASTOLIC BLOOD PRESSURE: 96 MMHG | HEIGHT: 70 IN | BODY MASS INDEX: 26.4 KG/M2 | TEMPERATURE: 97.7 F

## 2019-11-18 DIAGNOSIS — J06.9 VIRAL URI WITH COUGH: ICD-10-CM

## 2019-11-18 DIAGNOSIS — K20.90 ESOPHAGITIS: Primary | ICD-10-CM

## 2019-11-18 DIAGNOSIS — Z72.0 TOBACCO USE: ICD-10-CM

## 2019-11-18 DIAGNOSIS — I10 ESSENTIAL HYPERTENSION: ICD-10-CM

## 2019-11-18 DIAGNOSIS — M54.50 CHRONIC MIDLINE LOW BACK PAIN WITHOUT SCIATICA: ICD-10-CM

## 2019-11-18 DIAGNOSIS — G89.29 CHRONIC MIDLINE LOW BACK PAIN WITHOUT SCIATICA: ICD-10-CM

## 2019-11-18 PROBLEM — R55 VASOVAGAL SYNCOPE: Status: RESOLVED | Noted: 2019-02-04 | Resolved: 2019-11-18

## 2019-11-18 RX ORDER — GUAIFENESIN/DEXTROMETHORPHAN 100-10MG/5
5 SYRUP ORAL 4 TIMES DAILY PRN
Qty: 473 ML | Refills: 1 | Status: SHIPPED | OUTPATIENT
Start: 2019-11-18 | End: 2019-12-23

## 2019-11-18 RX ORDER — SUCRALFATE ORAL 1 G/10ML
1 SUSPENSION ORAL 4 TIMES DAILY PRN
Qty: 420 ML | Refills: 1 | Status: SHIPPED | OUTPATIENT
Start: 2019-11-18 | End: 2019-11-22

## 2019-11-18 RX ORDER — CYCLOBENZAPRINE HCL 10 MG
10 TABLET ORAL 3 TIMES DAILY PRN
Qty: 60 TABLET | Refills: 0 | Status: SHIPPED | OUTPATIENT
Start: 2019-11-18 | End: 2020-06-17

## 2019-11-18 ASSESSMENT — ANXIETY QUESTIONNAIRES
IF YOU CHECKED OFF ANY PROBLEMS ON THIS QUESTIONNAIRE, HOW DIFFICULT HAVE THESE PROBLEMS MADE IT FOR YOU TO DO YOUR WORK, TAKE CARE OF THINGS AT HOME, OR GET ALONG WITH OTHER PEOPLE: VERY DIFFICULT
6. BECOMING EASILY ANNOYED OR IRRITABLE: MORE THAN HALF THE DAYS
GAD7 TOTAL SCORE: 14
2. NOT BEING ABLE TO STOP OR CONTROL WORRYING: MORE THAN HALF THE DAYS
7. FEELING AFRAID AS IF SOMETHING AWFUL MIGHT HAPPEN: MORE THAN HALF THE DAYS
5. BEING SO RESTLESS THAT IT IS HARD TO SIT STILL: MORE THAN HALF THE DAYS
3. WORRYING TOO MUCH ABOUT DIFFERENT THINGS: MORE THAN HALF THE DAYS
1. FEELING NERVOUS, ANXIOUS, OR ON EDGE: MORE THAN HALF THE DAYS

## 2019-11-18 ASSESSMENT — MIFFLIN-ST. JEOR: SCORE: 1687.68

## 2019-11-18 ASSESSMENT — PATIENT HEALTH QUESTIONNAIRE - PHQ9: 5. POOR APPETITE OR OVEREATING: MORE THAN HALF THE DAYS

## 2019-11-18 NOTE — PROGRESS NOTES
Assessment and Plan   1. Esophagitis  Reviewed outside/hospital records, last EGD 4/2017 which showed esophagitis. Plan a that time had been to repeat in 2 months to evaluate for Ceja's but he was lost to follow up. Notes indicated he was going to have a colonoscopy performed but it looks to me like this was also never done. History of heavy alcohol use, but has been sober for a few years now. Instructed him to continue his PPI and we will add Carafate to treat symptoms until his repeat EGD at which point we will check for H pylori as well (biopsies were done but we do not have the path records). Added in colonoscopy as well as he needs this for screening. Instructed him to call with worsening symptoms or signs of bleeding.  - GASTROENTEROLOGY ADULT REF PROCEDURE ONLY  - sucralfate (CARAFATE) 1 GM/10ML suspension; Take 10 mLs (1 g) by mouth 4 times daily as needed (GERD)  Dispense: 420 mL; Refill: 1    2. Viral URI with cough  Reassuring exam, will treat symptomatically.  - guaiFENesin-dextromethorphan (ROBITUSSIN DM) 100-10 MG/5ML syrup; Take 5 mLs by mouth 4 times daily as needed for cough  Dispense: 473 mL; Refill: 1    3. Essential hypertension  Above goal today, but reviewed past BP's and it has fluctuated some in the past - will recheck at his next visit and increase Losartan if still elevated.    4. Tobacco use  Discussed that smoking is exacerbating his URI and also GERD and that for his overall health it would be best to quit. Offered resources, he is pre-contemplative.    5. Chronic midline low back pain without sciatica  Has dealt with low back pain for years, had taken Naproxen but now out of this. Instructed him no NSAIDs for the time being given #1 and will switch to the below.  - cyclobenzaprine (FLEXERIL) 10 MG tablet; Take 1 tablet (10 mg) by mouth 3 times daily as needed for muscle spasms  Dispense: 60 tablet; Refill: 0    Follow up in 1 Month(s).    Options for treatment and follow-up care  were reviewed with the patient and/or guardian. Piter Gaines and/or guardian engaged in the decision making process and verbalized understanding of the options discussed and agreed with the final plan.    Gildardo Bundy MD  [facility], PGY-3    Precepted patient with Dr. Shahzad Aponte       HPI:   Piter Gaines is a 53 year old male who presents to clinic today for   Chief Complaint   Patient presents with     Cough     Productive cough x1 wk     Gastrophageal Reflux     Pt stated that omeprazole has not been helping with acid reflux      Medication Reconciliation     Completed     Patient has had congestion and cough for the past week. Cough is worse at night and is sometimes productive of phlegm. No fevers or chills. Still smoking 1/2 pack of cigarettes per day. Patient states he had quit for a few years in the past, but it was difficult to stay off of them since other people he lives with smoke.    Patient has been on PPI for the past few years (looks like it was started in 2017) and has been taking it BID. He feels it is not as effective as it has been in the past. Sometimes he takes more than 2 - but it hasn't really helped.    He feels that for the past couple of weeks he has had really bad GERD. He has a constant burning sensation in his epigastric region that goes up to his throat. He has had ulcer in the past 3-4 years ago (was throwing up blood) but he was still drinking alcohol at that point.         Review of Systems:     Constitutional, HEENT, cardiovascular, pulmonary, GI, musculoskeletal, neuro, skin, and psych systems are negative, except as otherwise noted.          PMHX:   Active Problems List  Patient Active Problem List   Diagnosis     Tobacco use     Caloric malnutrition (H)     Essential hypertension     Esophagitis     Moderate episode of recurrent major depressive disorder (H)     Seborrheic Keratosis     Active problem  list reviewed and updated.    Current Medications  Current Outpatient Medications   Medication Sig Dispense Refill     amLODIPine (NORVASC) 10 MG tablet Take 1 tablet (10 mg) by mouth daily 90 tablet 1     citalopram (CELEXA) 40 MG tablet Take 1 tablet (40 mg) by mouth daily 90 tablet 1     cyclobenzaprine (FLEXERIL) 10 MG tablet Take 1 tablet (10 mg) by mouth 3 times daily as needed for muscle spasms 60 tablet 0     gabapentin (NEURONTIN) 300 MG capsule Take 1 capsule (300 mg) by mouth 3 times daily 90 capsule 1     guaiFENesin-dextromethorphan (ROBITUSSIN DM) 100-10 MG/5ML syrup Take 5 mLs by mouth 4 times daily as needed for cough 473 mL 1     losartan (COZAAR) 50 MG tablet Take 1 tablet (50 mg) by mouth daily 90 tablet 1     omeprazole (PRILOSEC) 20 MG DR capsule Take 1 capsule (20 mg) by mouth 2 times daily 180 capsule 1     sildenafil (REVATIO) 20 MG tablet Take 1-2 tablet (20 mg) as needed once daily.  Never use with nitroglycerin, terazosin or doxazosin. 50 tablet 1     sucralfate (CARAFATE) 1 GM/10ML suspension Take 10 mLs (1 g) by mouth 4 times daily as needed (GERD) 420 mL 1     Medication list reviewed and updated.    Social History  Social History     Tobacco Use     Smoking status: Current Every Day Smoker     Types: Cigarettes     Smokeless tobacco: Never Used     Tobacco comment: 1/2 pack a day     Substance Use Topics     Alcohol use: No     Alcohol/week: 14.0 standard drinks     Types: 6 Cans of beer, 8 Shots of liquor per week     Comment: Quit in 2017 in June     Drug use: Yes     Types: Marijuana     History   Drug Use     Types: Marijuana     Family History  Family History   Problem Relation Age of Onset     Bone Cancer Mother      Breast Cancer Mother      Diabetes Father      Kidney Disease Father      Diabetes Sister      Cerebrovascular Disease Brother      Allergies  Allergies   Allergen Reactions     No Known Allergies           Physical Exam:     Vitals:    11/18/19 0943   BP: (!) 156/96  "  Pulse: 75   Resp: 18   Temp: 97.7  F (36.5  C)   TempSrc: Oral   SpO2: 96%   Weight: 83.6 kg (184 lb 6.4 oz)   Height: 1.778 m (5' 10\")     Body mass index is 26.46 kg/m .    GENERAL APPEARANCE: alert, appears stated age, no acute distress  HEENT: Eyes grossly normal to inspection, nares normal, and mouth and throat without erythema, ulcers, or lesions, bilateral ear canals and TMs normal  RESP: lungs clear to auscultation - no rales, rhonchi, or wheezes  CV: regular rate and rhythm, no murmur, click, rub, or gallop  ABDOMEN: soft, nontender   MSK: extremities normal, no gross deformities noted, no lower extremity edema  NEURO: sensory exam grossly normal, mentation appears intact and speech normal  PSYCH: mood and affect normal      "

## 2019-11-18 NOTE — PATIENT INSTRUCTIONS
Referral for :     Gastroenterology    LOCATION/PLACE/Provider :    [facility]  DATE & TIME :    Jan. 20th at 10:15am   PHONE :     [phone]  FAX :    [phone]  Appointment made by clinic staff/:    Lizbeth

## 2019-11-18 NOTE — PROGRESS NOTES
I have personally reviewed the history and examination as documented by Dr. Bundy.  I was present during key portions of the visit and agree with the assessment and plan as documented for 53 yr old male with tobacco use, hx of esophagitis here for refractory GERD. Recommend referral to GI for EGD. Precautions given. Anticipatory guidance given.     Shahzad Aponte MD  November 18, 2019  10:31 AM

## 2019-11-19 ASSESSMENT — PATIENT HEALTH QUESTIONNAIRE - PHQ9: SUM OF ALL RESPONSES TO PHQ QUESTIONS 1-9: 11

## 2019-11-20 ASSESSMENT — ANXIETY QUESTIONNAIRES: GAD7 TOTAL SCORE: 14

## 2019-11-22 ENCOUNTER — TELEPHONE (OUTPATIENT)
Dept: FAMILY MEDICINE | Facility: CLINIC | Age: 54
End: 2019-11-22

## 2019-11-22 DIAGNOSIS — K20.90 ESOPHAGITIS: Primary | ICD-10-CM

## 2019-11-22 RX ORDER — FAMOTIDINE 20 MG/1
20 TABLET, FILM COATED ORAL AT BEDTIME
Qty: 90 TABLET | Refills: 0 | Status: SHIPPED | OUTPATIENT
Start: 2019-11-22 | End: 2020-02-06

## 2019-11-22 NOTE — TELEPHONE ENCOUNTER
Prior Authorization needed on:  11/22/19    Medication:  SUCRALFATE Dose:  1GM/10ML    Pharmacy confirmed as   [id number] - [place], [place] - [address] [place] [address]  Phone: [phone] Fax: [phone]  : Yes    Insurance Name:  [employer]  Insurance Phone: [phone]  Insurance Patient ID: [id number]    Alternatives Suggested:      Magali Spring MA November 22, 2019 at 10:34 AM

## 2019-12-03 DIAGNOSIS — Z20.1 EXPOSURE TO TB: Primary | ICD-10-CM

## 2019-12-13 DIAGNOSIS — F10.20 UNCOMPLICATED ALCOHOL DEPENDENCE (H): ICD-10-CM

## 2019-12-16 RX ORDER — GABAPENTIN 300 MG/1
300 CAPSULE ORAL 3 TIMES DAILY
Qty: 90 CAPSULE | Refills: 2 | Status: SHIPPED | OUTPATIENT
Start: 2019-12-16 | End: 2020-03-25

## 2019-12-23 ENCOUNTER — RECORDS - HEALTHEAST (OUTPATIENT)
Dept: LAB | Facility: CLINIC | Age: 54
End: 2019-12-23

## 2019-12-23 ENCOUNTER — OFFICE VISIT (OUTPATIENT)
Dept: FAMILY MEDICINE | Facility: CLINIC | Age: 54
End: 2019-12-23
Payer: COMMERCIAL

## 2019-12-23 VITALS
WEIGHT: 186 LBS | OXYGEN SATURATION: 96 % | RESPIRATION RATE: 18 BRPM | HEART RATE: 80 BPM | TEMPERATURE: 98.2 F | DIASTOLIC BLOOD PRESSURE: 87 MMHG | SYSTOLIC BLOOD PRESSURE: 142 MMHG | HEIGHT: 70 IN | BODY MASS INDEX: 26.63 KG/M2

## 2019-12-23 DIAGNOSIS — I10 ESSENTIAL HYPERTENSION: ICD-10-CM

## 2019-12-23 DIAGNOSIS — Z71.6 TOBACCO ABUSE COUNSELING: ICD-10-CM

## 2019-12-23 DIAGNOSIS — N40.1 BENIGN PROSTATIC HYPERPLASIA WITH NOCTURIA: ICD-10-CM

## 2019-12-23 DIAGNOSIS — K20.90 ESOPHAGITIS: Primary | ICD-10-CM

## 2019-12-23 DIAGNOSIS — Z72.0 TOBACCO ABUSE: ICD-10-CM

## 2019-12-23 DIAGNOSIS — Z20.1 CONTACT WITH TUBERCULOSIS: Primary | ICD-10-CM

## 2019-12-23 DIAGNOSIS — R35.1 BENIGN PROSTATIC HYPERPLASIA WITH NOCTURIA: ICD-10-CM

## 2019-12-23 LAB — PSA SERPL-MCNC: 3 NG/ML (ref 0–3.5)

## 2019-12-23 RX ORDER — TAMSULOSIN HYDROCHLORIDE 0.4 MG/1
0.4 CAPSULE ORAL DAILY
Qty: 30 CAPSULE | Refills: 0 | Status: SHIPPED | OUTPATIENT
Start: 2019-12-23 | End: 2020-01-28

## 2019-12-23 ASSESSMENT — MIFFLIN-ST. JEOR: SCORE: 1691.19

## 2019-12-23 NOTE — PATIENT INSTRUCTIONS
"  Varenicline Tartrate (Chantix):  Chantix tablets Days                Dosage  0.5 mg                     1 through 3 0.5 mg once daily  0.5 mg                      4 through 7 0.5 mg twice daily  1.0 mg                     Day 8 to end  1 mg twice daily    Renal dosing:  For severe renal impairment, start at a dose of 0.5 mg daily and then titrate as needed to a maximum dose of 0.5 mg twice daily.  For patients with end-stage renal disease (ESRD) undergoing hemodialysis, a maximum dose of 0.5 mg daily may be administered if tolerated well.    How to take:  You will start taking Chantix one week before your quit date while you are still smoking. At first you will use the Chantix starter pack. Once you are finished with that, you will be on a \"maintenance dose\" that will probably stay the same for the rest of your treatment.  After a week of slowly increasing your dose, you will take Chantix twice a day. Take each dose after eating and with a full glass of water. Taking Chantix with food and water decreases nausea. That is also why you begin on a lower dose and slowly increase it. Once you begin the twice a day dosage, your 2 doses should be at least 8-10 hours apart and at least 4-5 hours before bedtime.      Duration of therapy: 12 weeks.  An additional course of 12 weeks of treatment is recommended if the first 12 weeks were successful to improve long term abstinence.    Adverse reactions:  The most common adverse reaction associated with varenicline treatment is nausea. Other common adverse effects include sleep disturbance, constipation, flatulence and vomiting.    There have been warnings from the FDA to be on the lookout for erratic behavior, suicidal ideation, or suicidal behavior.   There is one case report of a death, though it appears alcohol consumption may have played a part in that case.    Please report any behavior or mood changes as well as being aware of drowsiness.   Be cautious when operating " motor vehicles or dangerous machinery until the medication's effect on you is clear.    HOW TO QUIT SMOKING  Smoking is one of the hardest habits to break. About half of all those who have ever smoked have been able to quit, and most of those (about 70%) who still smoke want to quit. Here are some of the best ways to stop smoking.     KEEP TRYING:  It takes most smokers about 8 tries before they are finally able to fully quit. So, the more often you try and fail, the better your chance of quitting the next time! So, don't give up!    GO COLD TURKEY:  Most ex-smokers quit cold turkey. Trying to cut back gradually doesn't seem to work as well, perhaps because it continues the smoking habit. Also, it is possible to fool yourself by inhaling more while smoking fewer cigarettes. This results in the same amount of nicotine in your body!    GET SUPPORT:  Support programs can make an important difference, especially for the heavy smoker. These groups offer lectures, methods to change your behavior and peer support. Call the free national Quitline for more information. 800-QUIT-NOW (865-096-3885). Low-cost or free programs are offered by many hospitals, local chapters of the American Lung Association (449-921-9775) and the American Cancer Society (061-185-8876). Support at home is important too. Non-smokers can help by offering praise and encouragement. If the smoker fails to quit, encourage them to try again!    OVER-THE-COUNTER MEDICINES:  For those who can't quit on their own, Nicotine Replacement Therapy (NRT) may make quitting much easier. Certain aids such as the nicotine patch, gum and lozenge are available without a prescription. However, it is best to use these under the guidance of your doctor. The skin patch provides a steady supply of nicotine to the body. Nicotine gum and lozenge gives temporary bursts of low levels of nicotine. Both methods take the edge off the craving for cigarettes. WARNING: If you feel  symptoms of nicotine overdose, such as nausea, vomiting, dizziness, weakness, or fast heartbeat, stop using these and see your doctor.    PRESCRIPTION MEDICINES:  After evaluating your smoking patterns and prior attempts at quitting, your doctor may offer a prescription medicine such as bupropion (Zyban, Wellbutrin), varenicline (Chantix, Champix), a niocotine inhaler or nasal spray. Each has its unique advantage and side effects which your doctor can review with you.    HEALTH BENEFITS OF QUITTING:  The benefits of quitting start right away and keep improving the longer you go without smokin minutes: blood pressure and pulse return to normal  8 hours: oxygen levels return to normal  2 days: ability to smell and taste begins to improve as damaged nerves start to regrow  2-3 weeks: circulation and lung function improves  1-9 months: decreased cough, congestion and shortness of breath; less tired  1 year: risk of heart attack decreases by half  5 years: risk of lung cancer decreases by half; risk of stroke becomes the same as a non-smoker  For information about how to quit smoking, visit the following links:  National [employer] [place] ,   Clearing the Air, Quit Smoking Today   - an online booklet. http://www.smokefree.gov/pubs/clearing_the_air.pdf  Smokefree.gov http://smokefree.gov/  QuitNet http://www.quitnet.com/    2103-0133 [employer], [address] [place] [address]. All rights reserved. This information is not intended as a substitute for professional medical care. Always follow your healthcare professional's instructions.    The Benefits of Living Smoke Free  What do you want to gain from quitting? Check off some reasons to quit.  Health Benefits  ___ Reduce my risk of lung cancer, heart disease, chronic lung disease  ___ Have fewer wrinkles and softer skin  ___ Improve my sense of taste and smell  ___ For pregnant women--reduce the risk of having a miscarriage, stillbirth, premature  birth, or low-birth-weight baby  Personal Benefits  ___ Feel more in control of my life  ___ Have better-smelling hair, breath, clothes, home, and car  ___ Save time by not having to take smoke breaks, buy cigarettes, or hunt for a light  ___ Have whiter teeth  Family Benefits  ___ Reduce my children s respiratory tract infections  ___ Set a good example for my children  ___ Reduce my family s cancer risk  Financial Benefits  ___ Save hundreds of dollars each year that would be spent on cigarettes  ___ Save money on medical bills  ___ Save on life, health, and car insurance premiums    Those Dollars Add Up!  Cigarettes are expensive, and getting more expensive all the time. Do you realize how much money you are spending on cigarettes per year? What is the average amount you spend on a pack of cigarettes? What is the average number of packs that you smoke per day? Using your answers to these questions, fill in this formula to help you find out:  ($ _____ per pack) ×  ( _____ number of packs per day) × (365 days) =  $ _____ yearly cost of smoking  Besides tobacco, there are other costs, including extra cleaning bills and replacement costs for clothing and furniture; medical expenses for smoking-related illnesses; and higher health, life, and car insurance premiums.    Cigars and Pipes Count Too!  Cigars and pipes are also dangerous. So are smokeless (chewing) tobacco and snuff. All of these products contain nicotine, a highly addictive substance that has harmful effects on your body. Quitting smoking means giving up all tobacco products.      3952-1180 [facility], [address] [place] [address], [place] [address]. All rights reserved. This information is not intended as a substitute for professional medical care. Always follow your healthcare professional's instructions.

## 2019-12-23 NOTE — LETTER
December 26, 2019      Piter Gaines  [address] [place] [address]        Dear Piter,    Below are your lab results from your recent visit, I was able to add on the Prostate Specific Antigen (PSA) to the blood we had already drawn - this level was normal so there is not concern for prostate cancer at this time. As we discussed in clinic, your urinary symptoms are likely from Benign Prostate Hyperplasia (BPH) which is common as we men get older. Please start the Flomax as prescribed which should help your symptoms - again, no concern for prostate cancer given the above normal result.      Resulted Orders   PSA Screening ([facility] Onln[facility])   Result Value Ref Range    PSA 3.0 0.0 - 3.5 ng/mL    Narrative    Test performed by:  [facility]  [address] [place] [address]  Method is Abbott Prostate-Specific Antigen (PSA)  Standard-WHO 1st International (90:10)       If you have any questions, please call the clinic to make an appointment.    Sincerely,    Gildardo Bundy MD

## 2019-12-23 NOTE — PROGRESS NOTES
Assessment and Plan   1. Esophagitis  Symptomatically doing well and has f/u scheduled with [facility] next month.    2. Tobacco abuse  Currently 1/2 PPD.  - Tobacco Cessation - Order to Satisfy Health Maintenance  - varenicline (CHANTIX BETTY) 0.5 MG X 11 & 1 MG X 42 tablet; Take 0.5 mg tab daily for 3 days, THEN 0.5 mg tab twice daily for 4 days, THEN 1 mg twice daily.  Dispense: 53 tablet; Refill: 0    3. Tobacco abuse counseling  Recommended therapy as well via QUITLINE, but patient declined. Quit date of 1/1/2020 and discussed dosing of the below. Close f/u in 1 month.  - varenicline (CHANTIX BETTY) 0.5 MG X 11 & 1 MG X 42 tablet; Take 0.5 mg tab daily for 3 days, THEN 0.5 mg tab twice daily for 4 days, THEN 1 mg twice daily.  Dispense: 53 tablet; Refill: 0    4. Essential hypertension  Above goal today, reviewed past levels and he is usually on the border, discussed going up on Losartan but will hold off since starting the below.    5. Benign prostatic hyperplasia with nocturia  Symptoms consistent with the above, strongly recommended he allow me to do a SYLVAIN but he declined. No PSA on file so attempting to add on to lab drawn today.  - tamsulosin (FLOMAX) 0.4 MG capsule; Take 1 capsule (0.4 mg) by mouth daily  Dispense: 30 capsule; Refill: 0    Follow up in 1 Month(s).    Options for treatment and follow-up care were reviewed with the patient and/or guardian. Piter Gaines and/or guardian engaged in the decision making process and verbalized understanding of the options discussed and agreed with the final plan.    Gildardo Bundy MD  [facility], PGY-3    Precepted patient with Dr. Shahzad Aponte       HPI:   Piter Gaines is a 54 year old male who presents to clinic today for   Chief Complaint   Patient presents with     Follow Up     has been going good no flare ups      Medication Reconciliation     completed needs refills and attention      Since  last visit, patient has not had any flares in his esophagitis symptoms. He was not able to get the Carafate, but has been stable with his PPI and H2 blocker. He goes in at the end of January for his EGD and Colonoscopy.    Patient has thought more about quitting smoking, would like to quit on 1/1/2020. Has tried patches in the past and they would not stay.    He has noticed urinary frequency and nocturia, has difficulty with getting large amounts of urine out. No pain, fever, chills.         Review of Systems:     Constitutional, HEENT, cardiovascular, pulmonary, GI, musculoskeletal, neuro, skin, and psych systems are negative, except as otherwise noted.          PMHX:   Active Problems List  Patient Active Problem List   Diagnosis     Tobacco use     Caloric malnutrition (H)     Essential hypertension     Esophagitis     Moderate episode of recurrent major depressive disorder (H)     Seborrheic Keratosis     Active problem list reviewed and updated.    Current Medications  Current Outpatient Medications   Medication Sig Dispense Refill     amLODIPine (NORVASC) 10 MG tablet Take 1 tablet (10 mg) by mouth daily 90 tablet 1     citalopram (CELEXA) 40 MG tablet Take 1 tablet (40 mg) by mouth daily 90 tablet 1     cyclobenzaprine (FLEXERIL) 10 MG tablet Take 1 tablet (10 mg) by mouth 3 times daily as needed for muscle spasms 60 tablet 0     famotidine (PEPCID) 20 MG tablet Take 1 tablet (20 mg) by mouth At Bedtime 90 tablet 0     gabapentin (NEURONTIN) 300 MG capsule Take 1 capsule (300 mg) by mouth 3 times daily 90 capsule 2     losartan (COZAAR) 50 MG tablet Take 1 tablet (50 mg) by mouth daily 90 tablet 1     omeprazole (PRILOSEC) 20 MG DR capsule Take 1 capsule (20 mg) by mouth 2 times daily 180 capsule 1     tamsulosin (FLOMAX) 0.4 MG capsule Take 1 capsule (0.4 mg) by mouth daily 30 capsule 0     varenicline (CHANTIX BETTY) 0.5 MG X 11 & 1 MG X 42 tablet Take 0.5 mg tab daily for 3 days, THEN 0.5 mg tab twice daily  "for 4 days, THEN 1 mg twice daily. 53 tablet 0     Medication list reviewed and updated.    Social History  Social History     Tobacco Use     Smoking status: Current Every Day Smoker     Types: Cigarettes     Smokeless tobacco: Never Used     Tobacco comment: 1/2 pack a day     Substance Use Topics     Alcohol use: No     Alcohol/week: 14.0 standard drinks     Types: 6 Cans of beer, 8 Shots of liquor per week     Comment: Quit in 2017 in June     Drug use: Yes     Types: Marijuana     History   Drug Use     Types: Marijuana     Family History  Family History   Problem Relation Age of Onset     Bone Cancer Mother      Breast Cancer Mother      Diabetes Father      Kidney Disease Father      Diabetes Sister      Cerebrovascular Disease Brother      Allergies  Allergies   Allergen Reactions     No Known Allergies           Physical Exam:     Vitals:    12/23/19 1026   BP: (!) 142/87   Pulse: 80   Resp: 18   Temp: 98.2  F (36.8  C)   TempSrc: Oral   SpO2: 96%   Weight: 84.4 kg (186 lb)   Height: 1.78 m (5' 10.08\")     Body mass index is 26.63 kg/m .    GENERAL APPEARANCE: alert, appears stated age, no acute distress  HEENT: Eyes grossly normal to inspection  RESP: lungs clear to auscultation - no rales, rhonchi, or wheezes  CV: regular rate and rhythm, no murmur, click, rub, or gallop  ABDOMEN: soft, nontender   MSK: extremities normal, no gross deformities noted, no lower extremity edema   NEURO: sensory exam grossly normal, mentation appears intact and speech normal  PSYCH: mood and affect normal/bright  : declined by patient  "

## 2019-12-24 NOTE — RESULT ENCOUNTER NOTE
Please mail a letter to the patient with the following:    Mr. Gaines,    Below are your lab results from your recent visit, I was able to add on the Prostate Specific Antigen (PSA) to the blood we had already drawn - this level was normal so there is not concern for prostate cancer at this time. As we discussed in clinic, your urinary symptoms are likely from Benign Prostate Hyperplasia (BPH) which is common as we men get older. Please start the Flomax as prescribed which should help your symptoms - again, no concern for prostate cancer given the above normal result.    If you have any further questions or concerns, please do not hesitate to reach out to my office.    I look forward to seeing you in the future!    Take care,  Gildardo Bundy MD

## 2019-12-26 LAB
GAMMA INTERFERON BACKGROUND BLD IA-ACNC: 0.21 IU/ML
M TB IFN-G BLD-IMP: NEGATIVE
MITOGEN IGNF BCKGRD COR BLD-ACNC: -0.02 IU/ML
MITOGEN IGNF BCKGRD COR BLD-ACNC: 0 IU/ML
QTF INTERPRETATION: NORMAL
QTF MITOGEN - NIL: 6.74 IU/ML

## 2019-12-27 NOTE — PROGRESS NOTES
I have personally reviewed the history and examination as documented by Dr. Bundy.  I was present during key portions of the visit and agree with the assessment and plan as documented for 54 yr old male with HTN, BPH, tobacco use, esophagitis here for follow-up. Trial of chantix for smoking cessation.  Trial of Flomax for BPH. Precautions given. Anticipatory guidance given.     Shahzad Aponte MD  December 26, 2019  6:32 PM

## 2019-12-31 ENCOUNTER — TELEPHONE (OUTPATIENT)
Dept: FAMILY MEDICINE | Facility: CLINIC | Age: 54
End: 2019-12-31

## 2019-12-31 NOTE — TELEPHONE ENCOUNTER
Spoke to patient in regards to negative TB results from exposure in 2018 in clinic. Pt did not have any other questions.

## 2020-01-27 DIAGNOSIS — R35.1 BENIGN PROSTATIC HYPERPLASIA WITH NOCTURIA: ICD-10-CM

## 2020-01-27 DIAGNOSIS — N40.1 BENIGN PROSTATIC HYPERPLASIA WITH NOCTURIA: ICD-10-CM

## 2020-01-28 DIAGNOSIS — R35.1 BENIGN PROSTATIC HYPERPLASIA WITH NOCTURIA: ICD-10-CM

## 2020-01-28 DIAGNOSIS — N40.1 BENIGN PROSTATIC HYPERPLASIA WITH NOCTURIA: ICD-10-CM

## 2020-01-28 RX ORDER — TAMSULOSIN HYDROCHLORIDE 0.4 MG/1
0.4 CAPSULE ORAL DAILY
Qty: 30 CAPSULE | Refills: 0 | Status: SHIPPED | OUTPATIENT
Start: 2020-01-28 | End: 2020-05-28

## 2020-01-30 RX ORDER — TAMSULOSIN HYDROCHLORIDE 0.4 MG/1
0.4 CAPSULE ORAL DAILY
Qty: 90 CAPSULE | OUTPATIENT
Start: 2020-01-30

## 2020-02-06 DIAGNOSIS — K20.90 ESOPHAGITIS: ICD-10-CM

## 2020-02-06 DIAGNOSIS — I10 ESSENTIAL HYPERTENSION: ICD-10-CM

## 2020-02-11 RX ORDER — AMLODIPINE BESYLATE 10 MG/1
10 TABLET ORAL DAILY
Qty: 90 TABLET | Refills: 0 | Status: SHIPPED | OUTPATIENT
Start: 2020-02-11 | End: 2020-04-01

## 2020-02-11 RX ORDER — FAMOTIDINE 20 MG/1
20 TABLET, FILM COATED ORAL AT BEDTIME
Qty: 90 TABLET | Refills: 0 | Status: SHIPPED | OUTPATIENT
Start: 2020-02-11 | End: 2020-04-01

## 2020-03-25 DIAGNOSIS — F10.20 UNCOMPLICATED ALCOHOL DEPENDENCE (H): ICD-10-CM

## 2020-03-27 RX ORDER — GABAPENTIN 300 MG/1
300 CAPSULE ORAL 3 TIMES DAILY
Qty: 90 CAPSULE | Refills: 1 | Status: SHIPPED | OUTPATIENT
Start: 2020-03-27 | End: 2020-08-10

## 2020-04-01 DIAGNOSIS — K20.90 ESOPHAGITIS: ICD-10-CM

## 2020-04-01 DIAGNOSIS — I10 ESSENTIAL HYPERTENSION: ICD-10-CM

## 2020-04-01 DIAGNOSIS — F32.A DEPRESSION, UNSPECIFIED DEPRESSION TYPE: ICD-10-CM

## 2020-04-06 NOTE — TELEPHONE ENCOUNTER
[facility] phone call message- patient requesting a refill:    Full Medication Name: omeprazole (PRILOSEC)    Dose:  20 MG  capsule     Pharmacy confirmed as   [id number] - [place], [place] - [address] [place] [address]  Phone: [phone] Fax: [phone]  : Yes    Additional Comments: Per patient he also needs a refill on Omeprazole. Please call and advise.      OK to leave a message on voice mail? Yes    Primary language: English      needed? No    Call taken on April 6, 2020 at 9:06 AM by Joleen Marroquin

## 2020-04-07 RX ORDER — FAMOTIDINE 20 MG/1
20 TABLET, FILM COATED ORAL AT BEDTIME
Qty: 90 TABLET | Refills: 1 | Status: SHIPPED | OUTPATIENT
Start: 2020-04-07 | End: 2020-07-10

## 2020-04-07 RX ORDER — LOSARTAN POTASSIUM 50 MG/1
50 TABLET ORAL DAILY
Qty: 90 TABLET | Refills: 1 | Status: SHIPPED | OUTPATIENT
Start: 2020-04-07 | End: 2020-09-17

## 2020-04-07 RX ORDER — CITALOPRAM HYDROBROMIDE 40 MG/1
40 TABLET ORAL DAILY
Qty: 90 TABLET | Refills: 1 | Status: SHIPPED | OUTPATIENT
Start: 2020-04-07 | End: 2020-10-02

## 2020-04-07 RX ORDER — AMLODIPINE BESYLATE 10 MG/1
10 TABLET ORAL DAILY
Qty: 90 TABLET | Refills: 1 | Status: SHIPPED | OUTPATIENT
Start: 2020-04-07 | End: 2020-12-07

## 2020-04-13 ENCOUNTER — TRANSFERRED RECORDS (OUTPATIENT)
Dept: HEALTH INFORMATION MANAGEMENT | Facility: CLINIC | Age: 55
End: 2020-04-13

## 2020-04-27 ENCOUNTER — VIRTUAL VISIT (OUTPATIENT)
Dept: FAMILY MEDICINE | Facility: CLINIC | Age: 55
End: 2020-04-27
Payer: COMMERCIAL

## 2020-04-27 VITALS — HEIGHT: 69 IN | BODY MASS INDEX: 27.47 KG/M2

## 2020-04-27 DIAGNOSIS — R19.7 DIARRHEA OF PRESUMED INFECTIOUS ORIGIN: Primary | ICD-10-CM

## 2020-04-27 NOTE — PROGRESS NOTES
"Family Medicine Telephone Visit Note               Telephone Visit Consent   Patient was verbally read the following and verbal consent was obtained.    \"This telephone visit will be conducted via a call between you and your physician/provider. We have found that certain health care needs can be provided without the need for a physical exam.  This service lets us provide the care you need with a short phone conversation.  If a prescription is necessary we can send it directly to your pharmacy.  If lab work is needed we can place an order for that and you can then stop by our lab to have the test done at a later time.    Telephone visits are billed at different rates depending on your insurance coverage. During this emergency period, for some insurers they may be billed the same as an in-person visit.  Please reach out to your insurance provider with any questions.    If during the course of the call the physician/provider feels a telephone visit is not appropriate, you will not be charged for this service.\"    Name person giving consent:  Patient   Date verbal consent given:  4/27/2020  Time verbal consent given:  1:17 PM           Chief Complaint   Patient presents with     Diarrhea     Diarrhea -2 weeks ago, dehdratation, kidney issue. Pt still have loose stool                HPI   Patients name: Piter  Appointment start time:  1:23 PM    Seen in the ED 4/13 (about 2 weeks ago)  Notes reviewed    Feels like things are better, but still having loose stools  Uses some anti diarrhea intermittently  Nausea is all gone  Not associated with foods  No blood in stools  Going 5x per day - normal for him is 2x  No fevers  He does not recall if he left a stool sample  No travel  No abdominal pain - just a \"gurgle\"        Current Outpatient Medications   Medication Sig Dispense Refill     amLODIPine (NORVASC) 10 MG tablet Take 1 tablet (10 mg) by mouth daily 90 tablet 1     citalopram (CELEXA) 40 MG tablet Take 1 tablet (40 " "mg) by mouth daily 90 tablet 1     gabapentin (NEURONTIN) 300 MG capsule Take 1 capsule (300 mg) by mouth 3 times daily 90 capsule 1     losartan (COZAAR) 50 MG tablet Take 1 tablet (50 mg) by mouth daily 90 tablet 1     omeprazole (PRILOSEC) 20 MG DR capsule Take 1 capsule (20 mg) by mouth 2 times daily 180 capsule 1     cyclobenzaprine (FLEXERIL) 10 MG tablet Take 1 tablet (10 mg) by mouth 3 times daily as needed for muscle spasms 60 tablet 0     famotidine (PEPCID) 20 MG tablet Take 1 tablet (20 mg) by mouth At Bedtime 90 tablet 1     tamsulosin (FLOMAX) 0.4 MG capsule Take 1 capsule (0.4 mg) by mouth daily 30 capsule 0     varenicline (CHANTIX BETTY) 0.5 MG X 11 & 1 MG X 42 tablet Take 0.5 mg tab daily for 3 days, THEN 0.5 mg tab twice daily for 4 days, THEN 1 mg twice daily. 53 tablet 0     Allergies   Allergen Reactions     No Known Allergies               Review of Systems:     Complete ROS negative unless noted above         Physical Exam:     Ht 1.753 m (5' 9\")   BMI 27.47 kg/m    Estimated body mass index is 27.47 kg/m  as calculated from the following:    Height as of this encounter: 1.753 m (5' 9\").    Weight as of 12/23/19: 84.4 kg (186 lb).    Exam:  Constitutional: healthy, alert and no distress  Psychiatric: mentation appears normal and affect normal/bright  Resp: Talking in complete sentences, no wheeze          Assessment and Plan   1. Diarrhea of presumed infectious origin  Patient is getting to borderline of chronic work up of diarrhea - would still treat this as acute given change from his normal 2 BMs per day for 2-3 weeks. No pain, no process on CT abdomen on ED visit. He does have Hx of EtOH abuse and we did not discuss this - would address in follow up. No recent Abx use. Not associated with foods. A little old for inflammatory process to start. Will start with basic work up as below - if negative may need more of a chronic work up.    - Stool Culture ([facility]); Future  - Ova And Parasite " - Stool ([facility]); Future  - C. Difficile Toxin By PCR ([facility]); Future    Appointment end time: 1:37 PM  This is a telephone visit that took 14 minutes.      Clinician location:  [facility]  I precepted today with Dr. Garcia.

## 2020-04-28 NOTE — PROGRESS NOTES
Preceptor Attestation:  I discussed the patient with the resident. I have verified the content of the note, which accurately reflects my assessment of the patient and the plan of care.  Supervising Physician:Ermelinda Garcia MD  [facility]

## 2020-04-29 DIAGNOSIS — R19.7 DIARRHEA OF PRESUMED INFECTIOUS ORIGIN: ICD-10-CM

## 2020-04-29 LAB
C. DIFFICILE TOXIN BY PCR: NEGATIVE
RIBOTYPE 027/NAP1/BI: NORMAL

## 2020-04-30 LAB
O+P SPEC MICRO: NORMAL
SHIGA TOXIN 1: NEGATIVE
SHIGA TOXIN 2: NEGATIVE

## 2020-05-02 LAB — CULTURE: NORMAL

## 2020-05-07 NOTE — RESULT ENCOUNTER NOTE
I tried to call patient without success. Please try another day or later - all the stool studies were negative/normal. I hope he is feeling better.   JVM

## 2020-05-18 ENCOUNTER — VIRTUAL VISIT (OUTPATIENT)
Dept: FAMILY MEDICINE | Facility: CLINIC | Age: 55
End: 2020-05-18
Payer: COMMERCIAL

## 2020-05-18 DIAGNOSIS — R23.8 BLISTERS OF MULTIPLE SITES: ICD-10-CM

## 2020-05-18 DIAGNOSIS — L30.9 DERMATITIS: Primary | ICD-10-CM

## 2020-05-18 RX ORDER — BENZOCAINE/MENTHOL 6 MG-10 MG
LOZENGE MUCOUS MEMBRANE 2 TIMES DAILY
Qty: 30 G | Refills: 1 | Status: SHIPPED | OUTPATIENT
Start: 2020-05-18 | End: 2020-10-07

## 2020-05-18 NOTE — PROGRESS NOTES
I have personally reviewed the telephone encounter as documented by Dr. Bellamy.  I agree with the assessment and plan as documented for 54 yr old male evaluated for rash.  ?contact derm vs concern for shingles. Pt declined office visit. Trial of cortisone cream.  Precautions given. Anticipatory guidance given.     Shahzad Aponte MD  May 18, 2020  10:15 AM

## 2020-05-18 NOTE — PROGRESS NOTES
"Family Medicine Telephone Visit Note               Telephone Visit Consent   Patient was verbally read the following and verbal consent was obtained.    \"Telephone visits are billed at different rates depending on your insurance coverage. During this emergency period, for some insurers they may be billed the same as an in-person visit.  Please reach out to your insurance provider with any questions.  If during the course of the call the physician/provider feels a telephone visit is not appropriate, you will not be charged for this service.\"    Name person giving consent:  Patient   Date verbal consent given:  5/18/2020  Time verbal consent given:  9:53 AM           Chief Complaint   Patient presents with     RECHECK     test results from hospital              [facility]   Patients name: Piter  Appointment start time:  9:58 AM    Wants to discuss stool results from last visit - symptoms improved    Having blisters on his calf - left side  He says it is very itchy  Says it is about 5\" up from heel  Area affected is about the size of half dollar  No known contacts  No other parts of body affected  Thinks he had this 15 years ago  Has never been diagnosed with shingles  Does not burn      Current Outpatient Medications   Medication Sig Dispense Refill     citalopram (CELEXA) 40 MG tablet Take 1 tablet (40 mg) by mouth daily 90 tablet 1     cyclobenzaprine (FLEXERIL) 10 MG tablet Take 1 tablet (10 mg) by mouth 3 times daily as needed for muscle spasms 60 tablet 0     famotidine (PEPCID) 20 MG tablet Take 1 tablet (20 mg) by mouth At Bedtime 90 tablet 1     gabapentin (NEURONTIN) 300 MG capsule Take 1 capsule (300 mg) by mouth 3 times daily 90 capsule 1     losartan (COZAAR) 50 MG tablet Take 1 tablet (50 mg) by mouth daily 90 tablet 1     tamsulosin (FLOMAX) 0.4 MG capsule Take 1 capsule (0.4 mg) by mouth daily 30 capsule 0     amLODIPine (NORVASC) 10 MG tablet Take 1 tablet (10 mg) by mouth daily 90 tablet 1     omeprazole " "(PRILOSEC) 20 MG DR capsule Take 1 capsule (20 mg) by mouth 2 times daily 180 capsule 1     varenicline (CHANTIX BETTY) 0.5 MG X 11 & 1 MG X 42 tablet Take 0.5 mg tab daily for 3 days, THEN 0.5 mg tab twice daily for 4 days, THEN 1 mg twice daily. 53 tablet 0     Allergies   Allergen Reactions     No Known Allergies               Review of Systems:     Complete ROS negative unless noted above         Physical Exam:     There were no vitals taken for this visit.  Estimated body mass index is 27.47 kg/m  as calculated from the following:    Height as of 4/27/20: 1.753 m (5' 9\").    Weight as of 12/23/19: 84.4 kg (186 lb).    Exam:  Constitutional: healthy, alert and no distress  Psychiatric: mentation appears normal and affect normal/bright  Resp: talking in complete sentences, no distress        Assessment and Plan   1. Dermatitis  2. Blisters of multiple sites  Most suspicious for either a contact irritant vs shingles. Will do symptomatic treatment at this time. Do not think he warrants an antiviral at this point as the area is so small (half dollar size) and does not hurt making varicella less likely. Discussed that if more lesions appear he should come to clinic to get a visual exam and/or possibly deroof a lesion for PCR testing.   - hydrocortisone (CORTAID) 1 % external cream; Apply topically 2 times daily  Dispense: 30 g; Refill: 1    Appointment end time: 10:08 AM  This is a telephone visit that took 10 minutes.      Clinician location:  [facility]  I precepted today with Dr. Aponte.            "

## 2020-05-22 DIAGNOSIS — R35.1 BENIGN PROSTATIC HYPERPLASIA WITH NOCTURIA: ICD-10-CM

## 2020-05-22 DIAGNOSIS — N40.1 BENIGN PROSTATIC HYPERPLASIA WITH NOCTURIA: ICD-10-CM

## 2020-05-28 RX ORDER — TAMSULOSIN HYDROCHLORIDE 0.4 MG/1
0.4 CAPSULE ORAL DAILY
Qty: 30 CAPSULE | Refills: 0 | Status: SHIPPED | OUTPATIENT
Start: 2020-05-28 | End: 2020-06-17

## 2020-05-29 DIAGNOSIS — R35.1 BENIGN PROSTATIC HYPERPLASIA WITH NOCTURIA: ICD-10-CM

## 2020-05-29 DIAGNOSIS — N40.1 BENIGN PROSTATIC HYPERPLASIA WITH NOCTURIA: ICD-10-CM

## 2020-06-02 RX ORDER — TAMSULOSIN HYDROCHLORIDE 0.4 MG/1
0.4 CAPSULE ORAL DAILY
Qty: 30 CAPSULE | OUTPATIENT
Start: 2020-06-02

## 2020-06-16 ENCOUNTER — VIRTUAL VISIT (OUTPATIENT)
Dept: FAMILY MEDICINE | Facility: CLINIC | Age: 55
End: 2020-06-16
Payer: COMMERCIAL

## 2020-06-16 DIAGNOSIS — L98.9 SKIN PROBLEM: Primary | ICD-10-CM

## 2020-06-16 NOTE — PROGRESS NOTES
"Family Medicine Telephone Visit Note         Telephone Visit Consent   Patient was verbally read the following and verbal consent was obtained.    \"Telephone visits are billed at different rates depending on your insurance coverage. During this emergency period, for some insurers they may be billed the same as an in-person visit.  Please reach out to your insurance provider with any questions.  If during the course of the call the physician/provider feels a telephone visit is not appropriate, you will not be charged for this service.\"    Name person giving consent:  Patient   Date verbal consent given:  6/16/2020  Time verbal consent given:  8:34 AM    Chief Complaint   Patient presents with     Shingles     Both legs     Derm Problem     Medication Problem     Hydrocortisone not helping             HPI   Patients name: Piter  Appointment start time:  10:40 AM    1) rash  -Onset: 5/18  -Started as blistering rash on left calf  -Multiple, separate lesions  -Then, has spread over to right calf  -History: starts as hard, red bumps that evolve into clear blisters with red bases that pop over time.  They then resolve  -Then are continuing to crop up  -extremely itchy, no pain  -He had a televisit on 5/18 for this.  Perscribed hydrocortizone cream, which didn't help.  -No new soaps, detergents, cologne, medications  -No yard work or hiking over past couple months  -No bug bites  -No fever, chills, mucosal blister, respiratory symptoms, GI symptoms, bloody stool, discolored urine        Current Outpatient Medications   Medication Sig Dispense Refill     amLODIPine (NORVASC) 10 MG tablet Take 1 tablet (10 mg) by mouth daily 90 tablet 1     citalopram (CELEXA) 40 MG tablet Take 1 tablet (40 mg) by mouth daily 90 tablet 1     cyclobenzaprine (FLEXERIL) 10 MG tablet Take 1 tablet (10 mg) by mouth 3 times daily as needed for muscle spasms 60 tablet 0     famotidine (PEPCID) 20 MG tablet Take 1 tablet (20 mg) by mouth At Bedtime " "90 tablet 1     gabapentin (NEURONTIN) 300 MG capsule Take 1 capsule (300 mg) by mouth 3 times daily 90 capsule 1     hydrocortisone (CORTAID) 1 % external cream Apply topically 2 times daily 30 g 1     losartan (COZAAR) 50 MG tablet Take 1 tablet (50 mg) by mouth daily 90 tablet 1     omeprazole (PRILOSEC) 20 MG DR capsule Take 1 capsule (20 mg) by mouth 2 times daily 180 capsule 1     tamsulosin (FLOMAX) 0.4 MG capsule Take 1 capsule (0.4 mg) by mouth daily 30 capsule 0     varenicline (CHANTIX BETTY) 0.5 MG X 11 & 1 MG X 42 tablet Take 0.5 mg tab daily for 3 days, THEN 0.5 mg tab twice daily for 4 days, THEN 1 mg twice daily. 53 tablet 0     Allergies   Allergen Reactions     No Known Allergies               Review of Systems:     6-point ROS reviewed and negative unless otherwise noted in HPI           Physical Exam:     There were no vitals taken for this visit.  Estimated body mass index is 27.47 kg/m  as calculated from the following:    Height as of 4/27/20: 1.753 m (5' 9\").    Weight as of 12/23/19: 84.4 kg (186 lb).    Exam:  Constitutional: healthy, alert and no distress  Psychiatric: mentation appears normal and affect normal/bright          Assessment and Plan     (L98.9) Skin problem  (primary encounter diagnosis)  Comment: 55 yo M presenting with 1-month of blistering rash of lower extremities.  PMH of active tobacco use.  No obvious explanation for rash from history.  Trial of low-intensity steroid cream not effective.  Will have patient come in person to examine rash +/- biopsy  Plan:   -Come into clinic tomorrow        Refilled medications that would be required in the next 3 months.     After Visit Information:  Patient declined AVS     No follow-ups on file.    Appointment end time: 10:52 AM  This is a telephone visit that took 12 minutes.      Clinician location:  [facility]    Kody Bangura MD  I precepted today with Dr. Garcia.  "

## 2020-06-17 ENCOUNTER — OFFICE VISIT (OUTPATIENT)
Dept: FAMILY MEDICINE | Facility: CLINIC | Age: 55
End: 2020-06-17
Payer: COMMERCIAL

## 2020-06-17 VITALS
SYSTOLIC BLOOD PRESSURE: 159 MMHG | HEART RATE: 80 BPM | OXYGEN SATURATION: 96 % | TEMPERATURE: 98.2 F | WEIGHT: 180 LBS | BODY MASS INDEX: 26.58 KG/M2 | RESPIRATION RATE: 16 BRPM | DIASTOLIC BLOOD PRESSURE: 104 MMHG

## 2020-06-17 DIAGNOSIS — R21 BULLOUS RASH: Primary | ICD-10-CM

## 2020-06-17 DIAGNOSIS — M54.50 CHRONIC MIDLINE LOW BACK PAIN WITHOUT SCIATICA: ICD-10-CM

## 2020-06-17 DIAGNOSIS — N40.1 BENIGN PROSTATIC HYPERPLASIA WITH NOCTURIA: ICD-10-CM

## 2020-06-17 DIAGNOSIS — G89.29 CHRONIC MIDLINE LOW BACK PAIN WITHOUT SCIATICA: ICD-10-CM

## 2020-06-17 DIAGNOSIS — R35.1 BENIGN PROSTATIC HYPERPLASIA WITH NOCTURIA: ICD-10-CM

## 2020-06-17 RX ORDER — CYCLOBENZAPRINE HCL 10 MG
10 TABLET ORAL 3 TIMES DAILY PRN
Qty: 60 TABLET | Refills: 0 | Status: SHIPPED | OUTPATIENT
Start: 2020-06-17 | End: 2020-09-16

## 2020-06-17 RX ORDER — TRIAMCINOLONE ACETONIDE 1 MG/G
OINTMENT TOPICAL 2 TIMES DAILY
Qty: 80 G | Refills: 1 | Status: SHIPPED | OUTPATIENT
Start: 2020-06-17 | End: 2021-03-09

## 2020-06-17 RX ORDER — TAMSULOSIN HYDROCHLORIDE 0.4 MG/1
0.4 CAPSULE ORAL DAILY
Qty: 30 CAPSULE | Refills: 0 | Status: SHIPPED | OUTPATIENT
Start: 2020-06-17 | End: 2020-07-10

## 2020-06-17 NOTE — PROGRESS NOTES
"       HPI:       Piter Gaines is a 54 year old  male with a significant past medical history of tobacco use who presents for follow up of concern(s) listed below    1) blisters  -Patient came in after having telephone visit yesterday  -The following are yesterdays notes:  \"Started on 5/18 with a blistering rash on left calf.  There are multiple, separate lesions, which have now spread to his right calf as well.  They start as hard, red bumps that evolve into clear blisters with red bases that pop over time.  They then resolve scar over and disappear.   They are continuing to crop up.  extremely itchy, but no pain.  Had televisit on 5/18 and was perscribed hydrocortizone cream, which didn't help.  He denies new soaps, detergents, cologne, medications.  No yard work or hiking over past couple months.  No bug bites.  No fever, chills, mucosal blister, respiratory symptoms, GI symptoms, bloody stool, discolored urine\"  -They are continuing as per discussion yesterday           PMHX:     Patient Active Problem List   Diagnosis     Tobacco use     Caloric malnutrition (H)     HTN (hypertension)     Esophagitis     Moderate episode of recurrent major depressive disorder (H)     Seborrheic Keratosis       Past Surgical History:   Procedure Laterality Date     FOREARM SURGERY Left 1980's       Current Outpatient Medications   Medication Sig Dispense Refill     amLODIPine (NORVASC) 10 MG tablet Take 1 tablet (10 mg) by mouth daily 90 tablet 1     citalopram (CELEXA) 40 MG tablet Take 1 tablet (40 mg) by mouth daily 90 tablet 1     cyclobenzaprine (FLEXERIL) 10 MG tablet Take 1 tablet (10 mg) by mouth 3 times daily as needed for muscle spasms 60 tablet 0     famotidine (PEPCID) 20 MG tablet Take 1 tablet (20 mg) by mouth At Bedtime 90 tablet 1     gabapentin (NEURONTIN) 300 MG capsule Take 1 capsule (300 mg) by mouth 3 times daily 90 capsule 1     hydrocortisone (CORTAID) 1 % external cream Apply topically 2 times daily 30 g 1 "     losartan (COZAAR) 50 MG tablet Take 1 tablet (50 mg) by mouth daily 90 tablet 1     omeprazole (PRILOSEC) 20 MG DR capsule Take 1 capsule (20 mg) by mouth 2 times daily 180 capsule 1     tamsulosin (FLOMAX) 0.4 MG capsule Take 1 capsule (0.4 mg) by mouth daily 30 capsule 0     triamcinolone (KENALOG) 0.1 % external ointment Apply topically 2 times daily 80 g 1     varenicline (CHANTIX BETTY) 0.5 MG X 11 & 1 MG X 42 tablet Take 0.5 mg tab daily for 3 days, THEN 0.5 mg tab twice daily for 4 days, THEN 1 mg twice daily. (Patient not taking: Reported on 6/17/2020) 53 tablet 0          Allergies   Allergen Reactions     No Known Allergies        Social History     Socioeconomic History     Marital status:      Spouse name: Not on file     Number of children: Not on file     Years of education: Not on file     Highest education level: Not on file   Occupational History     Not on file   Social Needs     Financial resource strain: Not on file     Food insecurity     Worry: Not on file     Inability: Not on file     Transportation needs     Medical: Not on file     Non-medical: Not on file   Tobacco Use     Smoking status: Current Every Day Smoker     Types: Cigarettes     Smokeless tobacco: Never Used     Tobacco comment: 1/2 pack a day     Substance and Sexual Activity     Alcohol use: No     Alcohol/week: 14.0 standard drinks     Types: 6 Cans of beer, 8 Shots of liquor per week     Comment: Quit in 2017 in June     Drug use: Yes     Types: Marijuana     Sexual activity: Yes     Partners: Female     Birth control/protection: Condom   Lifestyle     Physical activity     Days per week: Not on file     Minutes per session: Not on file     Stress: Not on file   Relationships     Social connections     Talks on phone: Not on file     Gets together: Not on file     Attends [employer]ical service: Not on file     Active member of club or organization: Not on file     Attends meetings of clubs or organizations: Not on file      Relationship status: Not on file     Intimate partner violence     Fear of current or ex partner: Not on file     Emotionally abused: Not on file     Physically abused: Not on file     Forced sexual activity: Not on file   Other Topics Concern     Parent/sibling w/ CABG, MI or angioplasty before 65F 55M? Not Asked   Social History Narrative     Not on file              Review of Systems:     7-point ROS reviewed and negative unless otherwise noted in HPI            Physical Exam:     Vitals:    06/17/20 0858 06/17/20 0915   BP: (!) 156/103 (!) 159/104   Pulse: 80    Resp: 16    Temp: 98.2  F (36.8  C)    TempSrc: Oral    SpO2: 96%    Weight: 81.6 kg (180 lb)      Body mass index is 26.58 kg/m .    GENERAL: healthy, alert and no distress  EYES: Eyes grossly normal to inspection  HENT: nose and mouth without ulcers or lesions  NECK: no asymmetry, masses, or scars  RESP: breathing and speaking comfortably, normal RR  CV: acyanotic  MS: extremities normal- no gross deformities noted  SKIN: 1-cm clear bullae without an erythematous base, it is tense and does not easily break.  Small, non-erythematous papules scattered on both lower extremities.  Contracted, scars present on both legs.  NEURO: mentation intact, speech normal and A&Ox3  PSYCH: affect/mood appropriate      Assessment and Plan     1. Benign prostatic hyperplasia with nocturia  Stable.  No new symptoms.  Needs refill.  - tamsulosin (FLOMAX) 0.4 MG capsule; Take 1 capsule (0.4 mg) by mouth daily  Dispense: 30 capsule; Refill: 0    2. Chronic midline low back pain without sciatica  Stable.  No new red flag symptoms.  Needs refills.  - cyclobenzaprine (FLEXERIL) 10 MG tablet; Take 1 tablet (10 mg) by mouth 3 times daily as needed for muscle spasms  Dispense: 60 tablet; Refill: 0    3. Bullous rash  1-month history of cycling of pruritic papules to bullae.  VS show mildly hypertensive.  Exam as above.  DDx includes contact dermatitis, irritant dermatitis,  porphyria, bullous disease of DM, bug bites.  The first two are most likely.  Will trial higher intensity steroid ointment.  If not improving, will then have to biopsy +/- check other labs.  - triamcinolone (KENALOG) 0.1 % external ointment; Apply topically 2 times daily  Dispense: 80 g; Refill: 1d    Options for treatment and follow-up care were reviewed with the patient and/or guardian. Piter Paraglaurent and/or guardian engaged in the decision making process and verbalized understanding of the options discussed and agreed with the final plan.      Kody Bangura MD    Precepted today with: Edu Hagan MD

## 2020-07-01 NOTE — PROGRESS NOTES
Preceptor Attestation:  Patient's case reviewed and discussed with Kody Bangura MD resident and I evaluated the patient. I agree with written assessment and plan of care.  Supervising Physician:  Edu Hagan MD, MD [facility]

## 2020-07-02 ENCOUNTER — TRANSFERRED RECORDS (OUTPATIENT)
Dept: HEALTH INFORMATION MANAGEMENT | Facility: CLINIC | Age: 55
End: 2020-07-02

## 2020-07-02 ENCOUNTER — OFFICE VISIT (OUTPATIENT)
Dept: FAMILY MEDICINE | Facility: CLINIC | Age: 55
End: 2020-07-02
Payer: COMMERCIAL

## 2020-07-02 VITALS
SYSTOLIC BLOOD PRESSURE: 136 MMHG | WEIGHT: 182 LBS | BODY MASS INDEX: 26.05 KG/M2 | TEMPERATURE: 97.6 F | HEIGHT: 70 IN | RESPIRATION RATE: 16 BRPM | OXYGEN SATURATION: 99 % | HEART RATE: 80 BPM | DIASTOLIC BLOOD PRESSURE: 85 MMHG

## 2020-07-02 DIAGNOSIS — M67.40 GANGLION CYST: ICD-10-CM

## 2020-07-02 DIAGNOSIS — R21 BULLOUS RASH: Primary | ICD-10-CM

## 2020-07-02 ASSESSMENT — MIFFLIN-ST. JEOR: SCORE: 1666.8

## 2020-07-02 NOTE — PROGRESS NOTES
"       HPI:       Piter Gaines is a 54 year old  male with a significant past medical history of tobacco use who presents for follow up of concern(s) listed below    1) bullous rash  \"Started on 5/18 with a blistering rash on left calf.  There are multiple, separate lesions, which have now spread to his right calf as well.  They start as hard, red bumps that evolve into clear blisters with red bases that pop over time.  They then resolve scar over and disappear.   They are continuing to crop up.  extremely itchy, but no pain.  Had televisit on 5/18 and was perscribed hydrocortizone cream, which didn't help.  He denies new soaps, detergents, cologne, medications.  No yard work or hiking over past couple months.  No bug bites.  No fever, chills, mucosal blister, respiratory symptoms, GI symptoms, bloody stool, discolored urine\"  -More controlled and less itchy with steroid ointment, but still cropping up  -Leaves bad scars  -+oral lesions that have gone on for years that occasionally pop up and burn  -Denies fever, chills, weight loss, coca cola colored urine    2) right hand bump  -started several months ago  -on backside of hand and near base of thumb  -not painful         PMHX:     Patient Active Problem List   Diagnosis     Tobacco use     Caloric malnutrition (H)     HTN (hypertension)     Esophagitis     Moderate episode of recurrent major depressive disorder (H)     Seborrheic Keratosis       Past Surgical History:   Procedure Laterality Date     FOREARM SURGERY Left 1980's       Current Outpatient Medications   Medication Sig Dispense Refill     amLODIPine (NORVASC) 10 MG tablet Take 1 tablet (10 mg) by mouth daily 90 tablet 1     citalopram (CELEXA) 40 MG tablet Take 1 tablet (40 mg) by mouth daily 90 tablet 1     cyclobenzaprine (FLEXERIL) 10 MG tablet Take 1 tablet (10 mg) by mouth 3 times daily as needed for muscle spasms 60 tablet 0     gabapentin (NEURONTIN) 300 MG capsule Take 1 capsule (300 mg) by mouth " 3 times daily 90 capsule 1     hydrocortisone (CORTAID) 1 % external cream Apply topically 2 times daily 30 g 1     losartan (COZAAR) 50 MG tablet Take 1 tablet (50 mg) by mouth daily 90 tablet 1     omeprazole (PRILOSEC) 20 MG DR capsule Take 1 capsule (20 mg) by mouth 2 times daily 180 capsule 1     triamcinolone (KENALOG) 0.1 % external ointment Apply topically 2 times daily 80 g 1     famotidine (PEPCID) 20 MG tablet Take 1 tablet (20 mg) by mouth At Bedtime (Patient not taking: Reported on 7/2/2020) 90 tablet 1     tamsulosin (FLOMAX) 0.4 MG capsule Take 1 capsule (0.4 mg) by mouth daily (Patient not taking: Reported on 7/2/2020) 30 capsule 0     varenicline (CHANTIX BETTY) 0.5 MG X 11 & 1 MG X 42 tablet Take 0.5 mg tab daily for 3 days, THEN 0.5 mg tab twice daily for 4 days, THEN 1 mg twice daily. (Patient not taking: Reported on 6/17/2020) 53 tablet 0          Allergies   Allergen Reactions     No Known Allergies        Social History     Socioeconomic History     Marital status:      Spouse name: Not on file     Number of children: Not on file     Years of education: Not on file     Highest education level: Not on file   Occupational History     Not on file   Social Needs     Financial resource strain: Not on file     Food insecurity     Worry: Not on file     Inability: Not on file     Transportation needs     Medical: Not on file     Non-medical: Not on file   Tobacco Use     Smoking status: Current Every Day Smoker     Types: Cigarettes     Smokeless tobacco: Never Used     Tobacco comment: 1/2 pack a day     Substance and Sexual Activity     Alcohol use: No     Alcohol/week: 14.0 standard drinks     Types: 6 Cans of beer, 8 Shots of liquor per week     Comment: Quit in 2017 in June     Drug use: Yes     Types: Marijuana     Sexual activity: Yes     Partners: Female     Birth control/protection: Condom   Lifestyle     Physical activity     Days per week: Not on file     Minutes per session: Not on  "file     Stress: Not on file   Relationships     Social connections     Talks on phone: Not on file     Gets together: Not on file     Attends Restorationist service: Not on file     Active member of club or organization: Not on file     Attends meetings of clubs or organizations: Not on file     Relationship status: Not on file     Intimate partner violence     Fear of current or ex partner: Not on file     Emotionally abused: Not on file     Physically abused: Not on file     Forced sexual activity: Not on file   Other Topics Concern     Parent/sibling w/ CABG, MI or angioplasty before 65F 55M? Not Asked   Social History Narrative     Not on file              Review of Systems:     7-point ROS reviewed and negative unless otherwise noted in HPI            Physical Exam:     Vitals:    07/02/20 0855   BP: 136/85   Pulse: 80   Resp: 16   Temp: 97.6  F (36.4  C)   TempSrc: Oral   SpO2: 99%   Weight: 82.6 kg (182 lb)   Height: 1.77 m (5' 9.69\")     Body mass index is 26.35 kg/m .    GENERAL: healthy, alert and no distress  EYES: Eyes grossly normal to inspection  HENT: nose and mouth without ulcers or lesions  NECK: no asymmetry, masses, or scars  RESP: breathing and speaking comfortably, normal RR  CV: acyanotic  MS: 2-cm, soft, mobile, subcutaneous mass on the R hand dorsum near the base of the thumb  SKIN: flattened, 8-mm clear bullae without surrounding erythema.  Two other clustered scars presented next to this.  Some contracted scars on both legs.  NEURO: mentation intact, speech normal and A&Ox3  PSYCH: affect/mood appropriate        Assessment and Plan     (R21) Bullous rash  (primary encounter diagnosis)  Comment: Still occurring.  Improvement in symptoms with Kenelog.  DDx includes contact dermatitis, irritant dermatitis, bullous disease of DM, erythema multiforme, bullous pemphigoid.  Will perform biopsies for H&E as well as immunofluorescence.  Can continue steroid ointment as needed.  Plan:   -Pathology " Specimen ([facility]),  PUNCH         BIOPSY OF SKIN, FIRST LESION, HC PUNCH BIOPSY         OF SKIN, EA SEPARATE/ADDL LESION, Misc. Wild         Test ([facility])  -RTC in 7-10 days for suture removal    (M67.40) Ganglion cyst  Comment: several month history of right hand dorsum subcutaneous mass.  It looks to be most likely a ganglion cyst.  Discussed briefly what this is, treatment, prognosis.  Will have him discuss further at follow up visit.  Plan:   -Discuss at follow up        Options for treatment and follow-up care were reviewed with the patient and/or guardian. Piter Gaines and/or guardian engaged in the decision making process and verbalized understanding of the options discussed and agreed with the final plan.      Kody Bangura MD    Precepted today with: Arnaldo Rawls MD

## 2020-07-03 DIAGNOSIS — R35.1 BENIGN PROSTATIC HYPERPLASIA WITH NOCTURIA: ICD-10-CM

## 2020-07-03 DIAGNOSIS — N40.1 BENIGN PROSTATIC HYPERPLASIA WITH NOCTURIA: ICD-10-CM

## 2020-07-07 ENCOUNTER — RECORDS - HEALTHEAST (OUTPATIENT)
Dept: ADMINISTRATIVE | Facility: OTHER | Age: 55
End: 2020-07-07

## 2020-07-07 LAB
LAB AP DIAGNOSIS COMMENT: NORMAL
Lab: NORMAL
PATH REPORT.COMMENTS IMP SPEC: NORMAL
PATH REPORT.FINAL DX SPEC: NORMAL
PATH REPORT.GROSS SPEC: NORMAL
PATH REPORT.MICROSCOPIC SPEC OTHER STN: NORMAL
PATH REPORT.RELEVANT HX SPEC: NORMAL

## 2020-07-10 ENCOUNTER — OFFICE VISIT (OUTPATIENT)
Dept: FAMILY MEDICINE | Facility: CLINIC | Age: 55
End: 2020-07-10
Payer: COMMERCIAL

## 2020-07-10 VITALS
TEMPERATURE: 97.7 F | OXYGEN SATURATION: 96 % | WEIGHT: 180 LBS | BODY MASS INDEX: 26.06 KG/M2 | SYSTOLIC BLOOD PRESSURE: 122 MMHG | DIASTOLIC BLOOD PRESSURE: 79 MMHG | RESPIRATION RATE: 16 BRPM | HEART RATE: 73 BPM

## 2020-07-10 DIAGNOSIS — R82.998 DARK URINE: ICD-10-CM

## 2020-07-10 DIAGNOSIS — Z48.02 ENCOUNTER FOR REMOVAL OF SUTURES: ICD-10-CM

## 2020-07-10 DIAGNOSIS — R21 BULLOUS RASH: Primary | ICD-10-CM

## 2020-07-10 DIAGNOSIS — M67.40 GANGLION CYST: ICD-10-CM

## 2020-07-10 LAB
BILIRUBIN UR: ABNORMAL MG/DL
BLOOD UR: NEGATIVE MG/DL
CLARITY, URINE: CLEAR
COLOR UR: YELLOW
GLUCOSE URINE: NEGATIVE
KETONES UR QL: NEGATIVE MG/DL
LEUKOCYTE ESTERASE UR: NEGATIVE
NITRITE UR QL STRIP: NEGATIVE MG/DL
PH UR STRIP: 5.5 [PH] (ref 4.5–8)
PROTEIN UR: ABNORMAL MG/DL
SP GR UR STRIP: 1.02 (ref 1–1.03)
UROBILINOGEN UR STRIP-ACNC: 1 E.U./DL

## 2020-07-10 RX ORDER — TAMSULOSIN HYDROCHLORIDE 0.4 MG/1
0.4 CAPSULE ORAL DAILY
Qty: 90 CAPSULE | Refills: 1 | Status: SHIPPED | OUTPATIENT
Start: 2020-07-10 | End: 2020-08-10

## 2020-07-10 NOTE — PROCEDURES
Date: 7/2/20     Performed by: Dr. Bangura  Supervised by: Dr. Rawls     Procedure: punch biopsies (x3) with local anesthesia, suture repair  Consent: verbal and written obtained  -Discussed steps of procedure  -Discussed risks and benefits  -Discussed alternatives to this procedure  -Patient demonstrated understanding of the above     Procedure: Patient was placed in a sitting position with left leg up on additional chair for the procedure.  The area was cleaned with alcohol wipes, and the sites of biopsies were visualed identified.  3-mL of xylocaine 1% with epinephrine (1:100,000) was drawn up into a syringe, and 1.5-mL was injected in underneath the areas of planned biopsies.  A 4-mm punch device was used to obtain two perilesional biopsies and one normal skin biopsy.  Two biopsy sites were closed with single throws of 2-0 silk.  Bleeding was approximately 1-2 mL.  Patient tolerated the procedure well.       Instructions: Patient instructed not to bathe or remove dressing for one day.  After the first day, patient can shower (not submerge) the wound in warm water.  Vaseline can be applied over wound after bathing.  In 7-10 days, sutures will be removed.

## 2020-07-10 NOTE — PROGRESS NOTES
HPI:       Piter Gaines is a 54 year old  Male who presents for:      1. Suture removal  - seen in clinic on 7/2/2020 for blisters that come and go on his left calf. Had his blister biopsied so needs the two sutures removed today  - healing well       2. Follow up skin biopsy   - showed perivascular chronic dermatitis with eosinophilia and resolving bullous lesion  - biopsy result is non specific. Could be contact dermatitis vs bullous pemphigoid vs drug induced erythema multiforme vs urticaria  - discussed above results with patient and he would like to see a dermatologist based on this broad differential     3. Dark urine  - last visit patient was asked if he had coca cola colored urine. Unclear why was asked but he did not have any then. He said since then he has had two episodes of coca cola colored urine.   - no dysuria. No exercise. No trauma    4. Right wrist bump  - has had bump on right dorsal wrist at the base of his thumb for about 5 months   - not painful  - does not bother him  - is soft and moveable  - not growing          PMHX:     Patient Active Problem List   Diagnosis     Tobacco use     Caloric malnutrition (H)     HTN (hypertension)     Esophagitis     Moderate episode of recurrent major depressive disorder (H)     Seborrheic Keratosis       Current Outpatient Medications   Medication Sig Dispense Refill     amLODIPine (NORVASC) 10 MG tablet Take 1 tablet (10 mg) by mouth daily 90 tablet 1     citalopram (CELEXA) 40 MG tablet Take 1 tablet (40 mg) by mouth daily 90 tablet 1     cyclobenzaprine (FLEXERIL) 10 MG tablet Take 1 tablet (10 mg) by mouth 3 times daily as needed for muscle spasms 60 tablet 0     gabapentin (NEURONTIN) 300 MG capsule Take 1 capsule (300 mg) by mouth 3 times daily 90 capsule 1     hydrocortisone (CORTAID) 1 % external cream Apply topically 2 times daily 30 g 1     losartan (COZAAR) 50 MG tablet Take 1 tablet (50 mg) by mouth daily 90 tablet 1     omeprazole  (PRILOSEC) 20 MG DR capsule Take 1 capsule (20 mg) by mouth 2 times daily 180 capsule 1     triamcinolone (KENALOG) 0.1 % external ointment Apply topically 2 times daily 80 g 1     famotidine (PEPCID) 20 MG tablet Take 1 tablet (20 mg) by mouth At Bedtime (Patient not taking: Reported on 7/2/2020) 90 tablet 1     tamsulosin (FLOMAX) 0.4 MG capsule Take 1 capsule (0.4 mg) by mouth daily (Patient not taking: Reported on 7/2/2020) 30 capsule 0     varenicline (CHANTIX BETTY) 0.5 MG X 11 & 1 MG X 42 tablet Take 0.5 mg tab daily for 3 days, THEN 0.5 mg tab twice daily for 4 days, THEN 1 mg twice daily. (Patient not taking: Reported on 6/17/2020) 53 tablet 0          Allergies   Allergen Reactions     No Known Allergies        Results for orders placed or performed in visit on 07/10/20 (from the past 24 hour(s))   Urinalysis, Micro If ([facility])   Result Value Ref Range    Specific Gravity Urine 1.020 1.005 - 1.030    pH Urine 5.5 4.5 - 8.0    Leukocyte Esterase UR Negative NEGATIVE    Nitrite Urine Negative NEGATIVE mg/dL    Protein UR Trace (A) NEGATIVE mg/dL    Glucose Urine Negative NEGATIVE    Ketones Urine Negative NEGATIVE mg/dL    Urobilinogen mg/dL 1.0 E.U./dL    Bilirubin UR 1+ (A) NEGATIVE mg/dL    Blood UR Negative NEGATIVE mg/dL    Color Urine Yellow     Clarity, urine Clear             Review of Systems:   10 point ROS negative except noted in above in HPI         Physical Exam:     Vitals:    07/10/20 1051   BP: 122/79   Pulse: 73   Resp: 16   Temp: 97.7  F (36.5  C)   TempSrc: Oral   SpO2: 96%   Weight: 81.6 kg (180 lb)     Body mass index is 26.06 kg/m .    GENERAL APPEARANCE: healthy, alert and no distress  Right hand: small moveable, soft round lesion under skin on the dorsal surface of right hand at the base of the thumb   SKIN left calf: well healing scar with two sutures in place, no surrounding erythema or open wound      Assessment and Plan     (R21) Bullous rash  (primary encounter  diagnosis)  Comment: biopsy result still presents a broad differential. Discussed the results with patient and he would like to meet with a dermatologist to further discuss work up for the blisters. No current blisters   Plan: DERMATOLOGY REFERRAL  - reviewed biopsy result with patient   - referral to derm    (R82.008) Dark urine  Comment: unclear why he was asked this last time. Has a history of BPH. No trauma to flank, no recent exercise. No dysuria. Noticed it twice since last visit 1 week ago. ddx includes UTI vs myoglobinuria vs MALGORZATA vs dehydration  Plan: Urinalysis, Micro If ([facility]), Urine Culture         ([facility])  - will check UA and Ucx today     (Z48.02) Encounter for removal of sutures  Comment: had biopsy 1 week ago and needs two sutures removed  Plan: Suture Removal Only, charge  - 2 sutures on left calf removed with suture removal kit    (M67.40) Ganglion cyst  Comment: likely ganglion on cyst on base of right thumb. Doesn't bother patientd. Offered drainage vs referral to hand surgeon but patient declines both   Plan:   - continue to follow as patient does not want it drained or referral   - patient education on what ganglion cyst is         Options for treatment and follow-up care were reviewed with the patient and/or guardian. Piter Gaines and/or guardian engaged in the decision making process and verbalized understanding of the options discussed and agreed with the final plan.      Jason Mendoza MD   [facility] Resident   Pager: [phone]      Precepted today with: Shahzad Aponte MD

## 2020-07-10 NOTE — PROGRESS NOTES
I have personally reviewed the history and examination as documented by Dr. Jo Mendoza.  I was present during key portions of the visit including suture removal and agree with the assessment and plan as documented for 54 yr old male with bullous leg rash s/p bx here for follow-up and new ganglion cyst, cola-colored urine, Sutures from bx removed. Will send to derm as bx was non-diagnostic. Will check U/A, urine cx. /discussed anticipatory guidance regarding ganglion cyst of wrist.     Shahzad Aponte MD  July 14, 2020  1:04 PM

## 2020-07-11 LAB — CULTURE: NORMAL

## 2020-07-13 PROBLEM — R80.1 PERSISTENT PROTEINURIA: Status: ACTIVE | Noted: 2020-07-13

## 2020-07-13 NOTE — PROGRESS NOTES
Preceptor Attestation:   Patient seen, evaluated and discussed with the resident. I was present for and supervised the entire procedure. I have verified the content of the note, which accurately reflects my assessment of the patient and the plan of care.  Supervising Physician:Arnaldo Rawls MD  [facility]

## 2020-07-14 NOTE — PATIENT INSTRUCTIONS
Referral for :     Dermatology    LOCATION/PLACE/Provider :    [facility]   DATE & TIME :    July 28th at 10:20 am   PHONE :     [phone]  FAX :    [phone]  Appointment made by clinic staff/:    Lizbeth

## 2020-07-15 ENCOUNTER — TELEPHONE (OUTPATIENT)
Dept: FAMILY MEDICINE | Facility: CLINIC | Age: 55
End: 2020-07-15

## 2020-07-15 NOTE — TELEPHONE ENCOUNTER
[facility] phone call message- general phone call:    Reason for call: Caller states patient has an appointment scheduled on 7/21/20 for dermatology and they still need the pathology repost. Please call and advise.     Return call needed: Yes    OK to leave a message on voice mail? Yes    Primary language: English      needed? No    Call taken on July 15, 2020 at 10:42 AM by Joleen Marroquin

## 2020-07-16 NOTE — TELEPHONE ENCOUNTER
Caller state that she spoke with Joleen yesterday and the report were being sent to [facility]. Caller advise patient is scheduled with them on Tuesday and would need pathology report sent over to them instead. Fax [phone]

## 2020-07-17 ENCOUNTER — OFFICE VISIT (OUTPATIENT)
Dept: FAMILY MEDICINE | Facility: CLINIC | Age: 55
End: 2020-07-17
Payer: COMMERCIAL

## 2020-07-17 VITALS
HEIGHT: 69 IN | WEIGHT: 179.6 LBS | SYSTOLIC BLOOD PRESSURE: 134 MMHG | RESPIRATION RATE: 18 BRPM | TEMPERATURE: 97.5 F | OXYGEN SATURATION: 96 % | BODY MASS INDEX: 26.6 KG/M2 | HEART RATE: 73 BPM | DIASTOLIC BLOOD PRESSURE: 85 MMHG

## 2020-07-17 DIAGNOSIS — R80.8 OTHER PROTEINURIA: Primary | ICD-10-CM

## 2020-07-17 LAB
ALBUMIN SERPL-MCNC: 3.8 G/DL (ref 3.3–4.6)
ALP SERPL-CCNC: 99 U/L (ref 40–150)
ALT SERPL-CCNC: 25 U/L (ref 0–45)
AST SERPL-CCNC: 23 U/L (ref 0–55)
BILIRUB SERPL-MCNC: 0.5 MG/DL (ref 0.2–1.3)
BUN SERPL-MCNC: 11 MG/DL (ref 7–30)
CALCIUM SERPL-MCNC: 9.3 MG/DL (ref 8.5–10.4)
CHLORIDE SERPLBLD-SCNC: 103 MMOL/L (ref 94–109)
CO2 SERPL-SCNC: 27 MMOL/L (ref 20–32)
CREAT SERPL-MCNC: 0.9 MG/DL (ref 0.8–1.5)
EGFR CALCULATED (BLACK REFERENCE): >90 ML/MIN
EGFR CALCULATED (NON BLACK REFERENCE): >90 ML/MIN
GLUCOSE SERPL-MCNC: 111 MG/DL (ref 60–109)
HBA1C MFR BLD: 6.1 % (ref 4.1–5.7)
POTASSIUM SERPL-SCNC: 3.4 MMOL/L (ref 3.4–5.3)
PROT SERPL-MCNC: 7.6 G/DL (ref 6.6–8.8)
SODIUM SERPL-SCNC: 141 MMOL/L (ref 133–144)

## 2020-07-17 ASSESSMENT — MIFFLIN-ST. JEOR: SCORE: 1645.04

## 2020-07-17 NOTE — PATIENT INSTRUCTIONS
-Will contact with results today from your kidney fucntion, liver function, and diabetes test.  -Please do a 24 hour urine collection, instructions provided, and return to clinic.  -Feel free to reach out with any questions or concerns.  -Follow up with Dr. Bangura on 7:27 at 1:20 PM

## 2020-07-17 NOTE — PROGRESS NOTES
CHIEF COMPLAINT                                                      Chief Complaint   Patient presents with     Results     for protein in urine     Medication Reconciliation     Completed     SUBJECTIVE:                                                    Piter Gaines is a 54 year old year old male who presents to clinic today for the following health issues:      Proteinuria on recent UA follow up      Duration: Two episodes of cola colored urine around 7/10/2020    Description (location/character/radiation): No other associated symptoms.  Notes he has increased urinary frequency for the last year or so and that has not changed acutely with the UA.  Also has a known history of MALGORZATA's with his most recent documented MALGORZATA's in 4/2020 with several ED visits for back pain and diarrhea.      Intensity:  Mildly cola coloared    Accompanying signs and symptoms: Increased frequency of urination, but not new    History (similar episodes/previous evaluation): Not cola colored urine, but has had several MALGORZATA's.    Precipitating or alleviating factors: None    Therapies tried and outcome: None     Patient is an established patient of this clinic.    ----------------------------------------------------------------------------------------------------------------------  Patient Active Problem List   Diagnosis     Tobacco use     Caloric malnutrition (H)     HTN (hypertension)     Esophagitis     Moderate episode of recurrent major depressive disorder (H)     Seborrheic Keratosis     Persistent proteinuria     Past Surgical History:   Procedure Laterality Date     FOREARM SURGERY Left 1980's       Social History     Tobacco Use     Smoking status: Current Every Day Smoker     Types: Cigarettes     Smokeless tobacco: Never Used     Tobacco comment: 1/2 pack a day     Substance Use Topics     Alcohol use: No     Alcohol/week: 14.0 standard drinks     Types: 6 Cans of beer, 8 Shots of liquor per week     Comment: Quit in 2017 in June  "    Family History   Problem Relation Age of Onset     Bone Cancer Mother      Breast Cancer Mother      Diabetes Father      Kidney Disease Father      Diabetes Sister      Cerebrovascular Disease Brother          Problem list and past medical, surgical, social, and family histories reviewed & adjusted, as indicated.    Current Outpatient Medications   Medication Sig Dispense Refill     amLODIPine (NORVASC) 10 MG tablet Take 1 tablet (10 mg) by mouth daily 90 tablet 1     citalopram (CELEXA) 40 MG tablet Take 1 tablet (40 mg) by mouth daily 90 tablet 1     cyclobenzaprine (FLEXERIL) 10 MG tablet Take 1 tablet (10 mg) by mouth 3 times daily as needed for muscle spasms 60 tablet 0     gabapentin (NEURONTIN) 300 MG capsule Take 1 capsule (300 mg) by mouth 3 times daily 90 capsule 1     hydrocortisone (CORTAID) 1 % external cream Apply topically 2 times daily 30 g 1     losartan (COZAAR) 50 MG tablet Take 1 tablet (50 mg) by mouth daily 90 tablet 1     omeprazole (PRILOSEC) 20 MG DR capsule Take 1 capsule (20 mg) by mouth 2 times daily 180 capsule 1     tamsulosin (FLOMAX) 0.4 MG capsule Take 1 capsule (0.4 mg) by mouth daily 90 capsule 1     triamcinolone (KENALOG) 0.1 % external ointment Apply topically 2 times daily 80 g 1     Medication list reviewed and updated as indicated.    Allergies   Allergen Reactions     No Known Allergies      Allergies reviewed and updated as indicated.  ----------------------------------------------------------------------------------------------------------------------  ROS:  Constitutional, HEENT, cardiovascular, pulmonary, gi and gu systems are negative, except as otherwise noted.    OBJECTIVE:     /85   Pulse 73   Temp 97.5  F (36.4  C)   Resp 18   Ht 1.753 m (5' 9\")   Wt 81.5 kg (179 lb 9.6 oz)   SpO2 96%   BMI 26.52 kg/m    Body mass index is 26.52 kg/m .  Exam:  Constitutional: healthy, alert and no distress  Head: Normocephalic  Cardiovascular: RRR, no murmurs " appreciated, intact symmetric distal pulses  Respiratory: CTAB, no wheezing, rales or rhonchi appreciated.  Gastrointestinal: nontender, no organomegaly appreciated, active bowel sounds  Musculoskeletal: extremities normal- no gross deformities noted, gait normal and normal muscle tone  Skin: some areas of active dermatitis on legs  Neurologic: Gait normal. Reflexes normal and symmetric. Sensation grossly WNL.  Psychiatric: mentation appears normal and affect normal/bright  Hematologic/Lymphatic/Immunologic: Normal cervical lymph nodes    Diagnostic Test Results:  No results found for this or any previous visit (from the past 24 hour(s)).    ASSESSMENT/PLAN:     (R80.8) Other proteinuria  (primary encounter diagnosis)  -Patient had some trace proteinuria on lab recently.  -Given his history of MALGORZATA and recent screening we will pursue a proteinuria workup consistent with AAFP guidelines.  -Will start with CMP given history of excessive alcohol use and A1c to help rule out diabetes.  -Also sent home with instructions to collect 24 hour urine creatinine and protein creatinine ratio  -Will follow up with PMD on 7/27 at 1:20 PM.    -Will call with results.  -Patient will call with any questions, concerns, or change in symptoms.      There are no discontinued medications.    Options for treatment and follow-up care were reviewed with the patient and/or guardian. Piter Paraglaurent and/or guardian engaged in the decision making process and verbalized understanding of the options discussed and agreed with the final plan    Precepted with Dr. Malloy.    Lupillo Blackwood MD on 7/17/2020 at 9:00 AM

## 2020-07-20 DIAGNOSIS — R80.8 OTHER PROTEINURIA: ICD-10-CM

## 2020-07-20 DIAGNOSIS — R80.8 OTHER PROTEINURIA: Primary | ICD-10-CM

## 2020-07-20 LAB
CREAT 24H UR-MRATE: 1.5 G/24 HR (ref 1–2)
CREAT UR-MCNC: 247.9 MG/DL
PROT UR-MCNC: 12 MG/DL
PROT,24HR CALCULATED: 73 MG/24HR (ref 0–150)
SPECIMEN VOL UR: 610 ML
SPECIMEN VOL UR: 610 ML

## 2020-07-25 NOTE — PROGRESS NOTES
Preceptor Attestation:  Patient seen, examined, and discussed with the resident..  I agree with written assessment and plan of care.  Supervising Physician:  Whit Malloy MD  [facility]

## 2020-08-05 DIAGNOSIS — K20.90 ESOPHAGITIS: ICD-10-CM

## 2020-08-10 ENCOUNTER — OFFICE VISIT (OUTPATIENT)
Dept: FAMILY MEDICINE | Facility: CLINIC | Age: 55
End: 2020-08-10
Payer: COMMERCIAL

## 2020-08-10 VITALS
TEMPERATURE: 97.8 F | WEIGHT: 179 LBS | DIASTOLIC BLOOD PRESSURE: 90 MMHG | SYSTOLIC BLOOD PRESSURE: 133 MMHG | RESPIRATION RATE: 20 BRPM | BODY MASS INDEX: 26.43 KG/M2 | HEART RATE: 87 BPM | OXYGEN SATURATION: 97 %

## 2020-08-10 DIAGNOSIS — R35.1 BENIGN PROSTATIC HYPERPLASIA WITH NOCTURIA: ICD-10-CM

## 2020-08-10 DIAGNOSIS — R35.1 NOCTURIA: ICD-10-CM

## 2020-08-10 DIAGNOSIS — L13.8 LINEAR IGA BULLOUS DERMATOSIS: Primary | ICD-10-CM

## 2020-08-10 DIAGNOSIS — N40.1 BENIGN PROSTATIC HYPERPLASIA WITH NOCTURIA: ICD-10-CM

## 2020-08-10 DIAGNOSIS — Z13.9 SCREENING FOR CONDITION: ICD-10-CM

## 2020-08-10 DIAGNOSIS — F10.20 UNCOMPLICATED ALCOHOL DEPENDENCE (H): ICD-10-CM

## 2020-08-10 DIAGNOSIS — I10 ESSENTIAL HYPERTENSION: ICD-10-CM

## 2020-08-10 DIAGNOSIS — M53.3 SI (SACROILIAC) JOINT DYSFUNCTION: ICD-10-CM

## 2020-08-10 LAB
% GRANULOCYTES: 72.4 %G (ref 40–75)
ALBUMIN SERPL BCP-MCNC: 3.7 G/DL (ref 3.5–5)
ALP SERPL-CCNC: 110 U/L (ref 45–120)
ALT SERPL W/O P-5'-P-CCNC: 17 U/L (ref 0–45)
AST SERPL-CCNC: 12 U/L (ref 0–40)
BILIRUB DIRECT SERPL-MCNC: 0.1 MG/DL (ref 0–0.5)
BILIRUB SERPL-MCNC: 0.4 MG/DL (ref 0–1)
BUN SERPL-MCNC: 14 MG/DL (ref 7–30)
CALCIUM SERPL-MCNC: 9.5 MG/DL (ref 8.5–10.4)
CHLORIDE SERPLBLD-SCNC: 100 MMOL/L (ref 94–109)
CO2 SERPL-SCNC: 28 MMOL/L (ref 20–32)
CREAT SERPL-MCNC: 1.3 MG/DL (ref 0.8–1.5)
EGFR CALCULATED (BLACK REFERENCE): 74 ML/MIN
EGFR CALCULATED (NON BLACK REFERENCE): 61.1 ML/MIN
GLUCOSE SERPL-MCNC: 103 MG/DL (ref 60–109)
GRANULOCYTES #: 4.5 K/UL (ref 1.6–8.3)
HCT VFR BLD AUTO: 43.2 % (ref 40–53)
HEMOGLOBIN: 12.4 G/DL (ref 13.3–17.7)
HIV 1+2 AB+HIV1 P24 AG SERPL QL IA: NEGATIVE
LYMPHOCYTES # BLD AUTO: 1.4 K/UL (ref 0.8–5.3)
LYMPHOCYTES NFR BLD AUTO: 22.3 %L (ref 20–48)
MCH RBC QN AUTO: 25.5 PG (ref 26.5–35)
MCHC RBC AUTO-ENTMCNC: 28.7 G/DL (ref 32–36)
MCV RBC AUTO: 88.8 FL (ref 78–100)
MID #: 0.3 K/UL (ref 0–2.2)
MID %: 5.3 %M (ref 0–20)
PLATELET # BLD AUTO: 191 K/UL (ref 150–450)
POTASSIUM SERPL-SCNC: 3.3 MMOL/L (ref 3.4–5.3)
PROT SERPL-MCNC: 7.8 G/DL (ref 6–8)
RBC # BLD AUTO: 4.86 M/UL (ref 4.4–5.9)
SODIUM SERPL-SCNC: 140 MMOL/L (ref 133–144)
WBC # BLD AUTO: 6.2 K/UL (ref 4–11)

## 2020-08-10 RX ORDER — TAMSULOSIN HYDROCHLORIDE 0.4 MG/1
0.8 CAPSULE ORAL DAILY
Qty: 90 CAPSULE | Refills: 1 | COMMUNITY
Start: 2020-08-10 | End: 2020-12-03

## 2020-08-10 RX ORDER — NAPROXEN 500 MG/1
500 TABLET ORAL 2 TIMES DAILY WITH MEALS
Qty: 28 TABLET | Refills: 0 | Status: SHIPPED | OUTPATIENT
Start: 2020-08-10 | End: 2020-08-24

## 2020-08-10 RX ORDER — METHOCARBAMOL 500 MG/1
500 TABLET, FILM COATED ORAL
Qty: 14 TABLET | Refills: 0 | Status: SHIPPED | OUTPATIENT
Start: 2020-08-10 | End: 2021-11-30

## 2020-08-10 RX ORDER — GABAPENTIN 300 MG/1
300 CAPSULE ORAL 3 TIMES DAILY
Qty: 90 CAPSULE | Refills: 1 | Status: SHIPPED | OUTPATIENT
Start: 2020-08-10 | End: 2020-10-06

## 2020-08-10 NOTE — PROGRESS NOTES
HONORIO Gaines is a 54 year old who presents for follow up on recently diagnosed IgA bullous disease. Patient was diagnosed with IgA bullous disease in the beginning of June via skin biopsy. He is still experiencing skin lesions.     Patient also notes that he is experiencing bilateral lower back pain. He was in a sledding accident 20 years ago where he injured his back, and started experiencing flares of this back pain about seven years ago. He has gone to the emergency rooms a couple times in the past, and tried PT, which he found to be painful and not helpful. He reports that his back feels fine in between the flares.This flare began three days ago after a large sneeze. He describes how the pain is worse with prolonged standing or sitting, and it is difficult to lay down. The pain does not radiate down either leg, and he denies any incontinence of urine or stool. He has tried muscle relaxants, ibuprofen and tylenol, and none of these options have helped.     The patient's last complaint is urinary frequency. He has been experiencing urinary frequency for the last few years. He was started on tamsulosin for this a couple months ago, but he reports that there has been no change in his symptoms. He denies any pain with urination. He had a rectal exam a couple years ago. Patient smokes, but does not have any family history of prostate cancer.    Problem, Medication and Allergy Lists were reviewed and updated if needed..    Patient is an established patient of this clinic..           Physical Exam:     Vitals:    08/10/20 1349   BP: (!) 133/90   Pulse: 87   Resp: 20   Temp: 97.8  F (36.6  C)   TempSrc: Oral   SpO2: 97%   Weight: 81.2 kg (179 lb)     Body mass index is 26.43 kg/m .  Vitals were reviewed and were normal    Physical Exam  General: appears uncomfortable in chair, appears to be in pain when moving to exam table  Head: normocephalic and atraumatic  ENT: EOM intact, PERRL  Pulmonary: no  increased work of breathing noted  Musculoskeletal exam: intact strength in bilateral lower extremities, movement incites pain. Intact reflexes. Pain upon diffuse palpation of bilateral lumbar region, especially in the SI region. Pain is elicited with movement of the legs. No radiation of pain with straight leg maneuver.         Results:      Results from this visit with some results pending  Results for orders placed or performed in visit on 08/10/20   CBC with Diff Plt ([facility])     Status: Abnormal   Result Value Ref Range    WBC 6.2 4.0 - 11.0 K/uL    Lymphocytes # 1.4 0.8 - 5.3 K/uL    % Lymphocytes 22.3 20.0 - 48.0 %L    Mid # 0.3 0.0 - 2.2 K/uL    Mid % 5.3 0.0 - 20.0 %M    GRANULOCYTES # 4.5 1.6 - 8.3 K/uL    % Granulocytes 72.4 40.0 - 75.0 %G    RBC 4.86 4.40 - 5.90 M/uL    Hemoglobin 12.4 (L) 13.3 - 17.7 g/dL    Hematocrit 43.2 40.0 - 53.0 %    MCV 88.8 78.0 - 100.0 fL    MCH 25.5 (L) 26.5 - 35.0 pg    MCHC 28.7 (L) 32.0 - 36.0 g/dL    Platelets 191.0 150.0 - 450.0 K/uL   Basic Metabolic Panel ([facility]) - Results < 1 hr     Status: Abnormal   Result Value Ref Range    Glucose 103.0 60.0 - 109.0 mg/dL    Urea Nitrogen 14.0 7.0 - 30.0 mg/dL    Creatinine 1.3 0.8 - 1.5 mg/dL    Sodium 140.0 133.0 - 144.0 mmol/L    Potassium 3.3 (L) 3.4 - 5.3 mmol/L    Chloride 100.0 94.0 - 109.0 mmol/L    Carbon Dioxide 28.0 20.0 - 32.0 mmol/L    Calcium 9.5 8.5 - 10.4 mg/dL    eGFR Calculated (Non Black Reference) 61.1 >60.0 mL/min    eGFR Calculated (Black Reference) 74.0 >60.0 mL/min       Assessment and Plan    Mr. Piter Gaines is a 55 yo male presenting to clinic for follow-up on recently diagnosed IgA bullous disease and to discuss persistent urinary frequency and back pain.     IgA bullous disease  This was diagnosed via skin biopsy. Patient received labs today to prepare for initiation of dapsone: CBC, liver panel, and glucose-6-phosphate. He was told that this medication will require weekly blood tests for the first  month, and is on board for starting this medication. Patient was informed that he should come back in a month for follow-up to see if the medication is helping.    - Start dapsone 50 mg once labs are back  - Weekly CBC, biweekly liver panel for the first month  -glucose-6-phosphate today  - Follow-up in one month    Back pain  Patient has been experiencing back pain for the past few days, described as a pain of bilateral lumbar region that is exacerbated with movement and palpation. Patient identifies his pain as most severe over the SI joint, and describes occasional muscle spasm. Patient has used cyclobenzaprine in the past, but says that it is not helpful. Patient is considering chiropractic help for this pain.  - Use of heat packs and consider chiropractic  -Robaxin   -Naproxen  - Follow-up of pain in one month.       Urinary frequency  Patient has had urinary urgency for years, which he started treatment with tamsulosin 0.4 mg two months ago. He has not noted any improvements in his symptoms during this time. Patient is hesitant about having a rectal exam at this visit, stating that he needs time to prepare.   - Increase dosage of tamsulosin from 0.4 mg to 0.8 mg  - Perform rectal exam at next visit and check PSA      Options for treatment and follow-up care were reviewed with the patient. Piter Gaines  engaged in the decision making process and verbalized understanding of the options discussed and agreed with the final plan.    Dyan Moreno, MS4    Resident/Fellow Attestation   I, Kody Bangura, was present with the medical student who participated in the service and in the documentation of the note.  I have verified the history and personally performed the physical exam and medical decision making.  I agree with the assessment and plan of care as documented in the note.      Kody Bangura MD  PGY3    Preceptor was Dr. Malloy

## 2020-08-11 LAB — HCV AB SER QL: NEGATIVE

## 2020-08-13 DIAGNOSIS — L13.8 LINEAR IGA BULLOUS DERMATOSIS: Primary | ICD-10-CM

## 2020-08-13 LAB — Lab: 10.6 U/G HB (ref 9.9–16.6)

## 2020-08-13 RX ORDER — DAPSONE 100 MG/1
50 TABLET ORAL DAILY
Qty: 30 TABLET | Refills: 0 | Status: SHIPPED | OUTPATIENT
Start: 2020-08-13 | End: 2020-09-16

## 2020-08-14 NOTE — RESULT ENCOUNTER NOTE
"Team - please call patient with results.    \"Your liver, kidneys look good.  Your blood counts are slightly low.  It looks like it may have been in the past.  Your potassium is also mildly low.  We will keep an eye on these, and look into it further at your future clinic visit.  You are negative for HIV and hepatitis C, which is great.  Your screening test for the medicine you need for your skin condition was acceptable.  Dr. Bangura will send in Dapsone for you to start taking right away.\""

## 2020-08-17 DIAGNOSIS — L13.8 LINEAR IGA BULLOUS DERMATOSIS: ICD-10-CM

## 2020-08-17 LAB
% GRANULOCYTES: 77.5 %G (ref 40–75)
GRANULOCYTES #: 5.9 K/UL (ref 1.6–8.3)
HCT VFR BLD AUTO: 45 % (ref 40–53)
HEMOGLOBIN: 13.9 G/DL (ref 13.3–17.7)
LYMPHOCYTES # BLD AUTO: 1.3 K/UL (ref 0.8–5.3)
LYMPHOCYTES NFR BLD AUTO: 17.3 %L (ref 20–48)
MCH RBC QN AUTO: 26.5 PG (ref 26.5–35)
MCHC RBC AUTO-ENTMCNC: 30.9 G/DL (ref 32–36)
MCV RBC AUTO: 85.8 FL (ref 78–100)
MID #: 0.4 K/UL (ref 0–2.2)
MID %: 5.2 %M (ref 0–20)
PLATELET # BLD AUTO: 129 K/UL (ref 150–450)
RBC # BLD AUTO: 5.24 M/UL (ref 4.4–5.9)
WBC # BLD AUTO: 7.6 K/UL (ref 4–11)

## 2020-08-17 NOTE — LETTER
August 21, 2020      Piter Gaines  [address] [place] [address]        Dear ,    We are writing to inform you of your test results.    Your blood counts and liver labs look good    Resulted Orders   CBC with Diff Plt ([facility])   Result Value Ref Range    WBC 7.6 4.0 - 11.0 K/uL    Lymphocytes # 1.3 0.8 - 5.3 K/uL    % Lymphocytes 17.3 (L) 20.0 - 48.0 %L    Mid # 0.4 0.0 - 2.2 K/uL    Mid % 5.2 0.0 - 20.0 %M    GRANULOCYTES # 5.9 1.6 - 8.3 K/uL    % Granulocytes 77.5 (H) 40.0 - 75.0 %G    RBC 5.24 4.40 - 5.90 M/uL    Hemoglobin 13.9 13.3 - 17.7 g/dL    Hematocrit 45.0 40.0 - 53.0 %    MCV 85.8 78.0 - 100.0 fL    MCH 26.5 26.5 - 35.0 pg    MCHC 30.9 (L) 32.0 - 36.0 g/dL    Platelets 129.0 (L) 150.0 - 450.0 K/uL       If you have any questions or concerns, please call the clinic at the number listed above.       Sincerely,        [facility]

## 2020-08-20 DIAGNOSIS — R35.1 NOCTURIA: ICD-10-CM

## 2020-08-20 DIAGNOSIS — L13.8 LINEAR IGA BULLOUS DERMATOSIS: ICD-10-CM

## 2020-08-20 LAB
ALBUMIN SERPL BCP-MCNC: 3.9 G/DL (ref 3.5–5)
ALP SERPL-CCNC: 121 U/L (ref 45–120)
ALT SERPL W/O P-5'-P-CCNC: 15 U/L (ref 0–45)
AST SERPL-CCNC: 15 U/L (ref 0–40)
BILIRUB DIRECT SERPL-MCNC: 0.2 MG/DL (ref 0–0.5)
BILIRUB SERPL-MCNC: 0.6 MG/DL (ref 0–1)
PROT SERPL-MCNC: 7.9 G/DL (ref 6–8)
PSA SERPL-MCNC: 3.6 NG/ML (ref 0–3.5)

## 2020-08-21 NOTE — RESULT ENCOUNTER NOTE
"Please send the following in a letter to the patient:     \"Your blood counts and liver labs look good\""

## 2020-08-24 DIAGNOSIS — L13.8 LINEAR IGA BULLOUS DERMATOSIS: ICD-10-CM

## 2020-08-24 DIAGNOSIS — M53.3 SI (SACROILIAC) JOINT DYSFUNCTION: ICD-10-CM

## 2020-08-24 LAB
% GRANULOCYTES: 77 %G (ref 40–75)
ALBUMIN SERPL BCP-MCNC: 3.7 G/DL (ref 3.5–5)
ALP SERPL-CCNC: 124 U/L (ref 45–120)
ALT SERPL W/O P-5'-P-CCNC: 24 U/L (ref 0–45)
AST SERPL-CCNC: 14 U/L (ref 0–40)
BILIRUB DIRECT SERPL-MCNC: 0.1 MG/DL (ref 0–0.5)
BILIRUB SERPL-MCNC: 0.3 MG/DL (ref 0–1)
GRANULOCYTES #: 5.7 K/UL (ref 1.6–8.3)
HCT VFR BLD AUTO: 39.6 % (ref 40–53)
HEMOGLOBIN: 12.3 G/DL (ref 13.3–17.7)
LYMPHOCYTES # BLD AUTO: 1.4 K/UL (ref 0.8–5.3)
LYMPHOCYTES NFR BLD AUTO: 18.4 %L (ref 20–48)
MCH RBC QN AUTO: 25.8 PG (ref 26.5–35)
MCHC RBC AUTO-ENTMCNC: 31.1 G/DL (ref 32–36)
MCV RBC AUTO: 83 FL (ref 78–100)
MID #: 0.3 K/UL (ref 0–2.2)
MID %: 4.6 %M (ref 0–20)
PLATELET # BLD AUTO: 198 K/UL (ref 150–450)
PROT SERPL-MCNC: 7.6 G/DL (ref 6–8)
RBC # BLD AUTO: 4.77 M/UL (ref 4.4–5.9)
WBC # BLD AUTO: 7.4 K/UL (ref 4–11)

## 2020-08-24 NOTE — LETTER
August 26, 2020      Piter Gaines  [address] [place] [address]        Dear ,    We are writing to inform you of your test results.    Your blood counts are just a little bit down, but nothing to worry about.  Your liver labs still look good    Resulted Orders   Hepatic Profile ([facility])   Result Value Ref Range    Bilirubin Total 0.3 0.0 - 1.0 mg/dL    Bilirubin Direct 0.1 <=0.5 mg/dL    Protein Total 7.6 6.0 - 8.0 g/dL    Albumin 3.7 3.5 - 5.0 g/dL    Alkaline Phosphatase 124 (H) 45 - 120 U/L    AST (SGOT) 14 0 - 40 U/L    ALT (SGPT) 24 0 - 45 U/L    Narrative    Test performed by:  [facility] [address] [place] [address]   CBC with Diff Plt ([facility])   Result Value Ref Range    WBC 7.4 4.0 - 11.0 K/uL    Lymphocytes # 1.4 0.8 - 5.3 K/uL    % Lymphocytes 18.4 (L) 20.0 - 48.0 %L    Mid # 0.3 0.0 - 2.2 K/uL    Mid % 4.6 0.0 - 20.0 %M    GRANULOCYTES # 5.7 1.6 - 8.3 K/uL    % Granulocytes 77.0 (H) 40.0 - 75.0 %G    RBC 4.77 4.40 - 5.90 M/uL    Hemoglobin 12.3 (L) 13.3 - 17.7 g/dL    Hematocrit 39.6 (L) 40.0 - 53.0 %    MCV 83.0 78.0 - 100.0 fL    MCH 25.8 (L) 26.5 - 35.0 pg    MCHC 31.1 (L) 32.0 - 36.0 g/dL    Platelets 198.0 150.0 - 450.0 K/uL       If you have any questions or concerns, please call the clinic at the number listed above.       Sincerely,        [facility]

## 2020-08-25 RX ORDER — NAPROXEN 500 MG/1
500 TABLET ORAL 2 TIMES DAILY WITH MEALS
Qty: 28 TABLET | Refills: 1 | Status: SHIPPED | OUTPATIENT
Start: 2020-08-25 | End: 2020-09-22

## 2020-08-26 NOTE — RESULT ENCOUNTER NOTE
"Please send the following in a letter to the patient:     \"Your blood counts are just a little bit down, but nothing to worry about.  Your liver labs still look good.\""

## 2020-08-31 DIAGNOSIS — L13.8 LINEAR IGA BULLOUS DERMATOSIS: ICD-10-CM

## 2020-08-31 LAB
% GRANULOCYTES: 68.2 %G (ref 40–75)
GRANULOCYTES #: 3.8 K/UL (ref 1.6–8.3)
HCT VFR BLD AUTO: 40.7 % (ref 40–53)
HEMOGLOBIN: 12.2 G/DL (ref 13.3–17.7)
LYMPHOCYTES # BLD AUTO: 1.4 K/UL (ref 0.8–5.3)
LYMPHOCYTES NFR BLD AUTO: 25.4 %L (ref 20–48)
MCH RBC QN AUTO: 26 PG (ref 26.5–35)
MCHC RBC AUTO-ENTMCNC: 30 G/DL (ref 32–36)
MCV RBC AUTO: 86.6 FL (ref 78–100)
MID #: 0.4 K/UL (ref 0–2.2)
MID %: 6.4 %M (ref 0–20)
PLATELET # BLD AUTO: 205 K/UL (ref 150–450)
RBC # BLD AUTO: 4.7 M/UL (ref 4.4–5.9)
WBC # BLD AUTO: 5.6 K/UL (ref 4–11)

## 2020-08-31 NOTE — LETTER
September 1, 2020      Piter Gaines  [address] [place] [address]        Dear ,    We are writing to inform you of your test results.    Your blood counts look stable.  I look forward to seeing you soon.    Resulted Orders   CBC with Diff Plt ([facility])   Result Value Ref Range    WBC 5.6 4.0 - 11.0 K/uL    Lymphocytes # 1.4 0.8 - 5.3 K/uL    % Lymphocytes 25.4 20.0 - 48.0 %L    Mid # 0.4 0.0 - 2.2 K/uL    Mid % 6.4 0.0 - 20.0 %M    GRANULOCYTES # 3.8 1.6 - 8.3 K/uL    % Granulocytes 68.2 40.0 - 75.0 %G    RBC 4.70 4.40 - 5.90 M/uL    Hemoglobin 12.2 (L) 13.3 - 17.7 g/dL    Hematocrit 40.7 40.0 - 53.0 %    MCV 86.6 78.0 - 100.0 fL    MCH 26.0 (L) 26.5 - 35.0 pg    MCHC 30.0 (L) 32.0 - 36.0 g/dL    Platelets 205.0 150.0 - 450.0 K/uL       If you have any questions or concerns, please call the clinic at the number listed above.       Sincerely,        [facility]

## 2020-09-01 NOTE — RESULT ENCOUNTER NOTE
"Please send the following in a letter to the patient:     \"Your blood counts look stable.  I look forward to seeing you soon.\""

## 2020-09-16 ENCOUNTER — OFFICE VISIT (OUTPATIENT)
Dept: FAMILY MEDICINE | Facility: CLINIC | Age: 55
End: 2020-09-16
Payer: COMMERCIAL

## 2020-09-16 VITALS
WEIGHT: 183.6 LBS | HEIGHT: 70 IN | RESPIRATION RATE: 18 BRPM | SYSTOLIC BLOOD PRESSURE: 151 MMHG | HEART RATE: 86 BPM | DIASTOLIC BLOOD PRESSURE: 94 MMHG | BODY MASS INDEX: 26.28 KG/M2 | OXYGEN SATURATION: 95 % | TEMPERATURE: 97.9 F

## 2020-09-16 DIAGNOSIS — N40.1 BENIGN PROSTATIC HYPERPLASIA WITH NOCTURIA: ICD-10-CM

## 2020-09-16 DIAGNOSIS — R35.1 BENIGN PROSTATIC HYPERPLASIA WITH NOCTURIA: ICD-10-CM

## 2020-09-16 DIAGNOSIS — L13.8 LINEAR IGA BULLOUS DERMATOSIS: Primary | ICD-10-CM

## 2020-09-16 DIAGNOSIS — I10 ESSENTIAL HYPERTENSION: ICD-10-CM

## 2020-09-16 LAB
% GRANULOCYTES: 67 %G (ref 40–75)
ALBUMIN SERPL-MCNC: 3.5 G/DL (ref 3.3–4.6)
ALP SERPL-CCNC: 90 U/L (ref 40–150)
ALT SERPL-CCNC: 27 U/L (ref 0–45)
AST SERPL-CCNC: 21 U/L (ref 0–55)
BILIRUB SERPL-MCNC: 0.6 MG/DL (ref 0.2–1.3)
BUN SERPL-MCNC: 11 MG/DL (ref 7–30)
CALCIUM SERPL-MCNC: 9.2 MG/DL (ref 8.5–10.4)
CHLORIDE SERPLBLD-SCNC: 103 MMOL/L (ref 94–109)
CO2 SERPL-SCNC: 27 MMOL/L (ref 20–32)
CREAT SERPL-MCNC: 1.4 MG/DL (ref 0.8–1.5)
EGFR CALCULATED (BLACK REFERENCE): 67.9 ML/MIN
EGFR CALCULATED (NON BLACK REFERENCE): 56.1 ML/MIN
GLUCOSE SERPL-MCNC: 116 MG/DL (ref 60–109)
GRANULOCYTES #: 4.1 K/UL (ref 1.6–8.3)
HCT VFR BLD AUTO: 39.7 % (ref 40–53)
HEMOGLOBIN: 11.8 G/DL (ref 13.3–17.7)
LYMPHOCYTES # BLD AUTO: 1.7 K/UL (ref 0.8–5.3)
LYMPHOCYTES NFR BLD AUTO: 27.9 %L (ref 20–48)
MCH RBC QN AUTO: 25.8 PG (ref 26.5–35)
MCHC RBC AUTO-ENTMCNC: 29.7 G/DL (ref 32–36)
MCV RBC AUTO: 86.9 FL (ref 78–100)
MID #: 0.3 K/UL (ref 0–2.2)
MID %: 5.1 %M (ref 0–20)
PLATELET # BLD AUTO: 194 K/UL (ref 150–450)
POTASSIUM SERPL-SCNC: 3.1 MMOL/L (ref 3.4–5.3)
PROT SERPL-MCNC: 7.5 G/DL (ref 6.6–8.8)
RBC # BLD AUTO: 4.57 M/UL (ref 4.4–5.9)
SODIUM SERPL-SCNC: 137 MMOL/L (ref 133–144)
WBC # BLD AUTO: 6.1 K/UL (ref 4–11)

## 2020-09-16 RX ORDER — DAPSONE 100 MG/1
100 TABLET ORAL DAILY
Qty: 30 TABLET | Refills: 0 | Status: SHIPPED | OUTPATIENT
Start: 2020-09-16 | End: 2020-10-16

## 2020-09-16 RX ORDER — FINASTERIDE 5 MG/1
5 TABLET, FILM COATED ORAL DAILY
Qty: 90 TABLET | Refills: 0 | Status: SHIPPED | OUTPATIENT
Start: 2020-09-16 | End: 2020-12-15

## 2020-09-16 ASSESSMENT — MIFFLIN-ST. JEOR: SCORE: 1675.3

## 2020-09-16 NOTE — PROGRESS NOTES
HPI:       Piter Gaines is a 54 year old  male with a significant past medical history of HTN, BPH, bullous IgA dermatosis who presents for follow up of concern(s) listed below    1) IgA bullous disease  -Issues with taking Dapsone? no  -Lesions still popping up? Yes  -ROS: Negative for fatigue, new headaches, blue lips, palpitations, chest pain, new SOB, yellow eyes, dark urine, new numbness in feet or hands  -Discussed options today    2) BPH  -Chronic intermittent nocturia, hesitancy, and weak stream for over a year  -Symptoms have not worsened over time, but they haven't improve with the Flomax  -PSA change from 3 to 3.6 over a year  -okay with SYLVAIN today  -Denies new hip or lower back pain, hematuria, unintentional weight loss  -wants something else    3) High BP  -anxious about SYLVAIN  -takes his losartan regularly         PMHX:     Patient Active Problem List   Diagnosis     Tobacco use disorder     Caloric malnutrition (H)     HTN (hypertension)     Esophagitis     Moderate episode of recurrent major depressive disorder (H)     Seborrheic Keratosis     Persistent proteinuria       Past Surgical History:   Procedure Laterality Date     FOREARM SURGERY Left 1980's       Current Outpatient Medications   Medication Sig Dispense Refill     amLODIPine (NORVASC) 10 MG tablet Take 1 tablet (10 mg) by mouth daily 90 tablet 1     citalopram (CELEXA) 40 MG tablet Take 1 tablet (40 mg) by mouth daily 90 tablet 1     cyclobenzaprine (FLEXERIL) 10 MG tablet Take 1 tablet (10 mg) by mouth 3 times daily as needed for muscle spasms 60 tablet 0     dapsone (ACZONE) 100 MG tablet Take 0.5 tablets (50 mg) by mouth daily 30 tablet 0     gabapentin (NEURONTIN) 300 MG capsule Take 1 capsule (300 mg) by mouth 3 times daily 90 capsule 1     hydrocortisone (CORTAID) 1 % external cream Apply topically 2 times daily 30 g 1     losartan (COZAAR) 50 MG tablet Take 1 tablet (50 mg) by mouth daily 90 tablet 1     methocarbamol (ROBAXIN)  500 MG tablet Take 1 tablet (500 mg) by mouth nightly as needed for muscle spasms 14 tablet 0     naproxen (NAPROSYN) 500 MG tablet Take 1 tablet (500 mg) by mouth 2 times daily (with meals) 28 tablet 1     omeprazole (PRILOSEC) 20 MG DR capsule Take 1 capsule (20 mg) by mouth 2 times daily 180 capsule 0     tamsulosin (FLOMAX) 0.4 MG capsule Take 2 capsules (0.8 mg) by mouth daily 90 capsule 1     triamcinolone (KENALOG) 0.1 % external ointment Apply topically 2 times daily 80 g 1          Allergies   Allergen Reactions     No Known Allergies        Social History     Socioeconomic History     Marital status:      Spouse name: Not on file     Number of children: Not on file     Years of education: Not on file     Highest education level: Not on file   Occupational History     Not on file   Social Needs     Financial resource strain: Not on file     Food insecurity     Worry: Not on file     Inability: Not on file     Transportation needs     Medical: Not on file     Non-medical: Not on file   Tobacco Use     Smoking status: Current Every Day Smoker     Types: Cigarettes     Smokeless tobacco: Never Used     Tobacco comment: 1/2 pack a day     Substance and Sexual Activity     Alcohol use: No     Alcohol/week: 14.0 standard drinks     Types: 6 Cans of beer, 8 Shots of liquor per week     Comment: Quit in 2017 in June     Drug use: Yes     Types: Marijuana     Sexual activity: Yes     Partners: Female     Birth control/protection: Condom   Lifestyle     Physical activity     Days per week: Not on file     Minutes per session: Not on file     Stress: Not on file   Relationships     Social connections     Talks on phone: Not on file     Gets together: Not on file     Attends [employer] service: Not on file     Active member of club or organization: Not on file     Attends meetings of clubs or organizations: Not on file     Relationship status: Not on file     Intimate partner violence     Fear of current or ex  "partner: Not on file     Emotionally abused: Not on file     Physically abused: Not on file     Forced sexual activity: Not on file   Other Topics Concern     Parent/sibling w/ CABG, MI or angioplasty before 65F 55M? Not Asked   Social History Narrative     Not on file              Review of Systems:     7-point ROS reviewed and negative unless otherwise noted in HPI            Physical Exam:     Vitals:    09/16/20 1430   BP: (!) 151/94   Pulse: 86   Resp: 18   Temp: 97.9  F (36.6  C)   TempSrc: Oral   SpO2: 95%   Weight: 83.3 kg (183 lb 9.6 oz)   Height: 1.772 m (5' 9.76\")     Body mass index is 26.52 kg/m .    GENERAL: healthy, alert and no distress  EYES: EOMI, pupils equal, conjunctiva normal  HENT: nose and mouth without ulcers or lesions  NECK: no asymmetry, masses, or scars  RESP: breathing and speaking comfortably, normal RR  CV: acyanotic, 2+ radial pulses  : no external or internal hemorrhoids; enlarged prostate that is smooth, symmetric, and rubbery without firm nodules; no natacha blood noted on glove  MS: extremities normal- no gross deformities noted  SKIN: cluster of 4 lesions in various stages of healing with one bullous lesion present  NEURO: mentation intact, speech normal and A&Ox3  PSYCH: affect/mood appropriate    WBC, Plt WNL  Granulocytes WNL  Hgb 13.9 -> 12.3 -> 12.2 -> 11.8  LFTs WNL  K 3.1  Cr 0.9 -> 1.4 over 2 months  PSA 3 -> 3.6 over 8 months    Assessment and Plan     (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  Started dapsone 50mg daily one month ago.  Tolerating medicine well.  Patient has had 2g/dl drop in hemoglobin, which is within the expected decline with Dapsone.  No signs of concerning adverse events with medicine.  However, no benefit noticed by patient yet.  Discussed increase in dose, and potential side effects (increased risk of anemia, methemoglobin, agranulocytosis, neuropathy, SJS).  Patient okay with this.  Will " increase to 100mg daily.  Will recheck CBC and CMP in 2 weeks, and then recheck these labs again in 4 weeks when seen in person.  If boost does not improve skin, will trial with topical steroids.  If that would fail, would likely need to send to dermatology for further management.  If significant anemia develops or signs of more severe adverse effects occurs, will need to cease dapsone and refer to dermatology for other options.  Plan:   -CBC and CMP today  -CBC and CMP in 2 weeks  -CBC and CMP in 4 weeks  -increase to dapsone (ACZONE) 100 MG tablet daily    (N40.1,  R35.1) Benign prostatic hyperplasia with nocturia  Comment: Little improvement with year long Flomax.  Small increase in PSA over multiple months, but not much and little data to suggest change in management based off PSA velocity.  SYLVAIN exam and symptoms support BPH, and not prostate cancer.  Since prostate is significnatly enlarged, will trial finasteride.  Did discuss side effects, and expectations of therapy (takes months for benefit).  If no improvement or concerns for cancer develop, will then refer to Urology.  Plan:   -start finasteride (PROSCAR) 5 MG tablet daily  -Continue Flomax 0.4mg daily  -Repeat PSA/SYLVAIN in another 6 months    (I10) Essential hypertension  Comment: Above goal.  Goal <140/90.  Previously borderline while on losartan.  K 3.1.  Will have patient take 100mg daily now to address BP and hypokalemia.  Plan:   -Increase losartan to 100mg daily  -Continue amlodipine 10mg daily      Options for treatment and follow-up care were reviewed with the patient and/or guardian. Piter Gaines and/or guardian engaged in the decision making process and verbalized understanding of the options discussed and agreed with the final plan.      Kody Bangura MD    Precepted today with: Arnaldo Rawls MD

## 2020-09-17 RX ORDER — LOSARTAN POTASSIUM 50 MG/1
100 TABLET ORAL DAILY
Qty: 90 TABLET | Refills: 1 | COMMUNITY
Start: 2020-09-17 | End: 2020-09-25

## 2020-09-22 DIAGNOSIS — M53.3 SI (SACROILIAC) JOINT DYSFUNCTION: ICD-10-CM

## 2020-09-24 RX ORDER — NAPROXEN 500 MG/1
500 TABLET ORAL 2 TIMES DAILY WITH MEALS
Qty: 28 TABLET | Refills: 1 | Status: SHIPPED | OUTPATIENT
Start: 2020-09-24 | End: 2020-10-19

## 2020-09-25 DIAGNOSIS — I10 ESSENTIAL HYPERTENSION: ICD-10-CM

## 2020-09-25 RX ORDER — LOSARTAN POTASSIUM 50 MG/1
100 TABLET ORAL DAILY
Qty: 180 TABLET | Refills: 0 | Status: SHIPPED | OUTPATIENT
Start: 2020-09-25 | End: 2020-12-28

## 2020-10-01 DIAGNOSIS — L13.8 LINEAR IGA BULLOUS DERMATOSIS: ICD-10-CM

## 2020-10-01 LAB
% GRANULOCYTES: 66.8 %G (ref 40–75)
ALBUMIN SERPL-MCNC: 3.8 G/DL (ref 3.3–4.6)
ALP SERPL-CCNC: 100 U/L (ref 40–150)
ALT SERPL-CCNC: 26 U/L (ref 0–45)
AST SERPL-CCNC: 24 U/L (ref 0–55)
BILIRUB SERPL-MCNC: 0.8 MG/DL (ref 0.2–1.3)
BUN SERPL-MCNC: 14 MG/DL (ref 7–30)
CALCIUM SERPL-MCNC: 9.3 MG/DL (ref 8.5–10.4)
CHLORIDE SERPLBLD-SCNC: 103 MMOL/L (ref 94–109)
CO2 SERPL-SCNC: 25 MMOL/L (ref 20–32)
CREAT SERPL-MCNC: 1.1 MG/DL (ref 0.8–1.5)
EGFR CALCULATED (BLACK REFERENCE): >90 ML/MIN
EGFR CALCULATED (NON BLACK REFERENCE): 74.1 ML/MIN
GLUCOSE SERPL-MCNC: 113 MG/DL (ref 60–109)
GRANULOCYTES #: 4.2 K/UL (ref 1.6–8.3)
HCT VFR BLD AUTO: 39.2 % (ref 40–53)
HEMOGLOBIN: 12.1 G/DL (ref 13.3–17.7)
LYMPHOCYTES # BLD AUTO: 1.7 K/UL (ref 0.8–5.3)
LYMPHOCYTES NFR BLD AUTO: 27.5 %L (ref 20–48)
MCH RBC QN AUTO: 27.1 PG (ref 26.5–35)
MCHC RBC AUTO-ENTMCNC: 30.9 G/DL (ref 32–36)
MCV RBC AUTO: 87.9 FL (ref 78–100)
MID #: 0.4 K/UL (ref 0–2.2)
MID %: 5.7 %M (ref 0–20)
PLATELET # BLD AUTO: 190 K/UL (ref 150–450)
POTASSIUM SERPL-SCNC: 3.9 MMOL/L (ref 3.4–5.3)
PROT SERPL-MCNC: 7.6 G/DL (ref 6.6–8.8)
RBC # BLD AUTO: 4.46 M/UL (ref 4.4–5.9)
SODIUM SERPL-SCNC: 141 MMOL/L (ref 133–144)
WBC # BLD AUTO: 6.3 K/UL (ref 4–11)

## 2020-10-01 PROCEDURE — 80053 COMPREHEN METABOLIC PANEL: CPT

## 2020-10-01 PROCEDURE — 85025 COMPLETE CBC W/AUTO DIFF WBC: CPT

## 2020-10-01 NOTE — LETTER
October 6, 2020      Piter Gaines  [address] [place] [address]        Dear ,    We are writing to inform you of your test results.    Your liver and kidney function look stable.  Your blood counts are improved from last time.  This is likely fluctuations in blood counts from the Dapsone.  Nothing needs to be changed at this time.  Call with questions    Resulted Orders   Comprehensive Metabolic Panel ([facility]) - results <1hr Protocol   Result Value Ref Range    Glucose 113.0 (H) 60.0 - 109.0 mg/dL    Urea Nitrogen 14.0 7.0 - 30.0 mg/dL    Creatinine 1.1 0.8 - 1.5 mg/dL    Sodium 141.0 133.0 - 144.0 mmol/L    Potassium 3.9 3.4 - 5.3 mmol/L    Chloride 103.0 94.0 - 109.0 mmol/L    Carbon Dioxide 25.0 20.0 - 32.0 mmol/L    Calcium 9.3 8.5 - 10.4 mg/dL    Protein Total 7.6 6.6 - 8.8 g/dL    Albumin 3.8 3.3 - 4.6 g/dL    Alkaline Phosphatase 100.0 40.0 - 150.0 U/L    ALT 26.0 0.0 - 45.0 U/L    AST 24.0 0.0 - 55.0 U/L    Bilirubin Total 0.8 0.2 - 1.3 mg/dL    eGFR Calculated (Non Black Reference) 74.1 >60.0 mL/min    eGFR Calculated (Black Reference) >90 >60.0 mL/min   CBC with Diff Plt ([facility])   Result Value Ref Range    WBC 6.3 4.0 - 11.0 K/uL    Lymphocytes # 1.7 0.8 - 5.3 K/uL    % Lymphocytes 27.5 20.0 - 48.0 %L    Mid # 0.4 0.0 - 2.2 K/uL    Mid % 5.7 0.0 - 20.0 %M    GRANULOCYTES # 4.2 1.6 - 8.3 K/uL    % Granulocytes 66.8 40.0 - 75.0 %G    RBC 4.46 4.40 - 5.90 M/uL    Hemoglobin 12.1 (L) 13.3 - 17.7 g/dL    Hematocrit 39.2 (L) 40.0 - 53.0 %    MCV 87.9 78.0 - 100.0 fL    MCH 27.1 26.5 - 35.0 pg    MCHC 30.9 (L) 32.0 - 36.0 g/dL    Platelets 190.0 150.0 - 450.0 K/uL       If you have any questions or concerns, please call the clinic at the number listed above.       Sincerely,        [facility]

## 2020-10-02 DIAGNOSIS — F32.A DEPRESSION, UNSPECIFIED DEPRESSION TYPE: ICD-10-CM

## 2020-10-02 RX ORDER — CITALOPRAM HYDROBROMIDE 40 MG/1
40 TABLET ORAL DAILY
Qty: 90 TABLET | Refills: 1 | Status: SHIPPED | OUTPATIENT
Start: 2020-10-02 | End: 2021-04-02

## 2020-10-06 DIAGNOSIS — F10.20 UNCOMPLICATED ALCOHOL DEPENDENCE (H): ICD-10-CM

## 2020-10-07 DIAGNOSIS — L30.9 DERMATITIS: ICD-10-CM

## 2020-10-08 RX ORDER — GABAPENTIN 300 MG/1
300 CAPSULE ORAL 3 TIMES DAILY
Qty: 90 CAPSULE | Refills: 2 | Status: SHIPPED | OUTPATIENT
Start: 2020-10-08 | End: 2021-01-04

## 2020-10-10 RX ORDER — BENZOCAINE/MENTHOL 6 MG-10 MG
LOZENGE MUCOUS MEMBRANE 2 TIMES DAILY
Qty: 30 G | Refills: 1 | Status: SHIPPED | OUTPATIENT
Start: 2020-10-10 | End: 2020-12-30

## 2020-10-16 DIAGNOSIS — L13.8 LINEAR IGA BULLOUS DERMATOSIS: ICD-10-CM

## 2020-10-18 RX ORDER — DAPSONE 100 MG/1
100 TABLET ORAL DAILY
Qty: 90 TABLET | Refills: 0 | Status: SHIPPED | OUTPATIENT
Start: 2020-10-18 | End: 2021-01-14

## 2020-10-19 DIAGNOSIS — M53.3 SI (SACROILIAC) JOINT DYSFUNCTION: ICD-10-CM

## 2020-10-23 RX ORDER — NAPROXEN 500 MG/1
500 TABLET ORAL 2 TIMES DAILY WITH MEALS
Qty: 60 TABLET | Refills: 1 | Status: SHIPPED | OUTPATIENT
Start: 2020-10-23 | End: 2021-02-01

## 2020-11-03 DIAGNOSIS — K20.90 ESOPHAGITIS: ICD-10-CM

## 2020-11-03 NOTE — TELEPHONE ENCOUNTER
Message to physician:     Date of last visit: 09/16/20    Date of next visit if scheduled: none    Last Comprehensive Metabolic Panel:  Sodium   Date Value Ref Range Status   10/01/2020 141.0 133.0 - 144.0 mmol/L Final     Potassium   Date Value Ref Range Status   10/01/2020 3.9 3.4 - 5.3 mmol/L Final     Chloride   Date Value Ref Range Status   10/01/2020 103.0 94.0 - 109.0 mmol/L Final     Carbon Dioxide   Date Value Ref Range Status   10/01/2020 25.0 20.0 - 32.0 mmol/L Final     Glucose   Date Value Ref Range Status   10/01/2020 113.0 (H) 60.0 - 109.0 mg/dL Final     Urea Nitrogen   Date Value Ref Range Status   10/01/2020 14.0 7.0 - 30.0 mg/dL Final     Creatinine   Date Value Ref Range Status   10/01/2020 1.1 0.8 - 1.5 mg/dL Final     Calcium   Date Value Ref Range Status   10/01/2020 9.3 8.5 - 10.4 mg/dL Final       BP Readings from Last 3 Encounters:   09/16/20 (!) 151/94   08/10/20 (!) 133/90   07/17/20 134/85       Lab Results   Component Value Date    A1C 6.1 07/17/2020    A1C 5.7 04/18/2017                Please complete refill and CLOSE ENCOUNTER.  Closing the encounter signifies the refill is complete.

## 2020-12-03 DIAGNOSIS — N40.1 BENIGN PROSTATIC HYPERPLASIA WITH NOCTURIA: ICD-10-CM

## 2020-12-03 DIAGNOSIS — R35.1 BENIGN PROSTATIC HYPERPLASIA WITH NOCTURIA: ICD-10-CM

## 2020-12-07 DIAGNOSIS — I10 ESSENTIAL HYPERTENSION: ICD-10-CM

## 2020-12-07 RX ORDER — TAMSULOSIN HYDROCHLORIDE 0.4 MG/1
0.8 CAPSULE ORAL DAILY
Qty: 90 CAPSULE | Refills: 1 | Status: SHIPPED | OUTPATIENT
Start: 2020-12-07 | End: 2021-01-28

## 2020-12-07 RX ORDER — AMLODIPINE BESYLATE 10 MG/1
10 TABLET ORAL DAILY
Qty: 90 TABLET | Refills: 3 | Status: SHIPPED | OUTPATIENT
Start: 2020-12-07 | End: 2021-12-20

## 2020-12-08 DIAGNOSIS — N40.1 BENIGN PROSTATIC HYPERPLASIA WITH NOCTURIA: ICD-10-CM

## 2020-12-08 DIAGNOSIS — R35.1 BENIGN PROSTATIC HYPERPLASIA WITH NOCTURIA: ICD-10-CM

## 2020-12-08 RX ORDER — TAMSULOSIN HYDROCHLORIDE 0.4 MG/1
0.8 CAPSULE ORAL DAILY
OUTPATIENT
Start: 2020-12-08

## 2020-12-15 DIAGNOSIS — R35.1 BENIGN PROSTATIC HYPERPLASIA WITH NOCTURIA: ICD-10-CM

## 2020-12-15 DIAGNOSIS — N40.1 BENIGN PROSTATIC HYPERPLASIA WITH NOCTURIA: ICD-10-CM

## 2020-12-15 RX ORDER — FINASTERIDE 5 MG/1
5 TABLET, FILM COATED ORAL DAILY
Qty: 90 TABLET | Refills: 1 | Status: SHIPPED | OUTPATIENT
Start: 2020-12-15 | End: 2021-06-25

## 2020-12-28 DIAGNOSIS — I10 ESSENTIAL HYPERTENSION: ICD-10-CM

## 2020-12-28 RX ORDER — LOSARTAN POTASSIUM 50 MG/1
100 TABLET ORAL DAILY
Qty: 180 TABLET | Refills: 1 | Status: SHIPPED | OUTPATIENT
Start: 2020-12-28 | End: 2021-07-02

## 2020-12-30 DIAGNOSIS — L30.9 DERMATITIS: ICD-10-CM

## 2021-01-03 RX ORDER — BENZOCAINE/MENTHOL 6 MG-10 MG
LOZENGE MUCOUS MEMBRANE 2 TIMES DAILY
Qty: 453 G | Refills: 0 | Status: SHIPPED | OUTPATIENT
Start: 2021-01-03 | End: 2022-03-29

## 2021-01-04 DIAGNOSIS — F10.20 UNCOMPLICATED ALCOHOL DEPENDENCE (H): ICD-10-CM

## 2021-01-04 RX ORDER — GABAPENTIN 300 MG/1
300 CAPSULE ORAL 3 TIMES DAILY
Qty: 180 CAPSULE | Refills: 1 | Status: SHIPPED | OUTPATIENT
Start: 2021-01-04 | End: 2021-05-07

## 2021-01-14 DIAGNOSIS — L13.8 LINEAR IGA BULLOUS DERMATOSIS: ICD-10-CM

## 2021-01-16 RX ORDER — DAPSONE 100 MG/1
100 TABLET ORAL DAILY
Qty: 90 TABLET | Refills: 0 | Status: SHIPPED | OUTPATIENT
Start: 2021-01-16 | End: 2021-02-01

## 2021-01-28 DIAGNOSIS — N40.1 BENIGN PROSTATIC HYPERPLASIA WITH NOCTURIA: ICD-10-CM

## 2021-01-28 DIAGNOSIS — R35.1 BENIGN PROSTATIC HYPERPLASIA WITH NOCTURIA: ICD-10-CM

## 2021-01-31 PROBLEM — R35.1 BENIGN PROSTATIC HYPERPLASIA WITH NOCTURIA: Status: ACTIVE | Noted: 2021-01-31

## 2021-01-31 PROBLEM — L13.8 LINEAR IGA BULLOUS DERMATOSIS: Status: ACTIVE | Noted: 2021-01-31

## 2021-01-31 PROBLEM — N40.1 BENIGN PROSTATIC HYPERPLASIA WITH NOCTURIA: Status: ACTIVE | Noted: 2021-01-31

## 2021-01-31 NOTE — PROGRESS NOTES
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  Dapsone 100mg daily.  No side effects.  Dapsone prescription requires regular blood work monitoring, and educated patient on this since he missed a few appointments.  Hgb looks relatively stable (some drift is expected with dapsone); nowhere near transfusion.  Hepatic labs look unremarkable.  He is getting benefit, but not complete remission.  Safe to continue and will increase to 150mg daily, which is max dose in literature.  Risks and benefits discussed once again with increase.  He will return in 4 weeks for labs.  Plan:   -CMP and CBC today  -CMP and CBC in 4 weeks  -Dapsone increased to 150mg daily  -Will refer to Derm if: at max dose and not resolved, or rebound with future taper    (N40.1,  R35.1) Benign prostatic hyperplasia with nocturia  Comment: Symptomatic.  Taking tamsulosin and finasteride.  Still too early to tell if finasteride failed. PSA decreased, which is expected with finasteride.  Will check back in another 6 months.  If failed, then will send to Urology.  Plan:   -PSA Diagnostic ([facility])  -Continue tamsulosin and finasteride  -check back in 6 months    (I10) Essential hypertension  Comment: Still uncontrolled.  Taking amlodipine and losartan, which losartan was increased at last visit.  Didn't have time to discuss adherence with above.  K and Cr unremarkable..    Plan:   -CMP  -continue losartan 100mg and amlodipine 10mg    (M67.431) Ganglion cyst of wrist, right  Comment: Chronic subcut mass on right dorsal hand.  Discussed about just watching or trying aspiration.  He wants to try an aspiration.   Plan:   -Aspiration in 4 weeks      Options for treatment and follow-up care were reviewed with the patient and/or guardian. Piter Gaines and/or guardian engaged in the decision making process and verbalized understanding of the options discussed and agreed with the final  plan.      Kody Bangura MD    Precepted today with: Shy Malloy MD           HPI:       Piter Gaines is a 55 year old  male with a significant past medical history of BPH, Linear IgA dermatosis, HTN accompanied with self who presents for follow up of concern(s) listed below    How many years of smoking? 15 years    1) IgA bullous disease  -Issues with taking Dapsone? no  -Lesions still popping up? Yes, but less itching/pain and less frequent  -ROS: Denies fatigue, new headaches, blue lips, palpitations, chest pain, new SOB, yellow eyes, dark urine, new numbness in feet or hands    2) BPH  -Chronic intermittent nocturia, hesitancy, and weak stream for 1-2 years that pretty bothersome  -Not noticed benefit with Proscar yet  -PSA change from 3 to 3.6 over a year  -Denies new hip or lower back pain, hematuria, unintentional weight loss    3) right hand bump  -wax and wane in size  -about a year now  -dorsal lateral hand  -worse with activity or pressure  -would like aspiration    4) HTN  -no issue with med changes  -no dizziness, chest pain, SOB           Summary of Events:     #Bullous IgA dermatosis  -Diagnosed in 7/2020  -Dapsone 50mg daily started in 8/2020 without response  -Dapsone 100mg daily started in 9/2020 with improvement, but not resolution  -patient missed follow up appointments    #BPH  -Chronic intermittent nocturia, hesitancy, and weak stream for 1-2 years  -PSA 3 (12/19) -> 3.6 (8/20)  -SYLVAIN shows enlarged prostate in 8/2020  -Flomax since 9/2019, without symptom improvement  -started finasteride in 9/2020    #HTN  -taking losartan 100mg and amlodipine 10mg         Health Maintenance:     Colon Cancer  Indicated (50-75, 40 or 10 years before 1st degree relative age of diagnosis)? yes  Interval (<10 years):TBD  Last Intervention: FIT + in 2017, has not done C-scope yet  Due now? YES  If not done, why? Not enough time - discuss at next visit    Lung Cancer  Indicated (55-80 - 30 pack-year current  "or former within 15 years)? no    Prostate Cancer  Indicated (50-70 - high risk only)? yes  Interval (<2 years):Q6months  Last Intervention: 3.6 in 9/2020  Due now? yes    AAA  Indicated (65+ - smoked 100 cigs ever)? Not yet    PHQ-2  Indicated (all adults)? yes  Interval (<6 months):Q6months  Last Intervention: 11/2019  Due now? Yes  If not done, why? Not enough time    Viral Studies (HIV, HCV)  Indicated? yes  Interval: once  Last Intervention: HIV negative 8/20; HCAb negative 8/20  Due now? no    Vaccines  Immunization History   Administered Date(s) Administered     Influenza Vaccine IM > 6 months Valent IIV4 10/17/2017, 11/08/2018, 10/06/2020     TDAP Vaccine (Boostrix) 08/25/2017     Zoster vaccine, live 10/06/2020   pneumonia due  If not done, why? Not enough time         Review of Systems:     10-point ROS reviewed and negative unless otherwise noted in HPI         Objective:     Vitals:    02/01/21 1452 02/01/21 1503   BP: (!) 152/97 (!) 147/93   BP Location: Right arm Right arm   Pulse: 92    Resp: 20    Temp: 97.9  F (36.6  C)    TempSrc: Oral    SpO2: 95%    Weight: 85.3 kg (188 lb)    Height: 1.765 m (5' 9.5\")      Body mass index is 27.36 kg/m .    Exam: Brief exam of consitutional, ocular, HENT, pulmonary, cardiovascular, musculoskeletal, integumentary, neurological, and psychiatric systems were performed - pertinent findings include: 2.5x2.5-cm soft, mobile mass on right dorsal hand    External Data: none    Internal Data: CMP, CBC, PSA    "

## 2021-02-01 ENCOUNTER — OFFICE VISIT (OUTPATIENT)
Dept: FAMILY MEDICINE | Facility: CLINIC | Age: 56
End: 2021-02-01
Payer: COMMERCIAL

## 2021-02-01 VITALS
HEIGHT: 70 IN | BODY MASS INDEX: 26.92 KG/M2 | HEART RATE: 92 BPM | RESPIRATION RATE: 20 BRPM | OXYGEN SATURATION: 95 % | DIASTOLIC BLOOD PRESSURE: 93 MMHG | TEMPERATURE: 97.9 F | SYSTOLIC BLOOD PRESSURE: 147 MMHG | WEIGHT: 188 LBS

## 2021-02-01 DIAGNOSIS — I10 ESSENTIAL HYPERTENSION: ICD-10-CM

## 2021-02-01 DIAGNOSIS — R35.1 BENIGN PROSTATIC HYPERPLASIA WITH NOCTURIA: ICD-10-CM

## 2021-02-01 DIAGNOSIS — N40.1 BENIGN PROSTATIC HYPERPLASIA WITH NOCTURIA: ICD-10-CM

## 2021-02-01 DIAGNOSIS — M67.431 GANGLION CYST OF WRIST, RIGHT: ICD-10-CM

## 2021-02-01 DIAGNOSIS — L13.8 LINEAR IGA BULLOUS DERMATOSIS: Primary | ICD-10-CM

## 2021-02-01 LAB
% GRANULOCYTES: 68.8 %G (ref 40–75)
ALBUMIN SERPL-MCNC: 3.9 G/DL (ref 3.2–4.5)
ALP SERPL-CCNC: 82 U/L (ref 40–150)
ALT SERPL-CCNC: 29 U/L (ref 0–45)
AST SERPL-CCNC: 25 U/L (ref 0–55)
BILIRUB SERPL-MCNC: 0.7 MG/DL (ref 0.2–1.3)
BUN SERPL-MCNC: 13 MG/DL (ref 7–30)
CALCIUM SERPL-MCNC: 9.4 MG/DL (ref 8.5–10.4)
CHLORIDE SERPLBLD-SCNC: 106 MMOL/L (ref 94–109)
CO2 SERPL-SCNC: 28 MMOL/L (ref 20–32)
CREAT SERPL-MCNC: 1.4 MG/DL (ref 0.8–1.5)
EGFR CALCULATED (BLACK REFERENCE): 67.7 ML/MIN
EGFR CALCULATED (NON BLACK REFERENCE): 55.9 ML/MIN
GLUCOSE SERPL-MCNC: 101 MG/DL (ref 60–109)
GRANULOCYTES #: 4.7 K/UL (ref 1.6–8.3)
HCT VFR BLD AUTO: 38.7 % (ref 40–53)
HEMOGLOBIN: 11.9 G/DL (ref 13.3–17.7)
LYMPHOCYTES # BLD AUTO: 1.7 K/UL (ref 0.8–5.3)
LYMPHOCYTES NFR BLD AUTO: 24.6 %L (ref 20–48)
MCH RBC QN AUTO: 28.5 PG (ref 26.5–35)
MCHC RBC AUTO-ENTMCNC: 30.7 G/DL (ref 32–36)
MCV RBC AUTO: 92.5 FL (ref 78–100)
MID #: 0.4 K/UL (ref 0–2.2)
MID %: 6.6 %M (ref 0–20)
PLATELET # BLD AUTO: 166 K/UL (ref 150–450)
POTASSIUM SERPL-SCNC: 4.2 MMOL/L (ref 3.4–5.3)
PROT SERPL-MCNC: 7.9 G/DL (ref 6.6–8.8)
PSA SERPL-MCNC: 2.5 NG/ML (ref 0–3.5)
RBC # BLD AUTO: 4.18 M/UL (ref 4.4–5.9)
SODIUM SERPL-SCNC: 138 MMOL/L (ref 133–144)
WBC # BLD AUTO: 6.8 K/UL (ref 4–11)

## 2021-02-01 PROCEDURE — 99214 OFFICE O/P EST MOD 30 MIN: CPT | Mod: GC | Performed by: STUDENT IN AN ORGANIZED HEALTH CARE EDUCATION/TRAINING PROGRAM

## 2021-02-01 PROCEDURE — 80053 COMPREHEN METABOLIC PANEL: CPT | Performed by: STUDENT IN AN ORGANIZED HEALTH CARE EDUCATION/TRAINING PROGRAM

## 2021-02-01 PROCEDURE — 36415 COLL VENOUS BLD VENIPUNCTURE: CPT | Performed by: STUDENT IN AN ORGANIZED HEALTH CARE EDUCATION/TRAINING PROGRAM

## 2021-02-01 PROCEDURE — 85025 COMPLETE CBC W/AUTO DIFF WBC: CPT | Performed by: STUDENT IN AN ORGANIZED HEALTH CARE EDUCATION/TRAINING PROGRAM

## 2021-02-01 RX ORDER — TAMSULOSIN HYDROCHLORIDE 0.4 MG/1
0.8 CAPSULE ORAL DAILY
Qty: 90 CAPSULE | Refills: 3 | Status: SHIPPED | OUTPATIENT
Start: 2021-02-01 | End: 2021-07-28

## 2021-02-01 RX ORDER — NAPROXEN 500 MG/1
500 TABLET ORAL 2 TIMES DAILY WITH MEALS
Qty: 60 TABLET | Refills: 1 | Status: SHIPPED | OUTPATIENT
Start: 2021-02-01 | End: 2021-03-25

## 2021-02-01 RX ORDER — DAPSONE 100 MG/1
150 TABLET ORAL DAILY
Qty: 90 TABLET | Refills: 0
Start: 2021-02-01 | End: 2021-04-27

## 2021-02-01 ASSESSMENT — MIFFLIN-ST. JEOR: SCORE: 1686.07

## 2021-02-01 NOTE — Clinical Note
So I think this is a level 5 because dapsone requires pretty often CBC/CMP blood work, has higher risks then other meds, and independently reviewed blood work that could .  Let me know if you think differently

## 2021-02-01 NOTE — LETTER
February 8, 2021      Piter Gaines  [address] [place] [address]        Dear ,    We are writing to inform you of your test results.    Your blood counts look stable, and your liver and kidneys look good.  Your PSA, the prostate labs, looks improved, which is expected while on Proscar.    Resulted Orders   CBC with Diff Plt ([facility])   Result Value Ref Range    WBC 6.8 4.0 - 11.0 K/uL    Lymphocytes # 1.7 0.8 - 5.3 K/uL    % Lymphocytes 24.6 20.0 - 48.0 %L    Mid # 0.4 0.0 - 2.2 K/uL    Mid % 6.6 0.0 - 20.0 %M    GRANULOCYTES # 4.7 1.6 - 8.3 K/uL    % Granulocytes 68.8 40.0 - 75.0 %G    RBC 4.18 (L) 4.40 - 5.90 M/uL    Hemoglobin 11.9 (L) 13.3 - 17.7 g/dL    Hematocrit 38.7 (L) 40.0 - 53.0 %    MCV 92.5 78.0 - 100.0 fL    MCH 28.5 26.5 - 35.0 pg    MCHC 30.7 (L) 32.0 - 36.0 g/dL    Platelets 166.0 150.0 - 450.0 K/uL   Comprehensive Metabolic Panel ([facility]) - results <1hr Protocol   Result Value Ref Range    Glucose 101.0 60.0 - 109.0 mg/dL    Urea Nitrogen 13.0 7.0 - 30.0 mg/dL    Creatinine 1.4 0.8 - 1.5 mg/dL    Sodium 138.0 133.0 - 144.0 mmol/L    Potassium 4.2 3.4 - 5.3 mmol/L    Chloride 106.0 94.0 - 109.0 mmol/L    Carbon Dioxide 28.0 20.0 - 32.0 mmol/L    Calcium 9.4 8.5 - 10.4 mg/dL    Protein Total 7.9 6.6 - 8.8 g/dL    Albumin 3.9 3.2 - 4.5 g/dL    Alkaline Phosphatase 82.0 40.0 - 150.0 U/L    ALT 29.0 0.0 - 45.0 U/L    AST 25.0 0.0 - 55.0 U/L    Bilirubin Total 0.7 0.2 - 1.3 mg/dL    eGFR Calculated (Non Black Reference) 55.9 (L) >60.0 mL/min    eGFR Calculated (Black Reference) 67.7 >60.0 mL/min   PSA Diagnostic ([facility])   Result Value Ref Range    PSA 2.5 0.0 - 3.5 ng/mL    Narrative    Test performed by:  [facility] [address] [place] [address]  Method is Abbott Prostate-Specific Antigen (PSA)  Standard-WHO 1st International (90:10)       If you have any questions or concerns, please call the clinic at the number listed above.        Sincerely,      Whit Malloy MD

## 2021-02-03 DIAGNOSIS — K20.90 ESOPHAGITIS: ICD-10-CM

## 2021-02-03 NOTE — RESULT ENCOUNTER NOTE
"Please send the following in a letter to the patient:     \"Your blood counts look stable, and your liver and kidneys look good.  Your PSA, the prostate labs, looks improved, which is expected while on Proscar.\""

## 2021-02-05 NOTE — PROGRESS NOTES
Preceptor Attestation:  Patient seen, examined, and discussed with the resident..  I agree with written assessment and plan of care.  Supervising Physician:  Whit Malloy MD  [facility]

## 2021-03-09 ENCOUNTER — OFFICE VISIT (OUTPATIENT)
Dept: FAMILY MEDICINE | Facility: CLINIC | Age: 56
End: 2021-03-09
Payer: COMMERCIAL

## 2021-03-09 VITALS
RESPIRATION RATE: 16 BRPM | WEIGHT: 184 LBS | HEIGHT: 70 IN | HEART RATE: 91 BPM | OXYGEN SATURATION: 94 % | BODY MASS INDEX: 26.34 KG/M2 | DIASTOLIC BLOOD PRESSURE: 90 MMHG | TEMPERATURE: 97.9 F | SYSTOLIC BLOOD PRESSURE: 146 MMHG

## 2021-03-09 DIAGNOSIS — N40.1 BENIGN PROSTATIC HYPERPLASIA WITH NOCTURIA: ICD-10-CM

## 2021-03-09 DIAGNOSIS — H05.012 CELLULITIS OF LEFT ORBITAL REGION: Primary | ICD-10-CM

## 2021-03-09 DIAGNOSIS — Z51.81 ENCOUNTER FOR THERAPEUTIC DRUG MONITORING: ICD-10-CM

## 2021-03-09 DIAGNOSIS — R35.1 BENIGN PROSTATIC HYPERPLASIA WITH NOCTURIA: ICD-10-CM

## 2021-03-09 DIAGNOSIS — L03.211 FACIAL CELLULITIS: ICD-10-CM

## 2021-03-09 DIAGNOSIS — L13.8 LINEAR IGA BULLOUS DERMATOSIS: ICD-10-CM

## 2021-03-09 LAB
ALBUMIN SERPL-MCNC: 3.6 G/DL (ref 3.2–4.5)
ALP SERPL-CCNC: 80 U/L (ref 40–150)
ALT SERPL-CCNC: 15 U/L (ref 0–45)
AST SERPL-CCNC: 16 U/L (ref 0–55)
BILIRUB SERPL-MCNC: 0.8 MG/DL (ref 0.2–1.3)
BUN SERPL-MCNC: 16 MG/DL (ref 7–30)
CALCIUM SERPL-MCNC: 9 MG/DL (ref 8.5–10.4)
CHLORIDE SERPLBLD-SCNC: 106 MMOL/L (ref 94–109)
CO2 SERPL-SCNC: 26 MMOL/L (ref 20–32)
CREAT SERPL-MCNC: 1.3 MG/DL (ref 0.8–1.5)
EGFR CALCULATED (BLACK REFERENCE): 73.7 ML/MIN
EGFR CALCULATED (NON BLACK REFERENCE): 60.9 ML/MIN
ERYTHROCYTE [DISTWIDTH] IN BLOOD BY AUTOMATED COUNT: 15.6 % (ref 11–14.5)
GLUCOSE SERPL-MCNC: 122 MG/DL (ref 60–109)
HCT VFR BLD AUTO: 36.7 % (ref 40–54)
HGB BLD-MCNC: 11.5 G/DL (ref 14–18)
MCH RBC QN AUTO: 27.3 PG (ref 27–34)
MCHC RBC AUTO-ENTMCNC: 31.3 G/DL (ref 32–36)
MCV RBC AUTO: 87 FL (ref 80–100)
PLATELET # BLD AUTO: 184 THOU/UL (ref 140–440)
PMV BLD AUTO: 11.8 FL (ref 8.5–12.5)
POTASSIUM SERPL-SCNC: 3.6 MMOL/L (ref 3.4–5.3)
PROT SERPL-MCNC: 7.7 G/DL (ref 6.6–8.8)
RBC # BLD AUTO: 4.22 MILL/UL (ref 4.4–6.2)
SODIUM SERPL-SCNC: 141 MMOL/L (ref 133–144)
WBC # BLD AUTO: 8.5 THOU/UL (ref 4–11)

## 2021-03-09 PROCEDURE — 36415 COLL VENOUS BLD VENIPUNCTURE: CPT | Performed by: STUDENT IN AN ORGANIZED HEALTH CARE EDUCATION/TRAINING PROGRAM

## 2021-03-09 PROCEDURE — 80053 COMPREHEN METABOLIC PANEL: CPT | Performed by: STUDENT IN AN ORGANIZED HEALTH CARE EDUCATION/TRAINING PROGRAM

## 2021-03-09 PROCEDURE — 99214 OFFICE O/P EST MOD 30 MIN: CPT | Mod: GC | Performed by: STUDENT IN AN ORGANIZED HEALTH CARE EDUCATION/TRAINING PROGRAM

## 2021-03-09 RX ORDER — INFLUENZA A VIRUS A/NEBRASKA/14/2019 (H1N1) ANTIGEN (MDCK CELL DERIVED, PROPIOLACTONE INACTIVATED), INFLUENZA A VIRUS A/DELAWARE/39/2019 (H3N2) ANTIGEN (MDCK CELL DERIVED, PROPIOLACTONE INACTIVATED), INFLUENZA B VIRUS B/SINGAPORE/INFTT-16-0610/2016 ANTIGEN (MDCK CELL DERIVED, PROPIOLACTONE INACTIVATED), INFLUENZA B VIRUS B/DARWIN/7/2019 ANTIGEN (MDCK CELL DERIVED, PROPIOLACTONE INACTIVATED) 15; 15; 15; 15 UG/.5ML; UG/.5ML; UG/.5ML; UG/.5ML
INJECTION, SUSPENSION INTRAMUSCULAR
COMMUNITY
Start: 2020-10-06 | End: 2021-03-25

## 2021-03-09 RX ORDER — ZOSTER VACCINE RECOMBINANT, ADJUVANTED 50 MCG/0.5
KIT INTRAMUSCULAR
COMMUNITY
Start: 2020-10-06 | End: 2023-07-28

## 2021-03-09 ASSESSMENT — MIFFLIN-ST. JEOR: SCORE: 1667.93

## 2021-03-10 NOTE — PROGRESS NOTES
Preceptor Attestation:  Patient's case reviewed and discussed with the resident, Kody Bangura MD, and I personally evaluated the patient. I agree with written assessment and plan of care. Clinical picture consistent with orbital cellulitis and need for IV antibiotics.    Supervising Physician:  Daphne Burdick MD   [facility]

## 2021-03-11 ENCOUNTER — COMMUNICATION - HEALTHEAST (OUTPATIENT)
Dept: SCHEDULING | Facility: CLINIC | Age: 56
End: 2021-03-11

## 2021-03-15 NOTE — PATIENT INSTRUCTIONS
Referral for :     Urology    LOCATION/PLACE/Provider :    [facility]   DATE & TIME :    Faxed, they will call   PHONE :     [phone]  FAX :    [phone]  Appointment made by clinic staff/:    Lizbeth

## 2021-03-16 ENCOUNTER — TRANSCRIBE ORDERS (OUTPATIENT)
Dept: OTHER | Age: 56
End: 2021-03-16

## 2021-03-16 DIAGNOSIS — R35.1 BENIGN PROSTATIC HYPERPLASIA WITH NOCTURIA: Primary | ICD-10-CM

## 2021-03-16 DIAGNOSIS — N40.1 BENIGN PROSTATIC HYPERPLASIA WITH NOCTURIA: Primary | ICD-10-CM

## 2021-03-18 NOTE — TELEPHONE ENCOUNTER
MEDICAL RECORDS REQUEST   [facility]  Urology Clinic  [address] [place] [address]  PHONE: [phone]  Fax: [phone]        FUTURE VISIT INFORMATION                                                   Piter Paraglaurent, : 1965 scheduled for future visit at [facility]    APPOINTMENT INFORMATION:    Date: 3/26/2021    Provider:  Homar LOPEZ    Reason for Visit/Diagnosis: BPH    REFERRAL INFORMATION:    Referring provider:  N/A    Specialty: N/A    Referring providers clinic:      Clinic contact number:  N/A    RECORDS REQUESTED FOR VISIT                                                     NOTES  STATUS/DETAILS   OFFICE NOTE from referring provider  yes   OFFICE NOTE from other specialist  no   DISCHARGE SUMMARY from hospital  no   DISCHARGE REPORT from the ER  no   OPERATIVE REPORT  no   MEDICATION LIST  yes   LABS     URINALYSIS (UA) /PSA  yes     PRE-VISIT CHECKLIST      Record collection complete Yes   Appointment appropriately scheduled           (right time/right provider) Yes   MyChart activation No- Declined   Questionnaire complete If no, please explain: In process      Completed by: Katalina Wood

## 2021-03-19 ENCOUNTER — PRE VISIT (OUTPATIENT)
Dept: UROLOGY | Facility: CLINIC | Age: 56
End: 2021-03-19

## 2021-03-19 NOTE — TELEPHONE ENCOUNTER
Chief Complaint : Consult    Hx/Sx: BPH w/ LUTS (urgency)    Records/Orders: Available    Pt Contacted: N/a    At Rooming: TABATHA Alex, EMT

## 2021-03-24 PROBLEM — Z00.00 HEALTHCARE MAINTENANCE: Status: ACTIVE | Noted: 2021-03-24

## 2021-03-25 ENCOUNTER — OFFICE VISIT (OUTPATIENT)
Dept: FAMILY MEDICINE | Facility: CLINIC | Age: 56
End: 2021-03-25
Payer: COMMERCIAL

## 2021-03-25 ENCOUNTER — RECORDS - HEALTHEAST (OUTPATIENT)
Dept: ADMINISTRATIVE | Facility: OTHER | Age: 56
End: 2021-03-25

## 2021-03-25 VITALS
HEIGHT: 70 IN | DIASTOLIC BLOOD PRESSURE: 88 MMHG | SYSTOLIC BLOOD PRESSURE: 136 MMHG | HEART RATE: 81 BPM | OXYGEN SATURATION: 96 % | RESPIRATION RATE: 18 BRPM | WEIGHT: 186 LBS | BODY MASS INDEX: 26.63 KG/M2 | TEMPERATURE: 98.1 F

## 2021-03-25 DIAGNOSIS — Z51.81 ENCOUNTER FOR THERAPEUTIC DRUG MONITORING: ICD-10-CM

## 2021-03-25 DIAGNOSIS — Z09 HOSPITAL DISCHARGE FOLLOW-UP: Primary | ICD-10-CM

## 2021-03-25 DIAGNOSIS — D64.9 NORMOCHROMIC NORMOCYTIC ANEMIA: ICD-10-CM

## 2021-03-25 DIAGNOSIS — R55 VASO VAGAL EPISODE: ICD-10-CM

## 2021-03-25 DIAGNOSIS — R06.09 DOE (DYSPNEA ON EXERTION): ICD-10-CM

## 2021-03-25 DIAGNOSIS — R35.1 BENIGN PROSTATIC HYPERPLASIA WITH NOCTURIA: ICD-10-CM

## 2021-03-25 DIAGNOSIS — N40.1 BENIGN PROSTATIC HYPERPLASIA WITH NOCTURIA: ICD-10-CM

## 2021-03-25 DIAGNOSIS — I65.21 CAROTID STENOSIS, NON-SYMPTOMATIC, RIGHT: ICD-10-CM

## 2021-03-25 LAB
% IMMATURE GRANULOCYTES: 1 % (ref 0–0)
ABS IMMATURE GRANULOCYTES: 0 10E9/L (ref 0–0)
ALBUMIN SERPL-MCNC: 3.7 G/DL (ref 3.2–4.5)
ALP SERPL-CCNC: 86 U/L (ref 40–150)
ALT SERPL-CCNC: 23 U/L (ref 0–45)
AST SERPL-CCNC: 26 U/L (ref 0–55)
BASOPHILS # BLD AUTO: 0 10E9/L (ref 0–0.2)
BASOPHILS NFR BLD AUTO: 1 % (ref 0–2)
BILIRUB SERPL-MCNC: 0.7 MG/DL (ref 0.2–1.3)
BUN SERPL-MCNC: 12 MG/DL (ref 7–30)
CALCIUM SERPL-MCNC: 9.4 MG/DL (ref 8.5–10.4)
CHLORIDE SERPLBLD-SCNC: 107 MMOL/L (ref 94–109)
CHOLEST SERPL-MCNC: 136 MG/DL
CO2 SERPL-SCNC: 26 MMOL/L (ref 20–32)
CREAT SERPL-MCNC: 1.3 MG/DL (ref 0.8–1.5)
EGFR CALCULATED (BLACK REFERENCE): 73.7 ML/MIN
EGFR CALCULATED (NON BLACK REFERENCE): 60.9 ML/MIN
EOSINOPHIL # BLD AUTO: 0.1 10E9/L (ref 0–0.7)
EOSINOPHIL NFR BLD AUTO: 1 % (ref 0–6)
ERYTHROCYTE [DISTWIDTH] IN BLOOD BY AUTOMATED COUNT: 16 % (ref 10–15)
FASTING?: NO
GLUCOSE SERPL-MCNC: 116 MG/DL (ref 60–109)
HCT VFR BLD AUTO: 34.6 % (ref 40–53)
HDLC SERPL-MCNC: 28 MG/DL
HEMOGLOBIN: 11.2 G/DL (ref 13.3–17.7)
LDLC SERPL CALC-MCNC: 72 MG/DL
LYMPHOCYTES # BLD AUTO: 1.6 10E9/L (ref 0.8–5.3)
LYMPHOCYTES NFR BLD AUTO: 27.6 % (ref 20–48)
MCH RBC QN AUTO: 28.4 PG (ref 26.5–35)
MCHC RBC AUTO-ENTMCNC: 32.4 G/DL (ref 32–36)
MCV RBC AUTO: 87.6 FL (ref 78–100)
MONOCYTES # BLD AUTO: 0.4 10E9/L (ref 0–1.3)
MONOCYTES NFR BLD AUTO: 6 % (ref 0–12)
NEUTROPHILS # BLD AUTO: 3.7 10E9/L (ref 1.6–8.3)
NEUTROPHILS NFR BLD AUTO: 63.2 % (ref 40–75)
PLATELET # BLD AUTO: 208 10E9/L (ref 150–450)
POTASSIUM SERPL-SCNC: 4.2 MMOL/L (ref 3.4–5.3)
PROT SERPL-MCNC: 7.5 G/DL (ref 6.6–8.8)
RBC # BLD AUTO: 3.95 10E12/L (ref 4.4–5.9)
SODIUM SERPL-SCNC: 140 MMOL/L (ref 133–144)
TRIGL SERPL-MCNC: 179 MG/DL
WBC # BLD AUTO: 5.9 10E9/L (ref 4–11)

## 2021-03-25 PROCEDURE — 36415 COLL VENOUS BLD VENIPUNCTURE: CPT | Performed by: STUDENT IN AN ORGANIZED HEALTH CARE EDUCATION/TRAINING PROGRAM

## 2021-03-25 PROCEDURE — 80053 COMPREHEN METABOLIC PANEL: CPT | Performed by: STUDENT IN AN ORGANIZED HEALTH CARE EDUCATION/TRAINING PROGRAM

## 2021-03-25 PROCEDURE — 85025 COMPLETE CBC W/AUTO DIFF WBC: CPT | Performed by: STUDENT IN AN ORGANIZED HEALTH CARE EDUCATION/TRAINING PROGRAM

## 2021-03-25 PROCEDURE — 99214 OFFICE O/P EST MOD 30 MIN: CPT | Mod: GC | Performed by: STUDENT IN AN ORGANIZED HEALTH CARE EDUCATION/TRAINING PROGRAM

## 2021-03-25 RX ORDER — ATORVASTATIN CALCIUM 20 MG/1
20 TABLET, FILM COATED ORAL DAILY
Qty: 90 TABLET | Refills: 1 | Status: SHIPPED | OUTPATIENT
Start: 2021-03-25 | End: 2022-01-23

## 2021-03-25 ASSESSMENT — MIFFLIN-ST. JEOR: SCORE: 1677

## 2021-03-25 NOTE — PROGRESS NOTES
Hospitalization Follow-up Visit         hospitals       Hospital Follow-up Visit:    Hospital:  [facility]   Date of Admission: 3/9  Date of Discharge: 3/11  Reason(s) for Admission: Orbital cellulitis            Problems taking medications regularly:  None       Post Discharge Medication Reconciliation: discharge medications reconciled and changed, per note/orders.       Problems adhering to non-medication therapy:  None       Medications reviewed by: by myself.    Summary of hospitalization:  [facility] discharge summary reviewed    Patient seen at [facility] on 3/9/2021 for 3 days of left eye pain and watering. At that time given physical exam there was concern by PCP Dr. Bangura for orbital cellulitis, so directly admitted for rule out of orbital cellulitis vs preseptal cellulitis.  No precipitating event, no fevers or chills, though some fatigue. On initial exam, swollen left eye with tearing present. Patient described intense pain to light touch, radiating to the temple and mastoid.  Pain with lateral upward and downward gaze. WBC 7.8, rest of labs WNL as well. Ordered CT facial bones and spoke to radiologist prior to try to include left mastoid to rule out mastoiditis. Scan showed periorbital soft issue swelling; also mild fat stranding, though similar to prior imaging 4/11/20. No drainable abscess. Stared on appropriate treatment for orbital cellulitis (ceftriaxone + vancomycin). Over the last day and a half symptoms have significantly improved and patient no longer has any visual acuity changes and pain with EOM has resolved. Discussed case with optho who were reassured with improving exam here and thought symptoms consistent with preseptal cellulitis. Patient feeling ready for discharge and sent home on oral bactrim and Augmentin x 7 days. Will follow up in the outpatient setting with [facility] by 3/15/2021 and will follow up with PCP in the next seven days. Also sent home with short  script for oxycodone to help address this.     Of note, incidentally noted to have 60% occlusion of the right internal carotid. When asked patient discusses remote history of syncope with sneezing or significant activity. Patient will follow up with PCP to discuss further workup for this as he denies any significant symptoms this time.     -finished antibiotics without problems  -eye, left face, and left upper jaw feel basically back to normal  -still some swelling on left face  -No fever, chills, or eye problems noted    2) JARRETT  -10-15 years  -trouble with breathing after exertion  -progressively worsening symptoms over the years  -now feels SOB after 1 set of stairs  -tried various inhalers without improvement  -No chest pain, nausea, near-syncope, syncope, sweats, orthopnea  -NM stress Sept 2017 was negative    3) syncope  -multiple episodes of brief loss of consciousness with baring down/cough/sneeze  -out for a few seconds  -no incontinence or grogginess after fall  -no nausea, vomiting, flush, chest pain, SOB before  -Never happens with exertion  -Echo 11/2018 showed only mild LVH  -Holter monitor showed no correlating rhythm issues with syncope  -Seen by Cardiology who felt it was vaso vagal  -this has not changed in nature since previously noted    4) Carotid stenosis  -right sided  -60% occlusion  -asymptomatic (no associated speech issue with left sided weakness/numbness  -incidental finding  -discussed treatments, monitoring, risks and benefits of each option    Diagnostic Tests/Treatments reviewed.  Follow up needed: yes, see below  Other Healthcare Providers Involved in Patient s Care:         Specialist appointment - ophthalmology (already seen)  Update since discharge: improved.   Plan of care communicated with patient                     Review of Systems:   7-point ROS reviewed and negative unless otherwise noted in HPI              Physical Exam:     Vitals:    03/25/21 0927 03/25/21 0931   BP: (!)  "141/92 136/88   BP Location: Right arm Right arm   Pulse: 81    Resp: 18    Temp: 98.1  F (36.7  C)    TempSrc: Oral    SpO2: 96%    Weight: 84.4 kg (186 lb)    Height: 1.765 m (5' 9.5\")      Body mass index is 27.07 kg/m .    Detailed exam of integumentary, ocular, HENT were performed; Brief exam of consitutional, pulmonary, cardiovascular, musculoskeletal, neurological, and psychiatric systems were performed - pertinent findings include: slight periorbial and left facial swelling that is improved from last visit; no facial tenderness; EOMI without tenderness; no new bollus lesions           Results:   Reviewed recent hospitalization, labs, imaging, recommendations    Assessment and Plan      Piter was seen today for hospital f/u and medication reconciliation.    Diagnoses and all orders for this visit:    Hospital discharge follow-up  Direct admit from clinic for presumed orbital cellulitis 2/2 dental infection.  Facial CT showed none specific swelling and fat stranding, but nothing else new or concerning.  IV Vanco/CTX was used, which improved patient dramatically.  Opthalmology was curbsided, and they were comfortable with seeing patient as an outpatient on 3/15.  Patient discharged on Bactrim DS+ Augmentin for 7 days.  Finished this and is much better.    -Follow up with dentist soon -> tooth extraction likely  -Follow Ophthalmology guidance on any follow up needed  -Continue to monitor for recurrence of symptoms until teeth removed    Encounter for therapeutic drug monitoring  Normochromic normocytic anemia  Patient takes dapsone, which requires regular monitoring of CBC, hepatic panel.   -     CBC with Diff Plt ([facility])  -     Comprehensive Metabolic Panel ([facility]) - results <1hr Protocol    Benign prostatic hyperplasia with nocturia  Still symptomatic.  Refill medications.  Stable PSA.  Seeing Urology tomorrow.    JARRETT  Chronic JARRETT that has progressively gotten worse.  Current smoker.  Past NM stress and echo " was unremarkable.  Believe this is more likely chronic lung disease.  Would want a PFT in the future as well as a new stress test.  -Return in 1 month and address these items  -Given precautions for going to ER    Vaso-vagal Syncope  Brief loss of consciousness with valsalva maneuvers only.  Noted for a few years now.  Seen by Cardiology, who agrees with diagnosis.  Echo, NM stress, and Holter monitor were unremarkable.  No change in symptoms.  -Continue to monitor symptoms for changes    Carotid stenosis, non-symptomatic, right  Incidental finding of 60% asymptomatic stenosis.  Discussed that this puts him at risk for future stroke, as well as future vascular events (MI, ischemic limb disease).  Discussed trial of anti-platelet and statin therapy to lower his risk of these events.  Discussed risks with these (rhabdomyolysis, myalgias, LFTs abnormal, GI bleed/PUD).  Patient understood and accepted.  Will check baseline carotid US and have follow up in the future after starting therapy.  -     US Carotid Bilateral; Future  -     Lipid Panel ([facility]) - Results < 1 hr  -     atorvastatin (LIPITOR) 20 MG tablet; Take 1 tablet (20 mg) by mouth daily  -     aspirin (ASA) 81 MG EC tablet; Take 1 tablet (81 mg) by mouth daily      Options for treatment and follow-up care were reviewed with the patient  Piter Gaines   engaged in the decision making process and verbalized understanding of the options discussed and agreed with the final plan.    Dr. Yanez was preceptor    Kody Bangura MD

## 2021-03-25 NOTE — LETTER
March 30, 2021      Piter Gaines  [address] [place] [address]        Dear ,    We are writing to inform you of your test results.    Piter - your kidney and liver function looks good.  Your low blood counts form the dapsone have not changed much.  We will continue to follow them through your treatment    Resulted Orders   CBC with Diff Plt ([facility])   Result Value Ref Range    WBC 5.9 4.0 - 11.0 10e9/L    RBC 3.95 (L) 4.40 - 5.90 10e12/L    Hemoglobin 11.2 (L) 13.3 - 17.7 g/dL    Hematocrit 34.6 (L) 40.0 - 53.0 %    MCV 87.6 78.0 - 100.0 fL    MCH 28.4 26.5 - 35.0 pg    MCHC 32.4 32.0 - 36.0 g/dL    RDW 16.0 (H) 10.0 - 15.0 %    Platelets 208.0 150.0 - 450.0 10e9/L    % Neutrophils 63.2 40.0 - 75.0 %    % Monocytes 6 0 - 12 %    % Lymphocytes 27.6 20.0 - 48.0 %    % Eosinophils 1 0 - 6 %    % Basophils 1 0 - 2 %    % Immature Granulocytes 1 (H) 0 - 0 %    Absolute Neutrophil 3.7 1.6 - 8.3 10e9/L    Absolute Monocytes 0.4 0.0 - 1.3 10e9/L    Lymphocytes # 1.6 0.8 - 5.3 10e9/L    Absolute Eosinophils 0.1 0.0 - 0.7 10e9/L    Absolute Basophils 0.0 0.0 - 0.2 10e9/L    Abs Immature Granulocytes 0.0 (H) 0.0 - 0.0 10e9/L   Comprehensive Metabolic Panel ([facility]) - results <1hr Protocol   Result Value Ref Range    Glucose 116.0 (H) 60.0 - 109.0 mg/dL    Urea Nitrogen 12.0 7.0 - 30.0 mg/dL    Creatinine 1.3 0.8 - 1.5 mg/dL    Sodium 140.0 133.0 - 144.0 mmol/L    Potassium 4.2 3.4 - 5.3 mmol/L    Chloride 107.0 94.0 - 109.0 mmol/L    Carbon Dioxide 26.0 20.0 - 32.0 mmol/L    Calcium 9.4 8.5 - 10.4 mg/dL    Protein Total 7.5 6.6 - 8.8 g/dL    Albumin 3.7 3.2 - 4.5 g/dL    Alkaline Phosphatase 86.0 40.0 - 150.0 U/L    ALT 23.0 0.0 - 45.0 U/L    AST 26.0 0.0 - 55.0 U/L    Bilirubin Total 0.7 0.2 - 1.3 mg/dL    eGFR Calculated (Non Black Reference) 60.9 >60.0 mL/min    eGFR Calculated (Black Reference) 73.7 >60.0 mL/min   Lipid [place] ([facility]) - Results > 1 hr   Result Value Ref Range    Cholesterol 136 <=199  mg/dL    Triglycerides 179 (H) <=149 mg/dL    HDL Cholesterol 28 (L) >=40 mg/dL    LDL Cholesterol Calculated 72 <=129 mg/dL    Fasting? No     Narrative    Test performed by:  [facility] [address] [place] [address]       If you have any questions or concerns, please call the clinic at the number listed above.       Sincerely,      Josephine Yanez MD

## 2021-03-25 NOTE — PATIENT INSTRUCTIONS
Referral for :     US Carotid    LOCATION/PLACE/Provider :    [facility]   DATE & TIME :    March 29th at 11:15 am   PHONE :     [phone]  FAX :    [phone]  Appointment made by clinic staff/:    Lizbeth

## 2021-03-25 NOTE — PROGRESS NOTES
Preceptor Attestation:  Patient's case reviewed and discussed with Kody Bangura MD resident and I evaluated the patient. I agree with written assessment and plan of care.  Supervising Physician:  CHAS COX MD  [facility]

## 2021-03-26 ENCOUNTER — VIRTUAL VISIT (OUTPATIENT)
Dept: UROLOGY | Facility: CLINIC | Age: 56
End: 2021-03-26
Payer: COMMERCIAL

## 2021-03-26 ENCOUNTER — PRE VISIT (OUTPATIENT)
Dept: UROLOGY | Facility: CLINIC | Age: 56
End: 2021-03-26

## 2021-03-26 DIAGNOSIS — N40.1 BENIGN PROSTATIC HYPERPLASIA WITH URINARY FREQUENCY: Primary | ICD-10-CM

## 2021-03-26 DIAGNOSIS — R35.0 BENIGN PROSTATIC HYPERPLASIA WITH URINARY FREQUENCY: Primary | ICD-10-CM

## 2021-03-26 PROCEDURE — 99203 OFFICE O/P NEW LOW 30 MIN: CPT | Mod: 95 | Performed by: NURSE PRACTITIONER

## 2021-03-26 ASSESSMENT — PATIENT HEALTH QUESTIONNAIRE - PHQ9: SUM OF ALL RESPONSES TO PHQ QUESTIONS 1-9: 3

## 2021-03-26 NOTE — LETTER
3/26/2021       RE: Piter Gaines  [address] [place] [address]     Dear Colleague,    Thank you for referring your patient, Piter Gaines, to the [facility] [place] at [facility]. Please see a copy of my visit note below.    Piter is a 55 year old who is being evaluated via a billable video visit.      Video Visit Technology for this patient: Neeraj Video Visit- Please send an invite to patient's [phone]       How would you like to obtain your AVS? Mail a copy  If the video visit is dropped, the invitation should be resent by: Text to cell phone: [phone]   Will anyone else be joining your video visit? No    Video Start Time: 11:36 AM  Video-Visit Details    Type of service:  Video Visit    Video End Time:11:44 AM    Originating Location (pt. Location): Home    Distant Location (provider location):  [facility] UROLOGY [facility]     Platform used for Video Visit: Neeraj      Piter Gaines complains of   Chief Complaint   Patient presents with     Consult For     BPH w/ LUTS       Assessment/Plan:  55 year old male with BPH with LUTS (frequency, nocturia, hesitancy, and sensation of incomplete emptying), on dual therapy of Flomax and finasteride, with ongoing bothersome symptoms. Due to the nature of our visit today, I am unable to evaluate for bladder emptying. Will therefore arrange for him to be seen in the urology office for a nurse visit to complete and uroflow and PVR. Staff completing uroflow with the patient to contact me with results to guide next steps (cystoscopy vs urodynamics).       Mari Graf, CNP  Department of Urology      Subjective:   55 year old male with a history of BPH with LUTS  who presents for evaluation. Has been managed with dual therapy of Flomax and finasteride in the past. Has taken the Flomax for about a year, and the finasteride for about 6 months.     His LUTS have become quite  "bothersome, and consistent of frequency, nocturia x4-5, pelvic discomfort while voiding and the need to \"push down\" in order to go. Stream cab be quite hesitant and take several minutes to start. Ultimately feels that he is not emptying his bladder completely with each void. No known family history of prostate issues or prostate cancer.     Last PSA obtained on 2/1/21 and was 2.5.      Objective:     Exam:   Patient is a 55 year old male   General Appearance: Well groomed, hygenic  Respiratory: No cough, no respiratory distress or labored breathing  Musculoskeletal: Grossly normal with no gross deficits  Skin: No discoloration or apparent rashes  Neurologic: No tremors  Psychiatric: Alert and oriented  Further examination is deferred due to the nature of our visit.     "

## 2021-03-26 NOTE — PROGRESS NOTES
"Piter is a 55 year old who is being evaluated via a billable video visit.      Video Visit Technology for this patient: Neeraj Video Visit- Please send an invite to patient's [phone]       How would you like to obtain your AVS? Mail a copy  If the video visit is dropped, the invitation should be resent by: Text to cell phone: [phone]   Will anyone else be joining your video visit? No    Video Start Time: 11:36 AM  Video-Visit Details    Type of service:  Video Visit    Video End Time:11:44 AM    Originating Location (pt. Location): Home    Distant Location (provider location):  [facility] [place]     Platform used for Video Visit: Neeraj      Piter Gaines complains of   Chief Complaint   Patient presents with     Consult For     BPH w/ LUTS       Assessment/Plan:  55 year old male with BPH with LUTS (frequency, nocturia, hesitancy, and sensation of incomplete emptying), on dual therapy of Flomax and finasteride, with ongoing bothersome symptoms. Due to the nature of our visit today, I am unable to evaluate for bladder emptying. Will therefore arrange for him to be seen in the urology office for a nurse visit to complete and uroflow and PVR. Staff completing uroflow with the patient to contact me with results to guide next steps (cystoscopy vs urodynamics).       Mari Graf, CNP  Department of Urology      Subjective:   55 year old male with a history of BPH with LUTS  who presents for evaluation. Has been managed with dual therapy of Flomax and finasteride in the past. Has taken the Flomax for about a year, and the finasteride for about 6 months.     His LUTS have become quite bothersome, and consistent of frequency, nocturia x4-5, pelvic discomfort while voiding and the need to \"push down\" in order to go. Stream cab be quite hesitant and take several minutes to start. Ultimately feels that he is not emptying his bladder completely with each void. No known family history of " prostate issues or prostate cancer.     Last PSA obtained on 2/1/21 and was 2.5.      Objective:     Exam:   Patient is a 55 year old male   General Appearance: Well groomed, hygenic  Respiratory: No cough, no respiratory distress or labored breathing  Musculoskeletal: Grossly normal with no gross deficits  Skin: No discoloration or apparent rashes  Neurologic: No tremors  Psychiatric: Alert and oriented  Further examination is deferred due to the nature of our visit.

## 2021-03-26 NOTE — PATIENT INSTRUCTIONS
UROLOGY CLINIC VISIT PATIENT INSTRUCTIONS    -Follow up for a nurse visit to complete a urinary flow study and post-void residual scan.   -I will be in touch with you once complete to review results and discuss next steps.     If you have any issues, questions or concerns in the meantime, do not hesitate to contact us at [phone] or via farm[employer].     It was a pleasure meeting with you today.  Thank you for allowing me and my team the privilege of caring for you today.  YOU are the reason we are here, and I truly hope we provided you with the excellent service you deserve.  Please let us know if there is anything else we can do for you so that we can be sure you are leaving completely satisfied with your care experience.    Mari Graf, CNP

## 2021-03-28 PROBLEM — D64.9 NORMOCHROMIC NORMOCYTIC ANEMIA: Status: ACTIVE | Noted: 2021-03-28

## 2021-03-29 ENCOUNTER — HOSPITAL ENCOUNTER (OUTPATIENT)
Dept: ULTRASOUND IMAGING | Facility: HOSPITAL | Age: 56
Discharge: HOME | End: 2021-03-29

## 2021-03-29 DIAGNOSIS — I65.21 CAROTID STENOSIS, RIGHT: ICD-10-CM

## 2021-03-30 NOTE — RESULT ENCOUNTER NOTE
"Please send the following in a letter to the patient:     \"Piter - your kidney and liver function looks good.  Your low blood counts form the dapsone have not changed much.  We will continue to follow them through your treatment.\""

## 2021-04-02 ENCOUNTER — OFFICE VISIT (OUTPATIENT)
Dept: UROLOGY | Facility: CLINIC | Age: 56
End: 2021-04-02
Payer: COMMERCIAL

## 2021-04-02 DIAGNOSIS — R35.0 BENIGN PROSTATIC HYPERPLASIA WITH URINARY FREQUENCY: Primary | ICD-10-CM

## 2021-04-02 DIAGNOSIS — F32.A DEPRESSION, UNSPECIFIED DEPRESSION TYPE: ICD-10-CM

## 2021-04-02 DIAGNOSIS — N40.1 BENIGN PROSTATIC HYPERPLASIA WITH URINARY FREQUENCY: Primary | ICD-10-CM

## 2021-04-02 PROCEDURE — 99207 PR NO BILLABLE SERVICE THIS VISIT: CPT

## 2021-04-02 NOTE — NURSING NOTE
Piter Gaines comes into clinic today at the request of Mari Graf CNP ordering Provider for PVR/Flow (uroflow).      Patient unable to void, so no uroflow done today.  Patient did state that he had a strong urge to urine when he arrived.  Bladder scan today was 70.      This service provided today was under the supervising provider of the day Dr. Matthew Mckeon, who was available if needed.    Elise Reza, CMA

## 2021-04-02 NOTE — PATIENT INSTRUCTIONS
Schedule appointment for Urodynamics Study with Mari Graf CNP.    It was a pleasure meeting with you today.  Thank you for allowing me and my team the privilege of caring for you today.  YOU are the reason we are here, and I truly hope we provided you with the excellent service you deserve.  Please let us know if there is anything else we can do for you so that we can be sure you are leaving completely satisfied with your care experience.        Elise Reza, CMA

## 2021-04-04 RX ORDER — CITALOPRAM HYDROBROMIDE 40 MG/1
40 TABLET ORAL DAILY
Qty: 90 TABLET | Refills: 3 | Status: SHIPPED | OUTPATIENT
Start: 2021-04-04 | End: 2022-03-21

## 2021-04-05 ENCOUNTER — TELEPHONE (OUTPATIENT)
Dept: INTERNAL MEDICINE | Facility: CLINIC | Age: 56
End: 2021-04-05

## 2021-04-26 DIAGNOSIS — L13.8 LINEAR IGA BULLOUS DERMATOSIS: ICD-10-CM

## 2021-04-26 RX ORDER — DAPSONE 100 MG/1
150 TABLET ORAL DAILY
Qty: 90 TABLET | Status: CANCELLED | OUTPATIENT
Start: 2021-04-26

## 2021-04-26 NOTE — TELEPHONE ENCOUNTER
Message to physician:     Date of last visit: 3/25/2021     Date of next visit if scheduled: none    Last Comprehensive Metabolic Panel:  Sodium   Date Value Ref Range Status   03/25/2021 140.0 133.0 - 144.0 mmol/L Final     Potassium   Date Value Ref Range Status   03/25/2021 4.2 3.4 - 5.3 mmol/L Final     Chloride   Date Value Ref Range Status   03/25/2021 107.0 94.0 - 109.0 mmol/L Final     Carbon Dioxide   Date Value Ref Range Status   03/25/2021 26.0 20.0 - 32.0 mmol/L Final     Glucose   Date Value Ref Range Status   03/25/2021 116.0 (H) 60.0 - 109.0 mg/dL Final     Urea Nitrogen   Date Value Ref Range Status   03/25/2021 12.0 7.0 - 30.0 mg/dL Final     Creatinine   Date Value Ref Range Status   03/25/2021 1.3 0.8 - 1.5 mg/dL Final     Calcium   Date Value Ref Range Status   03/25/2021 9.4 8.5 - 10.4 mg/dL Final       BP Readings from Last 3 Encounters:   03/25/21 136/88   03/09/21 (!) 146/90   02/01/21 (!) 147/93       Lab Results   Component Value Date    A1C 6.1 07/17/2020    A1C 5.7 04/18/2017                Please complete refill and CLOSE ENCOUNTER.  Closing the encounter signifies the refill is complete.

## 2021-04-27 ENCOUNTER — OFFICE VISIT (OUTPATIENT)
Dept: FAMILY MEDICINE | Facility: CLINIC | Age: 56
End: 2021-04-27
Payer: COMMERCIAL

## 2021-04-27 VITALS
BODY MASS INDEX: 27.74 KG/M2 | HEIGHT: 69 IN | DIASTOLIC BLOOD PRESSURE: 88 MMHG | TEMPERATURE: 98 F | HEART RATE: 100 BPM | WEIGHT: 187.25 LBS | SYSTOLIC BLOOD PRESSURE: 137 MMHG | OXYGEN SATURATION: 94 %

## 2021-04-27 DIAGNOSIS — I65.23 CAROTID STENOSIS, ASYMPTOMATIC, BILATERAL: ICD-10-CM

## 2021-04-27 DIAGNOSIS — L73.1 PSEUDOFOLLICULITIS BARBAE: ICD-10-CM

## 2021-04-27 DIAGNOSIS — Z53.20 COLON CANCER SCREENING DECLINED: ICD-10-CM

## 2021-04-27 DIAGNOSIS — L13.8 LINEAR IGA BULLOUS DERMATOSIS: Primary | ICD-10-CM

## 2021-04-27 DIAGNOSIS — I10 ESSENTIAL HYPERTENSION: ICD-10-CM

## 2021-04-27 LAB
% IMMATURE GRANULOCYTES: 1 % (ref 0–0)
ABS IMMATURE GRANULOCYTES: 0 10E9/L (ref 0–0)
ALBUMIN SERPL-MCNC: 3.8 G/DL (ref 3.2–4.5)
ALP SERPL-CCNC: 96 U/L (ref 40–150)
ALT SERPL-CCNC: 24 U/L (ref 0–45)
AST SERPL-CCNC: 27 U/L (ref 0–55)
BASOPHILS # BLD AUTO: 0.1 10E9/L (ref 0–0.2)
BASOPHILS NFR BLD AUTO: 1 % (ref 0–2)
BILIRUB SERPL-MCNC: 0.8 MG/DL (ref 0.2–1.3)
BUN SERPL-MCNC: 12 MG/DL (ref 7–30)
CALCIUM SERPL-MCNC: 9.3 MG/DL (ref 8.5–10.4)
CHLORIDE SERPLBLD-SCNC: 106 MMOL/L (ref 94–109)
CO2 SERPL-SCNC: 26 MMOL/L (ref 20–32)
CREAT SERPL-MCNC: 1.2 MG/DL (ref 0.8–1.5)
EGFR CALCULATED (NON BLACK REFERENCE): 66.8 ML/MIN
EOSINOPHIL # BLD AUTO: 0.1 10E9/L (ref 0–0.7)
EOSINOPHIL NFR BLD AUTO: 1 % (ref 0–6)
ERYTHROCYTE [DISTWIDTH] IN BLOOD BY AUTOMATED COUNT: 16.4 % (ref 10–15)
GLUCOSE SERPL-MCNC: 112 MG/DL (ref 60–109)
HCT VFR BLD AUTO: 34.5 % (ref 40–53)
HEMOGLOBIN: 11.1 G/DL (ref 13.3–17.7)
LYMPHOCYTES # BLD AUTO: 1.8 10E9/L (ref 0.8–5.3)
LYMPHOCYTES NFR BLD AUTO: 23.2 % (ref 20–48)
MCH RBC QN AUTO: 28.8 PG (ref 26.5–35)
MCHC RBC AUTO-ENTMCNC: 32.2 G/DL (ref 32–36)
MCV RBC AUTO: 89.6 FL (ref 78–100)
MONOCYTES # BLD AUTO: 0.5 10E9/L (ref 0–1.3)
MONOCYTES NFR BLD AUTO: 6 % (ref 0–12)
NEUTROPHILS # BLD AUTO: 5.2 10E9/L (ref 1.6–8.3)
NEUTROPHILS NFR BLD AUTO: 67.9 % (ref 40–75)
PLATELET # BLD AUTO: 204 10E9/L (ref 150–450)
POTASSIUM SERPL-SCNC: 3.7 MMOL/L (ref 3.4–5.3)
PROT SERPL-MCNC: 7.4 G/DL (ref 6.6–8.8)
RBC # BLD AUTO: 3.85 10E12/L (ref 4.4–5.9)
SODIUM SERPL-SCNC: 141 MMOL/L (ref 133–144)
WBC # BLD AUTO: 7.6 10E9/L (ref 4–11)

## 2021-04-27 PROCEDURE — 99214 OFFICE O/P EST MOD 30 MIN: CPT | Mod: GC | Performed by: STUDENT IN AN ORGANIZED HEALTH CARE EDUCATION/TRAINING PROGRAM

## 2021-04-27 PROCEDURE — 80053 COMPREHEN METABOLIC PANEL: CPT | Performed by: STUDENT IN AN ORGANIZED HEALTH CARE EDUCATION/TRAINING PROGRAM

## 2021-04-27 PROCEDURE — 36415 COLL VENOUS BLD VENIPUNCTURE: CPT | Performed by: STUDENT IN AN ORGANIZED HEALTH CARE EDUCATION/TRAINING PROGRAM

## 2021-04-27 PROCEDURE — 85025 COMPLETE CBC W/AUTO DIFF WBC: CPT | Performed by: STUDENT IN AN ORGANIZED HEALTH CARE EDUCATION/TRAINING PROGRAM

## 2021-04-27 RX ORDER — DAPSONE 25 MG/1
50 TABLET ORAL DAILY
Qty: 120 TABLET | Refills: 0 | Status: SHIPPED | OUTPATIENT
Start: 2021-04-27 | End: 2021-04-28

## 2021-04-27 RX ORDER — ADAPALENE 0.1 G/100G
CREAM TOPICAL
Qty: 45 G | Refills: 1 | Status: SHIPPED | OUTPATIENT
Start: 2021-04-27

## 2021-04-27 ASSESSMENT — MIFFLIN-ST. JEOR: SCORE: 1673.12

## 2021-04-27 NOTE — LETTER
April 28, 2021      Piter Gaines  [address] [place] [address]        Dear ,    We are writing to inform you of your test results.    Piter - your kidney and liver function look good.  Your blood counts have been stable with previous month.  Call with concerns.    Resulted Orders   CBC with Diff Plt ([facility])   Result Value Ref Range    WBC 7.6 4.0 - 11.0 10e9/L    RBC 3.85 (L) 4.40 - 5.90 10e12/L    Hemoglobin 11.1 (L) 13.3 - 17.7 g/dL    Hematocrit 34.5 (L) 40.0 - 53.0 %    MCV 89.6 78.0 - 100.0 fL    MCH 28.8 26.5 - 35.0 pg    MCHC 32.2 32.0 - 36.0 g/dL    RDW 16.4 (H) 10.0 - 15.0 %    Platelets 204.0 150.0 - 450.0 10e9/L    % Neutrophils 67.9 40.0 - 75.0 %    % Monocytes 6 0 - 12 %    % Lymphocytes 23.2 20.0 - 48.0 %    % Eosinophils 1 0 - 6 %    % Basophils 1 0 - 2 %    % Immature Granulocytes 1 (H) 0 - 0 %    Absolute Neutrophil 5.2 1.6 - 8.3 10e9/L    Absolute Monocytes 0.5 0.0 - 1.3 10e9/L    Lymphocytes # 1.8 0.8 - 5.3 10e9/L    Absolute Eosinophils 0.1 0.0 - 0.7 10e9/L    Absolute Basophils 0.1 0.0 - 0.2 10e9/L    Abs Immature Granulocytes 0.0 (H) 0.0 - 0.0 10e9/L   Comprehensive Metabolic Panel ([facility]) - results <1hr Protocol   Result Value Ref Range    Glucose 112.0 (H) 60.0 - 109.0 mg/dL    Urea Nitrogen 12.0 7.0 - 30.0 mg/dL    Creatinine 1.2 0.8 - 1.5 mg/dL    Sodium 141.0 133.0 - 144.0 mmol/L    Potassium 3.7 3.4 - 5.3 mmol/L    Chloride 106.0 94.0 - 109.0 mmol/L    Carbon Dioxide 26.0 20.0 - 32.0 mmol/L    Calcium 9.3 8.5 - 10.4 mg/dL    Protein Total 7.4 6.6 - 8.8 g/dL    Albumin 3.8 3.2 - 4.5 g/dL    Alkaline Phosphatase 96.0 40.0 - 150.0 U/L    ALT 24.0 0.0 - 45.0 U/L    AST 27.0 0.0 - 55.0 U/L    Bilirubin Total 0.8 0.2 - 1.3 mg/dL    eGFR Calculated (Non Black Reference) 66.8 >60.0 mL/min      Comment:      eGFR is calculated by the CKD-EPI creatinine equation, without race   adjustment. eGFR can be influenced by muscle mass, exercise, and diet. The   reported eGFR is an estimation  only and is only applicable if the renal   function is stable.         If you have any questions or concerns, please call the clinic at the number listed above.       Sincerely,      Ermelinda Garcia MD

## 2021-04-27 NOTE — PROGRESS NOTES
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  Suppose to be taking Dapsone 150mg, but patient had been taking 100mg daily (likely miscommunication).  Regardless, patient has had no new lesions appear for about 7 weeks.  Although original plan was for 3 months with no new lesions, literature review shows that tapers can be started after 1 month of no new lesions.  Will start to taper.  No changes in LFTs, and his normocytic anemia is stable.  Will taper to 50mg daily for 1 month, and then 25mg for 1 month before stopping completely.  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  Does engage in some behaviors that antagonize disorder.  Discussed best practices for shaving.  Patient would like something to help with condition.  Adapalene has evidence in support of resolving lesions.  .    Plan:   -adapalene (DIFFERIN) 0.1 % external cream  -Behaviors education to prevent lesions    (I10) Essential hypertension  Comment: Stable and controlled.  Goal <140/90. Taking ARB and CCB.    Plan:   -Continue current meds  -Recheck in 3 months    Carotid stenosis  Comment: Stable.  No symptoms.  Incidental finding in hospital.  US confirms findings from CTA.  Tolerating statin and ASA.  Plan:  -Continue current meds    Declined colonoscopy  Comment: Patient unable to tolerate prep, and has tried twice.  It seems the volume and mag citrate are both triggers of vomiting.  Discussed low volume with split dosing, but patient declined.  Did discuss that patient could have a pre-malignant or early malignant lesion that caused his positive FIT, and itneeds to be address via colonoscopy.  He understood this.  Plan:  -Address at next visit -> discuss  "possible hospital admission for NG based prep with GI?        Options for treatment and follow-up care were reviewed with the patient and/or guardian. Piter Gaines and/or guardian engaged in the decision making process and verbalized understanding of the options discussed and agreed with the final plan.      Kody Bangura MD    Precepted today with: Dr. Garcia           HPI:       Piter Gaines is a 55 year old  male with a significant past medical history of IgA bullous disease, HTN accompanied with self who presents for follow up of concern(s) listed below    1) IgA bullous disease  -Issues with taking Dapsone? no  --Was suppose to be taking 150mg, but has only been taking 100mg  -Lesions still popping up? No  -ROS: Denies fatigue, new headaches, blue lips, palpitations, chest pain, new SOB, yellow eyes, dark urine, new numbness in feet or hands    2) facial bumps  -multiple years of occurrences  -isolated episodes with waxing and waning  -worse after close shaves  -hair growing in after shave grows inward and follicule becomes hard bump, painful, with white material  -razor with growth direction with new razor each time  -however does shave against grain to get close    3) carotid stenosis  -Symptoms? No  -Carotid US - nothing >70%  -Issues with ASA or statin? None    4) HTN  -remembers to take meds  -No major side effects           Summary of Events:     #HTN  -Goal <140/90  -Controlled as of April 2021  -Taking losartan 100mg and amlodipine 10mg  -Taking ASA and statin    #Carotid stenosis  -Asympatomatic  -US shows right 50-69% and left 50% (April 2021)  -Taking ASA and moderate intensity statin         Review of Systems:     8-point ROS reviewed and negative unless otherwise noted in HPI           Objective:     Vitals:    04/27/21 0958 04/27/21 1001   BP: (!) 146/81 137/88   Pulse: 100    Temp: 98  F (36.7  C)    SpO2: 94%    Weight: 84.9 kg (187 lb 4 oz)    Height: 1.75 m (5' 8.9\")      Body mass index " is 27.73 kg/m .    Exam: Detailed exam of integumentary systems; Brief exam of consitutional, ocular, HENT, pulmonary, cardiovascular, musculoskeletal, neurological, and psychiatric systems were performed - pertinent findings include: follicular nodules in beard and hairline distribution; post-inflammatory hyperpigmentation on legs without sign of new bullae    External Data: none    Internal Data: reviewed Carotid US; CBC and CMP

## 2021-04-27 NOTE — PATIENT INSTRUCTIONS
For your stool issues:    -Take 1 cap mixed with 8 ounces of fulids every night for 7 days  -see how your bowel movements do the follow day and take note    For skin problem:    -Start taking 50mg dapsone tablets  -let Dr. Bangura know if bubbles start popping up again    For hair follicle issue:    -Keep shaving with cream, in hair growth pattern, clipper, and new razor  -Use Adapalene after washing your face daily.  If you have peeling or skin irritation, skip one day

## 2021-04-28 DIAGNOSIS — L13.8 LINEAR IGA BULLOUS DERMATOSIS: ICD-10-CM

## 2021-04-28 RX ORDER — DAPSONE 25 MG/1
50 TABLET ORAL DAILY
Qty: 180 TABLET | Refills: 0 | Status: SHIPPED | OUTPATIENT
Start: 2021-04-28 | End: 2021-07-30

## 2021-04-28 NOTE — RESULT ENCOUNTER NOTE
"Please send the following in a letter to the patient:     \"Phila - your kidney and liver function look good.  Your blood counts have been stable with previous month.  Call with concerns\""

## 2021-05-02 PROBLEM — I65.23 CAROTID STENOSIS, ASYMPTOMATIC, BILATERAL: Status: ACTIVE | Noted: 2021-05-02

## 2021-05-03 ENCOUNTER — TELEPHONE (OUTPATIENT)
Dept: FAMILY MEDICINE | Facility: CLINIC | Age: 56
End: 2021-05-03

## 2021-05-03 NOTE — TELEPHONE ENCOUNTER
Prior Authorization needed on:  5/3/21    Medication:  Differin cream Dose:  0.15    Pharmacy confirmed as   [id number] - [place], [place] - [address] [place] [address]  Phone: [phone] Fax: [phone]  : Yes    Insurance Name:  [employer]  Insurance Phone: [phone]  Insurance Patient ID: [id number]    Alternatives Suggested:      Magali Spring MA May 3, 2021 at 10:41 AM

## 2021-05-06 NOTE — TELEPHONE ENCOUNTER
PRIOR AUTHORIZATION DENIED    Medication: Differin .1% cream- DENIED     Denial Date: 5/5/2021    Denial Rational: Coverage is provided for a FDA approved indication (acne).    Appeal Information: If provider would like to appeal please provide a letter of medical necessity.

## 2021-05-07 DIAGNOSIS — F10.20 UNCOMPLICATED ALCOHOL DEPENDENCE (H): ICD-10-CM

## 2021-05-07 NOTE — TELEPHONE ENCOUNTER
Message to physician:     Date of last visit: 4/27/2021     Date of next visit if scheduled: none    Last Comprehensive Metabolic Panel:  Sodium   Date Value Ref Range Status   04/27/2021 141.0 133.0 - 144.0 mmol/L Final     Potassium   Date Value Ref Range Status   04/27/2021 3.7 3.4 - 5.3 mmol/L Final     Chloride   Date Value Ref Range Status   04/27/2021 106.0 94.0 - 109.0 mmol/L Final     Carbon Dioxide   Date Value Ref Range Status   04/27/2021 26.0 20.0 - 32.0 mmol/L Final     Glucose   Date Value Ref Range Status   04/27/2021 112.0 (H) 60.0 - 109.0 mg/dL Final     Urea Nitrogen   Date Value Ref Range Status   04/27/2021 12.0 7.0 - 30.0 mg/dL Final     Creatinine   Date Value Ref Range Status   04/27/2021 1.2 0.8 - 1.5 mg/dL Final     Calcium   Date Value Ref Range Status   04/27/2021 9.3 8.5 - 10.4 mg/dL Final       BP Readings from Last 3 Encounters:   04/27/21 137/88   03/25/21 136/88   03/09/21 (!) 146/90       Lab Results   Component Value Date    A1C 6.1 07/17/2020    A1C 5.7 04/18/2017                Please complete refill and CLOSE ENCOUNTER.  Closing the encounter signifies the refill is complete.

## 2021-05-10 RX ORDER — GABAPENTIN 300 MG/1
300 CAPSULE ORAL 3 TIMES DAILY
Qty: 270 CAPSULE | Refills: 1 | Status: SHIPPED | OUTPATIENT
Start: 2021-05-10 | End: 2022-03-23

## 2021-05-30 VITALS — HEIGHT: 68 IN | WEIGHT: 125.3 LBS | BODY MASS INDEX: 18.99 KG/M2

## 2021-06-01 VITALS — WEIGHT: 176 LBS | BODY MASS INDEX: 25.99 KG/M2

## 2021-06-01 VITALS — BODY MASS INDEX: 25.48 KG/M2 | WEIGHT: 172 LBS | HEIGHT: 69 IN

## 2021-06-02 VITALS — WEIGHT: 175 LBS | BODY MASS INDEX: 25.92 KG/M2 | HEIGHT: 69 IN

## 2021-06-13 NOTE — PROGRESS NOTES
[facility] Discharge Summary  Patient Name: Piter Gaines  Date: 12/13/2017  Referral Diagnosis: Chronic lower back pain with L sided radiation   Referring provider: Whit Malloy*  Visit Diagnosis:   1. Chronic midline low back pain with left-sided sciatica     2. Generalized muscle weakness     3. Antalgic gait     4. Poor posture         Goals: NOT MET  Pt. will be independent with home exercise program in : 2 weeks  Pt will: reduce RANDALL by >10% for significant change in 8 weeks  Pt will: return to walking 10 min or greater without breaks due to pain to improve exercise tolerance in 8 weeks  Pt will: bend over to tie shoes without increased back/LE pain to improve dressing in 8 weeks  Pt will: roll supine<>sidelying without increased pain to improve mobility and sleep in 8 weeks    Patient was seen for initial evaluation on 10/23/17 and did not return.    Therapy will be discontinued at this time.  The patient will need a new referral to resume.    Thank you for your referral.  Wendy Sadie  12/13/2017  3:55 PM  [facility]   [facility]-Pelvic Initial Evaluation    Patient Name: Piter Gaines  Date of evaluation: 10/23/2017  Referral Diagnosis: Chronic lower back pain with left sided radiation   Referring provider: Whit Malloy*  Visit Diagnosis:     ICD-10-CM    1. Chronic midline low back pain with left-sided sciatica M54.42     G89.29    2. Generalized muscle weakness M62.81    3. Antalgic gait R26.89    4. Poor posture R29.3        Assessment:   Piter Gaines is a 51 y.o. male who presents to therapy today with chief complaints of chronic lower back pain with L sided radiation. Patient has Hx of back pain for >20 years, but pain gradually worsened on 10/8/17 when patient presented to the ED with pain. Pain is constant with radiation L > R sided. Denies numbness, feels slight tingling in posterior L thigh. Sx are aggravated by sitting, standing for long periods, bending over,  "reaching. Evaluation reveals: antalgic gait, restrictions in lumbar AROM, L myotomal weakness at L4/5, + SLR on L. Unable to fully assess hips and PA's due to c/o pain today. S/s do appear to be consistent with lumbar radiculopathy likely due to disc pathology. Patient will benefit from 1:1 skilled physical therapy services to address the above limitations.      Goals:  Pt. will be independent with home exercise program in : 2 weeks  Pt will: reduce RANDALL by >10% for significant change in 8 weeks  Pt will: return to walking 10 min or greater without breaks due to pain to improve exercise tolerance in 8 weeks  Pt will: bend over to tie shoes without increased back/LE pain to improve dressing in 8 weeks  Pt will: roll supine<>sidelying without increased pain to improve mobility and sleep in 8 weeks    Patient's expectations/goals are realistic.    Barriers to Learning or Achieving Goals:  No Barriers.       Plan / Patient Instructions:        Plan of Care:   Authorization / Certification Start Date: 10/23/17  Authorization / Certification End Date: 01/21/18  Authorization / Certification Number of Visits: up to 12 per MD  Communication with: Referral Source  Patient Related Instruction: Nature of Condition;Treatment plan and rationale;Basis of treatment;Body mechanics;Posture;Expected outcome;Next steps  Times per Week: 1-2x/week  Number of Weeks: up to 12 weeks  Number of Visits: up to 12 visits per MD  Therapeutic Exercise: ROM;Stretching;Strengthening  Neuromuscular Reeducation: kinesio tape;posture;balance/proprioception;TNE;core  Manual Therapy: soft tissue mobilization;myofascial release;joint mobilization;muscle energy  Modalities: traction;cold pack;hot pack;ultrasound;TENS  Other Plan #2: Therapeutic activity     Plan for next visit: consider mobilization if helpful,      Subjective:          Piter Gaines is a 50 y/o male who presents today with chief c/o chronic lower back pain with L sided radiation.   \"I feel " "I can't move my left side\". Patient has Hx of back pain for >20 years, but pain gradually worsened on 10/8/17 when patient presented to the ED with pain. He was given valium, prednisone, and mm relaxant. Pt does not feel any of these were very helpful. Pain is constant with radiation L > R sided. Denies numbness, feels slight tingling in posterior L thigh near popliteal. Pt experiences occasional weakness in LE, worse in the morning.   Pt is on disability, not currently working. Pain is aggravated by sitting, standing for long periods, bending over, reaching. Estimates he can walk 3-5 minutes before breaking due to pain. Uses SPC occasionally off/on in right hand.     Patient goals (established in collaboration with patient today): improve walking, bending over    Social information:   Living Situation:single family home   Occupation: disability     Pain Ratin  Pain rating at best: 6  Pain rating at worst: 9  Pain description: aching and sharp    Patient reports benefit from:  rest           Objective:      Note: Items left blank indicates the item was not performed or not indicated at the time of the evaluation.    Patient Outcome Measures :    Modified Oswestry Low Back Pain Disablity Questionnaire  in %: 64   Scores range from 0-100%, where a score of 0% represents minimal pain and maximal function. The minimal clinically important difference is a score reduction of 12%.    Examination  1. Chronic midline low back pain with left-sided sciatica     2. Generalized muscle weakness     3. Antalgic gait     4. Poor posture         Precautions/Restrictions: None  Involved side: Left    Posture Observation:   Standing:stands with L LE unweighted        Lumbar ROM:    Date:      *Indicate scale AROM AROM AROM   Lumbar Flexion 75% loss, pt uses L UE support, increased L gluteal pain, pt uses hands on thighs to return     Lumbar Extension 25% loss no increased pain      Right Left Right Left Right Left   Lumbar " "Sidebending         Lumbar Rotation 30% loss increased lower back pain \"across my whole back\" 50% loss increased LBP and gluteal pain        Thoracic Flexion      Thoracic Extension      Thoracic Sidebending         Thoracic Rotation           Lower Extremity Strength:     Date:      LE strength/5 Right Left Right Left Right Left   Hip Flexion (L1-3) 4+ 4+       Hip Extension (L5-S1)         Hip Abduction (L4-5)         Hip Adduction (L2-3)         Hip External Rotation         Hip Internal Rotation         Knee Extension (L3-4) 5 4+       Knee Flexion         Ankle Dorsiflexion (L4-5) 5 4       Great Toe Extension (L5) 5 4       Ankle Plantar flexion (S1)         Abdominals                       Reflex Testing:  Patellar (L3-4): WNL  Achilles (S1-2): WNL    Sensation:      Hip PROM with overpressure:   R:flexion increased LBP on L, IR/ER slight R groin pain  + VICTOR M tightness and pain  SLR to approx 75 no pain with add/IR    L: flexion/IR/ER limited unable to reach end range due to c/o L back knee and hip pain  + VICTOR M with hip and LBP  SLR to approx 50 degrees increased Sx from L lumbar to posterior LE with adduction/IR      Lumbar Special Tests:    Lumbar Special Tests Right Left SI Tests Right  Left   Quadrant test   SI Compression  +   Straight leg raise   SI Distraction  -   Crossover response   Thigh Thrust  +   Slump   Sacral Thrust  +     VICTOR M     Trunk extensor endurance test  Resisted Abduction     Prone instability test  Other:         Palpation:     soreness reported over L greater trochanter, IT band and piriformis    Repeated Motion Testing:  No centralization in prone or GINNA, Sx no better no worse in LE, pt does however display hip flexor tightness on L side  Attempted prone press up to GINNA in shortened range x 10 reps      Passive Mobility - Joint Integrity:  PA's: NT due to pain complaints       Treatment Today     TREATMENT MINUTES COMMENTS   Evaluation 30 Lumbar evaluation    Self-care/ Home " management     Manual therapy 12 Pt was placed in R sidelying with grade II-III semispecific traction at L4/5. Performed oscillations through L lumbar facets and PSIS.    Attempted L LE long axis distraction in open pack but pt c/o increased LE and hip pain so discontinued.    Less antalgic gait post treatment; pt reports slight decrease in pain    Neuromuscular Re-education     Therapeutic Activity     Therapeutic Exercises 13 Exercises:  Exercise #1: SKC  Comment #1: HEP x 20-30 seconds x 2-3 reps  Exercise #2: Lumbar rotations  Comment #2: HEP x 15-20 seconds x 2-3 reps  Exercise #3: Ab set- TrA activation   Comment #3: HEP x 5 seconds x 10 reps  Educated on findings from evaluation including origins of lumbar radicular Sx of disc pathology. Discussed inflammatory process of disc pathology and healing process.    Gait training     Modality__________________                Total 55    Blank areas are intentional and mean the treatment did not include these items.            PT Evaluation Code: (Please list factors)  Patient History/Comorbidities: HTN, kidney disease, Hx of alcohol abuse  Examination: see above  Clinical Presentation: stable  Clinical Decision Making: low    Patient History/  Comorbidities Examination  (body structures and functions, activity limitations, and/or participation restrictions) Clinical Presentation Clinical Decision Making (Complexity)   No documented Comorbidities or personal factors 1-2 Elements Stable and/or uncomplicated Low   1-2 documented comorbidities or personal factor 3 Elements Evolving clinical presentation with changing characteristics Moderate   3-4 documented comorbidities or personal factors 4 or more Unstable and unpredictable High         Wendy Noel  10/23/2017  9:34 AM

## 2021-06-15 NOTE — PROGRESS NOTES
[facility] Discharge Summary    Patient Name: Piter Gaines  Date: 2/7/2018  Referring provider: Edmund Dey MD  Visit Diagnosis:     ICD-10-CM    1. Chronic midline low back pain with left-sided sciatica M54.42     G89.29    2. Generalized muscle weakness M62.81    3. Antalgic gait R26.89    4. Poor posture R29.3        Goal Status:  See status in note below.    Patient was seen for 1 visit with 2 no show f/u appointments.    The patient has not shown for their scheduled appointment(s) and has not called to reschedule for 30 days.  Goals were not met.    Therapy will be discontinued at this time.  The patient will need a new referral to resume.    Thank you for your referral.  Nereyda Pinto PT, DPT, OCS, CLT  2/7/2018  3:41 PM    [facility]   [facility]-P[facility] Initial Evaluation    Patient Name: Piter Gaines  Date of evaluation: 1/3/2018  Referral Diagnosis: Chronic lower back pain with left sided radiation   Referring provider: Edmund Dey MD  Visit Diagnosis:     ICD-10-CM    1. Chronic midline low back pain with left-sided sciatica M54.42     G89.29    2. Generalized muscle weakness M62.81    3. Antalgic gait R26.89    4. Poor posture R29.3        Assessment:   Piter Gaines is a 52 y.o. male who presents to therapy today with chief complaints of chronic lower back pain with L sided radiation. Patient has Hx of back pain for >20 years, but pain gradually worsened on 10/8/17 when patient presented to the ED with pain. Pain is constant with radiation L > R sided and bothers into L knee. Denies numbness, feels slight tingling in posterior L thigh. Sx are aggravated by sitting, standing for long periods, bending over, reaching. Evaluation reveals: antalgic gait, significant restrictions in lumbar AROM, L LE, core and trunk weakness, + SLR on L. S/s do appear to be consistent with lumbar radiculopathy with altered neurodynamics, decreased trunk and L knee ROM and muscle weakness. Patient will  benefit from 1:1 skilled physical therapy services to address the above limitations.      Goals:  Pt. will demonstrate/verbalize independence in self-management of condition in : 12 weeks  Pt. will be independent with home exercise program in : 2 weeks  Pt. will have improved quality of sleep: with less pain;waking less times/night;in 12 weeks  Pt. will be able to walk : 10 minutes;around the house;1 block;2 blocks;6 blocks;with less pain;with less difficulty;for household mobility;for community mobility;for exercise/recreation;in 12 weeks  Patient will transfer: sit/stand;for car;for toileting;for in/out of bed;for in/out of chair;with less pain;with less difficulty;in 12 weeks  Pt will: reduce RANDALL by >10% for significant change in 8 weeks  Pt will: return to walking 10 min or greater without breaks due to pain to improve exercise tolerance in 8 weeks  Pt will: bend over to tie shoes without increased back/LE pain to improve dressing in 8 weeks    Patient's expectations/goals are realistic.    Barriers to Learning or Achieving Goals:  No Barriers.       Plan / Patient Instructions:      Plan of Care:   Communication with: Referral Source  Patient Related Instruction: Nature of Condition;Treatment plan and rationale;Self Care instruction;Basis of treatment;Body mechanics;Posture;Precautions;Next steps;Expected outcome  Times per Week: 1  Number of Weeks: 12  Number of Visits: 12  Discharge Planning: when indicated  Precautions / Restrictions : none  Therapeutic Exercise: ROM;Stretching;Strengthening  Neuromuscular Reeducation: posture;TNE;core;other  Neuromuscular Re-education: neurodynamics  Manual Therapy: soft tissue mobilization;joint mobilization;muscle energy  Functional Training (ADL's): self care;ADL's    Plan for next visit: Assess response to nerve glide and attempt lumbar movement - LTR, glut squeezes, prone or standing trunk extension, seated side bend.     Subjective:        Piter Gaines is a 52 y/o male  who presents today with chief c/o chronic lower back pain with L sided radiation and L knee pain. Patient has Hx of back pain for >20 years from a sledding accident, but pain gradually worsened on 10/8/17 when patient presented to the ED with pain.  Pt tried PT but was unable to make appointments at that time. Pain is constant with radiation L > R sided. Denies numbness, feels slight tingling in posterior L thigh near popliteal. Pt experiences occasional weakness in LE.  Pt reports difficulty with all ADLs and pain with static positions and functional mobility and he feels like he has to change positions all the time to try to stay comfortable.    Pt reports he has not been working due to uncontrolled blood pressure, bleeding ulcers and stress issues.     Pain is aggravated by sitting, standing for long periods, bending over, reaching. Estimates he can walk 5-10 minutes before breaking due to pain. Uses SPC occasionally off/on in right hand.     Patient goals (established in collaboration with patient today): improve walking, bending over    Social information:   Living Situation:single family home   Occupation: not working    Pain Ratin  Pain rating at best: 5  Pain rating at worst: 10  Pain description: aching and sharp    Patient reports benefit from:  rest  , sitting sideways with leg stretched out    Objective:      Note: Items left blank indicates the item was not performed or not indicated at the time of the evaluation.    Patient Outcome Measures :    Modified Oswestry Low Back Pain Disablity Questionnaire  in %: 62   Scores range from 0-100%, where a score of 0% represents minimal pain and maximal function. The minimal clinically important difference is a score reduction of 12%.    Lower Extremity Functional Scale (_/80): 25    Observation: Pt has poor posture with rounded shoulders and forward head.  Pt tends to keep his L leg extended out and ER or IR rotated in sitting and supine.      Pt has  significant difficulty with bed mobility and transfers bed to sit and sit to stand demo'ing poor abdominal strength.    L knee girth is 1 cm larger than R knee girth    Gait: Pt ambulates with significant antalgic gait with L trendelenberg, decreased stance time L LE and trunk lean away with L stance.    ROM: Trunk flexion significantly limited with fingertips to knees and increased back and leg pain, ext significantly limited with increased back pain, B side bend significantly limited with increased back pain    R knee flexion 140   without pain, L knee flexion 125   with pain.    STRENGTH: R hip flexor 4+/5 with pain L LBP, R quad 5/5, DF 4/5   L hip flexor 4/5 with pain back and leg, L quad 3/5 with pain in leg and back, DF 4/5 with pain into L knee    Special tests: Repeated extension negative for sx reduction in back or leg    Treatment Today     TREATMENT MINUTES COMMENTS   Evaluation 25 Lumbar evaluation    Self-care/ Home management 10 Discussed knee swelling and gave compression tubing F and G to trial to help with L knee swelling. Discussed risks of use and when to D/C if circulation dysfunction noted.   Manual therapy     Neuromuscular Re-education     Therapeutic Activity     Therapeutic Exercises 14 Discussed eval findings and POC. HEP instruction per exercise flowsheet and Patient Instructions with written handout given.    Gait training     Modality__________________                Total 49    Blank areas are intentional and mean the treatment did not include these items.     PT Evaluation Code: (Please list factors)  Patient History/Comorbidities: HTN, bleeding ulcers, chronic back pain  Examination: see above  Clinical Presentation: stable  Clinical Decision Making: low    Patient History/  Comorbidities Examination  (body structures and functions, activity limitations, and/or participation restrictions) Clinical Presentation Clinical Decision Making (Complexity)   No documented Comorbidities or  personal factors 1-2 Elements Stable and/or uncomplicated Low   1-2 documented comorbidities or personal factor 3 Elements Evolving clinical presentation with changing characteristics Moderate   3-4 documented comorbidities or personal factors 4 or more Unstable and unpredictable High     Nereyda Pinto PT, DPT, OCS, CLT  1/3/2018  9:34 AM

## 2021-06-17 NOTE — PROGRESS NOTES
Results discussed and noted in [facility] Epic Chart.    Kody Bangura MD  [facility] Family Medicine Resident, PGY3

## 2021-06-19 NOTE — PROGRESS NOTES
[facility] Outpatient Consultation    Assessment and Plan:   Piter Gaines is a 52 y.o. male with a history of longstanding and active tobacco dependence, alcohol dependence in remission, HTN, depression/anxiety, GERD, presenting for evaluation of chronic dyspnea.    Tobacco dependence and chronic dyspnea:  DDx remains broad.  PFT results are not valid as the patient was unable to produce consistent results, coughed frequently during the test, and was unable to achieve >85% of vital capacity during DLCO measurement; in keeping with this, flow-volume loop morphology is also erratic and uninterpretable.  He certainly is at high risk of COPD.  He smokes over two packs of cigarettes daily, and obviously tobacco cessation is an essential first step and should be a primary focus of all health care encounters for this patient.  He tried varenicline in the past, which made him excessively somnolent, but is open to other pharmacologic options.    Plan:  - start bupropion 150 mg PO two times a day  - start nicotine 21 mg/24-hr patch; change patch daily  - quit smoking  - start fluticasone-salmeterol 250-50 mcg one inhalation two times a day; rinse/gargle/spit water after use  - high-resolution chest CT; I will call him with the result  - annual influenza vaccination  - recommend Pneumovax-23 administration  - follow up in six weeks  - encouraged him to call any time with questions or concerning symptoms    I appreciate the opportunity to participate in the care of Mr. Gaines.  Please feel free to contact me at any time.    CCx: tobacco dependence and chronic dyspnea    HPI: Piter Gaines is a 52 y.o. male with a history of longstanding and active tobacco dependence, alcohol dependence in remission, HTN, depression, GERD, presenting for evaluation of chronic dyspnea.  Waling one block or across a room leads to shortness of breath; this has been the case for more years than he can remember, gradually worsening.  Occasional  wheezing at night.  In the morning he feels sinus congestion.  When he feels short of breath with exertion, he will occasionally cough/gag, sometimes passes out ofr vomits from coughing.  Cough is nonproductive.  Walking up 10 stairs is feasible but causes him to pant.  When short of breath, he pours water over his face and feels better.  Scents from washing powder in a store worsens dyspnea, as do extremes of heat and cold.  Occasional conjunctival pruritus/redness.  No childhood asthma.  Started smoking at age 17, up to at least 2 ppd which is the amount he smokes now.  Interested in quitting.  Tried varenicline in the past, which caused excessive somnolence.  Was exposed to secondhand smoke growing up.  Has a daughter with asthma who was hospitalized with respiratory failure at age 15, and a sone who had asthma but has improved.  The patient used to work for [employer] and was exposed to fumes during work.    ROS:  A 12-system review was obtained and was negative with the exception of the symptoms endorsed in the history of present illness.    PMH:  Tobacco dependence  Alcohol dependence in remission  HTN  GERD  Depression/anxiety    PSH:  Past Surgical History:   Procedure Laterality Date     ESOPHAGOGASTRODUODENOSCOPY N/A 4/6/2017    Procedure: ESOPHAGOGASTRODUODENOSCOPY (EGD);  Surgeon: Conrad Chao MD;  Location: [facility];  Service:      HAND SURGERY Left        Allergies:  No Known Allergies    Family HX:  Family History   Problem Relation Age of Onset     Kidney disease Father      Lung cancer Father      Kidney disease Brother      Heart attack Brother      Stomach cancer Neg Hx        Social Hx:  Social History     Social History     Marital status: Single     Spouse name: N/A     Number of children: N/A     Years of education: N/A     Occupational History     Not on file.     Social History Main Topics     Smoking status: Current Every Day Smoker     Packs/day: 1.00     Years: 10.00     Types:  Cigarettes     Smokeless tobacco: Never Used     Alcohol use Yes      Comment:  0.5 pint of vodka and 6 beers daily per family     Drug use: Yes     Special: Marijuana      Comment: last week     Sexual activity: Not on file     Other Topics Concern     Not on file     Social History Narrative    Patient moved to the [facility] a few months ago (~Jan 2017)  from [place] to be with his sister-in-law who wanted to take care of him. He has discontinued all of his home medications since arriving in [place]. He has not established primary care.        Current Meds:  Current Outpatient Prescriptions   Medication Sig Dispense Refill     amLODIPine (NORVASC) 10 MG tablet Take 1 tablet (10 mg total) by mouth daily. 30 tablet 1     citalopram (CELEXA) 40 MG tablet Take 40 mg by mouth daily.       gabapentin (NEURONTIN) 300 MG capsule Take 300 mg by mouth 3 (three) times a day.       lisinopril (PRINIVIL,ZESTRIL) 20 MG tablet Take 20 mg by mouth daily.       losartan (COZAAR) 50 MG tablet Take 50 mg by mouth daily.       multivitamin therapeutic (THERAGRAN) tablet Take 1 tablet by mouth daily.  0     naltrexone (DEPADE) 50 mg tablet Take 1 tablet (50 mg total) by mouth daily. 30 tablet 1     naproxen (NAPROSYN) 500 MG tablet Take 1 tablet (500 mg total) by mouth 2 (two) times a day with meals. 30 tablet 0     omeprazole (PRILOSEC) 20 MG capsule Take 1 capsule (20 mg total) by mouth 2 (two) times a day before meals. 60 capsule 1     sildenafil, antihypertensive, (REVATIO) 20 mg tablet Take 20 mg by mouth daily.  0     buPROPion (WELLBUTRIN SR) 150 MG 12 hr tablet Take 1 tablet (150 mg total) by mouth 2 (two) times a day. 60 tablet 5     fluticasone-salmeterol (ADVAIR DISKUS) 250-50 mcg/dose DISKUS Inhale 1 puff 2 (two) times a day. 60 each 11     nicotine (NICODERM CQ) 21 mg/24 hr Place 1 patch on the skin daily. 30 patch 5     No current facility-administered medications for this visit.        Physical Exam:  /86   Pulse 83  Resp 16  Wt 176 lb (79.8 kg)  SpO2 97% Comment: RA  BMI 26.37 kg/m2  Gen: alert, oriented, no distress  HEENT: nasal turbinates are unremarkable, no oropharyngeal lesions, no cervical or supraclavicular lymphadenopathy  CV: RRR, no M/G/R  Resp: CTAB, no focal crackles or wheezes  Abd: soft, nontender, no palpable organomegaly  Skin: no apparent rashes  Ext: no cyanosis, clubbing or edema  Neuro: alert, nonfocal    Labs:  reviewed    Imaging studies:  No recent chest imaging    PFT's (7/17/18):  FEV1/FVC is 92 and is normal.  FEV1 is 51% predicted and is reduced.  FVC is 45% predicted and reduced.  There was improvement in spirometry after a single inhaled dose of bronchodilator.  TLC is 59% predicted and is reduced.  RV is 122% predicted and is increased.  DLCO is 33% predicted and is reduced when it   is corrected for hemoglobin.    Ar Hyman MD  [facility] [place]  Cell [phone]  Office [phone]  Pager [phone]

## 2021-06-21 DIAGNOSIS — N40.1 BENIGN PROSTATIC HYPERPLASIA WITH NOCTURIA: ICD-10-CM

## 2021-06-21 DIAGNOSIS — R35.1 BENIGN PROSTATIC HYPERPLASIA WITH NOCTURIA: ICD-10-CM

## 2021-06-25 DIAGNOSIS — N40.1 BENIGN PROSTATIC HYPERPLASIA WITH NOCTURIA: ICD-10-CM

## 2021-06-25 DIAGNOSIS — R35.1 BENIGN PROSTATIC HYPERPLASIA WITH NOCTURIA: ICD-10-CM

## 2021-06-25 RX ORDER — FINASTERIDE 5 MG/1
5 TABLET, FILM COATED ORAL DAILY
Qty: 90 TABLET | OUTPATIENT
Start: 2021-06-25

## 2021-06-25 RX ORDER — FINASTERIDE 5 MG/1
5 TABLET, FILM COATED ORAL DAILY
Qty: 90 TABLET | Refills: 1 | Status: SHIPPED | OUTPATIENT
Start: 2021-06-25 | End: 2022-01-23

## 2021-06-26 NOTE — PROGRESS NOTES
Progress Notes by Kali Alejandra MD at 6/1/2018  8:30 AM     Author: Kali Alejandra MD Service: -- Author Type: Physician    Filed: 6/1/2018  9:07 AM Encounter Date: 6/1/2018 Status: Signed    : Kali Alejandra MD (Physician)           Click to link to [facility]     [facility] HEART CARE NOTE    Thank you, Dr. Edmund Dey MD, for asking the [facility] team to see Mr. Piter Gaines to evaluate Consult for syncope and shortness of breath.      Assessment/Recommendations   Assessment:    1. Vasovagal syncope - clear trigger of coughing or sneezing and no bradycardia, pauses, or pathologic arrhythmia on event monitor despite symptoms.   2. Dyspnea on exertion - normal stress test 9 months ago. Slowly progressive over years. I suspect this is more a pulmonary etiology than angina.  3. Tobacco abuse - ongoing. Pt is working on quitting. I spent at least 3 minutes of today's encounter on smoking cessation counseling.  I offered referral to the smoking cessation program as well as pharmacologic therapy, however the patient declined both.    Plan:  1. Complete echocardiogram   2. Encouraged the patient to maintain aggressive hydration and to not restrict sodium from his diet. Avoid syncopal triggers if possible.  3. No need for continued cardiology follow-up unless echo is abnormal or other questions/concerns arise.         History of Present Illness   Mr. Piter Gaines is a 52 y.o. male with a significant past history of tobacco use and hypertension who presents for evaluation of dyspnea on exertion and syncope.    Syncope: this occurs in the setting of a forceful cough or sneeze. It started to occur last fall. He has had many episodes over the past several months. He has many other times when he gets near-syncope without overt loss of consciousness. Anything that triggers a cough can trigger the near-syncope or syncope. He has always had a prodrome of light headedness prior to his  syncopal events. Also with a warm flushed feeling. No tongue biting or loss of bowel or bladder function. No post-ictal symptoms.    Dyspnea on exertion: this occurs with walking less than a block of up one flight of stairs. Associated with heart pounding. Occasionally with some cramping quality chest pain on the left side of his chest. The pain is improved with stretching his left arm above his head. The chest pain is in his left anterior chest wall and is worse with palpation. The shortness of breath has been present for years and has been slowly progressive.    Other than noted above, Mr. Gaines denies any chest pain/pressure/tightness, shortness of breath at rest or with exertion, light headedness/dizziness, pre-syncope, syncope, lower extremity swelling, palpitations, paroxysmal nocturnal dyspnea (PND), or orthopnea.     Cardiac Problems and Cardiac Diagnostics     Most Recent Cardiac testing:  ECG dated 6/1/18 (personaly reviewed and interpreted): normal sinus rhythm. No acute or chronic ischemic changes.     ISMAEL Monitor 3/2018:  Impression:    Essentially not normal multi-day patient activated monitor.    The patient had multiple symptomatic recordings including for dizziness and lightheadedness (indication for study was syncope and collapse).  There was no pathologic rhythm disturbance suggesting a cause for syncope.    No sustained atrial or ventricular tachyarrhythmia.    No profound bradycardia or pauses.    ECHO (report reviewed): no prior echo  Echo results:      Stress test: pharmacologic nuclear stress dated 9/1/17 revealed     The pharmacologic nuclear stress test is negative for inducible myocardial ischemia or infarction.    The left ventricular ejection fraction is 68%.    There is no prior study available.    Cardiac cath: no prior cardiac cath     Medications  Allergies   Current Outpatient Prescriptions   Medication Sig Dispense Refill   ? amLODIPine (NORVASC) 10 MG tablet Take 1 tablet (10 mg  total) by mouth daily. 30 tablet 1   ? citalopram (CELEXA) 40 MG tablet Take 40 mg by mouth daily.     ? diazePAM (VALIUM) 5 MG tablet Take 1 tablet (5 mg total) by mouth every 6 (six) hours as needed for muscle spasms. 15 tablet 0   ? gabapentin (NEURONTIN) 300 MG capsule Take 300 mg by mouth 3 (three) times a day.     ? losartan (COZAAR) 50 MG tablet Take 50 mg by mouth daily.     ? multivitamin therapeutic (THERAGRAN) tablet Take 1 tablet by mouth daily.  0   ? naproxen (NAPROSYN) 500 MG tablet Take 1 tablet (500 mg total) by mouth 2 (two) times a day with meals. 30 tablet 0   ? omeprazole (PRILOSEC) 20 MG capsule Take 1 capsule (20 mg total) by mouth 2 (two) times a day before meals. 60 capsule 1   ? sildenafil, antihypertensive, (REVATIO) 20 mg tablet Take 20 mg by mouth daily.  0   ? lisinopril (PRINIVIL,ZESTRIL) 20 MG tablet Take 20 mg by mouth daily.     ? naltrexone (DEPADE) 50 mg tablet Take 1 tablet (50 mg total) by mouth daily. 30 tablet 1     No current facility-administered medications for this visit.       No Known Allergies     Physical Examination Review of Systems   Vitals:    06/01/18 0823   BP: 122/88   Pulse: 88   Resp: 16     Body mass index is 25.77 kg/(m^2).  Wt Readings from Last 3 Encounters:   06/01/18 172 lb (78 kg)   10/08/17 160 lb (72.6 kg)   04/06/17 125 lb 4.8 oz (56.8 kg)       General Appearance:   no distress, normal body habitus   ENT/Mouth: membranes moist, no apparent gingival bleeding.      EYES:  no scleral icterus, normal conjunctivae   Neck: no carotid bruits. No anterior cervical lymphadenopaty   Respiratory:   lungs are clear to auscultation, no rales or wheezing, equal chest wall expansion. Left anterior chest wall is tender to palpation, reproducing his chest discomfort.   Cardiovascular:   Regular rhythm, normal rate. Normal first and second heart sounds with no murmurs, rubs, or gallops; the carotid, radial and posterior tibial pulses are intact, Jugular venous  pressure normal, no edema bilaterally    Abdomen/GI:  no organomegaly, masses, bruits, or tenderness; bowel sounds are present   Extremities: no cyanosis or clubbing   Skin: no xanthelasma, warm.    Heme/lymph/ Immunology No apparent bleeding noted.   Neurologic: Alert and oriented. normal gait, no tremors     Psychiatric: Pleasant, calm, appropriate affect.    A complete 10 system review of systems was performed and is negative except as mentioned in the HPI or below:  General: WNL  Eyes: Visual Distubance  Ears/Nose/Throat: WNL  Lungs: Cough, Shortness of Breath  Heart: Chest Pain, Arm Pain, Shortness of Breath with activity, Irregular Heartbeat, Leg Swelling, Fainting  Stomach: WNL  Bladder: Frequent Urination at Night  Muscle/Joints: Joint Pain, Muscle Pain  Skin: WNL  Nervous System: Daytime Sleepiness, Dizziness  Mental Health: Anxiety     Blood: WNL       Past History   Past Medical History:   Past Medical History:   Diagnosis Date   ? Acute hyperactive alcohol withdrawal delirium 4/6/2017   ? Alcohol abuse    ? Esophagitis    ? Hypertension    ? Kidney disease        Past Surgical History:   Past Surgical History:   Procedure Laterality Date   ? ESOPHAGOGASTRODUODENOSCOPY N/A 4/6/2017    Procedure: ESOPHAGOGASTRODUODENOSCOPY (EGD);  Surgeon: Conrad Chao MD;  Location: [facility];  Service:    ? HAND SURGERY Left        Family History:   Family History   Problem Relation Age of Onset   ? Kidney disease Father    ? Lung cancer Father    ? Kidney disease Brother    ? Heart attack Brother    ? Stomach cancer Neg Hx        Social History:   Social History     Social History   ? Marital status: Single     Spouse name: N/A   ? Number of children: N/A   ? Years of education: N/A     Occupational History   ? Not on file.     Social History Main Topics   ? Smoking status: Current Every Day Smoker     Packs/day: 2.00     Types: Cigarettes   ? Smokeless tobacco: Never Used   ? Alcohol use Yes      Comment:   0.5 pint of vodka and 6 beers daily per family   ? Drug use: Yes     Special: Marijuana      Comment: last week   ? Sexual activity: Not on file     Other Topics Concern   ? Not on file     Social History Narrative    Patient moved to the [facility] a few months ago (~Jan 2017)  from [place] to be with his sister-in-law who wanted to take care of him. He has discontinued all of his home medications since arriving in [place]. He has not established primary care.               Lab Results    Chemistry/lipid CBC Cardiac Enzymes/BNP/TSH/INR   Lab Results   Component Value Date    CREATININE 1.11 04/07/2017    BUN 20 04/07/2017    K 3.2 (L) 04/07/2017     (L) 04/07/2017     04/07/2017    CO2 27 04/07/2017    Lab Results   Component Value Date    WBC 4.4 04/07/2017    HGB 10.7 (L) 04/07/2017    HCT 30.7 (L) 04/07/2017    MCV 95 04/07/2017     04/07/2017    No results found for: CKTOTAL, CKMB, CKMBINDEX, TROPONINI, BNP, TSH, INR       Kali Alejandra MD  Non-invasive Cardiology  [facility]

## 2021-07-02 DIAGNOSIS — I10 ESSENTIAL HYPERTENSION: ICD-10-CM

## 2021-07-02 RX ORDER — LOSARTAN POTASSIUM 50 MG/1
100 TABLET ORAL DAILY
Qty: 180 TABLET | Refills: 11 | Status: SHIPPED | OUTPATIENT
Start: 2021-07-02 | End: 2022-01-23

## 2021-07-09 ENCOUNTER — HOSPITAL ENCOUNTER (EMERGENCY)
Dept: EMERGENCY MEDICINE | Facility: HOSPITAL | Age: 56
Discharge: HOME | End: 2021-07-09
Payer: COMMERCIAL

## 2021-07-09 DIAGNOSIS — M54.42 ACUTE LEFT-SIDED LOW BACK PAIN WITH LEFT-SIDED SCIATICA: ICD-10-CM

## 2021-07-09 NOTE — ED TRIAGE NOTES
ED Triage Notes by Li Aranda RN at 7/9/2021 12:20 PM     Author: Li Aranda RN Service: -- Author Type: Registered Nurse    Filed: 7/9/2021 12:21 PM Date of Service: 7/9/2021 12:20 PM Status: Signed    : Li Aranda RN (Registered Nurse)       Patient arrives by private car for evaluation of left sided lower back pain with radiation down his left leg that started 3 days ago.  Patient took muscle relaxer without relief.

## 2021-07-09 NOTE — ED PROVIDER NOTES
ED Provider Notes by Aleena Casas PA-C at 7/9/2021  1:13 PM     Author: Aleena Casas PA-C Service: Emergency Medicine Author Type: Physician Assistant    Filed: 7/9/2021  2:31 PM Date of Service: 7/9/2021  1:13 PM Status: Signed    : Aleena Casas PA-C (Physician Assistant)       Emergency Department Encounter   NAME: Piter Gaines ; AGE: 55 y.o. male ; YOB: 1965 ; MRN: [id number] ; EVALUATION DATE & TIME: 7/9/2021 12:45 PM ; PCP: Kody Bangura MD   ED PROVIDER: Aleena Casas PA-C    Chief Complaint   Patient presents with   ? Back Pain       Medical Decision Making & Final Diagnosis     1. Acute left-sided low back pain with left-sided sciatica       Piter Gaines is a 55 y.o. male with hypertension who presents to the ED by private vehicle for evaluation of left lower back pain.  Patient presents to the emergency department for evaluation of left low back, buttock, and thigh pain that has been present for the past week or so.  Has had similar episodes of pain in the past after a sledding injury 30 some years ago.  Here in the ED, he is resting comfortably in the bed, in no acute distress.  Pain is exacerbated by movement he does have reproducible tenderness over his left buttock, left lateral hip, left posterior thigh.  Positive straight leg raise on the left.  Exam and clinical history certainly are consistent with sciatica, however did consider other etiologies.  There is no overlying skin changes, erythema, warmth or edema to the extremity or the hip joint.  No concern at this time for developing infectious process or a septic joint.  Given the lateral hip tenderness, did obtain x-ray which showed no evidence of fracture, subluxation, joint effusion, or any acute abnormalities.  He is not experiencing any red flag symptoms such as saddle anesthesia, bladder or bowel incontinence, or lower extremity weakness or numbness concerning for cauda equina syndrome or need for emergent  spinal MRI imaging.  No infectious symptoms, IV drug abuse, or concerns for infectious spinal process.  Extremity is otherwise well perfused and there is no concerns at this time for ischemic limb.  He was given a dose of Toradol and Percocet here in the ED which he tolerated well with significant improvement in his pain.  He is unable to take extended doses of NSAIDs at home due to his cardiovascular history, and given this, more limited options for sciatic nerve inflammation.  We will place him on a Medrol dose pack, I will give him several tablets of Percocet for breakthrough pain, and advised Tylenol or his home muscle relaxants as needed.  Reviewed concerning signs and symptoms to return to the ED, importance of follow-up in clinic, medication side effects.  He verbalized understanding is comfortable with the plan.  Discharged to home in good condition.      ED Course   1:00 PM I met and introduced myself to the patient. I gathered initial history and performed my physical exam. We discussed plan for initial workup. PPE worn including surgical mask, gloves.  2:25 PM  rechecked the patient and discussed discharge, follow-up, and reasons to return to the ED.    MEDICATIONS GIVEN IN THE EMERGENCY:   Medications   lidocaine 4 % patch 1 patch (1 patch Transdermal Patch/Med Applied 7/9/21 1323)   ketorolac injection 30 mg (TORADOL) (30 mg Intramuscular Given 7/9/21 1324)   oxyCODONE-acetaminophen 5-325 mg 2 tablet (PERCOCET/ENDOCET) (2 tablets Oral Given 7/9/21 1324)      NEW PRESCRIPTIONS STARTED AT TODAY'S ER VISIT:  Current Discharge Medication List      START taking these medications    Details   lidocaine (LIDODERM) 5 % Remove & Discard patch within 12 hours or as directed by MD  Qty: 5 patch, Refills: 0    Associated Diagnoses: Acute left-sided low back pain with left-sided sciatica      methylPREDNISolone (MEDROL DOSEPACK) 4 mg tablet Follow package directions  Qty: 21 tablet, Refills: 0    Associated  Diagnoses: Acute left-sided low back pain with left-sided sciatica      oxyCODONE-acetaminophen (PERCOCET/ENDOCET) 5-325 mg per tablet Take 1 tablet by mouth every 6 (six) hours as needed for pain.  Qty: 8 tablet, Refills: 0    Associated Diagnoses: Acute left-sided low back pain with left-sided sciatica         CONTINUE these medications which have NOT CHANGED    Details   amLODIPine (NORVASC) 10 MG tablet Take 1 tablet (10 mg total) by mouth daily.  Qty: 30 tablet, Refills: 1    Associated Diagnoses: Essential hypertension with goal blood pressure less than 140/90      citalopram (CELEXA) 40 MG tablet Take 40 mg by mouth daily.      dapsone 100 MG tablet Take 100 mg by mouth daily.      finasteride (PROSCAR) 5 mg tablet Take 5 mg by mouth daily.      gabapentin (NEURONTIN) 300 MG capsule Take 300 mg by mouth 3 (three) times a day.      guaiFENesin (ROBITUSSIN) 100 mg/5 mL syrup Take 5 mL (100 mg total) by mouth 3 (three) times a day as needed for cough.  Qty: 118 mL, Refills: 0      hydrocortisone 1 % cream Apply 1 application topically daily. To the affected area of the leg      losartan (COZAAR) 50 MG tablet Take 50 mg by mouth 2 (two) times a day.       naproxen (NAPROSYN) 500 MG tablet Take 1 tablet (500 mg total) by mouth 2 (two) times a day with meals.  Qty: 30 tablet, Refills: 0      omeprazole (PRILOSEC) 20 MG capsule Take 1 capsule (20 mg total) by mouth 2 (two) times a day before meals.  Qty: 60 capsule, Refills: 1    Associated Diagnoses: Esophagitis, Sierra grade D      oxyCODONE (ROXICODONE) 5 MG immediate release tablet Take 1 tablet (5 mg total) by mouth every 6 (six) hours as needed for pain.  Qty: 12 tablet, Refills: 0    Associated Diagnoses: Cellulitis of left orbital region      tamsulosin (FLOMAX) 0.4 mg cap Take 0.4 mg by mouth 2 (two) times a day.            =================================================================   History   Patient information was obtained from: patient   Use  "of Intrepreter: N/A     Piter Gaines is a 55 y.o. male with hypertension who presents to the ED by private vehicle for evaluation of left lower back pain. Patient states that he has been having back pain for about 1 week, but reports to the ED due to worsening symptoms. He reports 10/10, stabbing pain, that radiates down through the bottom of his leg down to his foot. Patient reports that he feels associated numbness in his left foot. Patient also reports pain in his thigh. He also states that the pain radiated into his arm in the past, but this has since resolved. Patient reports that he has taken gabapentin and muscle relaxers with no palliative effect. Patient reports that he was in a skiing accident about 30-25 years ago in which he collided with a piece of ice. He suspects that this may have to do with back pain that he has felt \"in the last couple of years\". Patient is currently not working and has reported no recent activity change including long car rides. Patient reports no recent falls or injuries, or recent infections. He states that he has no history of kidney stones. The patient takes losartan potassium, amlodipine, and lisinopril for hypertension.    Patient denies rash, swelling, urinary incontinence, bowel incontinence, weakness, hematuria, dysuria.      ______________________________________________________________________  Past Medical History:   Diagnosis Date   ? Acute hyperactive alcohol withdrawal delirium (H) 4/6/2017   ? Alcohol abuse    ? Esophagitis    ? Hypertension    ? Kidney disease        Past Surgical History:   Procedure Laterality Date   ? ESOPHAGOGASTRODUODENOSCOPY N/A 4/6/2017    Procedure: ESOPHAGOGASTRODUODENOSCOPY (EGD);  Surgeon: Conrad Chao MD;  Location: [facility];  Service:    ? HAND SURGERY Left        Family History   Problem Relation Age of Onset   ? Kidney disease Father    ? Lung cancer Father    ? Kidney disease Brother    ? Heart attack Brother    ? Stomach " cancer Neg Hx        Social History     Tobacco Use   ? Smoking status: Current Every Day Smoker     Packs/day: 1.00     Years: 10.00     Pack years: 10.00     Types: Cigarettes   ? Smokeless tobacco: Never Used   Substance Use Topics   ? Alcohol use: Yes     Comment:  0.5 pint of vodka and 6 beers daily per family   ? Drug use: Yes     Types: Marijuana     Comment: last week       REVIEW OF SYSTEMS:    Review of Systems   Constitutional: Negative for chills and fever.   Gastrointestinal:        Negative for bowel incontinence.   Genitourinary: Negative.  Negative for dysuria and hematuria.        Negative for urinary incontinence.   Musculoskeletal: Positive for back pain. Negative for joint swelling.   Skin: Negative for rash.   Neurological: Positive for numbness (foot). Negative for weakness.   All other systems reviewed and are negative.        Physical Exam   /82 (Patient Position: Sitting)   Pulse 82   Temp 97.9  F (36.6  C) (Tympanic)   Resp 18   Wt 183 lb 1.6 oz (83.1 kg)   SpO2 96%   BMI 27.04 kg/m      Physical Exam   Constitutional: He appears well-developed and well-nourished. No distress.   Eyes: Conjunctivae are normal.   Pulmonary/Chest: Effort normal. No respiratory distress.   Musculoskeletal:      Comments: No midline spinal tenderness or bony step-offs.  No overlying skin changes or rashes.  Tenderness to the left buttock into the lateral left hip and left posterior thigh.  No swelling, erythema, or warmth of the hip joint.  He is able to actively and passively perform range of motion of the entire lower extremity with 5 out of 5 strength.  Sensory exam is intact.  Positive straight leg raise on the left.  2+ patellar reflexes.  2+ DP and PT pulses and extremity is warm and well-perfused.  Distal cap refill less than 2 seconds.  Dorsiflexion and plantarflexion intact.   Neurological: He is alert.   Skin: Skin is warm and dry. He is not diaphoretic.   Nursing note and vitals  reviewed.      Imaging (Reviewed and Interpreted):   Xr Pelvis W 2 Vw Hip Left    Result Date: 7/9/2021  EXAM: XR PELVIS W 2 VW HIP LEFT LOCATION: [facility] DATE/TIME: 7/9/2021 2:03 PM INDICATION: Pain COMPARISON: None.     Both hips negative for fracture. Pelvis negative for fracture.       I, Costa Valderrama, am serving as a scribe to document services personally performed by Aleena Caass PA-C, based on my observation and the provider's statements to me. I, Aleena Casas PA-C attest that Costa Valderrama  is acting in a scribe capacity, has observed my performance of the services and has documented them in accordance with my direction.     Aleena Casas PA-C   Emergency Medicine   [facility] EMERGENCY DEPARTMENT  [address]  [facility] [address]  Dept: [phone]  Loc: [phone]      Aleena Casas PA-C  07/09/21 1439

## 2021-07-10 VITALS — WEIGHT: 183.1 LBS | BODY MASS INDEX: 27.12 KG/M2

## 2021-07-14 PROBLEM — N17.9 AKI (ACUTE KIDNEY INJURY) (H): Status: RESOLVED | Noted: 2017-04-06 | Resolved: 2017-09-01

## 2021-07-28 DIAGNOSIS — N40.1 BENIGN PROSTATIC HYPERPLASIA WITH NOCTURIA: ICD-10-CM

## 2021-07-28 DIAGNOSIS — R35.1 BENIGN PROSTATIC HYPERPLASIA WITH NOCTURIA: ICD-10-CM

## 2021-07-28 RX ORDER — TAMSULOSIN HYDROCHLORIDE 0.4 MG/1
0.8 CAPSULE ORAL DAILY
Qty: 90 CAPSULE | Refills: 4 | Status: SHIPPED | OUTPATIENT
Start: 2021-07-28 | End: 2022-01-23

## 2021-07-28 NOTE — TELEPHONE ENCOUNTER
Message to physician:     Date of last visit: 4/27/2021     Date of next visit if scheduled: none    Last Comprehensive Metabolic Panel:  Sodium   Date Value Ref Range Status   04/27/2021 141.0 133.0 - 144.0 mmol/L Final     Potassium   Date Value Ref Range Status   04/27/2021 3.7 3.4 - 5.3 mmol/L Final     Chloride   Date Value Ref Range Status   04/27/2021 106.0 94.0 - 109.0 mmol/L Final     Carbon Dioxide   Date Value Ref Range Status   04/27/2021 26.0 20.0 - 32.0 mmol/L Final     Anion Gap   Date Value Ref Range Status   03/11/2021 7 5 - 18 mmol/L Final     Glucose   Date Value Ref Range Status   04/27/2021 112.0 (H) 60.0 - 109.0 mg/dL Final     Urea Nitrogen   Date Value Ref Range Status   04/27/2021 12.0 7.0 - 30.0 mg/dL Final     Creatinine   Date Value Ref Range Status   04/27/2021 1.2 0.8 - 1.5 mg/dL Final     GFR Estimate   Date Value Ref Range Status   03/11/2021 >60 >60 mL/min/1.73m2 Final     Calcium   Date Value Ref Range Status   04/27/2021 9.3 8.5 - 10.4 mg/dL Final       BP Readings from Last 3 Encounters:   04/27/21 137/88   03/25/21 136/88   03/09/21 (!) 146/90       Lab Results   Component Value Date    A1C 6.1 07/17/2020    A1C 5.7 04/18/2017                Please complete refill and CLOSE ENCOUNTER.  Closing the encounter signifies the refill is complete.

## 2021-07-30 DIAGNOSIS — L13.8 LINEAR IGA BULLOUS DERMATOSIS: ICD-10-CM

## 2021-08-01 RX ORDER — DAPSONE 25 MG/1
50 TABLET ORAL DAILY
Qty: 180 TABLET | Refills: 3 | Status: SHIPPED | OUTPATIENT
Start: 2021-08-01 | End: 2022-10-22

## 2021-08-03 ENCOUNTER — OFFICE VISIT (OUTPATIENT)
Dept: FAMILY MEDICINE | Facility: CLINIC | Age: 56
End: 2021-08-03
Payer: COMMERCIAL

## 2021-08-03 VITALS
RESPIRATION RATE: 18 BRPM | OXYGEN SATURATION: 96 % | HEART RATE: 85 BPM | DIASTOLIC BLOOD PRESSURE: 86 MMHG | BODY MASS INDEX: 26.05 KG/M2 | WEIGHT: 182 LBS | HEIGHT: 70 IN | TEMPERATURE: 97.7 F | SYSTOLIC BLOOD PRESSURE: 127 MMHG

## 2021-08-03 DIAGNOSIS — M53.3 SACROILIAC JOINT PAIN: Primary | ICD-10-CM

## 2021-08-03 DIAGNOSIS — I65.21 CAROTID STENOSIS, NON-SYMPTOMATIC, RIGHT: ICD-10-CM

## 2021-08-03 DIAGNOSIS — Z23 HIGH PRIORITY FOR 2019-NCOV VACCINE: ICD-10-CM

## 2021-08-03 PROBLEM — F10.931 ACUTE HYPERACTIVE ALCOHOL WITHDRAWAL DELIRIUM (H): Status: RESOLVED | Noted: 2017-04-06 | Resolved: 2017-09-01

## 2021-08-03 PROCEDURE — 99213 OFFICE O/P EST LOW 20 MIN: CPT | Mod: GC | Performed by: STUDENT IN AN ORGANIZED HEALTH CARE EDUCATION/TRAINING PROGRAM

## 2021-08-03 PROCEDURE — 0011A COVID-19,PF,MODERNA: CPT | Performed by: STUDENT IN AN ORGANIZED HEALTH CARE EDUCATION/TRAINING PROGRAM

## 2021-08-03 PROCEDURE — 91301 COVID-19,PF,MODERNA: CPT | Performed by: STUDENT IN AN ORGANIZED HEALTH CARE EDUCATION/TRAINING PROGRAM

## 2021-08-03 ASSESSMENT — MIFFLIN-ST. JEOR: SCORE: 1658.86

## 2021-08-03 NOTE — PROGRESS NOTES
CHIEF COMPLAINT                                                      Chief Complaint   Patient presents with     ER F/U     was seen at [facility] on 7/9 for sciatic nerve. worsening on left side, feels swollen     Injections     covid vaccines     Medication Reconciliation     complete     Imm/Inj     COVID-19 VACCINE       ASSESSMENT/PLAN:     (M53.3) Sacroiliac joint pain  (primary encounter diagnosis)  Comment: Exam consistent w/ L sided SI joint pain. Has been to a few sessions of PT in the past but stopped d/t soreness. Pt agreeable to try PT again after injection.  Plan:   -IR referral for SI steroid injection  -New PT referral    (Z23) High priority for 2019-nCoV vaccine  Comment: Interested in getting covid vaccine today.  Plan:  - COVID-19,PF,MODERNA    (I65.21) Carotid stenosis, non-symptomatic, right  Comment: Needs refill of ASA.  Plan:   -Refill ordered      Options for treatment and follow-up care were reviewed with the patient and/or guardian. Piter Gaines and/or guardian engaged in the decision making process and verbalized understanding of the options discussed and agreed with the final plan    Precepted with Dr. Chakraborty.    Kev Whatley MD - PGY2  [facility]  P: [phone]    Preceptor Attestation:   Patient seen, evaluated and discussed with the resident. I have verified the content of the note, which accurately reflects my assessment of the patient and the plan of care.  Supervising Physician:Phil Chakraborty MD  [facility]      SUBJECTIVE:                                                    Piter Gaines is a 55 year old year old male who presents to clinic today for the following health issues:    L hip issue  Been going on for years  Sledding with kids 30 years ago and hit patch of ice  Wraps around hip  Small amount of numbness/tingling in toes  Point tenderness over SI  Feels like it's swollen  Diagnosed w/ sciatica at [facility] ED 7/9  No bowel/bladder  incontinence, saddle anesthesia  Has done PT for this in the past but could only go to 2 sessions d/t soreness; stopped going    ----------------------------------------------------------------------------------------------------------------------  Patient Active Problem List   Diagnosis     Tobacco use disorder     Caloric malnutrition (H)     HTN (hypertension)     Esophagitis     Moderate episode of recurrent major depressive disorder (H)     Seborrheic Keratosis     Persistent proteinuria     Benign prostatic hyperplasia with nocturia     Linear IgA bullous dermatosis     Healthcare maintenance     Normochromic normocytic anemia     Carotid stenosis, asymptomatic, bilateral     Past Surgical History:   Procedure Laterality Date     ESOPHAGOSCOPY, GASTROSCOPY, DUODENOSCOPY (EGD), COMBINED N/A 4/6/2017    Procedure: ESOPHAGOGASTRODUODENOSCOPY (EGD);  Surgeon: Conrad Chao MD;  Location: [facility];  Service:      FOREARM SURGERY Left 1980's     HAND SURGERY Left        Social History     Tobacco Use     Smoking status: Current Every Day Smoker     Packs/day: 1.00     Years: 10.00     Pack years: 10.00     Types: Cigarettes, Cigarettes     Smokeless tobacco: Never Used     Tobacco comment: 1/2 pack a day     Substance Use Topics     Alcohol use: Yes     Alcohol/week: 14.0 standard drinks     Types: 6 Cans of beer, 8 Shots of liquor per week     Comment: Alcoholic Drinks/day:  0.5 pint of vodka and 6 beers daily per family     Family History   Problem Relation Age of Onset     Bone Cancer Mother      Breast Cancer Mother      Diabetes Father      Kidney Disease Father      Diabetes Sister      Cerebrovascular Disease Brother      Lung Cancer Father      Kidney Disease Brother      Coronary Artery Disease Brother      Stomach Cancer No family hx of          Problem list and past medical, surgical, social, and family histories reviewed & adjusted, as indicated.    Current Outpatient Medications   Medication  "Sig Dispense Refill     adapalene (DIFFERIN) 0.1 % external cream Apply thin layer to beard and hairline region after washing hair/face everyday.  If irritation or peeling develops, skip application for one day. 45 g 1     amLODIPine (NORVASC) 10 MG tablet Take 1 tablet (10 mg) by mouth daily 90 tablet 3     aspirin (ASA) 81 MG EC tablet Take 1 tablet (81 mg) by mouth daily 90 tablet 1     atorvastatin (LIPITOR) 20 MG tablet Take 1 tablet (20 mg) by mouth daily 90 tablet 1     citalopram (CELEXA) 40 MG tablet Take 1 tablet (40 mg) by mouth daily 90 tablet 3     dapsone (ACZONE) 25 MG tablet Take 2 tablets (50 mg) by mouth daily 180 tablet 3     finasteride (PROSCAR) 5 MG tablet Take 1 tablet (5 mg) by mouth daily 90 tablet 1     gabapentin (NEURONTIN) 300 MG capsule Take 1 capsule (300 mg) by mouth 3 times daily 270 capsule 1     hydrocortisone (CORTAID) 1 % external cream Apply topically 2 times daily 453 g 0     losartan (COZAAR) 50 MG tablet Take 2 tablets (100 mg) by mouth daily 180 tablet 11     methocarbamol (ROBAXIN) 500 MG tablet Take 1 tablet (500 mg) by mouth nightly as needed for muscle spasms 14 tablet 0     omeprazole (PRILOSEC) 20 MG DR capsule Take 1 capsule (20 mg) by mouth 2 times daily 180 capsule 1     SHINGRIX injection ADM 0.5ML IM UTD       tamsulosin (FLOMAX) 0.4 MG capsule Take 2 capsules (0.8 mg) by mouth daily 90 capsule 4     Medication list reviewed and updated as indicated.    Allergies   Allergen Reactions     No Known Allergies      Allergies reviewed and updated as indicated.  ----------------------------------------------------------------------------------------------------------------------  ROS:  Constitutional, HEENT, cardiovascular, pulmonary, GI, musculoskeletal, neuro, skin, and psych systems are negative, except as otherwise noted.    OBJECTIVE:     /86 (BP Location: Right arm)   Pulse 85   Temp 97.7  F (36.5  C) (Oral)   Resp 18   Ht 1.765 m (5' 9.5\")   Wt 82.6 kg " (182 lb)   SpO2 96%   BMI 26.49 kg/m    Body mass index is 26.49 kg/m .  Exam:  Constitutional: healthy, alert and no distress  Head: Normocephalic. Atraumatic  Neck: Neck supple. FROM  Cardiovascular: Acyanotic  Respiratory: Normal chest rise. Non-labored breathing.  Musculoskeletal: L SI point TTP. Positive VICTOR M, distraction, thigh thrust. Neg straight leg raise.  Skin: no suspicious lesions or rashes  Neurologic: Gait normal. CN II-XII grossly intact  Psychiatric: mentation appears normal and affect normal/bright

## 2021-08-09 NOTE — PATIENT INSTRUCTIONS
Referral for :     IR Injection    LOCATION/PLACE/Provider :    [facility]   DATE & TIME :    Faxed to location, they will call   PHONE :     [phone]  FAX :    [phone]  Appointment made by clinic staff/:    Lizbeth

## 2021-08-10 ENCOUNTER — PRE VISIT (OUTPATIENT)
Dept: UROLOGY | Facility: CLINIC | Age: 56
End: 2021-08-10

## 2021-08-15 NOTE — PROGRESS NOTES
PREPROCEDURE DIAGNOSES:    1. BPH with LUTS (frequency, nocturia, hesitancy, and sensation of incomplete emptying) despite combination therapy of finasteride and Flomax.     POSTPROCEDURE DIAGNOSES:   -Small bladder capacity (116 mL) with heightened filling sensations.  -Good bladder compliance without DO/DOI.  -There is an initial rise in Pdet pressure to 22 cm H20, during which he empties his bladder. This is followed by a very brief spike up to ~110 cm H20, followed by a second brief spike up to 50 cm H20. He does not void any additional volume during these brief pressure spikes.   -Normal flow rate (Qmax 14 mL/s) with a bell-shaped flow curve and complete bladder emptying (final PVR 0 mL).  -Some mild EMG activity immediately before and immediately after voiding.  -BOOI is -5.4 which is negative for bladder outlet obstruction.  -Fluoroscopy reveals a smooth bladder wall with no diverticulae or cellules.  No vesicoureteral reflux was observed.  The bladder neck was closed during filling. As he was unable to void on the [employer] chair, no voiding imaging is available.    PROCEDURE:    -Uroflowmetry.  -Sterile urethral catheterization for measurement of postvoid residual urine volume.  -Complex filling cystometrogram with measurement of bladder and rectal pressures.  -Complex voiding cystometrogram with measurement of bladder and rectal pressures.  -Electromyography of the pelvic floor during urodynamics.  -Fluoroscopic imaging of the bladder during urodynamics, at least 3 views.    -Interpretation of urodynamics and flouroscopic imaging.      INDICATIONS FOR PROCEDURE:  Mr. Piter Gaines is a pleasant 55 year old male with BPH with LUTS (frequency, nocturia, hesitancy, and sensation of incomplete emptying) despite combination therapy of finasteride and Flomax. Baseline video urodynamic assessment is requested today to better characterize Mr. Piter Gaines's voiding dysfunction.      DESCRIPTION OF PROCEDURE:  Risks,  benefits, and alternatives to urodynamics were discussed with the patient and he wished to proceed.  Urodynamics are planned to better assess the primary etiology for Mr. Gaines's urologic dysfunction. After informed consent was obtained, the patient was taken to the procedure room where the study was initiated. Findings below.     PRE-STUDY UROFLOWMETRY:  Postvoid residual by catheter: 10 mL.  Pretest urine dipstick was negative for leukocytes and nitrites.    Next a 7F double-lumen urodynamics catheter was inserted into the bladder under sterile technique via the urethra.  A 7F abdominal manometry catheter was placed in the rectum.  EMG pads were placed on both sides of the anal verge.  The bladder was filled with 200 mL of Omnipaque at 30 mL/minute and serial pressures were recorded.  With coughing there was an appropriate rise in vesical and abdominal pressures with no change in detrusor pressure, confirming good study catheter placement.    DURING THE FILLING PHASE:  First sensation: 51 mL.  First Desire: 59 mL.  Strong Desire: 8 mL.  Maximum Capacity: 116 mL (final voided volume).    Uninhibited detrusor contractions: None.  Compliance: Good. PDet=0 cmH20 at capacity.  Continence: No DOI or HUY.   EMG: Concordant during filling.    DURING THE VOIDING PHASE:  The patient attempts to void from a seated position on the [employer] chair but was unable. He did not feel that he would be able to urinate any easier from a standing position. He was then transferred to a commode and all staff left the room for his second attempt to void. This yielded the following data:     Maximum detrusor contraction with void: There is an initial rise in Pdet pressure to 22 cm H20, during which he empties his bladder. This is followed by a very brief spike up to ~110 cm H20, followed by a second brief spike up to 50 cm H20. He does not void any additional volume during these brief pressure spikes.   Voided volume: 116 mL.  Maximum flow  rate: 14 mL/sec.  Average flow rate: 9.5 mL/sec.  Postvoid Residual: 0 mL.  EMG activity: Some mild increase in activity prior to and after he voids.  Character of voiding curve: Bell-shaped.  BOOI: -5.4 (suggesting no obstruction - see key below)  [obstructed (JIANG index [BOOI] ? 40); equivocal (no definite   obstruction; BOOI 20-40); and no obstruction (BOOI ? 20)]    FLUOROSCOPIC IMAGING OF THE BLADDER DURING URODYNAMICS:  Please note, image numbers on UDS tracings correlate with i[employer] series numbers on PACS images. Fluoroscopy during today's procedure demonstrated a smooth bladder wall with no diverticulae or cellules.  No vesicoureteral reflux was observed.  The bladder neck was closed during filling. As he was unable to void on the [employer] chair, no voiding imaging is available.  At the completion of the study, all catheters were removed and the patient was brought back into the consultation room to further discuss today's study results.      ASSESSMENT/PLAN:  Mr. Piter Gaines is a pleasant 55 year old male with BPH with LUTS (frequency, nocturia, hesitancy, and sensation of incomplete emptying) despite combination therapy of finasteride and Flomax who demonstrated the following findings today on urodynamic evaluation:    -Small bladder capacity (116 mL) with heightened filling sensations.  -Good bladder compliance without DO/DOI.  -There is an initial rise in Pdet pressure to 22 cm H20, during which he empties his bladder. This is followed by a very brief spike up to ~110 cm H20, followed by a second brief spike up to 50 cm H20. He does not void any additional volume during these brief pressure spikes.   -Normal flow rate (Qmax 14 mL/s) with a bell-shaped flow curve and complete bladder emptying (final PVR 0 mL).  -Some mild EMG activity immediately before and immediately after voiding.  -BOOI is -5.4 which is negative for bladder outlet obstruction.  -Fluoroscopy reveals a smooth bladder wall with no  diverticulae or cellules.  No vesicoureteral reflux was observed.  The bladder neck was closed during filling. As he was unable to void on the [employer] chair, no voiding imaging is available.    We discussed the results of his study today in detail, showing a small bladder capacity and very brief bladder contraction. He was, however, able to empty his total bladder contents. He does not appear to be obstructed. Based on these results, next steps are somewhat unclear. We discussed potential next steps, including cystoscopy, but he does not wish to pursue this. Will therefore initiate oxybutynin XL 5 mg now and evaluate for symptom improvement. He may also benefit from pelvic floor physical therapy evaluation, given hesitancy and EMG activity on our tracings today. Will plan to follow up virtually in a few months to review symptoms. If no improvement, will consider PFPT referral.     Thank you for allowing me to participate in the care of Mr. Piter Gaines and please don't hesitate to contact me with any questions or concerns.      This procedure was performed under a collaborative agreement with Dr. Aftab Leo, Professor and  of Urology, [facility].    CASEY Torres, CNP  Department of Urology

## 2021-08-16 ENCOUNTER — ALLIED HEALTH/NURSE VISIT (OUTPATIENT)
Dept: UROLOGY | Facility: CLINIC | Age: 56
End: 2021-08-16
Payer: COMMERCIAL

## 2021-08-16 ENCOUNTER — ANCILLARY PROCEDURE (OUTPATIENT)
Dept: RADIOLOGY | Facility: AMBULATORY SURGERY CENTER | Age: 56
End: 2021-08-16
Attending: NURSE PRACTITIONER
Payer: COMMERCIAL

## 2021-08-16 VITALS — HEART RATE: 83 BPM | SYSTOLIC BLOOD PRESSURE: 138 MMHG | DIASTOLIC BLOOD PRESSURE: 85 MMHG

## 2021-08-16 DIAGNOSIS — R35.0 URINARY FREQUENCY: ICD-10-CM

## 2021-08-16 DIAGNOSIS — R35.0 BENIGN PROSTATIC HYPERPLASIA WITH URINARY FREQUENCY: Primary | ICD-10-CM

## 2021-08-16 DIAGNOSIS — N40.1 BENIGN PROSTATIC HYPERPLASIA WITH URINARY FREQUENCY: Primary | ICD-10-CM

## 2021-08-16 PROBLEM — K20.80 ESOPHAGITIS, LOS ANGELES GRADE D: Status: ACTIVE | Noted: 2017-04-07

## 2021-08-16 PROBLEM — H05.019 ORBITAL CELLULITIS: Status: ACTIVE | Noted: 2021-03-09

## 2021-08-16 LAB
ALBUMIN UR-MCNC: NEGATIVE MG/DL
APPEARANCE UR: CLEAR
BILIRUB UR QL STRIP: NEGATIVE
COLOR UR AUTO: YELLOW
GLUCOSE UR STRIP-MCNC: NEGATIVE MG/DL
HGB UR QL STRIP: NEGATIVE
KETONES UR STRIP-MCNC: NEGATIVE MG/DL
LEUKOCYTE ESTERASE UR QL STRIP: NEGATIVE
NITRATE UR QL: NEGATIVE
PH UR STRIP: 5.5 [PH] (ref 5–8)
SP GR UR STRIP: 1.02 (ref 1–1.03)
UROBILINOGEN UR STRIP-ACNC: 0.2 E.U./DL

## 2021-08-16 PROCEDURE — 51784 ANAL/URINARY MUSCLE STUDY: CPT | Performed by: NURSE PRACTITIONER

## 2021-08-16 PROCEDURE — 51797 INTRAABDOMINAL PRESSURE TEST: CPT | Performed by: NURSE PRACTITIONER

## 2021-08-16 PROCEDURE — 81003 URINALYSIS AUTO W/O SCOPE: CPT | Performed by: NURSE PRACTITIONER

## 2021-08-16 PROCEDURE — 51741 ELECTRO-UROFLOWMETRY FIRST: CPT | Performed by: NURSE PRACTITIONER

## 2021-08-16 PROCEDURE — 51728 CYSTOMETROGRAM W/VP: CPT | Performed by: NURSE PRACTITIONER

## 2021-08-16 PROCEDURE — 51600 INJECTION FOR BLADDER X-RAY: CPT | Performed by: NURSE PRACTITIONER

## 2021-08-16 PROCEDURE — 74455 X-RAY URETHRA/BLADDER: CPT | Performed by: NURSE PRACTITIONER

## 2021-08-16 RX ORDER — OXYBUTYNIN CHLORIDE 5 MG/1
5 TABLET, EXTENDED RELEASE ORAL DAILY
Qty: 30 TABLET | Refills: 4 | Status: SHIPPED | OUTPATIENT
Start: 2021-08-16 | End: 2021-11-09 | Stop reason: DRUGHIGH

## 2021-08-16 RX ORDER — LIDOCAINE 50 MG/G
PATCH TOPICAL
COMMUNITY
Start: 2021-07-09 | End: 2021-11-30

## 2021-08-16 ASSESSMENT — PAIN SCALES - GENERAL: PAINLEVEL: NO PAIN (0)

## 2021-08-16 NOTE — NURSING NOTE
Chief Complaint   Patient presents with     Urodynamics Study     Urinary frequency/BPH       Blood pressure 138/85, pulse 83.     Patient Active Problem List   Diagnosis     Tobacco use disorder     Caloric malnutrition (H)     HTN (hypertension)     Esophagitis     Moderate episode of recurrent major depressive disorder (H)     Seborrheic Keratosis     Persistent proteinuria     Benign prostatic hyperplasia with nocturia     Linear IgA bullous dermatosis     Healthcare maintenance     Normochromic normocytic anemia     Carotid stenosis, asymptomatic, bilateral     Orbital cellulitis     Esophagitis, Wake grade D       Allergies   Allergen Reactions     No Known Allergies        Current Outpatient Medications   Medication Sig Dispense Refill     adapalene (DIFFERIN) 0.1 % external cream Apply thin layer to beard and hairline region after washing hair/face everyday.  If irritation or peeling develops, skip application for one day. 45 g 1     amLODIPine (NORVASC) 10 MG tablet Take 1 tablet (10 mg) by mouth daily 90 tablet 3     aspirin (ASA) 81 MG EC tablet Take 1 tablet (81 mg) by mouth daily 90 tablet 1     atorvastatin (LIPITOR) 20 MG tablet Take 1 tablet (20 mg) by mouth daily 90 tablet 1     citalopram (CELEXA) 40 MG tablet Take 1 tablet (40 mg) by mouth daily 90 tablet 3     dapsone (ACZONE) 25 MG tablet Take 2 tablets (50 mg) by mouth daily 180 tablet 3     finasteride (PROSCAR) 5 MG tablet Take 1 tablet (5 mg) by mouth daily 90 tablet 1     gabapentin (NEURONTIN) 300 MG capsule Take 1 capsule (300 mg) by mouth 3 times daily 270 capsule 1     hydrocortisone (CORTAID) 1 % external cream Apply topically 2 times daily 453 g 0     lidocaine (LIDODERM) 5 % patch Remove & Discard patch within 12 hours or as directed by MD       losartan (COZAAR) 50 MG tablet Take 2 tablets (100 mg) by mouth daily 180 tablet 11     methocarbamol (ROBAXIN) 500 MG tablet Take 1 tablet (500 mg) by mouth nightly as needed for  muscle spasms 14 tablet 0     omeprazole (PRILOSEC) 20 MG DR capsule Take 1 capsule (20 mg) by mouth 2 times daily 180 capsule 1     SHINGRIX injection ADM 0.5ML IM UTD       tamsulosin (FLOMAX) 0.4 MG capsule Take 2 capsules (0.8 mg) by mouth daily 90 capsule 4       Social History     Tobacco Use     Smoking status: Current Every Day Smoker     Packs/day: 1.00     Years: 10.00     Pack years: 10.00     Types: Cigarettes, Cigarettes     Smokeless tobacco: Never Used     Tobacco comment: 1/2 pack a day     Substance Use Topics     Alcohol use: Yes     Alcohol/week: 14.0 standard drinks     Types: 6 Cans of beer, 8 Shots of liquor per week     Comment: Alcoholic Drinks/day:  0.5 pint of vodka and 6 beers daily per family     Drug use: Yes     Types: Marijuana     Comment: Drug use: last week       Invasive Procedure Safety Checklist:    Procedure: Urodynamics    Action: Complete sections and checkboxes as appropriate.  Pre-procedure:  1. Patient ID Verified with 2 identifiers (Emi and  or MRN) : YES    2. Procedure and site verified with patient/designee (when able) : YES    3. Accurate consent documentation in medical record : YES    4. H&P (or appropriate assessment) documented in medical record : N/A  H&P must be up to 30 days prior to procedure an updated within 24 hours of Procedure as applicable.     5. Relevant diagnostic and radiology test results appropriately labeled and displayed as applicable : YES    6. Blood products, implants, devices, and/or special equipment available for the procedure as applicable : YES    7. Procedure site(s) marked with provider initials [Exclusions: none] : NO    8. Marking not required. Reason : Yes  Procedure does not require site marking    Time Out:     Time-Out performed immediately prior to starting procedure, including verbal and active participation of all team members addressing: YES    1. Correct patient identity.  2. Confirmed that the correct side and site are  marked.  3. An accurate procedure to be done.  4. Agreement on the procedure to be done.  5. Correct patient position.  6. Relevant images and results are properly labeled and appropriately displayed.  7. The need to administer antibiotics or fluids for irrigation purposes during the procedure as applicable.  8. Safety precautions based on patient history or medication use.    During Procedure: Verification of correct person, site, and procedure occurs any time the responsibility for care of the patient is transferred to another member of the care team.        The following medication was given:     MEDICATION:  Omnipaque (Iohexol Injection) (240mgI/mL)  ROUTE: Provider Administered  SITE: Provider Administered via catheter  DOSE: 200mL  LOT #: [id number]  : "[employer]  EXPIRATION DATE: 08/08/2023  NDC#: [address]-42   Was there drug waste? No      Elise Reza CMA  8/16/2021  11:21 AM

## 2021-08-16 NOTE — PATIENT INSTRUCTIONS
UROLOGY CLINIC VISIT PATIENT INSTRUCTIONS    -Start oxybutynin XL 5 mg daily.   -Follow up with me in 3-4 months via virtual visit.     If you have any issues, questions or concerns in the meantime, do not hesitate to contact us at [phone] or via [employer].     It was a pleasure meeting with you today.  Thank you for allowing me and my team the privilege of caring for you today.  YOU are the reason we are here, and I truly hope we provided you with the excellent service you deserve.  Please let us know if there is anything else we can do for you so that we can be sure you are leaving completely satisfied with your care experience.    Mari Graf, CNP

## 2021-08-31 ENCOUNTER — IMMUNIZATION (OUTPATIENT)
Dept: FAMILY MEDICINE | Facility: CLINIC | Age: 56
End: 2021-08-31
Attending: FAMILY MEDICINE
Payer: COMMERCIAL

## 2021-08-31 PROCEDURE — 0012A PR COVID VAC MODERNA 100 MCG/0.5 ML IM: CPT

## 2021-08-31 PROCEDURE — 91301 PR COVID VAC MODERNA 100 MCG/0.5 ML IM: CPT

## 2021-09-01 ENCOUNTER — HOSPITAL ENCOUNTER (EMERGENCY)
Facility: HOSPITAL | Age: 56
Discharge: HOME | End: 2021-09-02
Attending: EMERGENCY MEDICINE | Admitting: EMERGENCY MEDICINE
Payer: COMMERCIAL

## 2021-09-01 DIAGNOSIS — M54.16 LUMBAR RADICULOPATHY: ICD-10-CM

## 2021-09-01 PROCEDURE — 85027 COMPLETE CBC AUTOMATED: CPT | Performed by: EMERGENCY MEDICINE

## 2021-09-01 PROCEDURE — 96372 THER/PROPH/DIAG INJ SC/IM: CPT | Performed by: EMERGENCY MEDICINE

## 2021-09-01 PROCEDURE — 85610 PROTHROMBIN TIME: CPT | Performed by: EMERGENCY MEDICINE

## 2021-09-01 PROCEDURE — 250N000011 HC RX IP 250 OP 636: Performed by: EMERGENCY MEDICINE

## 2021-09-01 PROCEDURE — 99285 EMERGENCY DEPT VISIT HI MDM: CPT | Mod: 25

## 2021-09-01 PROCEDURE — 250N000013 HC RX MED GY IP 250 OP 250 PS 637: Performed by: EMERGENCY MEDICINE

## 2021-09-01 PROCEDURE — 80048 BASIC METABOLIC PNL TOTAL CA: CPT | Performed by: EMERGENCY MEDICINE

## 2021-09-01 PROCEDURE — 36415 COLL VENOUS BLD VENIPUNCTURE: CPT | Performed by: EMERGENCY MEDICINE

## 2021-09-01 RX ORDER — KETOROLAC TROMETHAMINE 30 MG/ML
30 INJECTION, SOLUTION INTRAMUSCULAR; INTRAVENOUS ONCE
Status: COMPLETED | OUTPATIENT
Start: 2021-09-01 | End: 2021-09-01

## 2021-09-01 RX ORDER — DIAZEPAM 5 MG
5 TABLET ORAL ONCE
Status: COMPLETED | OUTPATIENT
Start: 2021-09-01 | End: 2021-09-01

## 2021-09-01 RX ORDER — LIDOCAINE 4 G/G
2 PATCH TOPICAL ONCE
Status: DISCONTINUED | OUTPATIENT
Start: 2021-09-01 | End: 2021-09-02 | Stop reason: HOSPADM

## 2021-09-01 RX ADMIN — LIDOCAINE 2 PATCH: 246 PATCH TOPICAL at 23:30

## 2021-09-01 RX ADMIN — KETOROLAC TROMETHAMINE 30 MG: 30 INJECTION, SOLUTION INTRAMUSCULAR at 23:30

## 2021-09-01 RX ADMIN — DIAZEPAM 5 MG: 5 TABLET ORAL at 23:31

## 2021-09-02 ENCOUNTER — TELEPHONE (OUTPATIENT)
Dept: FAMILY MEDICINE | Facility: CLINIC | Age: 56
End: 2021-09-02

## 2021-09-02 ENCOUNTER — APPOINTMENT (OUTPATIENT)
Dept: MRI IMAGING | Facility: HOSPITAL | Age: 56
End: 2021-09-02
Attending: EMERGENCY MEDICINE
Payer: COMMERCIAL

## 2021-09-02 VITALS
SYSTOLIC BLOOD PRESSURE: 121 MMHG | DIASTOLIC BLOOD PRESSURE: 62 MMHG | HEART RATE: 73 BPM | RESPIRATION RATE: 16 BRPM | BODY MASS INDEX: 26.78 KG/M2 | WEIGHT: 184 LBS | OXYGEN SATURATION: 93 % | TEMPERATURE: 98.9 F

## 2021-09-02 LAB
ANION GAP SERPL CALCULATED.3IONS-SCNC: 10 MMOL/L (ref 5–18)
BUN SERPL-MCNC: 13 MG/DL (ref 8–22)
CALCIUM SERPL-MCNC: 9.2 MG/DL (ref 8.5–10.5)
CHLORIDE BLD-SCNC: 103 MMOL/L (ref 98–107)
CO2 SERPL-SCNC: 25 MMOL/L (ref 22–31)
CREAT SERPL-MCNC: 1.3 MG/DL (ref 0.7–1.3)
ERYTHROCYTE [DISTWIDTH] IN BLOOD BY AUTOMATED COUNT: 15.2 % (ref 10–15)
GFR SERPL CREATININE-BSD FRML MDRD: 61 ML/MIN/1.73M2
GLUCOSE BLD-MCNC: 100 MG/DL (ref 70–125)
HCT VFR BLD AUTO: 36.9 % (ref 40–53)
HGB BLD-MCNC: 11.6 G/DL (ref 13.3–17.7)
INR PPP: 1.03 (ref 0.9–1.15)
MCH RBC QN AUTO: 27.3 PG (ref 26.5–33)
MCHC RBC AUTO-ENTMCNC: 31.4 G/DL (ref 31.5–36.5)
MCV RBC AUTO: 87 FL (ref 78–100)
PLATELET # BLD AUTO: 162 10E3/UL (ref 150–450)
POTASSIUM BLD-SCNC: 3.7 MMOL/L (ref 3.5–5)
RBC # BLD AUTO: 4.25 10E6/UL (ref 4.4–5.9)
SODIUM SERPL-SCNC: 138 MMOL/L (ref 136–145)
WBC # BLD AUTO: 10 10E3/UL (ref 4–11)

## 2021-09-02 PROCEDURE — 96374 THER/PROPH/DIAG INJ IV PUSH: CPT | Mod: 59

## 2021-09-02 PROCEDURE — 255N000002 HC RX 255 OP 636: Performed by: EMERGENCY MEDICINE

## 2021-09-02 PROCEDURE — 250N000011 HC RX IP 250 OP 636: Performed by: EMERGENCY MEDICINE

## 2021-09-02 PROCEDURE — A9585 GADOBUTROL INJECTION: HCPCS | Performed by: EMERGENCY MEDICINE

## 2021-09-02 PROCEDURE — 72158 MRI LUMBAR SPINE W/O & W/DYE: CPT

## 2021-09-02 PROCEDURE — 250N000012 HC RX MED GY IP 250 OP 636 PS 637: Performed by: EMERGENCY MEDICINE

## 2021-09-02 RX ORDER — PREDNISONE 20 MG/1
TABLET ORAL
Qty: 10 TABLET | Refills: 0 | Status: SHIPPED | OUTPATIENT
Start: 2021-09-02 | End: 2021-11-30

## 2021-09-02 RX ORDER — MORPHINE SULFATE 4 MG/ML
4 INJECTION, SOLUTION INTRAMUSCULAR; INTRAVENOUS ONCE
Status: COMPLETED | OUTPATIENT
Start: 2021-09-02 | End: 2021-09-02

## 2021-09-02 RX ORDER — CYCLOBENZAPRINE HCL 10 MG
10 TABLET ORAL 3 TIMES DAILY PRN
Qty: 20 TABLET | Refills: 0 | Status: SHIPPED | OUTPATIENT
Start: 2021-09-02 | End: 2021-09-08

## 2021-09-02 RX ORDER — MORPHINE SULFATE 4 MG/ML
4 INJECTION, SOLUTION INTRAMUSCULAR; INTRAVENOUS ONCE
Status: DISCONTINUED | OUTPATIENT
Start: 2021-09-02 | End: 2021-09-02

## 2021-09-02 RX ORDER — LIDOCAINE 50 MG/G
1 PATCH TOPICAL EVERY 24 HOURS
Qty: 15 PATCH | Refills: 0 | Status: SHIPPED | OUTPATIENT
Start: 2021-09-02 | End: 2022-04-24

## 2021-09-02 RX ORDER — GADOBUTROL 604.72 MG/ML
10 INJECTION INTRAVENOUS ONCE
Status: COMPLETED | OUTPATIENT
Start: 2021-09-02 | End: 2021-09-02

## 2021-09-02 RX ORDER — PREDNISONE 20 MG/1
60 TABLET ORAL ONCE
Status: COMPLETED | OUTPATIENT
Start: 2021-09-02 | End: 2021-09-02

## 2021-09-02 RX ADMIN — PREDNISONE 60 MG: 20 TABLET ORAL at 03:08

## 2021-09-02 RX ADMIN — GADOBUTROL 10 ML: 604.72 INJECTION INTRAVENOUS at 01:15

## 2021-09-02 RX ADMIN — MORPHINE SULFATE 4 MG: 4 INJECTION INTRAVENOUS at 01:38

## 2021-09-02 NOTE — ED TRIAGE NOTES
Patient states low back pain since 08/29.  Reports pain is increasing.  Has a hx of sciatica and states this feels very similar.  C/o pain across the back and down to the hips and legs on both sides.  Denies any numbness/tingling to groin and denies any bowel or bladder incontinence.  Takes Gabapentin at home without relief of symptoms.  Denies any acute injury- states it is difficult to ambulate.

## 2021-09-02 NOTE — DISCHARGE INSTRUCTIONS
Your MRI today was normal.  Use the medications as prescribed for inflammation and spasm.  Continue close follow-up with your specialist.    Return to the ED if symptoms worsen or new arise

## 2021-09-02 NOTE — TELEPHONE ENCOUNTER
[facility] phone call message- medication clarification/question:    Full Medication Name:   lidocaine (LIDODERM) 5 % patch 15 patch 0 9/2/2021     Dose: Sig - Route: Place 1 patch onto the skin every 24 hours To prevent lidocaine toxicity, patient should be patch free for 12 hrs daily. -     Question: PT NEEDS A PRIOR AUTH SENT TO THE PHARMACY LISTED BELOW IN ORDER TO HAVE THE MEDICATION FILLED     Pharmacy confirmed as [id number] - [place], [place] - [address] AT [facility]: Yes    OK to leave a message on voice mail? Yes    Primary language: English      needed? No    Call taken on September 2, 2021 at 3:25 PM by Berenice Dozier

## 2021-09-02 NOTE — ED PROVIDER NOTES
EMERGENCY DEPARTMENT ENCOUNTER      FINAL IMPRESSION    1. Lumbar radiculopathy        MEDICAL DECISION MAKING    55-year-old gentleman with self-limited back pain history presenting to the ED with severe acute back pain flare and with recent corticosteroid injection in the lumbosacral spine and with fevers development with worsening pain and worsening neuro deficits stating left-sided weakness and radiation of pain to bilateral low back down left leg into abdomen. Vitals reviewed and arrival without fever. Examination with tenderness diffusely across the lumbosacral back. No obvious focality to the midline spine. Straight leg raise positive bilaterally left greater than right. Babinski's downgoing.     Ultimately red flag symptoms concerning for occult post steroid injection discitis/osteomyelitis. Additionally this may be isolated lumbosacral strain, and possible muscular spasm, vs lumbar radiculopathy.     Patient unable to provide a UA though symptoms most consistent with lumbar radiculopathy do not suspect pyelonephritis.  Labs obtained and noted reassuring     Pt given analgesics and antispasmodics with some improvement.      Given concerns, pt sent for MRI imaging. This demonstrates no acute process of concern to suggest discitis, osteomyelitis, cord injury.     Patient given steroids, analgesics, antispasmodics with improvement.    Ultimately given the reassuring work-up I believe safe for discharge with continued outpatient follow-up.    At the conclusion of the encounter I discussed the results of all of the tests and the disposition. All questions were answered. The patient and or family acknowledged understanding and was involved in the decision making regarding the overall care plan. I discussed the utility, limitations and findings of the exam/interventions/studies done during this visit as well as the list of differential diagnosis and symptoms for which to monitor/return to ED. Verbalizes  understanding.     Due to contagious pathogen concern full protective equipment was utilized through the duration of the interaction including N95  mask, eye shield, hair cover, gown and gloves.     MEDICATIONS GIVEN IN THE EMERGENCY:  Medications   Lidocaine (LIDOCARE) 4 % Patch 2 patch (2 patches Transdermal Patch/Med Applied 9/1/21 2330)     And   lidocaine patch in PLACE ( Transdermal Patch in Place 9/2/21 0133)   predniSONE (DELTASONE) tablet 60 mg (has no administration in time range)   ketorolac (TORADOL) injection 30 mg (30 mg Intramuscular Given 9/1/21 2330)   diazepam (VALIUM) tablet 5 mg (5 mg Oral Given 9/1/21 2331)   gadobutrol (GADAVIST) injection 10 mL (10 mLs Intravenous Given 9/2/21 0115)   morphine (PF) injection 4 mg (4 mg Intravenous Given 9/2/21 0138)       NEW PRESCRIPTIONS STARTED AT TODAY'S ER VISIT     Review of your medicines      UNREVIEWED medicines. Ask your doctor about these medicines      Dose / Directions   adapalene 0.1 % external cream  Commonly known as: DIFFERIN  Used for: Pseudofolliculitis barbae      Apply thin layer to beard and hairline region after washing hair/face everyday.  If irritation or peeling develops, skip application for one day.  Quantity: 45 g  Refills: 1     amLODIPine 10 MG tablet  Commonly known as: NORVASC  Used for: Essential hypertension      Dose: 10 mg  Take 1 tablet (10 mg) by mouth daily  Quantity: 90 tablet  Refills: 3     aspirin 81 MG EC tablet  Commonly known as: ASA  Used for: Carotid stenosis, non-symptomatic, right      Dose: 81 mg  Take 1 tablet (81 mg) by mouth daily  Quantity: 90 tablet  Refills: 1     atorvastatin 20 MG tablet  Commonly known as: LIPITOR  Used for: Carotid stenosis, non-symptomatic, right      Dose: 20 mg  Take 1 tablet (20 mg) by mouth daily  Quantity: 90 tablet  Refills: 1     citalopram 40 MG tablet  Commonly known as: celeXA  Used for: Depression, unspecified depression type      Dose: 40 mg  Take 1 tablet (40 mg) by  mouth daily  Quantity: 90 tablet  Refills: 3     dapsone 25 MG tablet  Commonly known as: ACZONE  Used for: Linear IgA bullous dermatosis      Dose: 50 mg  Take 2 tablets (50 mg) by mouth daily  Quantity: 180 tablet  Refills: 3     finasteride 5 MG tablet  Commonly known as: PROSCAR  Used for: Benign prostatic hyperplasia with nocturia      Dose: 5 mg  Take 1 tablet (5 mg) by mouth daily  Quantity: 90 tablet  Refills: 1     gabapentin 300 MG capsule  Commonly known as: NEURONTIN  Used for: Uncomplicated alcohol dependence (H)      Dose: 300 mg  Take 1 capsule (300 mg) by mouth 3 times daily  Quantity: 270 capsule  Refills: 1     hydrocortisone 1 % external cream  Commonly known as: CORTAID  Used for: Dermatitis      Apply topically 2 times daily  Quantity: 453 g  Refills: 0     losartan 50 MG tablet  Commonly known as: COZAAR  Used for: Essential hypertension      Dose: 100 mg  Take 2 tablets (100 mg) by mouth daily  Quantity: 180 tablet  Refills: 11     methocarbamol 500 MG tablet  Commonly known as: ROBAXIN  Used for: SI (sacroiliac) joint dysfunction      Dose: 500 mg  Take 1 tablet (500 mg) by mouth nightly as needed for muscle spasms  Quantity: 14 tablet  Refills: 0     omeprazole 20 MG DR capsule  Commonly known as: priLOSEC  Used for: Esophagitis      Dose: 20 mg  Take 1 capsule (20 mg) by mouth 2 times daily  Quantity: 180 capsule  Refills: 1     oxybutynin ER 5 MG 24 hr tablet  Commonly known as: DITROPAN-XL  Used for: Urinary frequency      Dose: 5 mg  Take 1 tablet (5 mg) by mouth daily  Quantity: 30 tablet  Refills: 4     Shingrix injection  Generic drug: zoster vaccine recombinant adjuvanted      ADM 0.5ML IM UTD  Refills: 0     tamsulosin 0.4 MG capsule  Commonly known as: FLOMAX  Used for: Benign prostatic hyperplasia with nocturia      Dose: 0.8 mg  Take 2 capsules (0.8 mg) by mouth daily  Quantity: 90 capsule  Refills: 4        START taking      Dose / Directions   cyclobenzaprine 10 MG  tablet  Commonly known as: FLEXERIL      Dose: 10 mg  Take 1 tablet (10 mg) by mouth 3 times daily as needed for muscle spasms  Quantity: 20 tablet  Refills: 0     predniSONE 20 MG tablet  Commonly known as: DELTASONE      Take two tablets (= 40mg) each day for 5 (five) days  Quantity: 10 tablet  Refills: 0        CONTINUE these medicines which may have CHANGED, or have new prescriptions. If we are uncertain of the size of tablets/capsules you have at home, strength may be listed as something that might have changed.      Dose / Directions   * lidocaine 5 % patch  Commonly known as: LIDODERM  This may have changed: Another medication with the same name was added. Make sure you understand how and when to take each.      Remove & Discard patch within 12 hours or as directed by MD  Refills: 0     * lidocaine 5 % patch  Commonly known as: LIDODERM  This may have changed: You were already taking a medication with the same name, and this prescription was added. Make sure you understand how and when to take each.      Dose: 1 patch  Place 1 patch onto the skin every 24 hours To prevent lidocaine toxicity, patient should be patch free for 12 hrs daily.  Quantity: 15 patch  Refills: 0         * This list has 2 medication(s) that are the same as other medications prescribed for you. Read the directions carefully, and ask your doctor or other care provider to review them with you.               Where to get your medicines      Some of these will need a paper prescription and others can be bought over the counter. Ask your nurse if you have questions.    Bring a paper prescription for each of these medications    cyclobenzaprine 10 MG tablet    lidocaine 5 % patch    predniSONE 20 MG tablet             CHIEF COMPLAINT    Chief Complaint   Patient presents with     Back Pain         HPI    Piter Gaines is a 55 year old male with a reported history of left-sided sciatica who presents to this Emergency Department for evaluation of  back pain.     Patient notes that he has a history of sciatica and reports diffuse lower back pain, where current flare began on Sunday (3 days ago). No recent trauma or injury. He had a steroid injection ~3 weeks ago for his sciatica and notes that he has had a fever at home. Reports associated left leg weakness but denies numbness, saddle paresthesias, or incontinence. Endorses slight generalized abdominal pain but denies chills, dysuria, hematuria, or frequency. Notes having 1 episode of emesis yesterday, although he attributes this to receiving the 2nd dose of the Covid-19 vaccine yesterday. Denies nausea or any other complaints at this time.    This document serves as a record of services performed by Dr. Quentin Garcia. It was created on his behalf by Kristy Rodriguez, trained medical scribe. The creation of this record is based on the scribes personal observations and the providers statements to him/her. This document has been checked and approved by the attending provider.    RELEVANT HISTORY, MEDICATIONS, & ALLERGIES   Past medical history, surgical history, family history, medications, and allergies reviewed and pertinent noted in HPI. See end of note for comprehensive list.    REVIEW OF SYSTEMS    Constitutional:  No chills. Reports fever.  Respiratory: No shortness of breath, no wheeze, no cough  Cardiovascular:  No chest pain, no palpitations, no syncope.  GI:  No nausea, diarrhea, or incontinence. Reports generalized abdominal pain (slight) and vomiting (1 episode, since resolved).  : No dysuria, No hematuria, No frequency, no incontinence  Musculoskeletal:  No swelling, no loss of function. Reports diffuse lower back pain.   Skin:  No rash.  Neurologic:  No numbness, saddle paresthesias, or unilateral weakness. Reports left leg weakness.     All other systems reviewed and are negative.    PHYSICAL EXAM    VITALS SIGNS: /82   Pulse 66   Temp 98.9  F (37.2  C) (Temporal)   Resp 16   Wt 83.5 kg  (184 lb)   SpO2 92%   BMI 26.78 kg/m    Constitutional:  Awake, Alert, Cooperative.  HENT: Normocephalic, Atraumatic, Bilateral external ears normal, Oropharynx moist, Nose normal.   Neck: Normal range of motion, No tenderness, Supple, No meningismus, No stridor.   Eyes: PERRL, EOMI, Conjunctiva normal, No discharge.   Respiratory: Normal breath sounds, No respiratory distress, No wheezing, No chest tenderness.   Cardiovascular: Normal heart rate, Normal rhythm, No murmurs, No rubs, No gallops.   GI: Soft, No tenderness, No guarding, No CVA tenderness.   Musculoskeletal: Neurovascularly intact distally, No edema, No tenderness  Back: Diffuse lumbosacral tenderness, positive straight leg raise bilaterally, no focal midline tenderness.   Integument: Warm, Dry, No erythema, No rash.   Neurologic: Alert & oriented x 3, Normal motor function, Normal sensory function, No focal deficits noted. Babinski's downgoing.     LABS  Labs Ordered and Resulted from Time of ED Arrival Up to the Time of Departure from the ED   CBC WITH PLATELETS - Abnormal; Notable for the following components:       Result Value    RBC Count 4.25 (*)     Hemoglobin 11.6 (*)     Hematocrit 36.9 (*)     MCHC 31.4 (*)     RDW 15.2 (*)     All other components within normal limits   BASIC METABOLIC PANEL - Normal   INR - Normal   ROUTINE UA WITH MICROSCOPIC REFLEX TO CULTURE         EKG    None    RADIOLOGY    Please see official radiology report.  MR Lumbar Spine w/o & w Contrast   Final Result   IMPRESSION:   1.  Normal distal cord.   2.  Mild degenerative changes without significant canal or foraminal narrowing at any level.   3.  No evidence of findings to suggest discitis osteomyelitis complex.            All laboratories, imaging and EKG's were personally reviewed and interpreted by myself prior to disposition decision.     PROCEDURES    None    Comprehensive outline of EPIC chart Hx  PAST MEDICAL HISTORY    Past Medical History:   Diagnosis Date      Acute hyperactive alcohol withdrawal delirium (H) 4/6/2017     Alcohol abuse 11/10/2016     Alcohol abuse      Depression      Esophagitis      Hypertension      Hypertension      Kidney disease      Tobacco abuse      Vasovagal syncope 2/4/2019     Past Surgical History:   Procedure Laterality Date     ESOPHAGOSCOPY, GASTROSCOPY, DUODENOSCOPY (EGD), COMBINED N/A 4/6/2017    Procedure: ESOPHAGOGASTRODUODENOSCOPY (EGD);  Surgeon: Conrad Chao MD;  Location: [facility];  Service:      FOREARM SURGERY Left 1980's     HAND SURGERY Left        CURRENT MEDICATIONS      Current Facility-Administered Medications:      Lidocaine (LIDOCARE) 4 % Patch 2 patch, 2 patch, Transdermal, Once, 2 patch at 09/01/21 2330 **AND** lidocaine patch in PLACE, , Transdermal, Q8H, Quentin Garcia MD     predniSONE (DELTASONE) tablet 60 mg, 60 mg, Oral, Once, Quentin Garcia MD    Current Outpatient Medications:      cyclobenzaprine (FLEXERIL) 10 MG tablet, Take 1 tablet (10 mg) by mouth 3 times daily as needed for muscle spasms, Disp: 20 tablet, Rfl: 0     lidocaine (LIDODERM) 5 % patch, Place 1 patch onto the skin every 24 hours To prevent lidocaine toxicity, patient should be patch free for 12 hrs daily., Disp: 15 patch, Rfl: 0     predniSONE (DELTASONE) 20 MG tablet, Take two tablets (= 40mg) each day for 5 (five) days, Disp: 10 tablet, Rfl: 0     adapalene (DIFFERIN) 0.1 % external cream, Apply thin layer to beard and hairline region after washing hair/face everyday.  If irritation or peeling develops, skip application for one day., Disp: 45 g, Rfl: 1     amLODIPine (NORVASC) 10 MG tablet, Take 1 tablet (10 mg) by mouth daily, Disp: 90 tablet, Rfl: 3     aspirin (ASA) 81 MG EC tablet, Take 1 tablet (81 mg) by mouth daily, Disp: 90 tablet, Rfl: 1     atorvastatin (LIPITOR) 20 MG tablet, Take 1 tablet (20 mg) by mouth daily, Disp: 90 tablet, Rfl: 1     citalopram (CELEXA) 40 MG tablet, Take 1 tablet (40 mg) by mouth daily,  Disp: 90 tablet, Rfl: 3     dapsone (ACZONE) 25 MG tablet, Take 2 tablets (50 mg) by mouth daily, Disp: 180 tablet, Rfl: 3     finasteride (PROSCAR) 5 MG tablet, Take 1 tablet (5 mg) by mouth daily, Disp: 90 tablet, Rfl: 1     gabapentin (NEURONTIN) 300 MG capsule, Take 1 capsule (300 mg) by mouth 3 times daily, Disp: 270 capsule, Rfl: 1     hydrocortisone (CORTAID) 1 % external cream, Apply topically 2 times daily, Disp: 453 g, Rfl: 0     lidocaine (LIDODERM) 5 % patch, Remove & Discard patch within 12 hours or as directed by MD, Disp: , Rfl:      losartan (COZAAR) 50 MG tablet, Take 2 tablets (100 mg) by mouth daily, Disp: 180 tablet, Rfl: 11     methocarbamol (ROBAXIN) 500 MG tablet, Take 1 tablet (500 mg) by mouth nightly as needed for muscle spasms, Disp: 14 tablet, Rfl: 0     omeprazole (PRILOSEC) 20 MG DR capsule, Take 1 capsule (20 mg) by mouth 2 times daily, Disp: 180 capsule, Rfl: 1     oxybutynin ER (DITROPAN-XL) 5 MG 24 hr tablet, Take 1 tablet (5 mg) by mouth daily, Disp: 30 tablet, Rfl: 4     SHINGRIX injection, ADM 0.5ML IM UTD, Disp: , Rfl:      tamsulosin (FLOMAX) 0.4 MG capsule, Take 2 capsules (0.8 mg) by mouth daily, Disp: 90 capsule, Rfl: 4    ALLERGIES    Allergies   Allergen Reactions     No Known Allergies        FAMILY HISTORY    Family History   Problem Relation Age of Onset     Bone Cancer Mother      Breast Cancer Mother      Diabetes Father      Kidney Disease Father      Diabetes Sister      Cerebrovascular Disease Brother      Lung Cancer Father      Kidney Disease Brother      Coronary Artery Disease Brother      Stomach Cancer No family hx of        SOCIAL HISTORY    Social History     Socioeconomic History     Marital status: Single     Spouse name: Not on file     Number of children: Not on file     Years of education: Not on file     Highest education level: Not on file   Occupational History     Not on file   Tobacco Use     Smoking status: Current Every Day Smoker     Packs/day:  1.00     Years: 10.00     Pack years: 10.00     Types: Cigarettes, Cigarettes     Smokeless tobacco: Never Used     Tobacco comment: 1/2 pack a day     Substance and Sexual Activity     Alcohol use: Yes     Alcohol/week: 14.0 standard drinks     Types: 6 Cans of beer, 8 Shots of liquor per week     Comment: Alcoholic Drinks/day:  0.5 pint of vodka and 6 beers daily per family     Drug use: Yes     Types: Marijuana     Comment: Drug use: last week     Sexual activity: Yes     Partners: Female     Birth control/protection: Condom   Other Topics Concern     Parent/sibling w/ CABG, MI or angioplasty before 65F 55M? Not Asked   Social History Narrative     Not on file     Social Determinants of Health     Financial Resource Strain:      Difficulty of Paying Living Expenses:    Food Insecurity:      Worried About Running Out of Food in the Last Year:      Ran Out of Food in the Last Year:    Transportation Needs:      Lack of Transportation (Medical):      Lack of Transportation (Non-Medical):    Physical Activity:      Days of Exercise per Week:      Minutes of Exercise per Session:    Stress:      Feeling of Stress :    Social Connections:      Frequency of Communication with Friends and Family:      Frequency of Social Gatherings with Friends and Family:      Attends Nondenominational Services:      Active Member of Clubs or Organizations:      Attends Club or Organization Meetings:      Marital Status:    Intimate Partner Violence:      Fear of Current or Ex-Partner:      Emotionally Abused:      Physically Abused:      Sexually Abused:        This document serves as a record of services performed by Dr. Quentin Garcia. It was created on his behalf by Kristy Rodriguez, trained medical scribe. The creation of this record is based on the scribes personal observations and the providers statements to him/her.     I Dr. Quentin Garcia, personally performed the services described in this documentation as scribed by the above named  individual in my presence, and it is both accurate and complete.      Quentin Garcia MD  09/02/21 0205

## 2021-09-08 DIAGNOSIS — M53.3 SACROILIAC JOINT PAIN: Primary | ICD-10-CM

## 2021-09-08 NOTE — TELEPHONE ENCOUNTER
RX for Lidocaine prescribed from the ED - Dr. Garcia. Pt informed insurance will not cover and that there is a 4% otc that he can try. Pt understood and will try the 4%.      Plan:   1) Routed to PCP as FYI  2) Routed to PCP Team to communicate the following to patient:    We received notification from your pharmacy that Lidocaine 5% is not covered by your insurance.    Our clinic policy is to not pursue insurance coverage for this medicine as these requests are usually not approved. We recommend you purchase out of pocket if medicine desired. If you need assistance selecting the correct product, please ask your pharmacist for help. Another option to reduce cost of medicine is through coupons that may be available on [employer].    If this plan does not work, please let us know and we will send a message to your doctor to see if another medicine is available to suit your needs.     Magali Spring MA September 8, 2021 at 3:20 PM

## 2021-09-09 NOTE — PATIENT INSTRUCTIONS
Referral for :     Physical Therapy    LOCATION/PLACE/Provider :    [facility]   DATE & TIME :    Faxed to location, they will call patient.   PHONE :     [phone]  FAX :    [phone]  Appointment made by clinic staff/:    Lizbeth

## 2021-10-04 ENCOUNTER — OFFICE VISIT (OUTPATIENT)
Dept: FAMILY MEDICINE | Facility: CLINIC | Age: 56
End: 2021-10-04
Payer: COMMERCIAL

## 2021-10-04 VITALS
SYSTOLIC BLOOD PRESSURE: 137 MMHG | OXYGEN SATURATION: 97 % | HEIGHT: 70 IN | TEMPERATURE: 97.8 F | HEART RATE: 85 BPM | RESPIRATION RATE: 16 BRPM | DIASTOLIC BLOOD PRESSURE: 91 MMHG | WEIGHT: 171.5 LBS | BODY MASS INDEX: 24.55 KG/M2

## 2021-10-04 DIAGNOSIS — D64.9 NORMOCYTIC ANEMIA: Primary | ICD-10-CM

## 2021-10-04 DIAGNOSIS — M99.03 SOMATIC DYSFUNCTION OF LUMBAR REGION: ICD-10-CM

## 2021-10-04 DIAGNOSIS — R63.4 WEIGHT LOSS: ICD-10-CM

## 2021-10-04 DIAGNOSIS — M54.50 CHRONIC LOW BACK PAIN WITHOUT SCIATICA, UNSPECIFIED BACK PAIN LATERALITY: ICD-10-CM

## 2021-10-04 DIAGNOSIS — Z12.11 SPECIAL SCREENING FOR MALIGNANT NEOPLASMS, COLON: ICD-10-CM

## 2021-10-04 DIAGNOSIS — G89.29 CHRONIC LOW BACK PAIN WITHOUT SCIATICA, UNSPECIFIED BACK PAIN LATERALITY: ICD-10-CM

## 2021-10-04 DIAGNOSIS — K20.90 ESOPHAGITIS: ICD-10-CM

## 2021-10-04 DIAGNOSIS — M99.04 SOMATIC DYSFUNCTION OF SACRAL REGION: ICD-10-CM

## 2021-10-04 LAB
BASOPHILS # BLD AUTO: 0.1 10E3/UL (ref 0–0.2)
BASOPHILS NFR BLD AUTO: 1 %
EOSINOPHIL # BLD AUTO: 0.1 10E3/UL (ref 0–0.7)
EOSINOPHIL NFR BLD AUTO: 1 %
ERYTHROCYTE [DISTWIDTH] IN BLOOD BY AUTOMATED COUNT: 14.1 % (ref 10–15)
HCT VFR BLD AUTO: 38.7 % (ref 40–53)
HGB BLD-MCNC: 12.6 G/DL (ref 13.3–17.7)
HOLD SPECIMEN: NORMAL
IMM GRANULOCYTES # BLD: 0 10E3/UL
IMM GRANULOCYTES NFR BLD: 0 %
LYMPHOCYTES # BLD AUTO: 1.3 10E3/UL (ref 0.8–5.3)
LYMPHOCYTES NFR BLD AUTO: 16 %
MCH RBC QN AUTO: 28.1 PG (ref 26.5–33)
MCHC RBC AUTO-ENTMCNC: 32.6 G/DL (ref 31.5–36.5)
MCV RBC AUTO: 86 FL (ref 78–100)
MONOCYTES # BLD AUTO: 0.5 10E3/UL (ref 0–1.3)
MONOCYTES NFR BLD AUTO: 7 %
NEUTROPHILS # BLD AUTO: 6.2 10E3/UL (ref 1.6–8.3)
NEUTROPHILS NFR BLD AUTO: 75 %
NRBC # BLD AUTO: 0 10E3/UL
NRBC BLD AUTO-RTO: 0 /100
PLATELET # BLD AUTO: 206 10E3/UL (ref 150–450)
RBC # BLD AUTO: 4.48 10E6/UL (ref 4.4–5.9)
RETICS # AUTO: 0.06 10E6/UL (ref 0.01–0.11)
RETICS/RBC NFR AUTO: 1.3 % (ref 0.8–2.7)
WBC # BLD AUTO: 8.2 10E3/UL (ref 4–11)

## 2021-10-04 PROCEDURE — 85060 BLOOD SMEAR INTERPRETATION: CPT | Performed by: PATHOLOGY

## 2021-10-04 PROCEDURE — 98925 OSTEOPATH MANJ 1-2 REGIONS: CPT | Performed by: STUDENT IN AN ORGANIZED HEALTH CARE EDUCATION/TRAINING PROGRAM

## 2021-10-04 PROCEDURE — 85025 COMPLETE CBC W/AUTO DIFF WBC: CPT | Performed by: STUDENT IN AN ORGANIZED HEALTH CARE EDUCATION/TRAINING PROGRAM

## 2021-10-04 PROCEDURE — 36415 COLL VENOUS BLD VENIPUNCTURE: CPT | Performed by: STUDENT IN AN ORGANIZED HEALTH CARE EDUCATION/TRAINING PROGRAM

## 2021-10-04 PROCEDURE — 99214 OFFICE O/P EST MOD 30 MIN: CPT | Mod: 25 | Performed by: STUDENT IN AN ORGANIZED HEALTH CARE EDUCATION/TRAINING PROGRAM

## 2021-10-04 PROCEDURE — 85045 AUTOMATED RETICULOCYTE COUNT: CPT | Performed by: STUDENT IN AN ORGANIZED HEALTH CARE EDUCATION/TRAINING PROGRAM

## 2021-10-04 RX ORDER — DULOXETIN HYDROCHLORIDE 30 MG/1
30 CAPSULE, DELAYED RELEASE ORAL 2 TIMES DAILY
Qty: 30 CAPSULE | Refills: 1 | Status: SHIPPED | OUTPATIENT
Start: 2021-10-04 | End: 2021-10-22

## 2021-10-04 ASSESSMENT — MIFFLIN-ST. JEOR: SCORE: 1611.23

## 2021-10-04 NOTE — PROGRESS NOTES
Assessment & Plan     (M54.50,  G89.29) Chronic low back pain without sciatica, unspecified back pain laterality  (M99.03) Somatic dysfunction of lumbar region  (M99.04) Somatic dysfunction of sacral region  Comment: Appears he has chronic back pain that acutely worsened on the right without a clear trigger. The only red flag factor is his unintentional weight loss (further discussed below); otherwise, negative for features requiring immediate higher level of care. Has a lumbar MR within the last month showing mild degenerative changes but no acute cord process, which is also reassuring. Suspect he is suffering from chronic MSK-related pain. As in objective, current primary issue is right paraspinal / sacral musculature, for which he requested intervention today. OMT provided as documented elsewhere. He did request further medication change/management - discussed below.   Plan:   - Continue supportive measures as helpful    Direct massage    Heat / ice    - Encouraged gentle range of motion, gradually increasing exercises    Notified of PT referral (and no show)     He wants to try again - message sent to staff to help arrange   - Discussed risks and benefits of duloxetine given chronic nature of pain. He elected to try given issues with sleep.   Duloxetine (CYMBALTA) 30 MG capsule; Take 1 capsule (30 mg) by mouth 2 times daily  Dispense: 30 capsule; Refill: 1  - Follow up visit in 2 weeks for repeat OMT if helpful and monitoring of duloxetine / to see if he was able to set up PT visit     (R63.4) Weight loss  (Z12.11) Screening for malignant neoplasms, colon  (K20.90) Esophagitis  Comment: Concern for malignancy given considerable rapid, unintentional weight loss in setting of tobacco use. First consideration is colon cancer (especially given FIT positive in 2017). Given hx of esophagitis, consideration of esophageal / other GI malignancy.  Given smoking, pulmonary malignancy always a possibility. Has prostate  "issues - prostate cancer a consideration (PSA 3.6 in 8/2020, 2.5 in 2/2021); has an appointment set up with urology in November. Differential otherwise remains broad to consider GI vs endo vs psych vs other chronic conditions.   Plan:   - Adult Gastro Ref - Procedure Only    Both upper and lower ordered - [facility] requested    May need anti-emetics if nauseous with the prep (has struggled with nausea in the past)   - Need to prioritize this in work-up    Continue working through differential pending results of scopes     (D64.9) Normocytic anemia    Comment: Hgb 11.6, MCV 87 in 9/2021. No formal diagnosis in past (that I can see at least in last year). Looks to have been attributed to dapsone. Ferritin has been low in the past (2019), arguing for iron deficiency component. Worry about colon cancer in this >49 yo smoker with unintentional weight loss. Will work up further at this time with appropriate follow up as necessary.   Plan:   - Sending labs today (dicussed and he is agreeable)    CBC with Platelets and Reflex to Iron Studies   Lab Blood Morphology Pathologist Review  - Follow up pending results   - Colonoscopy as below       Discuss at future visit (he did request that I become his PCP so I made this adjustment):   - Discuss influenza / Zoster vaccines (possibly pneumococcal also) - appears he had COVID-19 vaccine   - Smoking cessation    Has tried both Chantix and Wellbutrin - neither on list currently   - COPD - check on control and determine if he needs to see pulm again (saw in 7/2018)   Diagnosed with \"COPD\" though spirometry not valid - inconsistent results     Azam Dozier DO  [facility]    Precepted with Dr. Gennaro Schafer   Piter is a 55 year old hx of esophagitis, carotid stenosis, BPH, tobacco use, depression, multiple derm conditions who presents for the following health issues:     HPI     Back Pain   Since last visit:   - Now its on the right (for last 2 " "weeks)   ?related to wallet - but haven't had it there for 2 weeks  Location: Low back, lateral right    Radiates all the way around his right thigh but not to knee or foot   - Better/worse/same? Same    - What makes it better / worse?    Better with a brace that he puts over it    Tried :    Massage ball     Heat/ice      No exercises     Tylenol/ibuprofen doesn't help      2 pills twice a day      Last taken it a couple of weeks ago     Tried gabapentin - doesn't help     Flexeril - not sure, long time ago     Steroids didn't help   - Did he establish with PT? No    It hurts too much   - Did he get the injection?    Got it 3-4 weeks ago    In the low back, it helped for 3 days    linic in [place]    But that was on the left       - Pain is limiting what he can do   - Sleep is affected   - No leg / saddle numbness or weakness   - No bowel or bladder incontinence   - Has had 10-15 lbs of unintentional weight loss over last 1-2 months    No known personal or family history of cancer    Mother may have had \"bone cancer of hip\"?   Some belly pain, lower diffuse    Feels nauseous but no vomiting    No stool changes - no blood or greasy quality    No night sweats    No fevers     From 8/3/21 with Dr. Whatley:   L hip issue  Been going on for years  Sledding with kids 30 years ago and hit patch of ice  Wraps around hip  Small amount of numbness/tingling in toes  Point tenderness over SI  Feels like it's swollen  Diagnosed w/ sciatica at [facility] ED 7/9  No bowel/bladder incontinence, saddle anesthesia  Has done PT for this in the past but could only go to 2 sessions d/t soreness; stopped going  Musculoskeletal: L SI point TTP. Positive VICTOR M, distraction, thigh thrust. Neg straight leg raise.  Skin: no suspicious lesions or rashes  Neurologic: Gait normal. CN II-XII grossly intact  (M53.3) Sacroiliac joint pain  (primary encounter diagnosis)  Comment: Exam consistent w/ L sided SI joint pain. Has been to a few sessions of " "PT in the past but stopped d/t soreness. Pt agreeable to try PT again after injection.  Plan:   -IR referral for SI steroid injection  -New PT referral    Seen at ED for back pain 9/1/21:   55-year-old gentleman with self-limited back pain history presenting to the ED with severe acute back pain flare and with recent corticosteroid injection in the lumbosacral spine and with fevers development with worsening pain and worsening neuro deficits stating left-sided weakness and radiation of pain to bilateral low back down left leg into abdomen. Vitals reviewed and arrival without fever. Examination with tenderness diffusely across the lumbosacral back. No obvious focality to the midline spine. Straight leg raise positive bilaterally left greater than right. Babinski's downgoing.  Ultimately red flag symptoms concerning for occult post steroid injection discitis/osteomyelitis. Additionally this may be isolated lumbosacral strain, and possible muscular spasm, vs lumbar radiculopathy.  Patient unable to provide a UA though symptoms most consistent with lumbar radiculopathy do not suspect pyelonephritis.  Labs obtained and noted reassuring  Pt given analgesics and antispasmodics with some improvement.   Given concerns, pt sent for MRI imaging. This demonstrates no acute process of concern to suggest discitis, osteomyelitis, cord injury.  Patient given steroids, analgesics, antispasmodics with improvement.  - Lidocaine patch, prednisone tablet, Toradol IM, Valium tablet, Morphine IV  Ultimately given the reassuring work-up I believe safe for discharge with continued outpatient follow-up.    Review of Systems   Constitutional, HEENT, cardiovascular, pulmonary, GI, , musculoskeletal, neuro, skin, endocrine and psych systems are negative, except as otherwise noted.      Objective    BP (!) 137/91   Pulse 85   Temp 97.8  F (36.6  C) (Oral)   Resp 16   Ht 1.765 m (5' 9.5\")   Wt 77.8 kg (171 lb 8 oz)   SpO2 97%   BMI 24.96 " kg/m    Body mass index is 24.96 kg/m .  Physical Exam   GENERAL: alert and mild distress 2/2 to pain.   NECK: no overt asymmetry.   RESP: breathing comfortably   CV: appears well perfused   ABDOMEN: soft, nontender, nondistended   SKIN: no suspicious lesions or rashes  PSYCH: mentation appears normal, affect down initially with pain but normal/bright following treatment.     MS (low back): Limited active flexion, extension 2/2 to pain - full passive flexion, extension. no midline tenderness and no bony step-off. Right lumber paraspinal tenderness to palpation, as well as just medial to the right PSIS; the area spasms to palpation.   Osteopathic structural exam: right paralumbar bogginess and spasm (excquistely tender) to palpation. Restricted flexion and extension as above.   NEURO: mentation intact and speech normal. LE b/l: 5/5 strength throughout and symmetric, sensation intact throughout and symmetric, DTRs b/l knees and ankles 2+/4. Negative straight leg.     OMT PROCEDURE NOTE    Body Region: Lumbar, Sacrum     Somatic Dysfunction #1: Lumbar paraspinal somatic dysfunction / trigger band   Treatment: Myofascial: direct and Soft Tissue and FDM   Outcome: Improved    Somatic Dysfunction #2: Sacral trigger point   Treatment: Soft Tissue, FDM   Outcome: Improved        EXAM: MR LUMBAR SPINE W/O and W CONTRAST  LOCATION: [facility]  DATE/TIME: 9/2/2021 12:55 AM     INDICATION: Low back pain, fevers, recent steroid injection  COMPARISON: 11/01/2017  CONTRAST: 10 ml gadavist  TECHNIQUE: Routine Lumbar Spine MRI without and with IV contrast.     FINDINGS:   Nomenclature is based on 5 lumbar type vertebral bodies. Normal vertebral body heights. Slight left lumbar curve. Normal distal spinal cord and cauda equina with conus medullaris at L1-L2. No extraspinal abnormality. Unremarkable visfualized bony pelvis.     T12-L1: Normal disc height and signal. No herniation. Normal facets. No spinal  canal or neural foraminal stenosis.      L1-L2: Disc dehydration. Normal disc height. Mild diffuse disc bulge. Slight facet arthropathy. No significant canal or foraminal narrowing.     L2-L3: Disc dehydration. Normal disc height. Minimal left paracentral disc protrusion. Minimal facet arthropathy. No significant canal or foraminal narrowing. Findings similar to prior.     L3-L4: Disc dehydration. Slight narrowing of intervertebral space. Minimal left paracentral disc protrusion with abutment of traversing left L4 nerve root. Mild facet arthropathy. No significant canal or foraminal narrowing. Findings similar to prior.     L4-L5: Normal disc height and signal. Slight facet arthropathy. No significant canal narrowing. Mild left foraminal narrowing. Findings similar to prior.     L5-S1: Normal disc height and signal. Small central/left paracentral disc protrusion with abutment of traversing left S1 nerve root, new. Mild facet arthropathy. No significant canal or foraminal narrowing.                                                                      IMPRESSION:  1.  Normal distal cord.  2.  Mild degenerative changes without significant canal or foraminal narrowing at any level.  3.  No evidence of findings to suggest discitis osteomyelitis complex.

## 2021-10-05 LAB
PATH REPORT.COMMENTS IMP SPEC: NORMAL
PATH REPORT.COMMENTS IMP SPEC: NORMAL
PATH REPORT.FINAL DX SPEC: NORMAL
PATH REPORT.MICROSCOPIC SPEC OTHER STN: NORMAL

## 2021-10-10 PROBLEM — R63.4 WEIGHT LOSS: Status: ACTIVE | Noted: 2021-10-10

## 2021-10-17 NOTE — PROGRESS NOTES
Preceptor Attestation:  Patient's case reviewed and discussed with the resident, Azam Dozier MD, and I personally evaluated the patient. I agree with written assessment and plan of care.    Supervising Physician:  Daphne Burdick MD   [facility]

## 2021-10-19 ENCOUNTER — OFFICE VISIT (OUTPATIENT)
Dept: FAMILY MEDICINE | Facility: CLINIC | Age: 56
End: 2021-10-19
Payer: COMMERCIAL

## 2021-10-19 VITALS
WEIGHT: 170 LBS | HEIGHT: 70 IN | SYSTOLIC BLOOD PRESSURE: 133 MMHG | DIASTOLIC BLOOD PRESSURE: 77 MMHG | HEART RATE: 80 BPM | OXYGEN SATURATION: 96 % | TEMPERATURE: 97.9 F | RESPIRATION RATE: 16 BRPM | BODY MASS INDEX: 24.34 KG/M2

## 2021-10-19 DIAGNOSIS — Z12.11 SPECIAL SCREENING FOR MALIGNANT NEOPLASMS, COLON: ICD-10-CM

## 2021-10-19 DIAGNOSIS — G89.29 CHRONIC LOW BACK PAIN WITHOUT SCIATICA, UNSPECIFIED BACK PAIN LATERALITY: ICD-10-CM

## 2021-10-19 DIAGNOSIS — M99.03 SOMATIC DYSFUNCTION OF LUMBAR REGION: ICD-10-CM

## 2021-10-19 DIAGNOSIS — M54.50 CHRONIC LOW BACK PAIN WITHOUT SCIATICA, UNSPECIFIED BACK PAIN LATERALITY: ICD-10-CM

## 2021-10-19 DIAGNOSIS — R11.0 NAUSEA: ICD-10-CM

## 2021-10-19 DIAGNOSIS — R82.90 CLOUDY URINE: Primary | ICD-10-CM

## 2021-10-19 DIAGNOSIS — M99.04 SOMATIC DYSFUNCTION OF SACRAL REGION: ICD-10-CM

## 2021-10-19 DIAGNOSIS — Z87.891 PERSONAL HISTORY OF TOBACCO USE, PRESENTING HAZARDS TO HEALTH: ICD-10-CM

## 2021-10-19 DIAGNOSIS — K20.90 ESOPHAGITIS: ICD-10-CM

## 2021-10-19 DIAGNOSIS — F17.200 TOBACCO DEPENDENCE SYNDROME: ICD-10-CM

## 2021-10-19 DIAGNOSIS — D50.9 HYPOCHROMIC MICROCYTIC ANEMIA: ICD-10-CM

## 2021-10-19 DIAGNOSIS — R63.4 WEIGHT LOSS: ICD-10-CM

## 2021-10-19 LAB
ALBUMIN SERPL-MCNC: 3.9 G/DL (ref 3.5–5)
ALBUMIN UR-MCNC: 100 MG/DL
ALP SERPL-CCNC: 101 U/L (ref 45–120)
ALT SERPL W P-5'-P-CCNC: 21 U/L (ref 0–45)
ANION GAP SERPL CALCULATED.3IONS-SCNC: 10 MMOL/L (ref 5–18)
APPEARANCE UR: CLEAR
AST SERPL W P-5'-P-CCNC: 16 U/L (ref 0–40)
BILIRUB SERPL-MCNC: 0.6 MG/DL (ref 0–1)
BILIRUB UR QL STRIP: ABNORMAL
BUN SERPL-MCNC: 14 MG/DL (ref 8–22)
CALCIUM SERPL-MCNC: 9.3 MG/DL (ref 8.5–10.5)
CHLORIDE BLD-SCNC: 105 MMOL/L (ref 98–107)
CO2 SERPL-SCNC: 24 MMOL/L (ref 22–31)
COLOR UR AUTO: YELLOW
CREAT SERPL-MCNC: 1.3 MG/DL (ref 0.7–1.3)
FERRITIN SERPL-MCNC: 49 NG/ML (ref 27–300)
GFR SERPL CREATININE-BSD FRML MDRD: 61 ML/MIN/1.73M2
GLUCOSE BLD-MCNC: 103 MG/DL (ref 70–125)
GLUCOSE UR STRIP-MCNC: NEGATIVE MG/DL
HGB UR QL STRIP: NEGATIVE
KETONES UR STRIP-MCNC: 15 MG/DL
LEUKOCYTE ESTERASE UR QL STRIP: NEGATIVE
NITRATE UR QL: NEGATIVE
PH UR STRIP: 5.5 [PH] (ref 5–7)
POTASSIUM BLD-SCNC: 3.8 MMOL/L (ref 3.5–5)
PROT SERPL-MCNC: 7.3 G/DL (ref 6–8)
SODIUM SERPL-SCNC: 139 MMOL/L (ref 136–145)
SP GR UR STRIP: 1.02 (ref 1–1.03)
UROBILINOGEN UR STRIP-ACNC: 1 E.U./DL

## 2021-10-19 PROCEDURE — 80053 COMPREHEN METABOLIC PANEL: CPT | Performed by: STUDENT IN AN ORGANIZED HEALTH CARE EDUCATION/TRAINING PROGRAM

## 2021-10-19 PROCEDURE — 82728 ASSAY OF FERRITIN: CPT | Performed by: STUDENT IN AN ORGANIZED HEALTH CARE EDUCATION/TRAINING PROGRAM

## 2021-10-19 PROCEDURE — 36415 COLL VENOUS BLD VENIPUNCTURE: CPT | Performed by: STUDENT IN AN ORGANIZED HEALTH CARE EDUCATION/TRAINING PROGRAM

## 2021-10-19 PROCEDURE — 81003 URINALYSIS AUTO W/O SCOPE: CPT | Performed by: STUDENT IN AN ORGANIZED HEALTH CARE EDUCATION/TRAINING PROGRAM

## 2021-10-19 PROCEDURE — 99214 OFFICE O/P EST MOD 30 MIN: CPT | Mod: 25 | Performed by: STUDENT IN AN ORGANIZED HEALTH CARE EDUCATION/TRAINING PROGRAM

## 2021-10-19 ASSESSMENT — MIFFLIN-ST. JEOR: SCORE: 1604.42

## 2021-10-19 NOTE — PROGRESS NOTES
Assessment & Plan     (R63.4) Weight loss - unintentional   Comment: Down 1 pound from last visit, down 14 pounds from 9/2021. As stated last visit: Concern for malignancy given considerable rapid, unintentional weight loss in setting of tobacco use. First consideration is colon cancer (especially given FIT positive in 2017). Given hx of esophagitis, consideration of esophageal / other GI malignancy.  Given smoking, pulmonary malignancy always a possibility. Has prostate issues - prostate cancer a consideration (PSA 3.6 in 8/2020, 2.5 in 2/2021); has an appointment set up with urology in November. Differential otherwise remains broad to consider GI vs endo vs psych vs other chronic conditions. Does not appear he is on medications that should cause this degree of weight loss.   Plan:   - Continue workup as below.    CMP to screen for any urgent GI pathologies requiring imaging / other workup/intervention   - Future considerations: SPEP/UPEP (pending CMP result), TSH, CXR, LDCT   - Follow up in 2-3 weeks to ensure workup continues and remainder of care needs being met     (D50.9) Hypochromic microcytic anemia  Comment: Confirmed that he does still have anemia at Hgb 12.6 / MCV 86, peripheral smear confirms microcytic, hypochromic.   Absolute Retic Count is <2 at 1.2, indicating hypoproliferation. Top thought is GLORIA (concern 2/2 to malignancy), though ddx includes ACD, hemoglobinopathy, lead poisoning -- even Multiple Myeloma in remainder of clinical context. Will continue workup as below.   Plan:   - Ferritin   - Comprehensive metabolic panel (especially to monitor gamma gap / renal function with consideration of MM)  - HGB Eval Reflex to ELP or RBC Solubility (to monitor for hemoglobinopathy)   - Consider lead testing in future     (K20.90) Esophagitis  (Z12.11) Special screening for malignant neoplasms, colon  (R11.0) Nausea  Comment: Prolonged history of progressively worsening overtime sx (decreasing ability  to swallow/tolerate solids and liquids). Could be esophageal stricture or other less severe pathology but concern for space-occupying mass in context of weight loss, anemia. Could certainly be 2/2 to colon cancer as well (prior positive FIT - still no screening).   Plan:   - Both our clinic and patient to call [facility] to ensure upper AND lower scopes are set up SOON   - Requested antiemetic for bowel prep - will give short course Zofran    ondansetron (ZOFRAN-ODT) 4 MG ODT tab    (Z87.891) Personal history of tobacco use, presenting hazards to health  (F17.200) Tobacco dependence syndrome  Comment: Does want to quit. Has gone from 1 PPD to 0.5 PPD on his own. Willing to try nicotine replacement. Discussed best evidence for combination of patches / gum and proper use. He will try. Quit date: Thanksgiving 2021.    Plan: nicotine (NICODERM CQ) 14 MG/24HR 24 hr patch         nicotine (NICORETTE) 2 MG gum    (M54.50,  G89.29) Chronic low back pain without sciatica, unspecified back pain laterality  (M99.03) Somatic dysfunction of lumbar region  (M99.04) Somatic dysfunction of sacral region  Comment: Significant improvement with OMT. Feel this is primarily MSK-related in etiology but considering MM in context of weight loss, anemia.   Plan:   - OMT again today as below   - Still encouraged PT referral for ongoing treatment/prevention   - Conservative measures as helpful - heat, ice, self-massage   - Requested refill of duloxetine - provided today    DULoxetine (CYMBALTA) 30 MG capsule    (R82.90) Cloudy urine    Comment: Symptomatic change in urine. Requested UA today.   Plan:   - UA reflex to Microscopic     DO KEO [facility]    Precepted with Dr. Rosenstein Subjective   Piter is a 55 year old hx of tobacco use, carotid stenosis, chronic back pain, esophagitis, substance use in remission who presents for the following health issues    HPI     Back pain:   Dx: chronic back  "pain (with acute right muscular/somatic component)   Better/worse/same: better, still present   Now on both sides again - a little bit   Now not having to use 2 canes now.    Did you try the Cymbalata? Yes That's helping the pain and actually his nausea in the morning   Wanting OMT again today? Yes     Weight:   Today: 170 lbs   Wt Readings from Last 3 Encounters:   10/19/21 77.1 kg (170 lb)   10/04/21 77.8 kg (171 lb 8 oz)   09/01/21 83.5 kg (184 lb)   Doesn't think he is scheduled for his upper and lower scopes. [facility] never called.    Our office said [facility] called twice.   Still having issues with \"gagging\"   - 20-30 years    - worse over time    - having issues with both food and liquids    - no pain with swallowing    - had a scope prior - not sure what it showed   Other: (urine as below), otherwise denies fever, chills, night sweats, any other symptomatic changes   *Related - anemia     Confirmed anemia with CBC showing Hgb 12.6 with MCV in 80s    Smear: Microcytic, hypochromic anemia. \"The clinical history has been reviewed. Although other causes of ineffective erythropoiesis should be ruled out, the features are suggestive of an underlying disorder of globin synthesis versus anemia of chronic disease, and hemoglobin studies can be performed\"      Cloudy Urine    - Last 2-3 days   - Have had it before   - Not noticed blood / no dysuria   - Has back pain as above but no flank pain   - No fever or chills   - No incontinence of bladder or bowel   - No constipation   - No blood in the stool     From my last visit 10/4/21  (M54.50,  G89.29) Chronic low back pain without sciatica, unspecified back pain laterality  (M99.03) Somatic dysfunction of lumbar region  (M99.04) Somatic dysfunction of sacral region  Comment: Appears he has chronic back pain that acutely worsened on the right without a clear trigger. The only red flag factor is his unintentional weight loss (further discussed below); otherwise, negative for " features requiring immediate higher level of care. Has a lumbar MR within the last month showing mild degenerative changes but no acute cord process, which is also reassuring. Suspect he is suffering from chronic MSK-related pain. As in objective, current primary issue is right paraspinal / sacral musculature, for which he requested intervention today. OMT provided as documented elsewhere. He did request further medication change/management - discussed below.   Plan:   - Continue supportive measures as helpful               Direct massage               Heat / ice    - Encouraged gentle range of motion, gradually increasing exercises               Notified of PT referral (and no show)                He wants to try again - message sent to staff to help arrange   - Discussed risks and benefits of duloxetine given chronic nature of pain. He elected to try given issues with sleep.              Duloxetine (CYMBALTA) 30 MG capsule; Take 1 capsule (30 mg) by mouth 2 times daily  Dispense: 30 capsule; Refill: 1  - Follow up visit in 2 weeks for repeat OMT if helpful and monitoring of duloxetine / to see if he was able to set up PT visit      (R63.4) Weight loss  (Z12.11) Screening for malignant neoplasms, colon  (K20.90) Esophagitis  Comment: Concern for malignancy given considerable rapid, unintentional weight loss in setting of tobacco use. First consideration is colon cancer (especially given FIT positive in 2017). Given hx of esophagitis, consideration of esophageal / other GI malignancy.  Given smoking, pulmonary malignancy always a possibility. Has prostate issues - prostate cancer a consideration (PSA 3.6 in 8/2020, 2.5 in 2/2021); has an appointment set up with urology in November. Differential otherwise remains broad to consider GI vs endo vs psych vs other chronic conditions.   Plan:   - Adult Gastro Ref - Procedure Only               Both upper and lower ordered - [facility] requested               May need  "anti-emetics if nauseous with the prep (has struggled with nausea in the past)   - Need to prioritize this in work-up               Continue working through differential pending results of scopes      (D64.9) Normocytic anemia    Comment: Hgb 11.6, MCV 87 in 9/2021. No formal diagnosis in past (that I can see at least in last year). Looks to have been attributed to dapsone. Ferritin has been low in the past (2019), arguing for iron deficiency component. Worry about colon cancer in this >49 yo smoker with unintentional weight loss. Will work up further at this time with appropriate follow up as necessary.   Plan:   - Sending labs today (dicussed and he is agreeable)               CBC with Platelets and Reflex to Iron Studies              Lab Blood Morphology Pathologist Review  - Follow up pending results   - Colonoscopy as below      Discuss at future visit (he did request that I become his PCP so I made this adjustment):   - Discuss influenza / Zoster vaccines (possibly pneumococcal also) - appears he had COVID-19 vaccine   - Smoking cessation               Has tried both Chantix and Wellbutrin - neither on list currently   - COPD - check on control and determine if he needs to see pulm again (saw in 7/2018)              Diagnosed with \"COPD\" though spirometry not valid - inconsistent results    Review of Systems   Constitutional, HEENT, cardiovascular, pulmonary, GI, , musculoskeletal, neuro, skin, endocrine and psych systems are negative, except as otherwise noted.      Objective    /77 (BP Location: Right arm, Patient Position: Sitting, Cuff Size: Adult Regular)   Pulse 80   Temp 97.9  F (36.6  C) (Oral)   Resp 16   Ht 1.765 m (5' 9.5\")   Wt 77.1 kg (170 lb)   SpO2 96%   BMI 24.74 kg/m    Body mass index is 24.74 kg/m .  Physical Exam   GENERAL: healthy, alert and no distress  EYES: Eyes grossly normal to inspection, PERRL and conjunctivae and sclerae normal (anicteric)   NECK: no adenopathy, no " asymmetry.  RESP: lungs clear to auscultation - no rales, rhonchi or wheezes  CV: regular rate and rhythm, no murmur   ABDOMEN: soft, nontender, non-distended bowel sounds normal  MS: no edema. Back as in OMT.   SKIN: no suspicious lesions or rashes  NEURO: Normal strength and tone, mentation intact and speech normal. Lower extremity strength 5/5 bilaterally and sensation intact and symmetric.   BACK: negative straight leg raise. No bony tenderness or step off.   PSYCH: mood regular - dramatically improved relative to last visit   Osteopathic: ROM in flexion, extension, sidebending, rotation dramatically improved relative to last visit. diffuse paraspinal low back tissue texture change (ropy) L>R. Similar distribution of tenderness but discrete area of maximal tenderness over musculature just medial to the left PSIS.      OMT PROCEDURE NOTE    Body Region: sacral, lumbar     Somatic Dysfunction #1: Lumbar SD   Treatment: soft tissue, MFR   Outcome: improved     Somatic Dysfunction #2: Sacral SD   Treatment: soft tissue, MFR, FDM   Outcome: improved

## 2021-10-19 NOTE — PROGRESS NOTES
Preceptor Attestation:   Patient seen, evaluated and discussed with the resident Dr. Dozier. I have verified the content of the note, which accurately reflects my assessment of the patient and the plan of care.    Recent significant weight loss in context of anemia, smoking hx, worsening dyphagia sxs with hx of esophagitis, concern for GI malignancy. Also consider myeloma. Recommend GI for endoscopy and c-scope while also completing further lab evaluation.     Supervising Physician:Benjamin Rosenstein, MD, MA  [facility]

## 2021-10-22 RX ORDER — DULOXETIN HYDROCHLORIDE 30 MG/1
30 CAPSULE, DELAYED RELEASE ORAL
Qty: 30 CAPSULE | Refills: 3 | Status: SHIPPED | OUTPATIENT
Start: 2021-10-22 | End: 2022-03-21

## 2021-10-22 RX ORDER — NICOTINE 21 MG/24HR
1 PATCH, TRANSDERMAL 24 HOURS TRANSDERMAL EVERY 24 HOURS
Qty: 30 PATCH | Refills: 11 | Status: SHIPPED | OUTPATIENT
Start: 2021-10-22 | End: 2022-04-24

## 2021-10-22 RX ORDER — ONDANSETRON 4 MG/1
4 TABLET, ORALLY DISINTEGRATING ORAL EVERY 8 HOURS PRN
Qty: 20 TABLET | Refills: 0 | Status: SHIPPED | OUTPATIENT
Start: 2021-10-22 | End: 2023-11-13

## 2021-10-28 ENCOUNTER — PRE VISIT (OUTPATIENT)
Dept: UROLOGY | Facility: CLINIC | Age: 56
End: 2021-10-28

## 2021-10-28 NOTE — TELEPHONE ENCOUNTER
Reason for visit: 3 month follow up      Dx/Hx/Sx: BPH w/urinary frequency     Records/imaging/labs/orders: in epic     At Rooming: video visit

## 2021-11-09 ENCOUNTER — VIRTUAL VISIT (OUTPATIENT)
Dept: UROLOGY | Facility: CLINIC | Age: 56
End: 2021-11-09
Payer: COMMERCIAL

## 2021-11-09 DIAGNOSIS — R39.9 LOWER URINARY TRACT SYMPTOMS (LUTS): Primary | ICD-10-CM

## 2021-11-09 DIAGNOSIS — R39.11 URINARY HESITANCY: ICD-10-CM

## 2021-11-09 PROCEDURE — 99213 OFFICE O/P EST LOW 20 MIN: CPT | Mod: 95 | Performed by: NURSE PRACTITIONER

## 2021-11-09 RX ORDER — OXYBUTYNIN CHLORIDE 10 MG/1
10 TABLET, EXTENDED RELEASE ORAL DAILY
Qty: 30 TABLET | Refills: 6 | Status: SHIPPED | OUTPATIENT
Start: 2021-11-09 | End: 2022-02-25

## 2021-11-09 ASSESSMENT — PAIN SCALES - GENERAL: PAINLEVEL: NO PAIN (0)

## 2021-11-09 NOTE — PROGRESS NOTES
Piter is a 55 year old who is being evaluated via a billable video visit. Multiple attempts made to connect with the patient via video through both [employer] and [employer], but he was unable to connect. We therefore made a shared decision to convert our visit to telephone.     How would you like to obtain your AVS? Mail a copy    Duration of telephone call: 15 minutes    Piter Gaines complains of   Chief Complaint   Patient presents with     RECHECK     BPH/urinary frequency       Assessment/Plan:  55 year old male with ongoing LUTS despite low-dose oxybutynin. He would like to avoid cystoscopy and is open to an evaluation by pelvic floor PT.   -Will increase his oxybutynin dose to 10 mg daily.   -Pelvic floor PT referral placed.  -Follow up with me in 3-4 months for a symptom check.     Mari Graf, CNP  Department of Urology      Subjective:   55 year old male with BPH with LUTS (frequency, nocturia, hesitancy, and sensation of incomplete emptying) with ongoing symptoms despite combo therapy of Flomax and finasteride. Completed UDS on 8/16 with me, showing a small bladder capacity and very brief bladder contraction. He was, however, able to empty his total bladder contents with no evidence of obstruction. Discussed cystoscopy, but the patient wished to defer this. Therefore, a shared decision was made to initiate oxybutynin XL 5 mg and evaluate for symptom improvement. If no improvement, a referral for PFPT considered given the hesitancy and EMG activity on UDS.     Today, he states that he has been taking oxybutynin regularly, but this has not made a significant impact on symptoms. Continues to void frequently and get up several times at night with a very hesitant stream. He would still like to avoid cystoscopy but is open to evaluation with PFPT.

## 2021-11-09 NOTE — LETTER
11/9/2021       RE: Piter Gaines  [address] [place] [address]     Dear Colleague,    Thank you for referring your patient, Piter Gaines, to the [facility] UROLOGY [facility]IN[facility]. Please see a copy of my visit note below.    Piter is a 55 year old who is being evaluated via a billable video visit. Multiple attempts made to connect with the patient via video through both [employer] and [employer], but he was unable to connect. We therefore made a shared decision to convert our visit to telephone.     How would you like to obtain your AVS? Mail a copy    Duration of telephone call: 15 minutes    Piter Gaines complains of   Chief Complaint   Patient presents with     RECHECK     BPH/urinary frequency       Assessment/Plan:  55 year old male with ongoing LUTS despite low-dose oxybutynin. He would like to avoid cystoscopy and is open to an evaluation by pelvic floor PT.   -Will increase his oxybutynin dose to 10 mg daily.   -Pelvic floor PT referral placed.  -Follow up with me in 3-4 months for a symptom check.     Mari Graf, CNP  Department of Urology      Subjective:   55 year old male with BPH with LUTS (frequency, nocturia, hesitancy, and sensation of incomplete emptying) with ongoing symptoms despite combo therapy of Flomax and finasteride. Completed UDS on 8/16 with me, showing a small bladder capacity and very brief bladder contraction. He was, however, able to empty his total bladder contents with no evidence of obstruction. Discussed cystoscopy, but the patient wished to defer this. Therefore, a shared decision was made to initiate oxybutynin XL 5 mg and evaluate for symptom improvement. If no improvement, a referral for PFPT considered given the hesitancy and EMG activity on UDS.     Today, he states that he has been taking oxybutynin regularly, but this has not made a significant impact on symptoms. Continues to void frequently and get  up several times at night with a very hesitant stream. He would still like to avoid cystoscopy but is open to evaluation with PFPT.

## 2021-11-09 NOTE — PATIENT INSTRUCTIONS
UROLOGY CLINIC VISIT PATIENT INSTRUCTIONS    -Trial an increased dose of oxybutynin- 10 mg daily.  -Referral placed for pelvic floor physical therapy evaluation.   -Follow up with me in 3-4 months for follow up to discuss symptoms.     If you have any issues, questions or concerns in the meantime, do not hesitate to contact us at [phone] or via [employer].     It was a pleasure meeting with you today.  Thank you for allowing me and my team the privilege of caring for you today.  YOU are the reason we are here, and I truly hope we provided you with the excellent service you deserve.  Please let us know if there is anything else we can do for you so that we can be sure you are leaving completely satisfied with your care experience.    Mari Graf, CNP

## 2021-11-09 NOTE — NURSING NOTE
Chief Complaint   Patient presents with     RECHECK     BPH/urinary frequency     Elise Reza, [facility]

## 2021-11-15 ENCOUNTER — MEDICAL CORRESPONDENCE (OUTPATIENT)
Dept: HEALTH INFORMATION MANAGEMENT | Facility: CLINIC | Age: 56
End: 2021-11-15
Payer: COMMERCIAL

## 2021-11-22 ENCOUNTER — TELEPHONE (OUTPATIENT)
Dept: FAMILY MEDICINE | Facility: CLINIC | Age: 56
End: 2021-11-22

## 2021-11-22 DIAGNOSIS — R35.1 BENIGN PROSTATIC HYPERPLASIA WITH NOCTURIA: ICD-10-CM

## 2021-11-22 DIAGNOSIS — N40.1 BENIGN PROSTATIC HYPERPLASIA WITH NOCTURIA: ICD-10-CM

## 2021-11-22 NOTE — TELEPHONE ENCOUNTER
[facility] phone call message- medication clarification/question:    Full Medication Name: finasteride (PROSCAR)    Dose: 5 MG tablet    Question: Pharmacy called informing that patient had told them his medication has been increased to 10 mg now, pharmacy states they did not receive any new script for mediation with new dose increase, please call and advise.       Pharmacy confirmed as [id number] - [place], [place] - [address] AT [facility]: Yes    OK to leave a message on voice mail? Yes    Primary language: English      needed? No    Call taken on November 22, 2021 at 2:16 PM by Zina Wood

## 2021-11-22 NOTE — PROGRESS NOTES
11/22/21    Received a request to adjust his finasteride from 5 mg to 10 mg.   In reviewing the recent urology note, it was his oxybutynin (not finasteride) that increased in dosage -- this adjustment already made by urology. No further med changes indicated as far as I am aware.     I had opened this encounter initially to make the above adjustment but holding now that I understand the situation. Sent following message to RN and Purple Team:         Team,    Thanks for the note.   It appears it was actually his oxybutynin, not his finasteride that was increased from 5 to 10 mg. Confusing, I know. This adjustment was already made by urology. They did not request any change in dose for the finasteride (which is the request I received).     Please call patient to notify him of the above.   Also, please do two more tasks:  - Did [facility] reach out regarding scopes? If not, please have him call [facility] - order has been confirmed to be placed.   - I would like to see him back in clinic when he is able to come in for weight loss and anemia follow-up. Please arrange this as able.     Let me know if any questions.     Thanks,    Matt Dozier DO   [facility] Resident   Pager#[phone]

## 2021-12-06 DIAGNOSIS — K20.90 ESOPHAGITIS: ICD-10-CM

## 2021-12-07 NOTE — TELEPHONE ENCOUNTER
Refilled omeprazole x 90 days with 1 refill   Trying to move toward upper endoscopy as documented

## 2021-12-17 DIAGNOSIS — L30.9 DERMATITIS: ICD-10-CM

## 2021-12-20 DIAGNOSIS — I10 ESSENTIAL HYPERTENSION: ICD-10-CM

## 2021-12-28 RX ORDER — AMLODIPINE BESYLATE 10 MG/1
10 TABLET ORAL DAILY
Qty: 90 TABLET | Refills: 1 | Status: SHIPPED | OUTPATIENT
Start: 2021-12-28 | End: 2022-01-23

## 2021-12-28 RX ORDER — BENZOCAINE/MENTHOL 6 MG-10 MG
LOZENGE MUCOUS MEMBRANE 2 TIMES DAILY
Qty: 453 G | OUTPATIENT
Start: 2021-12-28

## 2021-12-29 NOTE — TELEPHONE ENCOUNTER
Do not know what this is for.   Patient has no-showed multiple visits.   If he wants it refilled, will need to show up for a visit to discuss what it is for.

## 2021-12-29 NOTE — TELEPHONE ENCOUNTER
Refilled amlodipine for 90 days     Would prefer he comes back for in-person visit but feel benefits of having BP medication outweigh risks.

## 2022-01-18 DIAGNOSIS — I65.21 CAROTID STENOSIS, NON-SYMPTOMATIC, RIGHT: ICD-10-CM

## 2022-01-19 NOTE — TELEPHONE ENCOUNTER
Refilled aspirin x 1 year     Sent staff message to try to get him in for follow up (has no-showed multiple times)     Matt Dozier DO   [facility] Medicine Resident   Pager#[phone]

## 2022-01-20 NOTE — PROGRESS NOTES
"  Assessment & Plan     (I10) Essential hypertension  Comment: Uncontrolled. Above goal of <140/90 today. He is adding salt and has not been taking amlodipine   Plan:   - Encouraged out of clinic BP monitoring   - Encouraged cut out salt completley   - amLODIPine (NORVASC) 10 MG tablet; Take 1 tablet (10 mg) by mouth daily  Dispense: 90 tablet; Refill: 1   Clarified that this is unlikely making him void more    Addressing sub-optimal BPH / LUTS as elsewhere   - losartan (COZAAR) 50 MG tablet; Take 2 tablets (100 mg) by mouth daily  Dispense: 180 tablet; Refill: 1    (N40.1,  R35.1) Benign prostatic hyperplasia with nocturia  Comment: Uncontrolled. Still significant LUTS. Serial PSAs have been reasonable, last <1 year ago. Following with urology. Is taking the oxybutynin - not sure if helping. Has both finasteride and tamsulosin on his med list but he is unclear if has actually taken these.   Plan:   - Encouraged to adhere to follow up urology appointment, appreciate    Sent message to provider given ongoing sx and unclear what he is actually taking    Encouraged him to do pelvic floor PT as recommended   - Recommended evening fluid restriction   - Sending refills of his tamsulosin and finasteride to ensure he has at least attempted these   - tamsulosin (FLOMAX) 0.4 MG capsule; Take 2 capsules (0.8 mg) by mouth daily  Dispense: 90 capsule; Refill: 3  - finasteride (PROSCAR) 5 MG tablet; Take 1 tablet (5 mg) by mouth daily  Dispense: 90 tablet; Refill: 1  - oxybutynin as prescribed by urology     (K20.90) Esophagitis  (Z12.11) Special screening for malignant neoplasms, colon  Comment: A key sx / condition of concern. Given on going reflux/gagging despite PPI (and in setting of anemia), concern for esophageal pathology that needs to be evaluated at least with an upper scope -- if not more. Prior notes: \"EGD on 11/11/16 with finding of severe esophagitis - LA Grade D - involving 10-15cm of distal esophagus.  There was " "also concern for pill dysphagia.\"   Plan:   - Reminded him to reach out   - Sent staff message to assist with coordination, appreciate   - I will call [facility] myself if I have a chance   - He NEEDS at least an upper scope (likely lower)   - He has a FIT test at home that he plans to send in (reminded to do so)   - Recently refilled PPI    (D64.9) Normocytic anemia  Comment: Confirmed anemia with CBC showing Hgb 12.6 with MCV in 80s. Anemic (mostly 11-12 range) dating back to about 2017. Possibly related to dapsone (has been attributed in past). Primary concern for occult GI bleed (iron deficiency); denies overt signs of bleeding. Otherwise ddx notable for anemia of chronic disease, hypo/aplastic marrow, among others. 10/2021: Ferritin 49 () -- low, normal. Absolute Retic Count is <2 at 1.2, indicating hypoproliferation. No gamma gap and renal function reasonable. Notes from prior Smear: \"Microcytic, hypochromic anemia... The clinical history has been reviewed. Although other causes of ineffective erythropoiesis should be ruled out, the features are suggestive of an underlying disorder of globin synthesis versus anemia of chronic disease, and hemoglobin studies can be performed\"     Plan:   - Recheck CBC with platelets, Ferritin  - Scopes mentioned as elsewhere   - Consider hemoglobin electrophoresis    - If rest of workup unrevealing, consider SPEP/UPEP, B12/folate, TSH, etc     (F17.200) Tobacco use disorder  Comment: 10 PY hx. Actively between 0.5-1 PPD, closer to 1 PPD. Wants to quit. Has seen pulm in past. Prior chest imaging shows calcified granuloma and lymph nodes of uncertain significance. High resolution CT had been recommended but does not appear to have been performed.   Plan:   - nicotine (NICODERM CQ) 21 MG/24HR 24 hr patch; Place 1 patch onto the skin every 24 hours  Dispense: 28 patch; Refill: 4  - nicotine (NICORETTE) 2 MG gum; Place 1 each (2 mg) inside cheek every hour as needed for smoking " "cessation  Dispense: 100 each; Refill: 4  - Clarify quit date   - track sx over time -- consider high res chest CT / pulm follow up     (I65.21) Carotid stenosis, non-symptomatic, right  Comment: On moderate intensity statin. 3/2021: LDL quite good at 72, HDL poor at 28. No neuro sx, exam reassuring at present.   Plan:   - refill statin today   - atorvastatin (LIPITOR) 20 MG tablet; Take 1 tablet (20 mg) by mouth daily  Dispense: 90 tablet; Refill: 1    (Z71.85) Vaccine counseling  (Z23) Need for prophylactic vaccination and inoculation against influenza  (primary encounter diagnosis)  Comment: Due for flu - given today. Cannot co-administer PPSV23. Notified of zoster at pharmacy.   Plan:  Flu shot today  Future visit: PPSV23   Notified of zoster at pharmacy     (Z71.89) Coordination of complex care  Comment: He continues to not be clear on his meds / not adhere to reasonable follow up / referrals / medical recommendations. Could benefit from care coordinator.   Plan:   Care Coordination Referral, appreciate     (R63.4) Weight loss  Comment: Resolved. Back to baseline weight.   Plan:   Monitor over time     (M54.50,  G89.29) Chronic low back pain without sciatica, unspecified back pain laterality  Comment: Resolved   Plan:   Continue to monitor   Repeat osteopathic evaluation as needed     Azam Dozier DO   [facility]    Gilmar Dumas is a 56 year old male hx of tobacco use, carotid stenosis, chronic back pain, esophagitis, substance use in remission who presents for the following health issues      HPI     Smoking   0.5-1 PPD   Starts smoking 2 hours after waking up   Patches \"don't work\" -- Wants gum   10 PY hx - does not qualify for LDCT     HTN   Home -- not taking home pressures   Diet -- adding salt sometimes   Pills -- taking the losartan   Doesn't take amlodipine because he \"pees with it\"     BPH / LUTS  Urinary frequency/urgency -- at least every hour (sometimes even " "minutes)   Nocturia - 4x per night (a little less than before)   No pain, no hematuria    Taking oxybutynin not sure if it is helping   Ran out of finasteride and hasn't had it in months to years  Not sure if he has taken tamsulosin before    From recent urology note:   \"55 year old male with BPH with LUTS (frequency, nocturia, hesitancy, and sensation of incomplete emptying) with ongoing symptoms despite combo therapy of Flomax and finasteride. Completed UDS on 8/16 with me, showing a small bladder capacity and very brief bladder contraction. He was, however, able to empty his total bladder contents with no evidence of obstruction. Discussed cystoscopy, but the patient wished to defer this. Therefore, a shared decision was made to initiate oxybutynin XL 5 mg and evaluate for symptom improvement. If no improvement, a referral for PFPT considered given the hesitancy and EMG activity on UDS.   Today, he states that he has been taking oxybutynin regularly, but this has not made a significant impact on symptoms. Continues to void frequently and get up several times at night with a very hesitant stream. He would still like to avoid cystoscopy but is open to evaluation with PFPT.\"    Back pain   Resolved     \"Gagging\"   Did you get in touch with [facility]? no   Still having issues with \"gagging\"       - 20-30 years        - worse over time        - having issues with both food and liquids        - no pain with swallowing        - had a scope prior - not sure what it showed   Does have a FIT test at home     Weight:   Today: 181 lb -- now back to baseline (170 lbs last visit [11/9/21] - down 14 lbs since 9/2021)  Did you get in touch with [facility]? no   Other: otherwise denies fever, chills, night sweats, any other symptomatic changes          (R63.4) Weight loss - unintentional   Comment: Down 1 pound from last visit, down 14 pounds from 9/2021. As stated last visit: Concern for malignancy given considerable rapid, unintentional " weight loss in setting of tobacco use. First consideration is colon cancer (especially given FIT positive in 2017). Given hx of esophagitis, consideration of esophageal / other GI malignancy.  Given smoking, pulmonary malignancy always a possibility. Has prostate issues - prostate cancer a consideration (PSA 3.6 in 8/2020, 2.5 in 2/2021); has an appointment set up with urology in November. Differential otherwise remains broad to consider GI vs endo vs psych vs other chronic conditions. Does not appear he is on medications that should cause this degree of weight loss.   Plan:   - Continue workup as below.               CMP to screen for any urgent GI pathologies requiring imaging / other workup/intervention   - Future considerations: SPEP/UPEP (pending CMP result), TSH, CXR, LDCT   - Follow up in 2-3 weeks to ensure workup continues and remainder of care needs being met      (D50.9) Hypochromic microcytic anemia  Comment: Confirmed that he does still have anemia at Hgb 12.6 / MCV 86, peripheral smear confirms microcytic, hypochromic.   Absolute Retic Count is <2 at 1.2, indicating hypoproliferation. Top thought is GLORIA (concern 2/2 to malignancy), though ddx includes ACD, hemoglobinopathy, lead poisoning -- even Multiple Myeloma in remainder of clinical context. Will continue workup as below.   Plan:   - Ferritin   - Comprehensive metabolic panel (especially to monitor gamma gap / renal function with consideration of MM)  - HGB Eval Reflex to ELP or RBC Solubility (to monitor for hemoglobinopathy)   - Consider lead testing in future      (K20.90) Esophagitis  (Z12.11) Special screening for malignant neoplasms, colon  (R11.0) Nausea  Comment: Prolonged history of progressively worsening overtime sx (decreasing ability to swallow/tolerate solids and liquids). Could be esophageal stricture or other less severe pathology but concern for space-occupying mass in context of weight loss, anemia. Could certainly be 2/2 to  "colon cancer as well (prior positive FIT - still no screening).   Plan:   - Both our clinic and patient to call [facility] to ensure upper AND lower scopes are set up SOON   - Requested antiemetic for bowel prep - will give short course Zofran               ondansetron (ZOFRAN-ODT) 4 MG ODT tab        (Z87.891) Personal history of tobacco use, presenting hazards to health  (F17.200) Tobacco dependence syndrome  Comment: Does want to quit. Has gone from 1 PPD to 0.5 PPD on his own. Willing to try nicotine replacement. Discussed best evidence for combination of patches / gum and proper use. He will try. Quit date: Thanksgiving 2021.    Plan: nicotine (NICODERM CQ) 14 MG/24HR 24 hr patch         nicotine (NICORETTE) 2 MG gum        (M54.50,  G89.29) Chronic low back pain without sciatica, unspecified back pain laterality  (M99.03) Somatic dysfunction of lumbar region  (M99.04) Somatic dysfunction of sacral region  Comment: Significant improvement with OMT. Feel this is primarily MSK-related in etiology but considering MM in context of weight loss, anemia.   Plan:   - OMT again today as below   - Still encouraged PT referral for ongoing treatment/prevention   - Conservative measures as helpful - heat, ice, self-massage   - Requested refill of duloxetine - provided today               DULoxetine (CYMBALTA) 30 MG capsule      Review of Systems   Constitutional, HEENT, cardiovascular, pulmonary, GI, , musculoskeletal, neuro, skin, endocrine and psych systems are negative, except as otherwise noted.      Objective    BP (!) 150/91   Pulse 91   Temp 97.6  F (36.4  C) (Oral)   Resp 20   Ht 1.765 m (5' 9.49\")   Wt 82.1 kg (181 lb)   SpO2 97%   BMI 26.35 kg/m    Body mass index is 26.35 kg/m .   Wt Readings from Last 4 Encounters:   01/21/22 82.1 kg (181 lb)   10/19/21 77.1 kg (170 lb)   10/04/21 77.8 kg (171 lb 8 oz)   09/01/21 83.5 kg (184 lb)       Physical Exam   GENERAL: alert and no distress  EYES: anicteric.   NECK: " no overt asymmetry  RESP: breathing comfortably. lungs clear to auscultation - no rales, rhonchi or wheezes  CV: regular rate and rhythm, no murmur, click or rub, no peripheral edema and peripheral pulses strong  ABDOMEN: soft, nontender, non-distended, no masses and bowel sounds normal  MS: no gross musculoskeletal defects noted, no edema  SKIN: no suspicious lesions or rashes  PSYCH: mentation appears normal, affect normal/bright

## 2022-01-21 ENCOUNTER — OFFICE VISIT (OUTPATIENT)
Dept: FAMILY MEDICINE | Facility: CLINIC | Age: 57
End: 2022-01-21
Payer: COMMERCIAL

## 2022-01-21 VITALS
HEART RATE: 91 BPM | SYSTOLIC BLOOD PRESSURE: 150 MMHG | BODY MASS INDEX: 26.81 KG/M2 | OXYGEN SATURATION: 97 % | TEMPERATURE: 97.6 F | WEIGHT: 181 LBS | HEIGHT: 69 IN | DIASTOLIC BLOOD PRESSURE: 91 MMHG | RESPIRATION RATE: 20 BRPM

## 2022-01-21 DIAGNOSIS — I65.21 CAROTID STENOSIS, NON-SYMPTOMATIC, RIGHT: ICD-10-CM

## 2022-01-21 DIAGNOSIS — D64.9 NORMOCYTIC ANEMIA: ICD-10-CM

## 2022-01-21 DIAGNOSIS — F17.200 TOBACCO USE DISORDER: ICD-10-CM

## 2022-01-21 DIAGNOSIS — N40.1 BENIGN PROSTATIC HYPERPLASIA WITH NOCTURIA: ICD-10-CM

## 2022-01-21 DIAGNOSIS — M54.50 CHRONIC LOW BACK PAIN WITHOUT SCIATICA, UNSPECIFIED BACK PAIN LATERALITY: ICD-10-CM

## 2022-01-21 DIAGNOSIS — R35.1 BENIGN PROSTATIC HYPERPLASIA WITH NOCTURIA: ICD-10-CM

## 2022-01-21 DIAGNOSIS — Z71.89 COORDINATION OF COMPLEX CARE: ICD-10-CM

## 2022-01-21 DIAGNOSIS — R63.4 WEIGHT LOSS: ICD-10-CM

## 2022-01-21 DIAGNOSIS — Z23 NEED FOR PROPHYLACTIC VACCINATION AND INOCULATION AGAINST INFLUENZA: ICD-10-CM

## 2022-01-21 DIAGNOSIS — I10 ESSENTIAL HYPERTENSION: Primary | ICD-10-CM

## 2022-01-21 DIAGNOSIS — Z12.11 SPECIAL SCREENING FOR MALIGNANT NEOPLASMS, COLON: ICD-10-CM

## 2022-01-21 DIAGNOSIS — G89.29 CHRONIC LOW BACK PAIN WITHOUT SCIATICA, UNSPECIFIED BACK PAIN LATERALITY: ICD-10-CM

## 2022-01-21 DIAGNOSIS — Z71.85 VACCINE COUNSELING: ICD-10-CM

## 2022-01-21 DIAGNOSIS — K20.90 ESOPHAGITIS: ICD-10-CM

## 2022-01-21 LAB
ERYTHROCYTE [DISTWIDTH] IN BLOOD BY AUTOMATED COUNT: 14.1 % (ref 10–15)
FERRITIN SERPL-MCNC: 18 NG/ML (ref 27–300)
HCT VFR BLD AUTO: 38.3 % (ref 40–53)
HGB BLD-MCNC: 12.2 G/DL (ref 13.3–17.7)
MCH RBC QN AUTO: 27.2 PG (ref 26.5–33)
MCHC RBC AUTO-ENTMCNC: 31.9 G/DL (ref 31.5–36.5)
MCV RBC AUTO: 86 FL (ref 78–100)
PLATELET # BLD AUTO: 199 10E3/UL (ref 150–450)
RBC # BLD AUTO: 4.48 10E6/UL (ref 4.4–5.9)
WBC # BLD AUTO: 6.2 10E3/UL (ref 4–11)

## 2022-01-21 PROCEDURE — 85027 COMPLETE CBC AUTOMATED: CPT | Performed by: STUDENT IN AN ORGANIZED HEALTH CARE EDUCATION/TRAINING PROGRAM

## 2022-01-21 PROCEDURE — 99214 OFFICE O/P EST MOD 30 MIN: CPT | Mod: 25 | Performed by: STUDENT IN AN ORGANIZED HEALTH CARE EDUCATION/TRAINING PROGRAM

## 2022-01-21 PROCEDURE — 90471 IMMUNIZATION ADMIN: CPT | Performed by: STUDENT IN AN ORGANIZED HEALTH CARE EDUCATION/TRAINING PROGRAM

## 2022-01-21 PROCEDURE — 90682 RIV4 VACC RECOMBINANT DNA IM: CPT | Performed by: STUDENT IN AN ORGANIZED HEALTH CARE EDUCATION/TRAINING PROGRAM

## 2022-01-21 PROCEDURE — 82728 ASSAY OF FERRITIN: CPT | Performed by: STUDENT IN AN ORGANIZED HEALTH CARE EDUCATION/TRAINING PROGRAM

## 2022-01-21 PROCEDURE — 36415 COLL VENOUS BLD VENIPUNCTURE: CPT | Performed by: STUDENT IN AN ORGANIZED HEALTH CARE EDUCATION/TRAINING PROGRAM

## 2022-01-21 RX ORDER — FINASTERIDE 5 MG/1
5 TABLET, FILM COATED ORAL DAILY
Qty: 90 TABLET | Refills: 1 | Status: CANCELLED | OUTPATIENT
Start: 2022-01-21

## 2022-01-21 RX ORDER — NICOTINE 21 MG/24HR
1 PATCH, TRANSDERMAL 24 HOURS TRANSDERMAL EVERY 24 HOURS
Status: CANCELLED | OUTPATIENT
Start: 2022-01-21

## 2022-01-21 ASSESSMENT — MIFFLIN-ST. JEOR: SCORE: 1649.13

## 2022-01-21 NOTE — Clinical Note
Dr. Burdick,   Lots going on with him - thanks for your help.   I billed him as a level 4 ... but with the multitude of conditions, various medication management and complex coordination, I don't feel it would be unreasonable to move him to a 5. Ill leave that up to you!  Let me know if you have any further thoughts / feedback.   Thanks    Torb

## 2022-01-23 PROBLEM — K62.5 RECTAL HEMORRHAGE: Status: ACTIVE | Noted: 2017-07-24

## 2022-01-23 PROBLEM — Z71.89 COORDINATION OF COMPLEX CARE: Status: ACTIVE | Noted: 2021-03-24

## 2022-01-23 PROBLEM — F10.10 ALCOHOL ABUSE: Status: ACTIVE | Noted: 2017-07-24

## 2022-01-23 RX ORDER — TAMSULOSIN HYDROCHLORIDE 0.4 MG/1
0.8 CAPSULE ORAL DAILY
Qty: 90 CAPSULE | Refills: 3 | Status: SHIPPED | OUTPATIENT
Start: 2022-01-23 | End: 2022-02-25

## 2022-01-23 RX ORDER — AMLODIPINE BESYLATE 10 MG/1
10 TABLET ORAL DAILY
Qty: 90 TABLET | Refills: 1 | Status: SHIPPED | OUTPATIENT
Start: 2022-01-23 | End: 2023-11-13 | Stop reason: DRUGHIGH

## 2022-01-23 RX ORDER — ATORVASTATIN CALCIUM 20 MG/1
20 TABLET, FILM COATED ORAL DAILY
Qty: 90 TABLET | Refills: 1 | Status: SHIPPED | OUTPATIENT
Start: 2022-01-23 | End: 2022-06-17

## 2022-01-23 RX ORDER — FINASTERIDE 5 MG/1
5 TABLET, FILM COATED ORAL DAILY
Qty: 90 TABLET | Refills: 1 | Status: SHIPPED | OUTPATIENT
Start: 2022-01-23 | End: 2022-02-25

## 2022-01-23 RX ORDER — NICOTINE 21 MG/24HR
1 PATCH, TRANSDERMAL 24 HOURS TRANSDERMAL EVERY 24 HOURS
Qty: 28 PATCH | Refills: 4 | Status: SHIPPED | OUTPATIENT
Start: 2022-01-23 | End: 2022-10-16

## 2022-01-23 RX ORDER — LOSARTAN POTASSIUM 50 MG/1
100 TABLET ORAL DAILY
Qty: 180 TABLET | Refills: 1 | Status: SHIPPED | OUTPATIENT
Start: 2022-01-23 | End: 2022-09-15

## 2022-01-24 ENCOUNTER — DOCUMENTATION ONLY (OUTPATIENT)
Dept: FAMILY MEDICINE | Facility: CLINIC | Age: 57
End: 2022-01-24
Payer: COMMERCIAL

## 2022-01-24 ENCOUNTER — PATIENT OUTREACH (OUTPATIENT)
Dept: FAMILY MEDICINE | Facility: CLINIC | Age: 57
End: 2022-01-24
Payer: COMMERCIAL

## 2022-01-24 NOTE — PROGRESS NOTES
Clinic Care Coordination Contact    Follow Up Progress Note      Assessment: The pt was referred for care coordination. I called the pt to see if I can help in any way. The pt stated that he just needs reminder at times about his appointments. I asked him to see if he has a smart phone, or a calendar. Pt stated that he does have a smart phone. I told him that the next time, he is in, I will show him to use his smart phone to remind him of his appointment. I told the pt that I will print out some calendar that he can use to put on his refrigerators so that he has a visual reminder. Pt stated that it would be fine.      Care Gaps:    Health Maintenance Due   Topic Date Due     PREVENTIVE CARE VISIT  Never done     ADVANCE CARE PLANNING  Never done     DEPRESSION ACTION PLAN  Never done     Pneumococcal Vaccine: Pediatrics (0 to 5 Years) and At-Risk Patients (6 to 64 Years) (1 of 2 - PPSV23) Never done     COLORECTAL CANCER SCREENING  05/24/2017     ZOSTER IMMUNIZATION (2 of 3) 12/01/2020     PHQ-9  09/26/2021     COVID-19 Vaccine (3 - Booster for Moderna series) 01/31/2022       Currently there are no Care Gaps.    Goals addressed this encounter:   Goals Addressed    None         Intervention/Education provided during outreach: NA          Plan:     Care Coordinator will follow up in month

## 2022-01-25 NOTE — PROGRESS NOTES
01/24/22    Staff are working to get him back in to [facility] for scopes.   Appreciate.   Got him scheduled 3/17 at [facility].   Appreciate! He will get phone reminders for this.     Matt Dozier DO   [facility] Medicine Resident   Pager#[phone]

## 2022-02-01 ENCOUNTER — PRE VISIT (OUTPATIENT)
Dept: UROLOGY | Facility: CLINIC | Age: 57
End: 2022-02-01
Payer: COMMERCIAL

## 2022-02-01 NOTE — TELEPHONE ENCOUNTER
Reason for visit: symptom check      Dx/Hx/Sx: LUTS    Records/imaging/labs/orders: in epic     At Rooming: video visit

## 2022-02-11 ENCOUNTER — VIRTUAL VISIT (OUTPATIENT)
Dept: UROLOGY | Facility: CLINIC | Age: 57
End: 2022-02-11
Payer: COMMERCIAL

## 2022-02-11 DIAGNOSIS — Z91.199 NO-SHOW FOR APPOINTMENT: Primary | ICD-10-CM

## 2022-02-11 NOTE — LETTER
Date:February 11, 2022      Provider requested that no letter be sent. Do not send.       [facility]

## 2022-02-11 NOTE — LETTER
2/11/2022       RE: Piter Gaines  [address] [place] [address]     Dear Colleague,    Thank you for referring your patient, Piter Gaines, to the [facility]OLOG[facility]. Please see a copy of my visit note below.      This patient was a no show for this scheduled appointment.      Again, thank you for allowing me to participate in the care of your patient.      Sincerely,    Mari Graf, CNP

## 2022-02-21 DIAGNOSIS — K20.90 ESOPHAGITIS: ICD-10-CM

## 2022-02-21 NOTE — TELEPHONE ENCOUNTER
Date of last visit:   02/17/22    Date of next visit if scheduled: Visit date not found       Pharmacy fax request:  Yes  Patient/Phone call request :  No    BP Readings from Last 3 Encounters:   01/21/22 (!) 150/91   10/19/21 133/77   10/04/21 (!) 137/91       LDL Cholesterol Calculated   Date Value Ref Range Status   03/25/2021 72 <=129 mg/dL Final       Lab Results   Component Value Date    A1C 6.1 07/17/2020    A1C 5.7 04/18/2017       Last Comprehensive Metabolic Panel:  Sodium   Date Value Ref Range Status   10/19/2021 139 136 - 145 mmol/L Final   04/27/2021 141.0 133.0 - 144.0 mmol/L Final     Potassium   Date Value Ref Range Status   10/19/2021 3.8 3.5 - 5.0 mmol/L Final   04/27/2021 3.7 3.4 - 5.3 mmol/L Final     Chloride   Date Value Ref Range Status   10/19/2021 105 98 - 107 mmol/L Final   04/27/2021 106.0 94.0 - 109.0 mmol/L Final     Carbon Dioxide   Date Value Ref Range Status   04/27/2021 26.0 20.0 - 32.0 mmol/L Final     Carbon Dioxide (CO2)   Date Value Ref Range Status   10/19/2021 24 22 - 31 mmol/L Final     Anion Gap   Date Value Ref Range Status   10/19/2021 10 5 - 18 mmol/L Final     Glucose   Date Value Ref Range Status   10/19/2021 103 70 - 125 mg/dL Final   04/27/2021 112.0 (H) 60.0 - 109.0 mg/dL Final     Urea Nitrogen   Date Value Ref Range Status   10/19/2021 14 8 - 22 mg/dL Final   04/27/2021 12.0 7.0 - 30.0 mg/dL Final     Creatinine   Date Value Ref Range Status   10/19/2021 1.30 0.70 - 1.30 mg/dL Final   04/27/2021 1.2 0.8 - 1.5 mg/dL Final     GFR Estimate   Date Value Ref Range Status   10/19/2021 61 >60 mL/min/1.73m2 Final     Comment:     As of July 11, 2021, eGFR is calculated by the CKD-EPI creatinine equation, without race adjustment. eGFR can be influenced by muscle mass, exercise, and diet. The reported eGFR is an estimation only and is only applicable if the renal function is stable.   03/11/2021 >60 >60 mL/min/1.73m2 Final     Calcium   Date Value Ref Range Status    10/19/2021 9.3 8.5 - 10.5 mg/dL Final   04/27/2021 9.3 8.5 - 10.4 mg/dL Final       Please complete refill and CLOSE ENCOUNTER.  Closing the encounter signifies the refill is complete.

## 2022-02-25 ENCOUNTER — VIRTUAL VISIT (OUTPATIENT)
Dept: UROLOGY | Facility: CLINIC | Age: 57
End: 2022-02-25
Payer: COMMERCIAL

## 2022-02-25 DIAGNOSIS — N40.1 BENIGN PROSTATIC HYPERPLASIA WITH NOCTURIA: Primary | ICD-10-CM

## 2022-02-25 DIAGNOSIS — R39.9 LOWER URINARY TRACT SYMPTOMS (LUTS): ICD-10-CM

## 2022-02-25 DIAGNOSIS — R35.1 BENIGN PROSTATIC HYPERPLASIA WITH NOCTURIA: Primary | ICD-10-CM

## 2022-02-25 PROCEDURE — 99213 OFFICE O/P EST LOW 20 MIN: CPT | Mod: 95 | Performed by: NURSE PRACTITIONER

## 2022-02-25 RX ORDER — FINASTERIDE 5 MG/1
5 TABLET, FILM COATED ORAL DAILY
Qty: 90 TABLET | Refills: 1 | Status: SHIPPED | OUTPATIENT
Start: 2022-02-25 | End: 2022-12-14

## 2022-02-25 RX ORDER — TAMSULOSIN HYDROCHLORIDE 0.4 MG/1
0.8 CAPSULE ORAL DAILY
Qty: 90 CAPSULE | Refills: 3 | Status: SHIPPED | OUTPATIENT
Start: 2022-02-25 | End: 2023-03-30

## 2022-02-25 RX ORDER — OXYBUTYNIN CHLORIDE 10 MG/1
10 TABLET, EXTENDED RELEASE ORAL DAILY
Qty: 90 TABLET | Refills: 3 | Status: SHIPPED | OUTPATIENT
Start: 2022-02-25 | End: 2023-03-20

## 2022-02-25 NOTE — PROGRESS NOTES
Piter is a 56 year old who is being evaluated via a billable video visit. Despite multiple attempts to connect with the patient via both [place] and [employer], we were ultimately unable. Therefore, a shared decision was made to go forward with a telephone visit for the sake of time.     How would you like to obtain your AVS? Mail a copy    Duration of telephone call: 18 minutes     Piter Gaines complains of   Chief Complaint   Patient presents with     Follow Up     symtom check        Assessment/Plan:  55 year old male with a history of BPH with LUTS (frequency, nocturia, hesitancy, and sensation of incomplete emptying) who has had a slight improvement in symptoms over the past few months on combination therapy of oxybutynin, Flomax and finasteride. No immediate concerns today. He has not yet seen PT but will plan to pursue this in the coming months. We discussed cystoscopy again today as a potential next step, but he declines.   -Continue oxybutynin, Flomax and finasteride. Refills sent for all.   -PSA recheck with next routine set of labs.  -Follow up with me in 6 months to revisit symptoms and discuss PT.     Mari Graf, CNP  Department of Urology      Subjective:   55 year old male with a history of BPH with LUTS (frequency, nocturia, hesitancy, and sensation of incomplete emptying) who presents for virtual follow up. Symptoms have been refractory to combination therapy of Flomax and finasteride. Completed UDS on 8/16 with me, showing a small bladder capacity and very brief bladder contraction. He was, however, able to empty his total bladder contents with no evidence of obstruction. Cystoscopy has been discussed. He was started on oxybutynin 5 mg.     At our last visit in November, he continued to report frequent urination with hesitant stream at night. Wanted to avoid cystoscopy but was open to PFPT eval. Oxybutynin dose increased to 10 mg daily and a PFPT referral placed.     Today, he states that symptoms  have not been as severe lately. He has been voiding less often. He is now voiding about 3 times at night, as opposed to 5. He first states that he is not taking the oxybutynin as he cannot locate it amongst his other medications, but then does locate the bottle that says 10 mg. Confirms he has been taking it. He is also confident that he has been taking both the Flomax and finasteride, as well.     He has not yet seen PT and does not feel that he is ready to see them in the immediate future. He states that he has several other doctor visits coming up and does not feel that there will be time. He will plan to pursue this in a few months, however, when things have calmed down.

## 2022-02-25 NOTE — PATIENT INSTRUCTIONS
UROLOGY CLINIC VISIT PATIENT INSTRUCTIONS    -Continue oxybutynin, Flomax and finasteride. Refills sent for all.   -PSA recheck with next routine set of labs.  -Follow up with me in 6 months to revisit symptoms and discuss PT.    If you have any issues, questions or concerns in the meantime, do not hesitate to contact us at [phone] or via [employer].       Mari Graf, CNP  Department of Urology

## 2022-02-25 NOTE — LETTER
2/25/2022       RE: Piter Gaines  [address] [place] [address]     Dear Colleague,    Thank you for referring your patient, Piter Gaines, to the [facility]. Please see a copy of my visit note below.    Piter is a 56 year old who is being evaluated via a billable video visit. Despite multiple attempts to connect with the patient via both [employer] and [employer], we were ultimately unable. Therefore, a shared decision was made to go forward with a telephone visit for the sake of time.     How would you like to obtain your AVS? Mail a copy    Duration of telephone call: 18 minutes     Piter Gaines complains of   Chief Complaint   Patient presents with     Follow Up     symtom check        Assessment/Plan:  55 year old male with a history of BPH with LUTS (frequency, nocturia, hesitancy, and sensation of incomplete emptying) who has had a slight improvement in symptoms over the past few months on combination therapy of oxybutynin, Flomax and finasteride. No immediate concerns today. He has not yet seen PT but will plan to pursue this in the coming months. We discussed cystoscopy again today as a potential next step, but he declines.   -Continue oxybutynin, Flomax and finasteride. Refills sent for all.   -PSA recheck with next routine set of labs.  -Follow up with me in 6 months to revisit symptoms and discuss PT.     Mari Graf, CNP  Department of Urology      Subjective:   55 year old male with a history of BPH with LUTS (frequency, nocturia, hesitancy, and sensation of incomplete emptying) who presents for virtual follow up. Symptoms have been refractory to combination therapy of Flomax and finasteride. Completed UDS on 8/16 with me, showing a small bladder capacity and very brief bladder contraction. He was, however, able to empty his total bladder contents with no evidence of obstruction. Cystoscopy has been discussed.  He was started on oxybutynin 5 mg.     At our last visit in November, he continued to report frequent urination with hesitant stream at night. Wanted to avoid cystoscopy but was open to PFPT eval. Oxybutynin dose increased to 10 mg daily and a PFPT referral placed.     Today, he states that symptoms have not been as severe lately. He has been voiding less often. He is now voiding about 3 times at night, as opposed to 5. He first states that he is not taking the oxybutynin as he cannot locate it amongst his other medications, but then does locate the bottle that says 10 mg. Confirms he has been taking it. He is also confident that he has been taking both the Flomax and finasteride, as well.     He has not yet seen PT and does not feel that he is ready to see them in the immediate future. He states that he has several other doctor visits coming up and does not feel that there will be time. He will plan to pursue this in a few months, however, when things have calmed down.

## 2022-03-01 ENCOUNTER — OFFICE VISIT (OUTPATIENT)
Dept: FAMILY MEDICINE | Facility: CLINIC | Age: 57
End: 2022-03-01
Payer: COMMERCIAL

## 2022-03-01 VITALS
TEMPERATURE: 97.8 F | RESPIRATION RATE: 20 BRPM | OXYGEN SATURATION: 97 % | DIASTOLIC BLOOD PRESSURE: 98 MMHG | HEIGHT: 69 IN | SYSTOLIC BLOOD PRESSURE: 148 MMHG | BODY MASS INDEX: 26.81 KG/M2 | HEART RATE: 109 BPM | WEIGHT: 181 LBS

## 2022-03-01 DIAGNOSIS — R80.1 PERSISTENT PROTEINURIA: ICD-10-CM

## 2022-03-01 DIAGNOSIS — Z71.85 VACCINE COUNSELING: ICD-10-CM

## 2022-03-01 DIAGNOSIS — Z71.89 COORDINATION OF COMPLEX CARE: ICD-10-CM

## 2022-03-01 DIAGNOSIS — K20.80 ESOPHAGITIS, LOS ANGELES GRADE D: ICD-10-CM

## 2022-03-01 DIAGNOSIS — N18.2 CHRONIC KIDNEY DISEASE, STAGE II (MILD): ICD-10-CM

## 2022-03-01 DIAGNOSIS — I10 ESSENTIAL HYPERTENSION: Primary | ICD-10-CM

## 2022-03-01 DIAGNOSIS — F17.200 TOBACCO USE DISORDER: ICD-10-CM

## 2022-03-01 PROCEDURE — 99214 OFFICE O/P EST MOD 30 MIN: CPT | Mod: GC | Performed by: STUDENT IN AN ORGANIZED HEALTH CARE EDUCATION/TRAINING PROGRAM

## 2022-03-01 RX ORDER — HYDROCHLOROTHIAZIDE 12.5 MG/1
12.5 TABLET ORAL DAILY
Qty: 90 TABLET | Refills: 1 | Status: SHIPPED | OUTPATIENT
Start: 2022-03-01 | End: 2022-08-29

## 2022-03-01 NOTE — PROGRESS NOTES
Assessment & Plan      (I10) Essential hypertension  (primary encounter diagnosis)  Comment: still above goal today (goal <140/90). Adherent to meds and trying good dietary changes. Discussed risks/benefits of increasing other agents vs adding new. Elected new; agreed to hydrochlorothiazide.    Plan:   To assist with ambulatory BP monitoring    SPHYG/BP ENID W CUFF AND STET   Reminded about physical BP log    hydrochlorothiazide (HYDRODIURIL) 12.5 MG tablet; Take 1 tablet (12.5 mg) by mouth daily  Dispense: 90 tablet; Refill: 1  Recheck with BMP in 2-4 weeks   Consider losartan to olmesartan or possibly losartan bid in future           (K20.80) Esophagitis, [place] grade D  Comment: Sx continue. Needing PPI.   Plan:   Refilled PPI recently.  Plan for EGD / colonoscopy 3/17  Further follow up pending above     (Z71.89) Coordination of complex care  Comment: Appreciate care coordinator efforts.   Plan:   Trying new strategies for remembering meds / appointments   Calendar     (F17.200) Tobacco use disorder  Comment: Continues. Seems to be contemplative -- not fully ready to commit to cessation.   Plan:   Follow future visits   He is generally targeting summer as a quit point in time     (R80.1) Persistent proteinuria  (N18.2) Chronic kidney disease, stage II (mild)  Comment: Noted based on prior BMP / UA.   Plan:   Tried to get UPCR today, not able to get    Follow in future     (Z71.85) Vaccine counseling  Comment: Recommend vaccine booster.  Plan:   He will come back within the week for RN only   Follow up next visit in case this does not get done     Azam Dozier DO   [facility]    Precepted with Dr. Rosenstein       Future: Follow up   - Syncope   - Chest imaging         Subjective   Piter is a 56 year old M hx of tobacco use, normocytic anemia with low ferritin, carotid stenosis, esophagitis, substance use in remission who     HPI     BP f/u   Home pressures --   Diet --  "low salt. Used to use a lot.    Exercise -- stretches, walks. Up and own stairs.   Last lipid 3/2021   Meds   - Amlodipine 10 mg daily   - Losartan 50 mg daily   - Open to new med     GERD   EGD on 11/11/16 with finding of severe esophagitis - LA Grade D - involving 10-15cm of distal esophagus.  There was also concern for pill dysphagia.  Appointment   Refilled omeprazole -- needing? Yes.   When is GI appointment for scopes? 3/17 -- reminded today      Smoking   Had been 0.5-1 PPD.   Some days better thn others   When stressed, he smokes more   Some days can't stand cigarette.   Does not smoke in the early morning.    Prescribed patches and gum. Using? Sometimes     COVID booster! Will do within week. Nursing visit.        From prior visit  (I10) Essential hypertension  Comment: Uncontrolled. Above goal of <140/90 today. He is adding salt and has not been taking amlodipine   Plan:   - Encouraged out of clinic BP monitoring   - Encouraged cut out salt completley   - amLODIPine (NORVASC) 10 MG tablet; Take 1 tablet (10 mg) by mouth daily  Dispense: 90 tablet; Refill: 1              Clarified that this is unlikely making him void more               Addressing sub-optimal BPH / LUTS as elsewhere   - losartan (COZAAR) 50 MG tablet; Take 2 tablets (100 mg) by mouth daily  Dispense: 180 tablet; Refill: 1     (K20.90) Esophagitis  (Z12.11) Special screening for malignant neoplasms, colon  Comment: A key sx / condition of concern. Given ongoing reflux/gagging despite PPI (and in setting of anemia), concern for esophageal pathology that needs to be evaluated at least with an upper scope -- if not more. Prior notes: \"EGD on 11/11/16 with finding of severe esophagitis - LA Grade D - involving 10-15cm of distal esophagus.  There was also concern for pill dysphagia.\"   Plan:   - Reminded him to reach out   - Sent staff message to assist with coordination, appreciate   - I will call [facility] myself if I have a chance   - He NEEDS at " "least an upper scope (likely lower)   - He has a FIT test at home that he plans to send in (reminded to do so)   - Recently refilled PPI     (D64.9) Normocytic anemia  Comment: Confirmed anemia with CBC showing Hgb 12.6 with MCV in 80s. Anemic (mostly 11-12 range) dating back to about 2017. Possibly related to dapsone (has been attributed in past). Primary concern for occult GI bleed (iron deficiency); denies overt signs of bleeding. Otherwise ddx notable for anemia of chronic disease, hypo/aplastic marrow, among others. 10/2021: Ferritin 49 () -- low, normal. Absolute Retic Count is <2 at 1.2, indicating hypoproliferation. No gamma gap and renal function reasonable. Notes from prior Smear: \"Microcytic, hypochromic anemia... The clinical history has been reviewed. Although other causes of ineffective erythropoiesis should be ruled out, the features are suggestive of an underlying disorder of globin synthesis versus anemia of chronic disease, and hemoglobin studies can be performed\"     Plan:   - Recheck CBC with platelets, Ferritin  - Scopes mentioned as elsewhere   - Consider hemoglobin electrophoresis    - If rest of workup unrevealing, consider SPEP/UPEP, B12/folate, TSH, etc      (F17.200) Tobacco use disorder  Comment: 10 PY hx. Actively between 0.5-1 PPD, closer to 1 PPD. Wants to quit. Has seen pulm in past. Prior chest imaging shows calcified granuloma and lymph nodes of uncertain significance. High resolution CT had been recommended but does not appear to have been performed.   Plan:   - nicotine (NICODERM CQ) 21 MG/24HR 24 hr patch; Place 1 patch onto the skin every 24 hours  Dispense: 28 patch; Refill: 4  - nicotine (NICORETTE) 2 MG gum; Place 1 each (2 mg) inside cheek every hour as needed for smoking cessation  Dispense: 100 each; Refill: 4  - Clarify quit date   - track sx over time -- consider high res chest CT / pulm follow up     Review of Systems   Constitutional, HEENT, cardiovascular, " "pulmonary, GI, , musculoskeletal, neuro, skin, endocrine and psych systems are negative, except as otherwise noted.      Objective    BP (!) 148/98   Pulse 109   Temp 97.8  F (36.6  C)   Resp 20   Ht 1.75 m (5' 8.9\")   Wt 82.1 kg (181 lb)   SpO2 97%   BMI 26.81 kg/m    Body mass index is 26.81 kg/m .     Wt Readings from Last 4 Encounters:   03/01/22 82.1 kg (181 lb)   01/21/22 82.1 kg (181 lb)   10/19/21 77.1 kg (170 lb)   10/04/21 77.8 kg (171 lb 8 oz)     Physical Exam   Gen: Pleasant. No distress.    HEENT: MMM. No oral lesions. Face appears a bit more full than I recall in the past.   Neck: No overt asymmetry.   CV: Appears well-perfused. RRR. No murmur.   Resp: Breathing comfortably on room air. Lungs clear to auscultation bilaterally without wheeze or crackle.   Abd: Non-distended, non-tender.   Ext: No edema or overt asymmetry/deformity.   Skin: No overt rash on easily visualized skin.   Neuro: Non-focal.   Psych: Calm.       "

## 2022-03-01 NOTE — PROGRESS NOTES
Preceptor Attestation:   Patient seen, evaluated and discussed with the resident Dr. Dozier. I have verified the content of the note, which accurately reflects my assessment of the patient and the plan of care.    Improving BP will add additional low dose agent. Recommend smoking cessation.     Supervising Physician:Benjamin Rosenstein, MD, MA [facility]

## 2022-03-02 ENCOUNTER — PATIENT OUTREACH (OUTPATIENT)
Dept: FAMILY MEDICINE | Facility: CLINIC | Age: 57
End: 2022-03-02
Payer: COMMERCIAL

## 2022-03-02 PROBLEM — N18.2 CHRONIC KIDNEY DISEASE, STAGE II (MILD): Status: ACTIVE | Noted: 2022-03-02

## 2022-03-02 NOTE — PROGRESS NOTES
Clinic Care Coordination Contact    Follow Up Progress Note      Assessment: I got a message from  about the pt. He wanted me to reach out to the pt, and help remind him of his appointment on 03/17 with GI. He also wanted me to see if the pt has bought a calendar yet. Pt is forgetting his appointments, and he was suppose to buy a calendar to remind him. I called and talked to the pt. I asked him if he is near the clinic, I can print out a calendar for him. Pt stated that he is on his way to the [employer] right now. Pt stated that he will buy one there. I did remind the pt of his GI appointment.     Care Gaps:    Health Maintenance Due   Topic Date Due     MICROALBUMIN  Never done     ADVANCE CARE PLANNING  Never done     DEPRESSION ACTION PLAN  Never done     Pneumococcal Vaccine: Pediatrics (0 to 5 Years) and At-Risk Patients (6 to 64 Years) (1 of 2 - PPSV23) Never done     MEDICARE ANNUAL WELLNESS VISIT  Never done     COLORECTAL CANCER SCREENING  05/24/2017     ZOSTER IMMUNIZATION (2 of 3) 12/01/2020     PHQ-9  09/26/2021     COVID-19 Vaccine (3 - Booster for Moderna series) 01/31/2022     LIPID  03/25/2022       Currently there are no Care Gaps.    Goals addressed this encounter:   Goals Addressed    None         Intervention/Education provided during outreach: NA          Plan:     Care Coordinator will follow up in month

## 2022-03-21 DIAGNOSIS — M54.50 CHRONIC LOW BACK PAIN WITHOUT SCIATICA, UNSPECIFIED BACK PAIN LATERALITY: ICD-10-CM

## 2022-03-21 DIAGNOSIS — F32.A DEPRESSION, UNSPECIFIED DEPRESSION TYPE: ICD-10-CM

## 2022-03-21 DIAGNOSIS — G89.29 CHRONIC LOW BACK PAIN WITHOUT SCIATICA, UNSPECIFIED BACK PAIN LATERALITY: ICD-10-CM

## 2022-03-21 RX ORDER — DULOXETIN HYDROCHLORIDE 30 MG/1
30 CAPSULE, DELAYED RELEASE ORAL
Qty: 90 CAPSULE | Refills: 0 | Status: SHIPPED | OUTPATIENT
Start: 2022-03-21 | End: 2022-05-04

## 2022-03-21 RX ORDER — CITALOPRAM HYDROBROMIDE 40 MG/1
40 TABLET ORAL DAILY
Qty: 90 TABLET | Refills: 0 | Status: SHIPPED | OUTPATIENT
Start: 2022-03-21 | End: 2022-04-24

## 2022-03-21 NOTE — TELEPHONE ENCOUNTER
Team, please call patient:    Piter,     I received medication refill requests for two meds - Celexa and Cymbalta. I am happy to refill these for you.     However, I looked at my records and it doesn't show that you and I have every discussed them in detail together.     If you are able, I would prefer that you come in to clinic so that we can discuss these meds and especially using them in combination.  They should be safe but there are some risks of which to be aware and I would like to make sure you:  - Are aware of those risks   - If you are uncomfortable with the risks that we search for an alternate plan.     If you come in to clinic, we could recheck your blood pressure too (which we had planned on doing soon anyway)    Let me know if you have any questions.     Best,     Dr. Dozier

## 2022-03-23 ENCOUNTER — OFFICE VISIT (OUTPATIENT)
Dept: FAMILY MEDICINE | Facility: CLINIC | Age: 57
End: 2022-03-23
Payer: COMMERCIAL

## 2022-03-23 VITALS
BODY MASS INDEX: 25.91 KG/M2 | WEIGHT: 181 LBS | HEART RATE: 87 BPM | RESPIRATION RATE: 18 BRPM | OXYGEN SATURATION: 96 % | DIASTOLIC BLOOD PRESSURE: 88 MMHG | HEIGHT: 70 IN | SYSTOLIC BLOOD PRESSURE: 128 MMHG | TEMPERATURE: 97.9 F

## 2022-03-23 DIAGNOSIS — M54.42 CHRONIC LEFT-SIDED LOW BACK PAIN WITH LEFT-SIDED SCIATICA: Primary | ICD-10-CM

## 2022-03-23 DIAGNOSIS — G89.29 CHRONIC LEFT-SIDED LOW BACK PAIN WITH LEFT-SIDED SCIATICA: Primary | ICD-10-CM

## 2022-03-23 PROCEDURE — 99214 OFFICE O/P EST MOD 30 MIN: CPT | Mod: GC | Performed by: STUDENT IN AN ORGANIZED HEALTH CARE EDUCATION/TRAINING PROGRAM

## 2022-03-23 RX ORDER — ACETAMINOPHEN 500 MG
500-1000 TABLET ORAL EVERY 6 HOURS PRN
Qty: 180 TABLET | Refills: 1 | Status: SHIPPED | OUTPATIENT
Start: 2022-03-23 | End: 2023-03-23

## 2022-03-23 RX ORDER — GABAPENTIN 300 MG/1
CAPSULE ORAL
Qty: 270 CAPSULE | Refills: 1 | Status: SHIPPED | OUTPATIENT
Start: 2022-03-23 | End: 2023-03-23

## 2022-03-23 RX ORDER — NAPROXEN 500 MG/1
250 TABLET ORAL 2 TIMES DAILY WITH MEALS
Qty: 14 TABLET | Refills: 0 | Status: SHIPPED | OUTPATIENT
Start: 2022-03-23 | End: 2022-04-06

## 2022-03-23 NOTE — PROGRESS NOTES
Preceptor Attestation:  Patient's case reviewed and discussed with Ar Reynaga MD resident and I evaluated the patient. I agree with written assessment and plan of care.  Supervising Physician:  Edu Hagan MD, MD [facility]

## 2022-03-23 NOTE — PROGRESS NOTES
"Assessment and Plan     (M54.42,  G89.29) Chronic left-sided low back pain with left-sided sciatica  (primary encounter diagnosis)  Comment: Left sided sciatica that's been acting up more over last few weeks. Already on gabapentin 300 TID but for alcohol use disorder; hasn't been taking anything else. Not interested in doing formal PT at this time as he's done it before and didn't think it was helpful- also already has the exercises at home that he can do.  Plan:   -Increased gabapentin to 300 in morning and afternoon but now 600 in evening  -Trial 2 weeks course of naproxen 250mg BID w/ meals (already on PPI)  -Can use tylenol as needed  -Discussed supportive cares and active exercising; patient to do \"home PT.\" Would encourage him to further consider formal PT again  -See back with Jacoby in 1 month for OMT and follow-up        Options for treatment and follow-up care were reviewed with the patient and/or guardian. Piter Gaines and/or guardian engaged in the decision making process and verbalized understanding of the options discussed and agreed with the final plan.    Ar Reynaga MD      Precepted today with: Dr Hagan           HPI:       Piter Gaines is a 56 year old  male with hx of left sided sciatica who presents for same:    Feels like left back pain been worse lately.   Radiates from left lower back down into left leg to level of knee.   Has to use cane consistently now.  Has had flare ups like this in the past.   Is already on gabapentin 300 TID for alcohol use disorder. Says unsure about the benefit since he's been on it a while and didn't know it could also help with back pain.  Hasn't been taking any nsaid or tylenol.   Doesn't have interest in going to PT right now, has been before and felt like it doesn't help. Has home PT exercises he can do.  Denies f/c, weight loss. No sensory or motor changes otherwise.         PMHX:     Patient Active Problem List   Diagnosis     Tobacco use disorder     " Caloric malnutrition (H)     Essential hypertension     Esophagitis     Moderate episode of recurrent major depressive disorder (H)     Seborrheic Keratosis     Persistent proteinuria     Benign prostatic hyperplasia with nocturia     Linear IgA bullous dermatosis     Coordination of complex care     Normochromic normocytic anemia     Carotid stenosis, asymptomatic, bilateral     Orbital cellulitis     Esophagitis, [place] grade D     Weight loss     Rectal hemorrhage     Alcohol abuse     Chronic kidney disease, stage II (mild)       Current Outpatient Medications   Medication Sig Dispense Refill     acetaminophen (TYLENOL) 500 MG tablet Take 1-2 tablets (500-1,000 mg) by mouth every 6 hours as needed for mild pain 180 tablet 1     amLODIPine (NORVASC) 10 MG tablet Take 1 tablet (10 mg) by mouth daily 90 tablet 1     aspirin (ASA) 81 MG EC tablet Take 1 tablet (81 mg) by mouth daily 90 tablet 3     atorvastatin (LIPITOR) 20 MG tablet Take 1 tablet (20 mg) by mouth daily 90 tablet 1     citalopram (CELEXA) 40 MG tablet Take 1 tablet (40 mg) by mouth daily 90 tablet 0     dapsone (ACZONE) 25 MG tablet Take 2 tablets (50 mg) by mouth daily 180 tablet 3     DULoxetine (CYMBALTA) 30 MG capsule Take 1 capsule (30 mg) by mouth nightly as needed (chronic pain / insominia) 90 capsule 0     finasteride (PROSCAR) 5 MG tablet Take 1 tablet (5 mg) by mouth daily 90 tablet 1     gabapentin (NEURONTIN) 300 MG capsule 300mg in the morning and afternoon and then 600mg in the evening 270 capsule 1     hydrochlorothiazide (HYDRODIURIL) 12.5 MG tablet Take 1 tablet (12.5 mg) by mouth daily 90 tablet 1     losartan (COZAAR) 50 MG tablet Take 2 tablets (100 mg) by mouth daily 180 tablet 1     naproxen (NAPROSYN) 500 MG tablet Take 0.5 tablets (250 mg) by mouth 2 times daily (with meals) for 14 days 14 tablet 0     omeprazole (PRILOSEC) 20 MG DR capsule Take 1 capsule (20 mg) by mouth 2 times daily 90 capsule 0     ondansetron  (ZOFRAN-ODT) 4 MG ODT tab Take 1 tablet (4 mg) by mouth every 8 hours as needed for nausea To be used for nausea associated with colonoscopy prep 20 tablet 0     oxybutynin ER (DITROPAN-XL) 10 MG 24 hr tablet Take 1 tablet (10 mg) by mouth daily 90 tablet 3     adapalene (DIFFERIN) 0.1 % external cream Apply thin layer to beard and hairline region after washing hair/face everyday.  If irritation or peeling develops, skip application for one day. 45 g 1     hydrocortisone (CORTAID) 1 % external cream Apply topically 2 times daily 453 g 0     lidocaine (LIDODERM) 5 % patch Place 1 patch onto the skin every 24 hours To prevent lidocaine toxicity, patient should be patch free for 12 hrs daily. 15 patch 0     nicotine (NICODERM CQ) 14 MG/24HR 24 hr patch Place 1 patch onto the skin every 24 hours 30 patch 11     nicotine (NICODERM CQ) 21 MG/24HR 24 hr patch Place 1 patch onto the skin every 24 hours 28 patch 4     nicotine (NICORETTE) 2 MG gum Place 1 each (2 mg) inside cheek every hour as needed for smoking cessation 100 each 4     nicotine (NICORETTE) 2 MG gum Place 1 each (2 mg) inside cheek every hour as needed for smoking cessation 30 each 11     SHINGRIX injection ADM 0.5ML IM UTD       tamsulosin (FLOMAX) 0.4 MG capsule Take 2 capsules (0.8 mg) by mouth daily 90 capsule 3       Social History     Tobacco Use     Smoking status: Current Every Day Smoker     Packs/day: 1.00     Years: 10.00     Pack years: 10.00     Types: Cigarettes     Smokeless tobacco: Never Used     Tobacco comment: 1/2 pack a day     Substance Use Topics     Alcohol use: Not Currently     Alcohol/week: 14.0 standard drinks     Types: 6 Cans of beer, 8 Shots of liquor per week     Comment: quit 5 years ago     Drug use: Yes     Types: Marijuana     Comment: Drug use: last week          Allergies   Allergen Reactions     No Known Allergies        No results found for this or any previous visit (from the past 24 hour(s)).         Review of  "Systems:     10 point ROS negative except for what is noted in HPI          Physical Exam:     Vitals:    03/23/22 0828   BP: 128/88   Pulse: 87   Resp: 18   Temp: 97.9  F (36.6  C)   TempSrc: Oral   SpO2: 96%   Weight: 82.1 kg (181 lb)   Height: 1.773 m (5' 9.8\")     Body mass index is 26.12 kg/m .    General: Sitting comfortably. No acute distress.   HEENT: Conjunctivae are clear, nonicteric.  Respiratory: No respiratory distress.  Cardiac Radial pulses 2+ bilaterally.  Abdominal: Abdomen is soft and nontender.  Extremities: Upper and lower extremities grossly normal without deformity.  Skin: Skin in warm without rashes.  Neurological: See below.  Psychiatric: Good insight.     Physical Exam for back:    ROM: limited in all directions     Bony/vertebral tenderness: None     Paraspinal tenderness: None    Sensation: intact in b/l lower extremities     Strength: 5/5 w/ dorsiflexion/ plantarflexion/ inversion/ eversion/ knee flexion/ extension    Maneuvers: Positive straight leg raise on left    Neuro: Strength and sensation grossly symmetric and intact    "

## 2022-03-24 NOTE — TELEPHONE ENCOUNTER
Patient have upcoming ofv on 4/20/22. Will leave appointment alone and add Med check to visit. Za Patel, CMA

## 2022-03-29 ENCOUNTER — TELEPHONE (OUTPATIENT)
Dept: FAMILY MEDICINE | Facility: CLINIC | Age: 57
End: 2022-03-29

## 2022-03-29 DIAGNOSIS — L30.9 DERMATITIS: ICD-10-CM

## 2022-03-29 NOTE — TELEPHONE ENCOUNTER
[facility] phone call message- medication clarification/question:    Full Medication Name: hydrocortisone (CORTAID)        Dose: 1 % external cream    Question: Patient call and request refill. Please send in request or call back to advise if needed, thanks    Pharmacy confirmed as [id number] - [place], [place] - [address] AT [facility]: Yes    OK to leave a message on voice mail? Yes    Primary language: English      needed? No    Call taken on March 29, 2022 at 9:02 AM by Berenice Harmon

## 2022-03-30 ENCOUNTER — DOCUMENTATION ONLY (OUTPATIENT)
Dept: FAMILY MEDICINE | Facility: CLINIC | Age: 57
End: 2022-03-30
Payer: COMMERCIAL

## 2022-03-30 NOTE — PROGRESS NOTES
03/30/22    Received refill request for hydrocortisone cream.   I do not recall filling this for Piter in the past and would like to know what it is for before I refill.     I will try to call 3/30 or 3/31.     Staff, if he calls, please notify him I will call to discuss further. If he needs something before I can call, please notify him he needs to be seen for that.     Matt Dozier,    [facility] Resident   Pager#[phone]

## 2022-03-31 DIAGNOSIS — L13.8 LINEAR IGA BULLOUS DERMATOSIS: Primary | ICD-10-CM

## 2022-03-31 RX ORDER — BENZOCAINE/MENTHOL 6 MG-10 MG
LOZENGE MUCOUS MEMBRANE 2 TIMES DAILY
Qty: 453 G | Refills: 0 | Status: SHIPPED | OUTPATIENT
Start: 2022-03-31 | End: 2023-08-11

## 2022-04-18 ENCOUNTER — TELEPHONE (OUTPATIENT)
Dept: FAMILY MEDICINE | Facility: CLINIC | Age: 57
End: 2022-04-18
Payer: COMMERCIAL

## 2022-04-18 NOTE — TELEPHONE ENCOUNTER
Pt cb, said he saw Dr Rios for leis ions on leg and that and another med is working. He also jamie care of his back pain, he is a GREAT doctor!

## 2022-04-19 NOTE — PROGRESS NOTES
Assessment & Plan     (L13.8) Linear IgA bullous dermatosis  Comment: Bx proven in 2021. Itching with recent flare controlled with hydrocortisone but he wants complete visual resolution if possible.   Plan:   Discussed unsure of chances of complete visual resolution   Discussed that the steroid cream may contribute to depigmentation he identifies   Would continue dapsone for now as per UTD first line   From my standpoint, okay to use hydrocortisone cream for itching    Clearly explained risk of this cream worsening depigmentation. He verbalized understanding   Eval by derm for more education / prognosis / management as indicated, appreciate   Follow up with me as needed for this     (K20.90) Esophagitis, [place] grade D  Comment: Ongoing. Controlled with PPI, uncontrolled without. Due for GI appointment -- have been trying to make this happen since I met him.    Plan:   Refill -- omeprazole (PRILOSEC) 20 MG DR capsule   Hope is to transition off this once GI evaluates    Re-emphasized long-terms risks associated with this med   Encouraged attending GI appointment    Hope is both EGD and colonoscopy    Sent care coordinator message to increase chance this happens     (I10) Essential hypertension  Comment: BP at goal today! <140/90.   Plan:   Continue current meds (pending BMP)  Amlodipine, hydrochlorothiazide, losartan as prescribed     (F32.A) Depression, unspecified depression type  Comment: Controlled. PHQ-9 of 3 today. Clarified he is JUST taking duloxetine (not citalopram also -- I had been concerned for increased risk serotonin syndrome but now I am less concerned).  Plan:   Continue current management, including duloxetine   Took citalopram off med list   Follow up PHQ-9 in 6 months     (Z79.899) On potassium wasting diuretic therapy  Comment: Due for BMP on hydrochlorothiazide.  Plan:   Basic metabolic panel    (R80.9) Proteinuria, unspecified type  Comment: While assessing for old BMPs noted  proteinuria in past. Does not look like this has been quantified before. No hematuria noted that I can see.   Plan:   Protein  random urine    (Z13.9) Screening for condition  Comment: Due for Lipid screen. Agreeable.   Plan:   Lipid Profile    Follow up:   - Continue offering vaccines: COVID booster, pneumococcal, Zoster   - Smoking cessation  - Rest as able     Tobacco Cessation:   reports that he has been smoking cigarettes. He has a 10.00 pack-year smoking history. He has never used smokeless tobacco.  Does not want to discuss today -- continue revisit     *Regarding complexity of MDM:  1. Number/comlexity of conditions: moderate to high    2. Amount/compleity of data reviewed/analyzed: mild to moderate   3. Risk of complications of management: moderate   Warrants level 61721 code     DO KEO Boyd [facility]    Precepted with Dr. Danya Schafer   Piter is a 56 year old who presents for the following health issues:     HPI     Rash Follow Up   Chronic rash 1-2 years on left thigh/groin  Diagnosed as IgA bullous disease.   Hasn't changed much but did get more itchy recently.   Had improved with hydrocortisone 1% cream in past -- wanted refill. Provided end of March.   Is on dapsone 50 mg daily   Has been improving on the cream    Placed derm referral per his request -- has an appointment made.     Mood Follow Up   Overall good   Struggling with nothing    Therapy no   Meds   -Celexa/citalopram (prior QTc in the 460-470 range) -- not taking this   -Cymbalata/duloxetine -- taking this     Reflux / heart burn   Did we see GI yet?? No. Says he has an appointment May 5th.   Wants a refill of his PPI until he sees them   Discomfort is kept at bay with the PPI but it starts flaring if he doesn't have the PPI   No vomiting nor diarrhea.       From my last note:   (I10) Essential hypertension  (primary encounter diagnosis)  Comment: still above goal today (goal <140/90).  Adherent to meds and trying good dietary changes. Discussed risks/benefits of increasing other agents vs adding new. Elected new; agreed to hydrochlorothiazide.    Plan:   To assist with ambulatory BP monitoring         SPHYG/BP ENID W CUFF AND STET        Reminded about physical BP log    hydrochlorothiazide (HYDRODIURIL) 12.5 MG tablet; Take 1 tablet (12.5 mg) by mouth daily  Dispense: 90 tablet; Refill: 1  Recheck with BMP in 2-4 weeks   Consider losartan to olmesartan or possibly losartan bid in future           (K20.80) Esophagitis, [place] grade D  Comment: Sx continue. Needing PPI.   Plan:   Refilled PPI recently.  Plan for EGD / colonoscopy 3/17  Further follow up pending above      (F17.200) Tobacco use disorder  Comment: Continues. Seems to be contemplative -- not fully ready to commit to cessation.   Plan:   Follow future visits   He is generally targeting summer as a quit point in time      (R80.1) Persistent proteinuria  (N18.2) Chronic kidney disease, stage II (mild)  Comment: Noted based on prior BMP / UA.   Plan:   Tried to get UPCR today, not able to get         Follow in future     Review of Systems   Constitutional, HEENT, cardiovascular, pulmonary, GI, , musculoskeletal, neuro, skin, endocrine and psych systems are negative, except as otherwise noted.      Objective    /77   Pulse 87   Temp 97.9  F (36.6  C)   Wt 81.7 kg (180 lb 1.3 oz)   SpO2 97%   BMI 25.98 kg/m    Body mass index is 25.98 kg/m .     Wt Readings from Last 4 Encounters:   04/20/22 81.7 kg (180 lb 1.3 oz)   03/23/22 82.1 kg (181 lb)   03/01/22 82.1 kg (181 lb)   01/21/22 82.1 kg (181 lb)       Physical Exam   Gen: Pleasant. No distress.    HEENT: MMM.   Neck: No overt asymmetry.   CV: Appears well-perfused.   Resp: Breathing comfortably on room air.  Abd: Non-distended, non-tender.   Ext: No edema or overt asymmetry/deformity.   Skin: as below   Psych: Calm. No SI.           PHQ-9 score of 3 today

## 2022-04-20 ENCOUNTER — OFFICE VISIT (OUTPATIENT)
Dept: FAMILY MEDICINE | Facility: CLINIC | Age: 57
End: 2022-04-20
Payer: COMMERCIAL

## 2022-04-20 VITALS
SYSTOLIC BLOOD PRESSURE: 112 MMHG | DIASTOLIC BLOOD PRESSURE: 77 MMHG | WEIGHT: 180.08 LBS | BODY MASS INDEX: 25.98 KG/M2 | TEMPERATURE: 97.9 F | OXYGEN SATURATION: 97 % | HEART RATE: 87 BPM

## 2022-04-20 DIAGNOSIS — K20.80 ESOPHAGITIS, LOS ANGELES GRADE D: ICD-10-CM

## 2022-04-20 DIAGNOSIS — Z79.899 ON POTASSIUM WASTING DIURETIC THERAPY: ICD-10-CM

## 2022-04-20 DIAGNOSIS — K20.90 ESOPHAGITIS: ICD-10-CM

## 2022-04-20 DIAGNOSIS — Z13.9 SCREENING FOR CONDITION: ICD-10-CM

## 2022-04-20 DIAGNOSIS — I10 ESSENTIAL HYPERTENSION: ICD-10-CM

## 2022-04-20 DIAGNOSIS — F32.A DEPRESSION, UNSPECIFIED DEPRESSION TYPE: ICD-10-CM

## 2022-04-20 DIAGNOSIS — L13.8 LINEAR IGA BULLOUS DERMATOSIS: Primary | ICD-10-CM

## 2022-04-20 DIAGNOSIS — R80.9 PROTEINURIA, UNSPECIFIED TYPE: ICD-10-CM

## 2022-04-20 LAB
ALBUMIN MFR UR ELPH: 11.7 MG/DL
ANION GAP SERPL CALCULATED.3IONS-SCNC: 13 MMOL/L (ref 5–18)
BUN SERPL-MCNC: 15 MG/DL (ref 8–22)
CALCIUM SERPL-MCNC: 9.3 MG/DL (ref 8.5–10.5)
CHLORIDE BLD-SCNC: 100 MMOL/L (ref 98–107)
CHOLEST SERPL-MCNC: 111 MG/DL
CO2 SERPL-SCNC: 26 MMOL/L (ref 22–31)
CREAT SERPL-MCNC: 1.28 MG/DL (ref 0.7–1.3)
CREAT UR-MCNC: 218 MG/DL
FASTING STATUS PATIENT QL REPORTED: NO
GFR SERPL CREATININE-BSD FRML MDRD: 66 ML/MIN/1.73M2
GLUCOSE BLD-MCNC: 112 MG/DL (ref 70–125)
HDLC SERPL-MCNC: 31 MG/DL
LDLC SERPL CALC-MCNC: 62 MG/DL
POTASSIUM BLD-SCNC: 3.5 MMOL/L (ref 3.5–5)
PROT/CREAT 24H UR: 0.05 MG/MG CR
SODIUM SERPL-SCNC: 139 MMOL/L (ref 136–145)
TRIGL SERPL-MCNC: 89 MG/DL

## 2022-04-20 PROCEDURE — 36415 COLL VENOUS BLD VENIPUNCTURE: CPT | Performed by: STUDENT IN AN ORGANIZED HEALTH CARE EDUCATION/TRAINING PROGRAM

## 2022-04-20 PROCEDURE — 80048 BASIC METABOLIC PNL TOTAL CA: CPT | Performed by: STUDENT IN AN ORGANIZED HEALTH CARE EDUCATION/TRAINING PROGRAM

## 2022-04-20 PROCEDURE — 99214 OFFICE O/P EST MOD 30 MIN: CPT | Mod: GC | Performed by: STUDENT IN AN ORGANIZED HEALTH CARE EDUCATION/TRAINING PROGRAM

## 2022-04-20 PROCEDURE — 80061 LIPID PANEL: CPT | Performed by: STUDENT IN AN ORGANIZED HEALTH CARE EDUCATION/TRAINING PROGRAM

## 2022-04-20 PROCEDURE — 84156 ASSAY OF PROTEIN URINE: CPT | Performed by: STUDENT IN AN ORGANIZED HEALTH CARE EDUCATION/TRAINING PROGRAM

## 2022-04-24 PROBLEM — K20.90 ESOPHAGITIS: Status: RESOLVED | Noted: 2017-04-07 | Resolved: 2022-04-24

## 2022-04-24 PROBLEM — F32.A DEPRESSION, UNSPECIFIED DEPRESSION TYPE: Status: ACTIVE | Noted: 2022-04-24

## 2022-04-24 PROBLEM — R63.4 WEIGHT LOSS: Status: RESOLVED | Noted: 2021-10-10 | Resolved: 2022-04-24

## 2022-04-24 PROBLEM — H05.019 ORBITAL CELLULITIS: Status: RESOLVED | Noted: 2021-03-09 | Resolved: 2022-04-24

## 2022-04-26 ENCOUNTER — APPOINTMENT (OUTPATIENT)
Dept: RADIOLOGY | Facility: HOSPITAL | Age: 57
End: 2022-04-26
Attending: EMERGENCY MEDICINE
Payer: COMMERCIAL

## 2022-04-26 ENCOUNTER — HOSPITAL ENCOUNTER (EMERGENCY)
Facility: HOSPITAL | Age: 57
Discharge: HOME | End: 2022-04-26
Attending: EMERGENCY MEDICINE | Admitting: EMERGENCY MEDICINE
Payer: COMMERCIAL

## 2022-04-26 ENCOUNTER — APPOINTMENT (OUTPATIENT)
Dept: CT IMAGING | Facility: HOSPITAL | Age: 57
End: 2022-04-26
Attending: EMERGENCY MEDICINE
Payer: COMMERCIAL

## 2022-04-26 VITALS
TEMPERATURE: 98.5 F | WEIGHT: 181 LBS | DIASTOLIC BLOOD PRESSURE: 98 MMHG | OXYGEN SATURATION: 95 % | SYSTOLIC BLOOD PRESSURE: 159 MMHG | BODY MASS INDEX: 26.81 KG/M2 | HEART RATE: 79 BPM | RESPIRATION RATE: 18 BRPM | HEIGHT: 69 IN
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
## 2022-04-26 PROCEDURE — 99285 EMERGENCY DEPT VISIT HI MDM: CPT | Mod: 25

## 2022-04-26 PROCEDURE — 250N000011 HC RX IP 250 OP 636: Performed by: EMERGENCY MEDICINE

## 2022-04-26 PROCEDURE — 73030 X-RAY EXAM OF SHOULDER: CPT | Mod: LT

## 2022-04-26 PROCEDURE — 72131 CT LUMBAR SPINE W/O DYE: CPT

## 2022-04-26 PROCEDURE — 73030 X-RAY EXAM OF SHOULDER: CPT | Mod: RT

## 2022-04-26 PROCEDURE — 96372 THER/PROPH/DIAG INJ SC/IM: CPT | Performed by: EMERGENCY MEDICINE

## 2022-04-26 RX ORDER — KETOROLAC TROMETHAMINE 30 MG/ML
30 INJECTION, SOLUTION INTRAMUSCULAR; INTRAVENOUS ONCE
Status: COMPLETED | OUTPATIENT
Start: 2022-04-26 | End: 2022-04-26

## 2022-04-26 RX ORDER — CYCLOBENZAPRINE HCL 10 MG
10 TABLET ORAL 3 TIMES DAILY PRN
Qty: 20 TABLET | Refills: 0 | Status: SHIPPED | OUTPATIENT
Start: 2022-04-26 | End: 2022-05-02

## 2022-04-26 RX ORDER — NAPROXEN 500 MG/1
500 TABLET ORAL 2 TIMES DAILY WITH MEALS
Qty: 16 TABLET | Refills: 0 | Status: SHIPPED | OUTPATIENT
Start: 2022-04-26 | End: 2022-05-04

## 2022-04-26 RX ORDER — OXYCODONE HYDROCHLORIDE 5 MG/1
5 CAPSULE ORAL EVERY 4 HOURS PRN
Qty: 6 CAPSULE | Refills: 0 | Status: SHIPPED | OUTPATIENT
Start: 2022-04-26 | End: 2022-05-02

## 2022-04-26 RX ADMIN — KETOROLAC TROMETHAMINE 30 MG: 30 INJECTION, SOLUTION INTRAMUSCULAR at 15:13

## 2022-04-26 ASSESSMENT — ENCOUNTER SYMPTOMS
WEAKNESS: 1
NUMBNESS: 1
MYALGIAS: 1
BACK PAIN: 1
ARTHRALGIAS: 1
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
## 2022-04-26 NOTE — PROGRESS NOTES
Preceptor Attestation:  Patient's case reviewed and discussed with Azam Dozier MD resident and I evaluated the patient. I agree with written assessment and plan of care.  Supervising Physician:  Edu Hagan MD, MD [facility]
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
## 2022-04-27 ENCOUNTER — PATIENT OUTREACH (OUTPATIENT)
Dept: CARE COORDINATION | Facility: CLINIC | Age: 57
End: 2022-04-27
Payer: COMMERCIAL

## 2022-04-27 DIAGNOSIS — Z71.89 OTHER SPECIFIED COUNSELING: ICD-10-CM
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
## 2022-05-02 NOTE — NURSING NOTE
PHQ-9 was misplaced but I did enter PH-Q2 score that would of been for 4/20/22. Talk to Dr. Dozier and he is aware.

## 2022-05-04 DIAGNOSIS — G89.29 CHRONIC LOW BACK PAIN WITHOUT SCIATICA, UNSPECIFIED BACK PAIN LATERALITY: ICD-10-CM

## 2022-05-04 DIAGNOSIS — M54.50 CHRONIC LOW BACK PAIN WITHOUT SCIATICA, UNSPECIFIED BACK PAIN LATERALITY: ICD-10-CM

## 2022-05-04 NOTE — RESULT ENCOUNTER NOTE
Please call patient to set up appointment with PCP Dr. Bangura for proteinuria.  Urine does not show a bladder infection.   Your urine does show that you have protein in your urine. You also had this 3 years ago with another urine test. For some people this is transient (comes and goes) and for others it could be due to the function of their kidney. Please make an appointment with your PCP to talk about next steps for testing.    - Jason Mendoza MD
Include Location In Plan?: No
Detail Level: Generalized

## 2022-05-06 RX ORDER — DULOXETIN HYDROCHLORIDE 30 MG/1
30 CAPSULE, DELAYED RELEASE ORAL AT BEDTIME
Qty: 90 CAPSULE | Refills: 1 | Status: SHIPPED | OUTPATIENT
Start: 2022-05-06 | End: 2022-12-26

## 2022-06-13 DIAGNOSIS — K20.80 ESOPHAGITIS, LOS ANGELES GRADE D: ICD-10-CM

## 2022-06-14 DIAGNOSIS — K20.80 ESOPHAGITIS, LOS ANGELES GRADE D: ICD-10-CM

## 2022-06-17 DIAGNOSIS — I65.21 CAROTID STENOSIS, NON-SYMPTOMATIC, RIGHT: ICD-10-CM

## 2022-06-17 RX ORDER — ATORVASTATIN CALCIUM 20 MG/1
20 TABLET, FILM COATED ORAL DAILY
Qty: 90 TABLET | Refills: 1 | Status: SHIPPED | OUTPATIENT
Start: 2022-06-17 | End: 2023-03-14

## 2022-07-21 ENCOUNTER — DOCUMENTATION ONLY (OUTPATIENT)
Dept: FAMILY MEDICINE | Facility: CLINIC | Age: 57
End: 2022-07-21

## 2022-07-21 NOTE — PROGRESS NOTES
07/21/22    Request in-person BP follow up (last one on file was high)  Sent staff message to assist   Per vascular care, please focus on smoking cessation next visit     Matt Dozier DO   [facility] Medicine Resident   Pager#[phone]
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
## 2022-07-26 ENCOUNTER — OFFICE VISIT (OUTPATIENT)
Dept: FAMILY MEDICINE | Facility: CLINIC | Age: 57
End: 2022-07-26
Payer: COMMERCIAL

## 2022-07-26 VITALS
HEIGHT: 69 IN | DIASTOLIC BLOOD PRESSURE: 85 MMHG | SYSTOLIC BLOOD PRESSURE: 132 MMHG | OXYGEN SATURATION: 96 % | WEIGHT: 184 LBS | TEMPERATURE: 97.8 F | BODY MASS INDEX: 27.25 KG/M2 | HEART RATE: 82 BPM

## 2022-07-26 DIAGNOSIS — Z23 COVID-19 VACCINE ADMINISTERED: ICD-10-CM

## 2022-07-26 DIAGNOSIS — G89.29 CHRONIC LEFT-SIDED LOW BACK PAIN WITH LEFT-SIDED SCIATICA: ICD-10-CM

## 2022-07-26 DIAGNOSIS — M54.42 CHRONIC LEFT-SIDED LOW BACK PAIN WITH LEFT-SIDED SCIATICA: ICD-10-CM

## 2022-07-26 DIAGNOSIS — F17.200 TOBACCO USE DISORDER: ICD-10-CM

## 2022-07-26 DIAGNOSIS — I10 ESSENTIAL HYPERTENSION: Primary | ICD-10-CM

## 2022-07-26 PROCEDURE — 99214 OFFICE O/P EST MOD 30 MIN: CPT | Mod: 25 | Performed by: STUDENT IN AN ORGANIZED HEALTH CARE EDUCATION/TRAINING PROGRAM

## 2022-07-26 PROCEDURE — 0064A COVID-19,PF,MODERNA (18+ YRS BOOSTER .25ML): CPT | Performed by: STUDENT IN AN ORGANIZED HEALTH CARE EDUCATION/TRAINING PROGRAM

## 2022-07-26 PROCEDURE — 91306 COVID-19,PF,MODERNA (18+ YRS BOOSTER .25ML): CPT | Performed by: STUDENT IN AN ORGANIZED HEALTH CARE EDUCATION/TRAINING PROGRAM

## 2022-07-26 NOTE — PATIENT INSTRUCTIONS
Referral for :     Physical Therapy    LOCATION/PLACE/Provider :    [facility] on [address]   DATE & TIME :    Aug. 2nd at 2:45 pm   PHONE :     [phone]  FAX :    [phone]  Appointment made by clinic staff/:    Lizbeth

## 2022-07-27 NOTE — PROGRESS NOTES
Preceptor Attestation:   Patient seen, evaluated and discussed with the resident. I have verified the content of the note, which accurately reflects my assessment of the patient and the plan of care.    Supervising Physician:Arnaldo Rawls    [facility]

## 2022-07-28 ENCOUNTER — PRE VISIT (OUTPATIENT)
Dept: UROLOGY | Facility: CLINIC | Age: 57
End: 2022-07-28

## 2022-07-28 NOTE — TELEPHONE ENCOUNTER
Reason for visit: 6 month follow up      Dx/Hx/Sx: BPH w/LUTS    Records/imaging/labs/orders: PSA in epic    At Rooming: video visit

## 2022-08-18 ENCOUNTER — LAB (OUTPATIENT)
Dept: LAB | Facility: CLINIC | Age: 57
End: 2022-08-18
Payer: COMMERCIAL

## 2022-08-18 DIAGNOSIS — N40.1 BENIGN PROSTATIC HYPERPLASIA WITH NOCTURIA: ICD-10-CM

## 2022-08-18 DIAGNOSIS — R35.1 BENIGN PROSTATIC HYPERPLASIA WITH NOCTURIA: ICD-10-CM

## 2022-08-18 LAB — PSA SERPL-MCNC: 3.02 UG/L (ref 0–4)

## 2022-08-18 PROCEDURE — 36415 COLL VENOUS BLD VENIPUNCTURE: CPT | Performed by: PATHOLOGY

## 2022-08-18 PROCEDURE — 84153 ASSAY OF PSA TOTAL: CPT | Performed by: PATHOLOGY
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
## 2022-08-23 ENCOUNTER — TELEPHONE (OUTPATIENT)
Dept: UROLOGY | Facility: CLINIC | Age: 57
End: 2022-08-23

## 2022-08-23 ENCOUNTER — VIRTUAL VISIT (OUTPATIENT)
Dept: UROLOGY | Facility: CLINIC | Age: 57
End: 2022-08-23
Payer: COMMERCIAL

## 2022-08-23 DIAGNOSIS — R35.0 URINARY FREQUENCY: Primary | ICD-10-CM

## 2022-08-23 PROCEDURE — 99213 OFFICE O/P EST LOW 20 MIN: CPT | Mod: 95 | Performed by: NURSE PRACTITIONER

## 2022-08-23 NOTE — PROGRESS NOTES
Piter is a 56 year old who is being evaluated via a billable video visit. This was changed to telephone as we were unable to establish video connection using either [employer] or [employer].    How would you like to obtain your AVS? Mail a copy     Duration of telephone call: 12 minutes

## 2022-08-23 NOTE — PATIENT INSTRUCTIONS
UROLOGY CLINIC VISIT PATIENT INSTRUCTIONS    -I have placed a referral for pelvic floor PT.   -Follow up with me in 4-5 months to revisit symptoms.     If you have any issues, questions or concerns in the meantime, do not hesitate to contact us at [phone] or via [employer].     Mari Graf, CNP  Department of Urology
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
## 2022-09-15 DIAGNOSIS — I10 ESSENTIAL HYPERTENSION: ICD-10-CM

## 2022-09-15 RX ORDER — LOSARTAN POTASSIUM 50 MG/1
100 TABLET ORAL DAILY
Qty: 180 TABLET | Refills: 1 | Status: SHIPPED | OUTPATIENT
Start: 2022-09-15 | End: 2023-03-29

## 2022-09-15 NOTE — TELEPHONE ENCOUNTER
BP at goal   Refill losartan     Requested follow up BMP around 10-11/2022     Matt Dozier DO   [facility] Medicine Resident   Pager#[phone]

## 2022-09-20 DIAGNOSIS — K20.80 ESOPHAGITIS, LOS ANGELES GRADE D: ICD-10-CM

## 2022-09-21 NOTE — TELEPHONE ENCOUNTER
See prior notes in relation to reflux / attempts at getting EGD   Have also tried to wean off PPI but has not tolerated

## 2022-09-24 NOTE — RESULT ENCOUNTER NOTE
"Please send the following in a letter to the patient:     \"Dear Piter,    Your liver and kidney function look stable.  Your blood counts are improved from last time.  This is likely fluctuations in blood counts from the [place].  Nothing needs to be changed at this time.  Call with questions.    Best wishes,  Dr. Bangura\"" 6

## 2022-10-10 ENCOUNTER — OFFICE VISIT (OUTPATIENT)
Dept: FAMILY MEDICINE | Facility: CLINIC | Age: 57
End: 2022-10-10
Payer: COMMERCIAL

## 2022-10-10 VITALS
SYSTOLIC BLOOD PRESSURE: 153 MMHG | WEIGHT: 187.8 LBS | OXYGEN SATURATION: 99 % | RESPIRATION RATE: 16 BRPM | HEART RATE: 85 BPM | DIASTOLIC BLOOD PRESSURE: 98 MMHG | BODY MASS INDEX: 27.73 KG/M2 | TEMPERATURE: 97.9 F

## 2022-10-10 DIAGNOSIS — F17.200 TOBACCO USE DISORDER: Primary | ICD-10-CM

## 2022-10-10 DIAGNOSIS — I10 ESSENTIAL HYPERTENSION: ICD-10-CM

## 2022-10-10 PROCEDURE — 99214 OFFICE O/P EST MOD 30 MIN: CPT | Mod: GC | Performed by: STUDENT IN AN ORGANIZED HEALTH CARE EDUCATION/TRAINING PROGRAM

## 2022-10-10 NOTE — PROGRESS NOTES
Preceptor Attestation:   Patient seen, evaluated and discussed with the resident. I have verified the content of the note, which accurately reflects my assessment of the patient and the plan of care.   Supervising Physician:  Arnaldo Cuellar MD

## 2022-10-10 NOTE — PROGRESS NOTES
Assessment & Plan     Tobacco use disorder  Uncontrolled. Finally ready for real cessation trial! Wants NRT, meds, and quit date.   - Outlined quit date in 1 month from today   - Discussed risks/benefits of following meds, verbalized understanding and in agreement to starting   - nicotine (NICORETTE) 2 MG gum; Place 1 each (2 mg) inside cheek every hour as needed for smoking cessation  Dispense: 100 each; Refill: 4   Did outline how to use   - nicotine (NICODERM CQ) 21 MG/24HR 24 hr patch; Place 1 patch onto the skin every 24 hours  Dispense: 28 patch; Refill: 4  - buPROPion (WELLBUTRIN XL) 150 MG 24 hr tablet; Take 1 tablet (150 mg) by mouth every morning  Dispense: 90 tablet; Refill: 2    Essential hypertension  BP at goal (<140/90) with home readings. Above goal here today. He feels his pressure is in a good range and is confident that quitting smoking will assist his HTN goals. verbalized understanding of risks of uncontrolled HTN.   - Continue ambulatory monitoring   - Continue current BP meds   - Smoking cessation   - F/u 1 mo as above     Future:   GERD Follow up: Has known [place] Grade D esophagitis on a prior upper scope and ongoing sx (not able to wean from PPI). Iron deficiency anemia in last year. Need to get scopes done. Did not want to overwhelm him today when he was ready to move toward smoking cessation.     Azam Dozier DO  [facility]    Precepted with Dr. Polo Schafer   Piter is a 56 year old HTN, HLD, tobacco use, GERD with esophagitis, BPH, mood disorder, chronic low back pain presenting for the following health issues:  RECHECK (Follow up injection)    HPI     Smokin PPD (from about the same)  Ready to start decreasing this   He is using NRT as prescribed   Hasn't clarified a quit date      BP today:    Was 132/85 --> higher today   Has been similar at home      Review of Systems   Constitutional, HEENT, cardiovascular, pulmonary, GI,  , musculoskeletal, neuro, skin, endocrine and psych systems are negative, except as otherwise noted.      Objective    BP (!) 153/98   Pulse 85   Temp 97.9  F (36.6  C)   Resp 16   Wt 85.2 kg (187 lb 12.8 oz)   SpO2 99%   BMI 27.73 kg/m    Body mass index is 27.73 kg/m .  Physical Exam     Gen: Pleasant. No distress.    HEENT: MMM.   Neck: No overt asymmetry.   CV: Appears well-perfused.   Resp: Breathing comfortably on room air.  Abd: Non-distended, non-tender.   Ext: No edema or overt asymmetry/deformity.   Neuro: Non-focal.   Psych: Calm.

## 2022-10-16 RX ORDER — BUPROPION HYDROCHLORIDE 150 MG/1
150 TABLET ORAL EVERY MORNING
Qty: 90 TABLET | Refills: 2 | Status: SHIPPED | OUTPATIENT
Start: 2022-10-16 | End: 2023-09-05

## 2022-10-16 RX ORDER — NICOTINE 21 MG/24HR
1 PATCH, TRANSDERMAL 24 HOURS TRANSDERMAL EVERY 24 HOURS
Qty: 28 PATCH | Refills: 4 | Status: SHIPPED | OUTPATIENT
Start: 2022-10-16

## 2022-10-18 DIAGNOSIS — L13.8 LINEAR IGA BULLOUS DERMATOSIS: ICD-10-CM

## 2022-10-22 RX ORDER — DAPSONE 25 MG/1
50 TABLET ORAL DAILY
Qty: 180 TABLET | Refills: 3 | Status: SHIPPED | OUTPATIENT
Start: 2022-10-22 | End: 2024-01-08

## 2022-10-30 ENCOUNTER — HOSPITAL ENCOUNTER (EMERGENCY)
Facility: HOSPITAL | Age: 57
Discharge: HOME | End: 2022-10-30
Admitting: PHYSICIAN ASSISTANT
Payer: COMMERCIAL

## 2022-10-30 VITALS
SYSTOLIC BLOOD PRESSURE: 157 MMHG | WEIGHT: 187 LBS | TEMPERATURE: 97.9 F | RESPIRATION RATE: 18 BRPM | DIASTOLIC BLOOD PRESSURE: 103 MMHG | OXYGEN SATURATION: 99 % | BODY MASS INDEX: 26.77 KG/M2 | HEIGHT: 70 IN | HEART RATE: 85 BPM

## 2022-10-30 DIAGNOSIS — K04.7 DENTAL INFECTION: ICD-10-CM

## 2022-10-30 PROCEDURE — 99283 EMERGENCY DEPT VISIT LOW MDM: CPT

## 2022-10-30 RX ORDER — OXYCODONE HYDROCHLORIDE 5 MG/1
5 TABLET ORAL EVERY 6 HOURS PRN
Qty: 3 TABLET | Refills: 0 | Status: SHIPPED | OUTPATIENT
Start: 2022-10-30 | End: 2022-11-02

## 2022-10-30 RX ORDER — PENICILLIN V POTASSIUM 500 MG/1
500 TABLET, FILM COATED ORAL 4 TIMES DAILY
Qty: 40 TABLET | Refills: 0 | Status: SHIPPED | OUTPATIENT
Start: 2022-10-30 | End: 2022-11-09

## 2022-10-30 ASSESSMENT — ACTIVITIES OF DAILY LIVING (ADL): ADLS_ACUITY_SCORE: 35

## 2022-10-30 NOTE — ED PROVIDER NOTES
ED PROVIDER NOTE    EMERGENCY DEPARTMENT ENCOUNTER      NAME: Piter Gaines  AGE: 56 year old male  YOB: 1965  MRN: [id number]  EVALUATION DATE & TIME: 10/30/2022 10:36 AM    PCP: Azam Dozier    ED PROVIDER: Mary Suarez PA-C      Chief Complaint   Patient presents with     Dental Pain         FINAL IMPRESSION:  1. Dental infection          MEDICAL DECISION MAKING:    Pertinent Labs & Imaging studies reviewed. (See chart for details)    56 year old male presenting with dental pain of the left upper gumline.  There is no appreciable swelling or visible abscess that would be amendable to drainage at this time.  Pharynx normal with no erythema, exudate, mass, or lesion visualized.  No sublingual or submental tenderness or swelling.  No trismus. We will cover hm with antibiotic, PCN, for infection.  Discussed importance of smoking cessation and f/u with dentistry for definitive care.      At the conclusion of the encounter I discussed the results of all of the tests and the disposition. The questions were answered. The patient or family acknowledged understanding and was agreeable with the care plan.       ED COURSE  10:39 AM   Met and evaluated patient. Discussed ED plan.       MEDICATIONS GIVEN IN THE EMERGENCY:  Medications - No data to display    NEW PRESCRIPTIONS STARTED AT TODAY'S ER VISIT  New Prescriptions    No medications on file          =================================================================    HPI    Patient information was obtained from: Patient    Piter Gaines is a 56 year old male w/ h/o tobaccus use, HTN, alcohol use who presents with dental pain.  Has h/o dental issues and previous followed with [facility].  5 days ago started developed increased dental pain over the left upper teeth.  Feels warm.  No recorded temperature.  Associated headache and nausea.      Took tylenol and ibuprofen w/o improvement.        REVIEW OF SYSTEMS   + dental pain,  headaches, nausea.  Negative for vomiting, neurologic symptoms    PAST MEDICAL HISTORY:  Past Medical History:   Diagnosis Date     Acute hyperactive alcohol withdrawal delirium (H) 4/6/2017     Alcohol abuse 11/10/2016     Alcohol abuse      Depression      Esophagitis      Hypertension      Hypertension      Kidney disease      Tobacco abuse      Vasovagal syncope 2/4/2019       PAST SURGICAL HISTORY:  Past Surgical History:   Procedure Laterality Date     ESOPHAGOSCOPY, GASTROSCOPY, DUODENOSCOPY (EGD), COMBINED N/A 4/6/2017    Procedure: ESOPHAGOGASTRODUODENOSCOPY (EGD);  Surgeon: Conrad Chao MD;  Location: [facility];  Service:      FOREARM SURGERY Left 1980's     HAND SURGERY Left            CURRENT MEDICATIONS:    No current facility-administered medications for this encounter.    Current Outpatient Medications:      acetaminophen (TYLENOL) 500 MG tablet, Take 1-2 tablets (500-1,000 mg) by mouth every 6 hours as needed for mild pain, Disp: 180 tablet, Rfl: 1     adapalene (DIFFERIN) 0.1 % external cream, Apply thin layer to beard and hairline region after washing hair/face everyday.  If irritation or peeling develops, skip application for one day. (Patient taking differently: Apply thin layer to beard and hairline region after washing hair/face everyday.  If irritation or peeling develops, skip application for one day.), Disp: 45 g, Rfl: 1     amLODIPine (NORVASC) 10 MG tablet, Take 1 tablet (10 mg) by mouth daily, Disp: 90 tablet, Rfl: 1     aspirin (ASA) 81 MG EC tablet, Take 1 tablet (81 mg) by mouth daily, Disp: 90 tablet, Rfl: 3     atorvastatin (LIPITOR) 20 MG tablet, Take 1 tablet (20 mg) by mouth daily, Disp: 90 tablet, Rfl: 1     buPROPion (WELLBUTRIN XL) 150 MG 24 hr tablet, Take 1 tablet (150 mg) by mouth every morning, Disp: 90 tablet, Rfl: 2     dapsone (ACZONE) 25 MG tablet, Take 2 tablets (50 mg) by mouth daily, Disp: 180 tablet, Rfl: 3     DULoxetine (CYMBALTA) 30 MG capsule, Take 1  capsule (30 mg) by mouth At Bedtime, Disp: 90 capsule, Rfl: 1     finasteride (PROSCAR) 5 MG tablet, Take 1 tablet (5 mg) by mouth daily, Disp: 90 tablet, Rfl: 1     gabapentin (NEURONTIN) 300 MG capsule, 300mg in the morning and afternoon and then 600mg in the evening, Disp: 270 capsule, Rfl: 1     hydrochlorothiazide (HYDRODIURIL) 12.5 MG tablet, TAKE 1 TABLET(12.5 MG) BY MOUTH DAILY, Disp: 90 tablet, Rfl: 1     hydrocortisone (CORTAID) 1 % external cream, Apply topically 2 times daily, Disp: 453 g, Rfl: 0     losartan (COZAAR) 50 MG tablet, Take 2 tablets (100 mg) by mouth daily, Disp: 180 tablet, Rfl: 1     nicotine (NICODERM CQ) 21 MG/24HR 24 hr patch, Place 1 patch onto the skin every 24 hours, Disp: 28 patch, Rfl: 4     nicotine (NICORETTE) 2 MG gum, Place 1 each (2 mg) inside cheek every hour as needed for smoking cessation, Disp: 100 each, Rfl: 4     omeprazole (PRILOSEC) 20 MG DR capsule, Take 1 capsule (20 mg) by mouth daily, Disp: 90 capsule, Rfl: 1     ondansetron (ZOFRAN-ODT) 4 MG ODT tab, Take 1 tablet (4 mg) by mouth every 8 hours as needed for nausea To be used for nausea associated with colonoscopy prep, Disp: 20 tablet, Rfl: 0     oxybutynin ER (DITROPAN-XL) 10 MG 24 hr tablet, Take 1 tablet (10 mg) by mouth daily, Disp: 90 tablet, Rfl: 3     SHINGRIX injection, ADM 0.5ML IM UTD, Disp: , Rfl:      tamsulosin (FLOMAX) 0.4 MG capsule, Take 2 capsules (0.8 mg) by mouth daily, Disp: 90 capsule, Rfl: 3    ALLERGIES:  Allergies   Allergen Reactions     No Known Allergies        FAMILY HISTORY:  Family History   Problem Relation Age of Onset     Bone Cancer Mother      Breast Cancer Mother      Diabetes Father      Kidney Disease Father      Diabetes Sister      Cerebrovascular Disease Brother      Lung Cancer Father      Kidney Disease Brother      Coronary Artery Disease Brother      Stomach Cancer No family hx of        SOCIAL HISTORY:   Smoking: + tobacco use    VITALS:  Vitals:    10/30/22 1031  "  BP: (!) 157/103   Pulse: 85   Resp: 18   Temp: 97.9  F (36.6  C)   TempSrc: Oral   SpO2: 99%   Weight: 84.8 kg (187 lb)   Height: 1.765 m (5' 9.5\")       PHYSICAL EXAM    General Appearance:  Alert, cooperative, no distress, appears stated age  HENT: Normocephalic without obvious deformity, atraumatic. Mucous membranes moist. Very poor dentition throughout. Several broken and missing teeth. There is erythema and sweling of the left upper ginigiva with tenderness to palpation of the teeth and gumline. No fluctuance or obvious abscess. No facial swelling. No submandibular swelling. No lymphadenopathy.   Eyes: Conjunctiva clear, Lids normal. No discharge.   Respiratory: No distress. Lungs clear to ausculation bilaterally. No wheezes, rhonchi or stridor  Cardiovascular: Regular rate and rhythm  Musculoskeletal: Moving all extremities. No gross deformities  Integument: Warm, dry, no rashes or lesions  Neurologic: Alert and orientated x3. No focal deficits.  Psych: Normal mood and affect        LAB:  Labs Ordered and Resulted from Time of ED Arrival to Time of ED Departure - No data to display    RADIOLOGY:  No orders to display             Mary Suarez PA-C   Emergency Medicine             aMry Suarez PA-C  10/31/22 1210    "

## 2022-10-30 NOTE — ED NOTES
ER DISCHARGE NOTE:   Piter Gaines  MRN: [id number]    Piter Gaines is ok to discharge from the emergency room.    (K04.7) Dental infection  Comment: medication and abx   Plan: follow up with PMD         Piter Gaines discharged via self with 2 rx's.    Verbalized understanding of discharge instructions. And tylenol and motrin for pain along with narcotic   Prescriptions: with pt .    AVS in hand.

## 2022-10-30 NOTE — DISCHARGE INSTRUCTIONS
Your symptoms are likely related to a dental infection.    We are placing you on antibiotics.  You need to follow-up with dentistry for further definitive care.  You should be seen by dentistry as soon as possible.    Avoid smoking, this is going to place you at higher risk of infection and slower healing.    Return to the ER if you have increased pain, fevers, swelling or difficulty opening the mouth.

## 2022-10-30 NOTE — ED NOTES
He is here today with lt sided moth pain. He said that it started on Wed. He has been gargling mouthwash for the discomfort. The pain is on the Rt side of his mouth. He too tylenol 2 days ago for the pain. Nothing since.

## 2022-10-30 NOTE — ED TRIAGE NOTES
Pain in left upper jaw that started on Wednesday. Each day has progressively gotten worse. Patient states he believes he has been running fevers since tooth pain began. Patient endorses using tylenol and Ibuprofen for pain, they were ineffective.     Triage Assessment     Row Name 10/30/22 1024       Triage Assessment (Adult)    Airway WDL WDL       Respiratory WDL    Respiratory WDL WDL       Skin Circulation/Temperature WDL    Skin Circulation/Temperature WDL WDL       Cardiac WDL    Cardiac WDL WDL       Peripheral/Neurovascular WDL    Peripheral Neurovascular WDL WDL       Cognitive/Neuro/Behavioral WDL    Cognitive/Neuro/Behavioral WDL WDL

## 2022-10-31 ENCOUNTER — PATIENT OUTREACH (OUTPATIENT)
Dept: CARE COORDINATION | Facility: CLINIC | Age: 57
End: 2022-10-31

## 2022-10-31 ENCOUNTER — MEDICAL CORRESPONDENCE (OUTPATIENT)
Dept: HEALTH INFORMATION MANAGEMENT | Facility: CLINIC | Age: 57
End: 2022-10-31

## 2022-10-31 NOTE — PROGRESS NOTES
Clinic Care Coordination Contact    Follow Up Progress Note      Assessment: The pt was recently in the ED, I called to check up on the pt and help the pt setup a ED follow up. The pt was at [facility] for dental pain. I called and talked to the pt, pt stated that he is doing better, but would like a follow up. I was able to help setup for the pt to come in on 11/07/2022 at 2:20pm with .      Care Gaps:    Health Maintenance Due   Topic Date Due     ADVANCE CARE PLANNING  Never done     HEPATITIS B IMMUNIZATION (1 of 3 - 3-dose series) Never done     Pneumococcal Vaccine: Pediatrics (0 to 5 Years) and At-Risk Patients (6 to 64 Years) (1 - PCV) Never done     MEDICARE ANNUAL WELLNESS VISIT  Never done     COLORECTAL CANCER SCREENING  05/24/2017     ZOSTER IMMUNIZATION (2 of 3) 12/01/2020     INFLUENZA VACCINE (1) 09/01/2022     COVID-19 Vaccine (4 - Booster for Moderna series) 09/20/2022     PHQ-9  10/20/2022           Care Plans      Intervention/Education provided during outreach:               Plan:     Care Coordinator will follow up in

## 2022-11-07 ENCOUNTER — OFFICE VISIT (OUTPATIENT)
Dept: FAMILY MEDICINE | Facility: CLINIC | Age: 57
End: 2022-11-07
Payer: COMMERCIAL

## 2022-11-07 VITALS
RESPIRATION RATE: 20 BRPM | HEIGHT: 69 IN | BODY MASS INDEX: 26.96 KG/M2 | TEMPERATURE: 98.4 F | OXYGEN SATURATION: 98 % | DIASTOLIC BLOOD PRESSURE: 89 MMHG | SYSTOLIC BLOOD PRESSURE: 127 MMHG | WEIGHT: 182 LBS | HEART RATE: 92 BPM

## 2022-11-07 DIAGNOSIS — Z23 NEED FOR PROPHYLACTIC VACCINATION AND INOCULATION AGAINST INFLUENZA: ICD-10-CM

## 2022-11-07 DIAGNOSIS — K08.89 PAIN, DENTAL: ICD-10-CM

## 2022-11-07 DIAGNOSIS — Z09 FOLLOW-UP EXAM: Primary | ICD-10-CM

## 2022-11-07 PROCEDURE — 0124A COVID-19,PF,PFIZER BOOSTER BIVALENT: CPT | Performed by: STUDENT IN AN ORGANIZED HEALTH CARE EDUCATION/TRAINING PROGRAM

## 2022-11-07 PROCEDURE — G0008 ADMIN INFLUENZA VIRUS VAC: HCPCS | Performed by: STUDENT IN AN ORGANIZED HEALTH CARE EDUCATION/TRAINING PROGRAM

## 2022-11-07 PROCEDURE — 99213 OFFICE O/P EST LOW 20 MIN: CPT | Mod: 25 | Performed by: STUDENT IN AN ORGANIZED HEALTH CARE EDUCATION/TRAINING PROGRAM

## 2022-11-07 PROCEDURE — G0009 ADMIN PNEUMOCOCCAL VACCINE: HCPCS | Performed by: STUDENT IN AN ORGANIZED HEALTH CARE EDUCATION/TRAINING PROGRAM

## 2022-11-07 PROCEDURE — 90677 PCV20 VACCINE IM: CPT | Performed by: STUDENT IN AN ORGANIZED HEALTH CARE EDUCATION/TRAINING PROGRAM

## 2022-11-07 PROCEDURE — 90682 RIV4 VACC RECOMBINANT DNA IM: CPT | Performed by: STUDENT IN AN ORGANIZED HEALTH CARE EDUCATION/TRAINING PROGRAM

## 2022-11-07 PROCEDURE — 91312 COVID-19,PF,PFIZER BOOSTER BIVALENT: CPT | Performed by: STUDENT IN AN ORGANIZED HEALTH CARE EDUCATION/TRAINING PROGRAM

## 2022-11-07 RX ORDER — NAPROXEN 250 MG/1
250 TABLET ORAL 2 TIMES DAILY WITH MEALS
Qty: 28 TABLET | Refills: 1 | Status: SHIPPED | OUTPATIENT
Start: 2022-11-07 | End: 2023-07-28

## 2022-11-07 NOTE — PROGRESS NOTES
"  Assessment & Plan     Pain, dental  Improved/resolved s/p abx/pain med.   - Encouraged him to follow up with dental clinic as outlined   - Wants PRN option in case pain recurs to avoid ED   - naproxen (NAPROSYN) 250 MG tablet; Take 1 tablet (250 mg) by mouth 2 times daily (with meals)  Dispense: 28 tablet; Refill: 1  - Continue with smoking cessation efforts     Need for prophylactic vaccination and inoculation against influenza  - INFLUENZA QUAD, RECOMBINANT, P-FREE (RIV4) (FLUBLOK)  - Also provided COVID booster, pneumococcal vaccines     Follow-up exam  Reviewed ED documentation   -as above     Encouraged him to follow up for GI screenings as noted prior      Azam Dozier DO   [facility]    Precepted with Dr. Gennaro Schafer   Piter is a 56 year old presenting for the following health issues:  Imm/Inj (Flu Shot)    HPI     ED/UC Followup:    Facility:  [facility]   Date of visit: 10/30/22   Reason for visit: Dental pain   Current Status: better; no pain. No swelling. No fever or chills. No swelling.     Presented with left upper gum pain x 4 days    Exam: no swelling or abscess   Given prophylactic penicillin     Since then:    Saw dentist? Has appointment  -- in 3 days      Smoking   Didn't smoke 3-4 days with the pain   Smoking like 0.5 (down from 1 PPD).     Blood pressure:   Was 157/103 in ED   Today: 127/89      Review of Systems   Constitutional, HEENT, cardiovascular, pulmonary, GI, , musculoskeletal, neuro, skin, endocrine and psych systems are negative, except as otherwise noted.      Objective    /89   Pulse 92   Temp 98.4  F (36.9  C)   Resp 20   Ht 1.753 m (5' 9\")   Wt 82.6 kg (182 lb)   SpO2 98%   BMI 26.88 kg/m    Body mass index is 26.88 kg/m .  Physical Exam     Gen: Pleasant. No distress.     HEENT: MMM. Poor dentition with diffuse carries    Neck: No overt asymmetry.   CV: Appears well-perfused. RRR. No murmur.   Resp: Breathing " comfortably on room air. Lungs clear to auscultation bilaterally without wheeze or crackle.   Abd: Non-distended, non-tender.   Ext: No edema or overt asymmetry/deformity.   Skin: No overt rash on easily visualized skin.   Neuro: Non-focal.   Psych: Calm.

## 2022-12-12 DIAGNOSIS — N40.1 BENIGN PROSTATIC HYPERPLASIA WITH NOCTURIA: ICD-10-CM

## 2022-12-12 DIAGNOSIS — R35.1 BENIGN PROSTATIC HYPERPLASIA WITH NOCTURIA: ICD-10-CM

## 2022-12-14 RX ORDER — FINASTERIDE 5 MG/1
5 TABLET, FILM COATED ORAL DAILY
Qty: 90 TABLET | Refills: 2 | Status: SHIPPED | OUTPATIENT
Start: 2022-12-14 | End: 2023-09-12

## 2022-12-26 DIAGNOSIS — M54.50 CHRONIC LOW BACK PAIN WITHOUT SCIATICA, UNSPECIFIED BACK PAIN LATERALITY: ICD-10-CM

## 2022-12-26 DIAGNOSIS — G89.29 CHRONIC LOW BACK PAIN WITHOUT SCIATICA, UNSPECIFIED BACK PAIN LATERALITY: ICD-10-CM

## 2022-12-28 RX ORDER — DULOXETIN HYDROCHLORIDE 30 MG/1
30 CAPSULE, DELAYED RELEASE ORAL AT BEDTIME
Qty: 90 CAPSULE | Refills: 1 | Status: SHIPPED | OUTPATIENT
Start: 2022-12-28 | End: 2023-07-28

## 2023-01-13 DIAGNOSIS — K20.80 ESOPHAGITIS, LOS ANGELES GRADE D: ICD-10-CM

## 2023-01-13 NOTE — TELEPHONE ENCOUNTER
Refill   Have been working to try to get him to GI for upper scope for over 1 year   Will continue to follow and encourage upper scope

## 2023-02-24 DIAGNOSIS — I10 ESSENTIAL HYPERTENSION: ICD-10-CM

## 2023-02-24 RX ORDER — HYDROCHLOROTHIAZIDE 12.5 MG/1
12.5 TABLET ORAL DAILY
Qty: 90 TABLET | Refills: 1 | Status: SHIPPED | OUTPATIENT
Start: 2023-02-24 | End: 2023-09-05

## 2023-02-24 NOTE — TELEPHONE ENCOUNTER
Refill   Last BP at goal     Called to request a BP follow in 2+ months so we can get an interval BMP

## 2023-03-05 DIAGNOSIS — K20.80 ESOPHAGITIS, LOS ANGELES GRADE D: ICD-10-CM

## 2023-03-05 NOTE — PROGRESS NOTES
03/05/23    Refill omeprazole requested   I called pt to discuss   I have recommended upper scope multiple times throughout the years   Have also discussed the risks of staying on chronic PPI  He is too anxious about nausea associated with upper scope   I have requested he at least see [facility] to discuss further   He declines   I recommended we discuss this at next visit     Dr. Dozier

## 2023-03-14 DIAGNOSIS — I65.21 CAROTID STENOSIS, NON-SYMPTOMATIC, RIGHT: ICD-10-CM

## 2023-03-14 RX ORDER — ATORVASTATIN CALCIUM 20 MG/1
20 TABLET, FILM COATED ORAL DAILY
Qty: 90 TABLET | Refills: 2 | Status: SHIPPED | OUTPATIENT
Start: 2023-03-14 | End: 2023-12-11

## 2023-03-17 DIAGNOSIS — R39.9 LOWER URINARY TRACT SYMPTOMS (LUTS): Primary | ICD-10-CM

## 2023-03-20 RX ORDER — OXYBUTYNIN CHLORIDE 10 MG/1
10 TABLET, EXTENDED RELEASE ORAL DAILY
Qty: 90 TABLET | Refills: 2 | Status: SHIPPED | OUTPATIENT
Start: 2023-03-20 | End: 2023-12-13

## 2023-03-29 DIAGNOSIS — I10 ESSENTIAL HYPERTENSION: ICD-10-CM

## 2023-03-30 DIAGNOSIS — R35.1 BENIGN PROSTATIC HYPERPLASIA WITH NOCTURIA: ICD-10-CM

## 2023-03-30 DIAGNOSIS — N40.1 BENIGN PROSTATIC HYPERPLASIA WITH NOCTURIA: ICD-10-CM

## 2023-03-30 RX ORDER — TAMSULOSIN HYDROCHLORIDE 0.4 MG/1
0.8 CAPSULE ORAL DAILY
Qty: 90 CAPSULE | Refills: 3 | Status: SHIPPED | OUTPATIENT
Start: 2023-03-30 | End: 2023-10-10

## 2023-03-30 RX ORDER — LOSARTAN POTASSIUM 50 MG/1
100 TABLET ORAL DAILY
Qty: 180 TABLET | Refills: 1 | Status: SHIPPED | OUTPATIENT
Start: 2023-03-30 | End: 2023-09-11

## 2023-03-30 NOTE — TELEPHONE ENCOUNTER
[facility] phone call message- medication clarification/question:    Full Medication Name: tamsulosin (FLOMAX) 0.4 MG capsule    Question: Patient called requesting for refill on medication. If able to fill please send to pharmacy thank you.    Pharmacy confirmed as [id number] - [place], [place] - [address] AT [address]: Yes    OK to leave a message on voice mail? Yes    Primary language: English      needed? No    Call taken on March 30, 2023 at 1:26 PM by Tayla Yarbrough

## 2023-03-30 NOTE — TELEPHONE ENCOUNTER
Message to physician:     Date of last visit: 11/14/2022    Date of next visit if scheduled:     Potassium   Date Value Ref Range Status   04/20/2022 3.5 3.5 - 5.0 mmol/L Final   04/27/2021 3.7 3.4 - 5.3 mmol/L Final     Creatinine   Date Value Ref Range Status   04/20/2022 1.28 0.70 - 1.30 mg/dL Final   04/27/2021 1.2 0.8 - 1.5 mg/dL Final     GFR Estimate   Date Value Ref Range Status   04/20/2022 66 >60 mL/min/1.73m2 Final     Comment:     Effective December 21, 2021 eGFRcr in adults is calculated using the 2021 CKD-EPI creatinine equation which includes age and gender (Lauren et al., NEJM, DOI: 10.1056/WLHPxi2094295)   03/11/2021 >60 >60 mL/min/1.73m2 Final       BP Readings from Last 3 Encounters:   11/07/22 127/89   10/30/22 (!) 157/103   10/10/22 (!) 153/98       Hemoglobin A1C   Date Value Ref Range Status   07/17/2020 6.1 (H) 4.1 - 5.7 % Final       Please complete refill and CLOSE ENCOUNTER.  Closing the encounter signifies the refill is complete.

## 2023-05-17 ENCOUNTER — PATIENT OUTREACH (OUTPATIENT)
Dept: FAMILY MEDICINE | Facility: CLINIC | Age: 58
End: 2023-05-17

## 2023-05-17 NOTE — TELEPHONE ENCOUNTER
Vascular Panel Management last visit 11/7/23, pt good for BP, LDL was good las one in April 2022 and is an everyday smoker.    Patient Quality Outreach    Patient is due for the following:   Hypertension -  BP check    Next Steps:   Patient did not answer, Left VM     Type of outreach:    Phone, left message for patient/parent to call back.    Next Steps:  Reach out within 3 days via Phone.    Max number of attempts reached: No. Will try again in 90 days if patient still on fail list.    Questions for provider review:    None           Laurie Villalba MA  Chart routed to Provider and CMA to call in 3 days

## 2023-06-15 DIAGNOSIS — K20.80 ESOPHAGITIS, LOS ANGELES GRADE D: ICD-10-CM

## 2023-06-15 NOTE — TELEPHONE ENCOUNTER
Called to request an appointment / check on GI referral status   No answer x 3   Encourage next provider to revisit this with him as I have been trying to wean the PPI / get him to see GI since I have known him     Dr. Dozier

## 2023-07-28 ENCOUNTER — OFFICE VISIT (OUTPATIENT)
Dept: FAMILY MEDICINE | Facility: CLINIC | Age: 58
End: 2023-07-28
Payer: COMMERCIAL

## 2023-07-28 VITALS
HEIGHT: 68 IN | TEMPERATURE: 98.1 F | HEART RATE: 102 BPM | OXYGEN SATURATION: 95 % | WEIGHT: 168.8 LBS | BODY MASS INDEX: 25.58 KG/M2 | SYSTOLIC BLOOD PRESSURE: 117 MMHG | DIASTOLIC BLOOD PRESSURE: 79 MMHG

## 2023-07-28 DIAGNOSIS — M54.50 CHRONIC LOW BACK PAIN WITHOUT SCIATICA, UNSPECIFIED BACK PAIN LATERALITY: ICD-10-CM

## 2023-07-28 DIAGNOSIS — N18.2 CHRONIC KIDNEY DISEASE, STAGE II (MILD): ICD-10-CM

## 2023-07-28 DIAGNOSIS — Z00.00 ANNUAL PHYSICAL EXAM: Primary | ICD-10-CM

## 2023-07-28 DIAGNOSIS — R80.9 PROTEINURIA, UNSPECIFIED TYPE: ICD-10-CM

## 2023-07-28 DIAGNOSIS — G89.29 CHRONIC LOW BACK PAIN WITHOUT SCIATICA, UNSPECIFIED BACK PAIN LATERALITY: ICD-10-CM

## 2023-07-28 DIAGNOSIS — D64.9 NORMOCYTIC ANEMIA: ICD-10-CM

## 2023-07-28 DIAGNOSIS — Z12.11 COLON CANCER SCREENING: ICD-10-CM

## 2023-07-28 DIAGNOSIS — I10 ESSENTIAL HYPERTENSION: ICD-10-CM

## 2023-07-28 LAB
ERYTHROCYTE [DISTWIDTH] IN BLOOD BY AUTOMATED COUNT: 14.3 % (ref 10–15)
HCT VFR BLD AUTO: 35.8 % (ref 40–53)
HGB BLD-MCNC: 11.5 G/DL (ref 13.3–17.7)
MCH RBC QN AUTO: 28.5 PG (ref 26.5–33)
MCHC RBC AUTO-ENTMCNC: 32.1 G/DL (ref 31.5–36.5)
MCV RBC AUTO: 89 FL (ref 78–100)
PLATELET # BLD AUTO: 184 10E3/UL (ref 150–450)
RBC # BLD AUTO: 4.03 10E6/UL (ref 4.4–5.9)
WBC # BLD AUTO: 6.6 10E3/UL (ref 4–11)

## 2023-07-28 PROCEDURE — 80061 LIPID PANEL: CPT

## 2023-07-28 PROCEDURE — 85027 COMPLETE CBC AUTOMATED: CPT

## 2023-07-28 PROCEDURE — G0010 ADMIN HEPATITIS B VACCINE: HCPCS

## 2023-07-28 PROCEDURE — 99396 PREV VISIT EST AGE 40-64: CPT | Mod: 25

## 2023-07-28 PROCEDURE — 80048 BASIC METABOLIC PNL TOTAL CA: CPT

## 2023-07-28 PROCEDURE — 36415 COLL VENOUS BLD VENIPUNCTURE: CPT

## 2023-07-28 PROCEDURE — 90746 HEPB VACCINE 3 DOSE ADULT IM: CPT

## 2023-07-28 RX ORDER — DULOXETIN HYDROCHLORIDE 30 MG/1
30 CAPSULE, DELAYED RELEASE ORAL AT BEDTIME
Qty: 90 CAPSULE | Refills: 1 | Status: SHIPPED | OUTPATIENT
Start: 2023-07-28 | End: 2024-03-13

## 2023-07-28 ASSESSMENT — ACTIVITIES OF DAILY LIVING (ADL): CURRENT_FUNCTION: NO ASSISTANCE NEEDED

## 2023-07-28 NOTE — PROGRESS NOTES
SUBJECTIVE:   CC: Piter is an 57 year old who presents for preventative health visit.   {(!) Visit Details have not yet been documented.  Please enter Visit Details and then use this list to pull in documentation. (Optional):271253}    HPI        {Add if <65 person on Medicare  - Required Questions (Optional):266166}  {Outside tests to abstract? :893462}    {additional problems to add (Optional):457033}  Have you ever done Advance Care Planning? (For example, a Health Directive, POLST, or a discussion with a medical provider or your loved ones about your wishes): { :765525}    Social History     Tobacco Use    Smoking status: Every Day     Packs/day: 1.00     Years: 10.00     Pack years: 10.00     Types: Cigarettes    Smokeless tobacco: Never    Tobacco comments:     1/2 pack a day     Substance Use Topics    Alcohol use: Not Currently     Alcohol/week: 14.0 standard drinks of alcohol     Types: 6 Cans of beer, 8 Shots of liquor per week     Comment: quit 5 years ago     {Rooming staff  Click this link to complete the Prescreen if response below is not for today's visit  Alcohol Use Prescreen >3 drinks/day or > 7 drinks/week.  If the prescreen question answer is YES, complete the full AUDIT  :779066}         No data to display            {add AUDIT responses (Optional) (A score of 7 for adult men is an indication of hazardous drinking; a score of 8 or more is an indication of an alcohol use disorder.  A score of 7 or more for adult women is an indication of hazardous drinking or an alchohol use disorder):272382}    Last PSA:   PSA   Date Value Ref Range Status   02/01/2021 2.5 0.0 - 3.5 ng/mL Final     PSA Tumor Marker   Date Value Ref Range Status   08/18/2022 3.02 0.00 - 4.00 ug/L Final       Reviewed orders with patient. Reviewed health maintenance and updated orders accordingly - { :732546}  {Chronicprobdata (optional):923084}    Reviewed and updated as needed this visit by clinical staff               "    Reviewed and updated as needed this visit by Provider                 {HISTORY OPTIONS (Optional):224674}    Review of Systems  {MALE ROS (Optional):899820}    OBJECTIVE:   There were no vitals taken for this visit.    Physical Exam  {Exam Choices (Optional):150730}    {Diagnostic Test Results (Optional):227564}    ASSESSMENT/PLAN:   {Diag Picklist:134074}    {Patient advised of split billing (Optional):519448}      COUNSELING:   {MALE COUNSELING MESSAGES:579728::\"Reviewed preventive health counseling, as reflected in patient instructions\"}      BMI:   Estimated body mass index is 26.88 kg/m  as calculated from the following:    Height as of 11/7/22: 1.753 m (5' 9\").    Weight as of 11/7/22: 82.6 kg (182 lb).   {Weight Management Plan needed for ACO:396817}      He reports that he has been smoking cigarettes. He has a 10.00 pack-year smoking history. He has never used smokeless tobacco.  Nicotine/Tobacco Cessation Plan:   {Nicotine/Tobacco Cessation Plan:606685}      {Counseling Resources  [place] Preventive Services Task Force  Cholesterol Screening  Health diet/nutrition  Pooled Cohorts Equation Calculator  USDA's MyPlate  ASA Prophylaxis  Lung CA Screening  Osteoporosis prevention/bone health :194265}  {Prostate Cancer Screening  Consider for men 55-69 per guidance from USPSTF :847842}    Kang Celeste MD  [facility]  "

## 2023-07-28 NOTE — PATIENT INSTRUCTIONS
cologuard at pharmacy  Refilled duloxetine  Hep B 1/3 vaccine giving today  Will contact you if labs (CBC, Urine, Lipids, BMP) are abnormal  Follow up as needed  Discussed tobacco cessation - resources are available as needed

## 2023-07-28 NOTE — PROGRESS NOTES
Preceptor Attestation:  Patient's case reviewed and discussed with the resident, Kang Celeste MD, and I personally evaluated the patient. I agree with written assessment and plan of care.    Supervising Physician:  Daphne Burdick MD   [facility]

## 2023-07-28 NOTE — PROGRESS NOTES
"SUBJECTIVE:   CC: Piter is an 57 year old who presents for preventative health visit.     Piter Gaines is a 56 y/o M with PMH HTN, BPH, alcohol use disorder, CKD, nomocytic anemia, esophagitis who presents to clinic today for annual physical. Says he feels well today, no concerns at this time. He is celebrating 7 years sobriety from alcohol, tobacco use has decreased to 1/2 ppd down from 1 ppd last visit. Discussed preventative health concerns today such as colon cancer screening, HepB vaccine, tobacco cessation. Discussed second dose of shingles vaccine but he would like to defer for a later time. He is agreeable to cologuard screening, deferred colonoscopy at this time. He is doing well with his daily exercise (walking his dog) and diet. Discussed tobacco cessation, says he has some nicotine patches left at home and was not interested in counseling at this time. This writer feels this is an agreeable plan for the time being as he is self motivated to continue cutting down.     Healthy Habits:     In general, how would you rate your overall health?  Very good    Frequency of exercise:  6-7 days/week    Duration of exercise:  30-45 minutes    Do you usually eat at least 4 servings of fruit and vegetables a day, include whole grains    & fiber and avoid regularly eating high fat or \"junk\" foods?  Yes    Taking medications regularly:  Yes    Barriers to taking medications:  Not applicable    Medication side effects:  None    Ability to successfully perform activities of daily living:  No assistance needed    Home Safety:  No safety concerns identified    Hearing Impairment:  No hearing concerns    In the past 6 months, have you been bothered by leaking of urine?  No    In general, how would you rate your overall mental or emotional health?  Very good    Additional concerns today:  No      Social History     Tobacco Use    Smoking status: Every Day     Packs/day: 1.00     Years: 10.00     Pack years: 10.00     Types: " "Cigarettes    Smokeless tobacco: Never    Tobacco comments:     1/2 pack a day     Substance Use Topics    Alcohol use: Not Currently     Alcohol/week: 14.0 standard drinks of alcohol     Types: 6 Cans of beer, 8 Shots of liquor per week     Comment: quit 5 years ago         Last PSA:   PSA   Date Value Ref Range Status   02/01/2021 2.5 0.0 - 3.5 ng/mL Final     PSA Tumor Marker   Date Value Ref Range Status   08/18/2022 3.02 0.00 - 4.00 ug/L Final       Reviewed orders with patient. Reviewed health maintenance and updated orders accordingly - Yes  Lab work is in process    Reviewed and updated as needed this visit by clinical staff        Review of Systems   All other systems reviewed and are negative.        OBJECTIVE:   /79   Pulse 102   Temp 98.1  F (36.7  C) (Oral)   Ht 1.738 m (5' 8.43\")   Wt 76.6 kg (168 lb 12.8 oz)   SpO2 95%   BMI 25.35 kg/m       Physical Exam  Constitutional:       Appearance: Normal appearance.   HENT:      Right Ear: Tympanic membrane, ear canal and external ear normal.      Left Ear: Tympanic membrane, ear canal and external ear normal.      Mouth/Throat:      Mouth: Mucous membranes are moist.      Pharynx: Oropharynx is clear.   Eyes:      Extraocular Movements: Extraocular movements intact.      Pupils: Pupils are equal, round, and reactive to light.   Cardiovascular:      Rate and Rhythm: Normal rate and regular rhythm.      Pulses: Normal pulses.      Heart sounds: Normal heart sounds.   Pulmonary:      Effort: Pulmonary effort is normal.      Breath sounds: Normal breath sounds.   Abdominal:      General: Bowel sounds are normal.   Musculoskeletal:         General: Normal range of motion.   Skin:     General: Skin is warm.      Capillary Refill: Capillary refill takes less than 2 seconds.   Neurological:      General: No focal deficit present.      Mental Status: He is alert and oriented to person, place, and time. Mental status is at baseline.   Psychiatric:         " "Mood and Affect: Mood normal.         Behavior: Behavior normal.           ASSESSMENT/PLAN:   (Z00.00) Annual physical exam  (primary encounter diagnosis)  Comment: Yearly lipid panel due to hx of DLD, on atorvastatin therapy  Plan: Basic metabolic panel, Lipid panel, UA reflex         to Microscopic, COLOGUARD([employer])  -Follow up in 1 year for routine wellness physical exam or sooner as needed by patient    Hx Depression  Plan: DULoxetine (CYMBALTA) 30 MG capsule        Continue current dose for depression, stable    (N18.2) Chronic kidney disease, stage II (mild)  Comment: Appears to be have normalized per last lab values, will recheck today due to hx of proteinuria and CKD  Plan: UA reflex to Microscopic           (I10) Essential hypertension  Comment: BP well controlled today  Continue Amlodipine as prescribed.  Plan: Basic metabolic panel, continue amlodipine        (R80.9) Proteinuria, unspecified type  Comment: Protein noted in last UA 2020, recheck today due to hx of CKD  Plan: UA reflex to Microscopic    (D64.9) Normocytic anemia  Comment: recheck today since still on dapsone. Prior testing in 2022 demonstrated low HgB and RBC felt to be due to this medication.  Plan: CBC with platelets    (Z12.11) Colon cancer screening  Comment: He is within age range of routine colon cancer screening w/out family hx of colon cancer.  Pt. Not agreeable to colonoscopy, reasonable to do cologuard at this time.  Plan: COLOGUARD([employer])      COUNSELING:   deferred      BMI:   Estimated body mass index is 26.88 kg/m  as calculated from the following:    Height as of 11/7/22: 1.753 m (5' 9\").    Weight as of 11/7/22: 82.6 kg (182 lb).         He reports that he has been smoking cigarettes. He has a 10.00 pack-year smoking history. He has never used smokeless tobacco.  Nicotine/Tobacco Cessation Plan:   Information offered: Patient not interested at this time            Kang Celeste MD  [facility] [place]  July 28, 2023

## 2023-07-29 ENCOUNTER — LAB (OUTPATIENT)
Dept: FAMILY MEDICINE | Facility: CLINIC | Age: 58
End: 2023-07-29
Payer: COMMERCIAL

## 2023-07-29 DIAGNOSIS — Z00.00 ANNUAL PHYSICAL EXAM: ICD-10-CM

## 2023-07-29 DIAGNOSIS — Z12.11 COLON CANCER SCREENING: ICD-10-CM

## 2023-07-29 LAB
ANION GAP SERPL CALCULATED.3IONS-SCNC: 10 MMOL/L (ref 7–15)
BUN SERPL-MCNC: 20.7 MG/DL (ref 6–20)
CALCIUM SERPL-MCNC: 9 MG/DL (ref 8.6–10)
CHLORIDE SERPL-SCNC: 104 MMOL/L (ref 98–107)
CHOLEST SERPL-MCNC: 100 MG/DL
CREAT SERPL-MCNC: 1.65 MG/DL (ref 0.67–1.17)
DEPRECATED HCO3 PLAS-SCNC: 24 MMOL/L (ref 22–29)
GFR SERPL CREATININE-BSD FRML MDRD: 48 ML/MIN/1.73M2
GLUCOSE SERPL-MCNC: 109 MG/DL (ref 70–99)
HDLC SERPL-MCNC: 32 MG/DL
LDLC SERPL CALC-MCNC: 30 MG/DL
NONHDLC SERPL-MCNC: 68 MG/DL
POTASSIUM SERPL-SCNC: 3.6 MMOL/L (ref 3.4–5.3)
SODIUM SERPL-SCNC: 138 MMOL/L (ref 136–145)
TRIGL SERPL-MCNC: 189 MG/DL

## 2023-08-11 ENCOUNTER — OFFICE VISIT (OUTPATIENT)
Dept: FAMILY MEDICINE | Facility: CLINIC | Age: 58
End: 2023-08-11
Payer: COMMERCIAL

## 2023-08-11 VITALS
WEIGHT: 166 LBS | DIASTOLIC BLOOD PRESSURE: 88 MMHG | OXYGEN SATURATION: 97 % | SYSTOLIC BLOOD PRESSURE: 142 MMHG | HEART RATE: 95 BPM | TEMPERATURE: 99.2 F | HEIGHT: 70 IN | BODY MASS INDEX: 23.77 KG/M2

## 2023-08-11 DIAGNOSIS — R79.89 ELEVATED SERUM CREATININE: Primary | ICD-10-CM

## 2023-08-11 DIAGNOSIS — L13.8 LINEAR IGA BULLOUS DERMATOSIS: ICD-10-CM

## 2023-08-11 DIAGNOSIS — N18.2 CHRONIC KIDNEY DISEASE, STAGE II (MILD): ICD-10-CM

## 2023-08-11 DIAGNOSIS — L30.9 DERMATITIS: ICD-10-CM

## 2023-08-11 LAB
ALBUMIN SERPL BCG-MCNC: 4.2 G/DL (ref 3.5–5.2)
ALP SERPL-CCNC: 87 U/L (ref 40–129)
ALT SERPL W P-5'-P-CCNC: 17 U/L (ref 0–70)
ANION GAP SERPL CALCULATED.3IONS-SCNC: 12 MMOL/L (ref 7–15)
AST SERPL W P-5'-P-CCNC: 30 U/L (ref 0–45)
BILIRUB DIRECT SERPL-MCNC: <0.2 MG/DL (ref 0–0.3)
BILIRUB SERPL-MCNC: 0.3 MG/DL
BUN SERPL-MCNC: 18.9 MG/DL (ref 6–20)
CALCIUM SERPL-MCNC: 8.9 MG/DL (ref 8.6–10)
CHLORIDE SERPL-SCNC: 106 MMOL/L (ref 98–107)
CREAT SERPL-MCNC: 1.57 MG/DL (ref 0.67–1.17)
CREAT UR-MCNC: 385 MG/DL
DEPRECATED HCO3 PLAS-SCNC: 23 MMOL/L (ref 22–29)
GFR SERPL CREATININE-BSD FRML MDRD: 51 ML/MIN/1.73M2
GLUCOSE SERPL-MCNC: 120 MG/DL (ref 70–99)
MICROALBUMIN UR-MCNC: 21.6 MG/L
MICROALBUMIN/CREAT UR: 5.61 MG/G CR (ref 0–17)
POTASSIUM SERPL-SCNC: 3.5 MMOL/L (ref 3.4–5.3)
PROT SERPL-MCNC: 7.4 G/DL (ref 6.4–8.3)
SODIUM SERPL-SCNC: 141 MMOL/L (ref 136–145)

## 2023-08-11 PROCEDURE — 82248 BILIRUBIN DIRECT: CPT

## 2023-08-11 PROCEDURE — 80053 COMPREHEN METABOLIC PANEL: CPT

## 2023-08-11 PROCEDURE — 99214 OFFICE O/P EST MOD 30 MIN: CPT | Mod: GC

## 2023-08-11 PROCEDURE — 82570 ASSAY OF URINE CREATININE: CPT

## 2023-08-11 PROCEDURE — 82043 UR ALBUMIN QUANTITATIVE: CPT

## 2023-08-11 PROCEDURE — 36415 COLL VENOUS BLD VENIPUNCTURE: CPT

## 2023-08-11 RX ORDER — BENZOCAINE/MENTHOL 6 MG-10 MG
LOZENGE MUCOUS MEMBRANE 2 TIMES DAILY
Qty: 453 G | Refills: 0 | Status: SHIPPED | OUTPATIENT
Start: 2023-08-11 | End: 2023-11-13

## 2023-08-11 RX ORDER — BENZOCAINE/MENTHOL 6 MG-10 MG
LOZENGE MUCOUS MEMBRANE 2 TIMES DAILY
Qty: 453 G | Status: CANCELLED | OUTPATIENT
Start: 2023-08-11

## 2023-08-11 ASSESSMENT — PAIN SCALES - GENERAL: PAINLEVEL: NO PAIN (0)

## 2023-08-11 ASSESSMENT — PATIENT HEALTH QUESTIONNAIRE - PHQ9: SUM OF ALL RESPONSES TO PHQ QUESTIONS 1-9: 1

## 2023-08-11 NOTE — PROGRESS NOTES
Assessment & Plan     (R79.89) Elevated serum creatinine  (primary encounter diagnosis)  (N18.2) Chronic kidney disease, stage II (mild)  Comment: Unclear etiology. May be related to his medications vs occurred during his initial bout of illness, however this is unlikely to have elevated his creatinine so much acutely. Will repeat labs today, plan to come back in 2 weeks to discuss and go over med list to ensure that these are not causing his MALGORZATA. Plan to follow up in 2 weeks. Low threshold to refer to nephrology.  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 Still on dapsone for IgA bullous dermatosis, monitor closely.  Plan: hydrocortisone (CORTAID) 1 % external cream,         Hepatic panel            Return in about 2 weeks (around 8/25/2023).    Any Truong MD  [facility] [place]  Precepted patient with  Dr. Burdick .    Gilmar Dumas is a 57 year old, presenting for the following health issues:  RECHECK (Follow up on results on kidney )        8/11/2023     3:50 PM   Additional Questions   Roomed by frank OLMEDO     Here for follow-up on labs  Had elevated creatinine at wellness visit on 7/28/2023 at 1.65, elevated from baseline which is less than 1.2  Has a mild history of CKD2 in the past, though no complications  At time of test, was not feeling well, though presented for a wellness visit  Had some sort of stomach bug, some watery diarrhea  Had a fever and chills, passed transiently  Did not have much to drink on day of test either  Unclear other etiology  Does not report smoking, alcohol, or drug use  Has a family history of some renal problems, appears one uncle or brother required dialysis for end stage renal disease  Does have a history of BPH with LUTS and IgA bullous dermatosis  On dapsone, could be playing a  "factor into his elevated creatinine  Question if IgA bullous dermatosis is a predisposing factor for IgA related nephropathy  Will repeat labs today  Patient also with other complications, should come in to establish care with me so we can go over his med list      Review of Systems   Constitutional, HEENT, cardiovascular, pulmonary, gi and gu systems are negative, except as otherwise noted.      Objective    BP (!) 142/88   Pulse 95   Temp 99.2  F (37.3  C) (Tympanic)   Ht 1.765 m (5' 9.5\")   Wt 75.3 kg (166 lb)   SpO2 97%   BMI 24.16 kg/m    Body mass index is 24.16 kg/m .    Physical Exam   General: Alert and oriented x 3, no acute distress  Skin: clean, dry, and intact. No active bullous lesions, scars present.  HEENT: normocephalic, atraumatic, PERRL, EOMI, no conjunctival injection or icterus, ears and nose normal, no LAD or masses  Resp: clear to ausculation bilaterally, no rales or wheezes  Cardio: RRR, S1 and S2 present, no S3 or S4, no rubs or murmurs  Abdomen: soft, nontender, nondistended, bowel sounds present x 4, no masses, no hepatosplenomegaly  Extremities: no erythema or edema  Psych: normal mood, normal mentation          ----- Service Performed and Documented by Resident or Fellow ------              "

## 2023-08-22 NOTE — PROGRESS NOTES
Preceptor Attestation:  Patient's case reviewed and discussed with the resident, Any Truong MD, and I personally evaluated the patient. I agree with written assessment and plan of care.    Supervising Physician:  Daphne Burdick MD   [facility]

## 2023-08-26 ENCOUNTER — APPOINTMENT (OUTPATIENT)
Dept: CT IMAGING | Facility: HOSPITAL | Age: 58
End: 2023-08-26
Attending: EMERGENCY MEDICINE
Payer: COMMERCIAL

## 2023-08-26 ENCOUNTER — HOSPITAL ENCOUNTER (EMERGENCY)
Facility: HOSPITAL | Age: 58
Discharge: HOME | End: 2023-08-26
Attending: EMERGENCY MEDICINE | Admitting: EMERGENCY MEDICINE
Payer: COMMERCIAL

## 2023-08-26 ENCOUNTER — APPOINTMENT (OUTPATIENT)
Dept: RADIOLOGY | Facility: HOSPITAL | Age: 58
End: 2023-08-26
Attending: EMERGENCY MEDICINE
Payer: COMMERCIAL

## 2023-08-26 VITALS
OXYGEN SATURATION: 98 % | RESPIRATION RATE: 18 BRPM | HEART RATE: 70 BPM | TEMPERATURE: 97.8 F | BODY MASS INDEX: 25.18 KG/M2 | DIASTOLIC BLOOD PRESSURE: 108 MMHG | SYSTOLIC BLOOD PRESSURE: 182 MMHG | HEIGHT: 69 IN | WEIGHT: 170 LBS

## 2023-08-26 DIAGNOSIS — M54.42 ACUTE LEFT-SIDED LOW BACK PAIN WITH LEFT-SIDED SCIATICA: ICD-10-CM

## 2023-08-26 DIAGNOSIS — R07.89 CHEST WALL PAIN: ICD-10-CM

## 2023-08-26 DIAGNOSIS — M54.2 NECK PAIN ON LEFT SIDE: ICD-10-CM
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
## 2023-08-26 PROCEDURE — 93005 ELECTROCARDIOGRAM TRACING: CPT | Performed by: EMERGENCY MEDICINE

## 2023-08-26 PROCEDURE — 71101 X-RAY EXAM UNILAT RIBS/CHEST: CPT | Mod: LT

## 2023-08-26 PROCEDURE — 72125 CT NECK SPINE W/O DYE: CPT

## 2023-08-26 PROCEDURE — 250N000013 HC RX MED GY IP 250 OP 250 PS 637: Performed by: EMERGENCY MEDICINE

## 2023-08-26 PROCEDURE — 99285 EMERGENCY DEPT VISIT HI MDM: CPT | Mod: 25

## 2023-08-26 PROCEDURE — 70450 CT HEAD/BRAIN W/O DYE: CPT

## 2023-08-26 PROCEDURE — 72131 CT LUMBAR SPINE W/O DYE: CPT

## 2023-08-26 RX ORDER — OXYCODONE HYDROCHLORIDE 5 MG/1
5 TABLET ORAL ONCE
Status: COMPLETED | OUTPATIENT
Start: 2023-08-26 | End: 2023-08-26

## 2023-08-26 RX ORDER — LIDOCAINE 50 MG/G
1 PATCH TOPICAL EVERY 24 HOURS
Qty: 10 PATCH | Refills: 0 | Status: SHIPPED | OUTPATIENT
Start: 2023-08-26 | End: 2023-09-05

## 2023-08-26 RX ORDER — OXYCODONE HYDROCHLORIDE 5 MG/1
5 TABLET ORAL 3 TIMES DAILY PRN
Qty: 6 TABLET | Refills: 0 | Status: SHIPPED | OUTPATIENT
Start: 2023-08-26 | End: 2023-08-30

## 2023-08-26 RX ORDER — NAPROXEN 500 MG/1
500 TABLET ORAL 2 TIMES DAILY WITH MEALS
Qty: 14 TABLET | Refills: 0 | Status: SHIPPED | OUTPATIENT
Start: 2023-08-26 | End: 2023-08-30

## 2023-08-26 RX ADMIN — OXYCODONE HYDROCHLORIDE 5 MG: 5 TABLET ORAL at 15:42

## 2023-08-26 NOTE — ED NOTES
Pt endorses headache and dizziness. States he has throbbing behind his eyes and in the front of head.
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
## 2023-08-26 NOTE — DISCHARGE INSTRUCTIONS
Take naproxen twice a day with food for next week or so.  Use lidoderm patches as directed. Take Tylenol for pain.  Oxycodone for breakthrough pain. Be sure to drink water regularly during the day and lightly stretch/move to reduce stiffening. This can take several days to a few weeks to improve/resolve.
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
## 2023-08-28 ENCOUNTER — PATIENT OUTREACH (OUTPATIENT)
Dept: CARE COORDINATION | Facility: CLINIC | Age: 58
End: 2023-08-28
Payer: COMMERCIAL
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
## 2023-08-29 ENCOUNTER — PATIENT OUTREACH (OUTPATIENT)
Dept: CARE COORDINATION | Facility: CLINIC | Age: 58
End: 2023-08-29
Payer: COMMERCIAL

## 2023-08-29 DIAGNOSIS — K20.80 ESOPHAGITIS, LOS ANGELES GRADE D: ICD-10-CM
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
## 2023-08-30 ENCOUNTER — OFFICE VISIT (OUTPATIENT)
Dept: FAMILY MEDICINE | Facility: CLINIC | Age: 58
End: 2023-08-30

## 2023-08-30 VITALS
RESPIRATION RATE: 18 BRPM | BODY MASS INDEX: 23.98 KG/M2 | DIASTOLIC BLOOD PRESSURE: 87 MMHG | HEIGHT: 70 IN | SYSTOLIC BLOOD PRESSURE: 129 MMHG | OXYGEN SATURATION: 95 % | HEART RATE: 77 BPM | TEMPERATURE: 97.5 F | WEIGHT: 167.5 LBS
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
## 2023-08-30 DIAGNOSIS — M54.50 ACUTE BILATERAL LOW BACK PAIN WITHOUT SCIATICA: ICD-10-CM

## 2023-08-30 PROCEDURE — 99214 OFFICE O/P EST MOD 30 MIN: CPT | Mod: GC

## 2023-08-30 RX ORDER — NAPROXEN 500 MG/1
500 TABLET ORAL 2 TIMES DAILY WITH MEALS
Qty: 14 TABLET | Refills: 0 | Status: SHIPPED | OUTPATIENT
Start: 2023-08-30 | End: 2023-11-13

## 2023-08-30 RX ORDER — BENZOCAINE/MENTHOL 6 MG-10 MG
LOZENGE MUCOUS MEMBRANE 2 TIMES DAILY
Qty: 453 G | Status: CANCELLED | OUTPATIENT
Start: 2023-08-30

## 2023-08-30 RX ORDER — PSEUDOEPHED/ACETAMINOPH/DIPHEN 30MG-500MG
500 TABLET ORAL EVERY 6 HOURS PRN
Qty: 180 TABLET | Refills: 1 | Status: SHIPPED | OUTPATIENT
Start: 2023-08-30

## 2023-08-30 RX ORDER — CYCLOBENZAPRINE HCL 5 MG
5-10 TABLET ORAL 3 TIMES DAILY PRN
Qty: 60 TABLET | Refills: 0 | Status: SHIPPED | OUTPATIENT
Start: 2023-08-30 | End: 2024-09-16
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
## 2023-09-01 LAB
ATRIAL RATE - MUSE: 80 BPM
DIASTOLIC BLOOD PRESSURE - MUSE: 93 MMHG
INTERPRETATION ECG - MUSE: NORMAL
P AXIS - MUSE: 64 DEGREES
PR INTERVAL - MUSE: 210 MS
QRS DURATION - MUSE: 92 MS
QT - MUSE: 378 MS
QTC - MUSE: 435 MS
R AXIS - MUSE: 42 DEGREES
SYSTOLIC BLOOD PRESSURE - MUSE: 146 MMHG
T AXIS - MUSE: 38 DEGREES
VENTRICULAR RATE- MUSE: 80 BPM

## 2023-09-02 NOTE — PROGRESS NOTES
Preceptor Attestation:  Patient's case reviewed and discussed with Reny Rust MD resident and I evaluated the patient. I agree with written assessment and plan of care.  Supervising Physician:  CHAS COX MD  [facility]

## 2023-09-05 DIAGNOSIS — F17.200 TOBACCO USE DISORDER: ICD-10-CM

## 2023-09-05 DIAGNOSIS — I10 ESSENTIAL HYPERTENSION: ICD-10-CM

## 2023-09-05 RX ORDER — HYDROCHLOROTHIAZIDE 12.5 MG/1
12.5 TABLET ORAL DAILY
Qty: 90 TABLET | Refills: 3 | Status: SHIPPED | OUTPATIENT
Start: 2023-09-05

## 2023-09-05 RX ORDER — BUPROPION HYDROCHLORIDE 150 MG/1
150 TABLET ORAL EVERY MORNING
Qty: 90 TABLET | Refills: 3 | Status: SHIPPED | OUTPATIENT
Start: 2023-09-05

## 2023-09-06 LAB — NONINV COLON CA DNA+OCC BLD SCRN STL QL: POSITIVE

## 2023-09-08 ENCOUNTER — TELEPHONE (OUTPATIENT)
Dept: FAMILY MEDICINE | Facility: CLINIC | Age: 58
End: 2023-09-08
Payer: COMMERCIAL

## 2023-09-08 DIAGNOSIS — R19.5 POSITIVE COLORECTAL CANCER SCREENING USING COLOGUARD TEST: Primary | ICD-10-CM

## 2023-09-08 RX ORDER — SOD SULF/POT CHLORIDE/MAG SULF 1.479 G
12 TABLET ORAL ONCE
Qty: 24 TABLET | Refills: 0 | Status: SHIPPED | OUTPATIENT
Start: 2023-09-08 | End: 2023-09-08

## 2023-09-08 NOTE — TELEPHONE ENCOUNTER
I called Piter to share the results of his positive Cologuard. He is not experiencing any symptoms of concern. He has not tolerated the colonoscopy prep previously because he has an oral aversion to the traditional bowel prep. He would be open to doing the colonoscopy if we are able to identify alternative prep.     Daphne Burdick MD

## 2023-09-08 NOTE — TELEPHONE ENCOUNTER
I spoke with Dr. Rawls about colonoscopy prep options and he recommended exploring coverage for Sutab as an alternative that Piter may tolerate better. Can we please call Piter to let him know that we are beginning the process to see if a tablet-based medication called Sutab may be able to be covered. We likely will need to pursue a prior authorization for this. I put the order in today and we will begin the process to determine coverage.     Thanks!    Daphne Burdick MD

## 2023-09-11 DIAGNOSIS — I10 ESSENTIAL HYPERTENSION: ICD-10-CM

## 2023-09-12 DIAGNOSIS — N40.1 BENIGN PROSTATIC HYPERPLASIA WITH NOCTURIA: ICD-10-CM

## 2023-09-12 DIAGNOSIS — R35.1 BENIGN PROSTATIC HYPERPLASIA WITH NOCTURIA: ICD-10-CM

## 2023-09-13 RX ORDER — FINASTERIDE 5 MG/1
5 TABLET, FILM COATED ORAL DAILY
Qty: 30 TABLET | Refills: 0 | Status: SHIPPED | OUTPATIENT
Start: 2023-09-13 | End: 2023-10-25

## 2023-09-13 RX ORDER — LOSARTAN POTASSIUM 50 MG/1
100 TABLET ORAL DAILY
Qty: 180 TABLET | Refills: 0 | Status: SHIPPED | OUTPATIENT
Start: 2023-09-13 | End: 2024-09-16 | Stop reason: ALTCHOICE

## 2023-09-13 NOTE — TELEPHONE ENCOUNTER
Refilled losartan. Needs BMP at next visit given unclear elevation of creatinine per chart review and recent use of naproxen.    Any Truong MD  [facility]

## 2023-09-13 NOTE — TELEPHONE ENCOUNTER
Called and Lvm. Please look at Dr. Burdick note.     Sutab maybe covered and look like we are going through prior auth

## 2023-09-13 NOTE — TELEPHONE ENCOUNTER
finasteride (PROSCAR) 5 MG tablet     90 tablet 2 12/14/2022       Last Office Visit : 8-  Future Office visit:  none        BPH Agents [id number]/12/2023 02:01 PM   Protocol Details Recent (12 mo) or future (30 days) visit within the authorizing provider's department        Maren refill  Scheduling has been notified to contact the pt for appointment.

## 2023-09-14 ENCOUNTER — TELEPHONE (OUTPATIENT)
Dept: UROLOGY | Facility: CLINIC | Age: 58
End: 2023-09-14
Payer: COMMERCIAL

## 2023-09-18 ENCOUNTER — TELEPHONE (OUTPATIENT)
Dept: UROLOGY | Facility: CLINIC | Age: 58
End: 2023-09-18
Payer: COMMERCIAL

## 2023-10-04 ENCOUNTER — TELEPHONE (OUTPATIENT)
Dept: FAMILY MEDICINE | Facility: CLINIC | Age: 58
End: 2023-10-04
Payer: COMMERCIAL

## 2023-10-04 NOTE — TELEPHONE ENCOUNTER
Called pt to make an appt, no answer. LVMTCB to make appt with Dionne.    If pt calls back please make appt

## 2023-10-10 DIAGNOSIS — R35.1 BENIGN PROSTATIC HYPERPLASIA WITH NOCTURIA: ICD-10-CM

## 2023-10-10 DIAGNOSIS — N40.1 BENIGN PROSTATIC HYPERPLASIA WITH NOCTURIA: ICD-10-CM

## 2023-10-11 RX ORDER — TAMSULOSIN HYDROCHLORIDE 0.4 MG/1
0.8 CAPSULE ORAL DAILY
Qty: 90 CAPSULE | Refills: 1 | Status: SHIPPED | OUTPATIENT
Start: 2023-10-11 | End: 2024-01-02

## 2023-10-11 NOTE — TELEPHONE ENCOUNTER
Refilled Flomax to pharmacy on record.    Any Truong MD  [facility]

## 2023-10-17 ENCOUNTER — TELEPHONE (OUTPATIENT)
Dept: DERMATOLOGY | Facility: CLINIC | Age: 58
End: 2023-10-17
Payer: COMMERCIAL

## 2023-10-17 NOTE — TELEPHONE ENCOUNTER
Via phone patient was scheduled for the following    Appointment type: New    Provider: Dr. Villanueva    Return date: 05-28-24    Specialty phone number: [phone]      Additonal Notes: Patient was informed to call and cancelled or rescheduled if he couldn't make appointment as he has no show 1st 2 appointments.

## 2023-10-25 DIAGNOSIS — N40.1 BENIGN PROSTATIC HYPERPLASIA WITH NOCTURIA: ICD-10-CM

## 2023-10-25 DIAGNOSIS — R35.1 BENIGN PROSTATIC HYPERPLASIA WITH NOCTURIA: ICD-10-CM

## 2023-10-25 RX ORDER — FINASTERIDE 5 MG/1
5 TABLET, FILM COATED ORAL DAILY
Qty: 30 TABLET | Refills: 2 | Status: SHIPPED | OUTPATIENT
Start: 2023-10-25 | End: 2024-01-02

## 2023-11-13 ENCOUNTER — OFFICE VISIT (OUTPATIENT)
Dept: FAMILY MEDICINE | Facility: CLINIC | Age: 58
End: 2023-11-13
Payer: COMMERCIAL

## 2023-11-13 VITALS
DIASTOLIC BLOOD PRESSURE: 92 MMHG | BODY MASS INDEX: 26.22 KG/M2 | HEIGHT: 69 IN | SYSTOLIC BLOOD PRESSURE: 142 MMHG | OXYGEN SATURATION: 95 % | WEIGHT: 177 LBS | HEART RATE: 102 BPM

## 2023-11-13 DIAGNOSIS — L30.9 DERMATITIS: ICD-10-CM

## 2023-11-13 DIAGNOSIS — I10 ESSENTIAL HYPERTENSION: ICD-10-CM

## 2023-11-13 DIAGNOSIS — R79.89 ELEVATED SERUM CREATININE: Primary | ICD-10-CM

## 2023-11-13 LAB — HBA1C MFR BLD: 5.3 % (ref 0–5.6)

## 2023-11-13 PROCEDURE — 83036 HEMOGLOBIN GLYCOSYLATED A1C: CPT

## 2023-11-13 PROCEDURE — 36415 COLL VENOUS BLD VENIPUNCTURE: CPT

## 2023-11-13 PROCEDURE — 80048 BASIC METABOLIC PNL TOTAL CA: CPT

## 2023-11-13 PROCEDURE — 99214 OFFICE O/P EST MOD 30 MIN: CPT | Mod: GC

## 2023-11-13 RX ORDER — BENZOCAINE/MENTHOL 6 MG-10 MG
LOZENGE MUCOUS MEMBRANE 2 TIMES DAILY PRN
Qty: 120 G | Refills: 1 | Status: SHIPPED | OUTPATIENT
Start: 2023-11-13

## 2023-11-13 RX ORDER — AMLODIPINE BESYLATE 5 MG/1
5 TABLET ORAL DAILY
Qty: 90 TABLET | Refills: 0 | Status: SHIPPED | OUTPATIENT
Start: 2023-11-13 | End: 2024-01-11
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
## 2023-11-14 LAB
ANION GAP SERPL CALCULATED.3IONS-SCNC: 11 MMOL/L (ref 7–15)
BUN SERPL-MCNC: 17.2 MG/DL (ref 6–20)
CALCIUM SERPL-MCNC: 9.3 MG/DL (ref 8.6–10)
CHLORIDE SERPL-SCNC: 106 MMOL/L (ref 98–107)
CREAT SERPL-MCNC: 1.25 MG/DL (ref 0.67–1.17)
DEPRECATED HCO3 PLAS-SCNC: 25 MMOL/L (ref 22–29)
EGFRCR SERPLBLD CKD-EPI 2021: 67 ML/MIN/1.73M2
GLUCOSE SERPL-MCNC: 91 MG/DL (ref 70–99)
POTASSIUM SERPL-SCNC: 4.3 MMOL/L (ref 3.4–5.3)
SODIUM SERPL-SCNC: 142 MMOL/L (ref 135–145)

## 2023-11-15 ENCOUNTER — HOSPITAL ENCOUNTER (OUTPATIENT)
Facility: AMBULATORY SURGERY CENTER | Age: 58
End: 2023-11-15
Attending: SURGERY
Payer: COMMERCIAL

## 2023-11-15 ENCOUNTER — TELEPHONE (OUTPATIENT)
Dept: GASTROENTEROLOGY | Facility: CLINIC | Age: 58
End: 2023-11-15
Payer: COMMERCIAL

## 2023-11-15 DIAGNOSIS — Z12.11 SPECIAL SCREENING FOR MALIGNANT NEOPLASMS, COLON: Primary | ICD-10-CM

## 2023-11-15 NOTE — TELEPHONE ENCOUNTER
"Endoscopy Scheduling Screen    Have you had a positive Covid test in the last 14 days?  No    Are you active on MyChart?   Yes    What insurance is in the chart?  Other:  [facility]     Ordering/Referring Provider:     EDD ENCISO      (If ordering provider performs procedure, schedule with ordering provider unless otherwise instructed. )    BMI: Estimated body mass index is 26.14 kg/m  as calculated from the following:    Height as of 11/13/23: 1.753 m (5' 9\").    Weight as of 11/13/23: 80.3 kg (177 lb).     Sedation Ordered  moderate sedation.   If patient BMI > 50 do not schedule in [facility].    If patient BMI > 45 do not schedule at [facility].    Are you taking methadone or Suboxone?  No    Are you taking any prescription medications for pain 3 or more times per week?  - PERCOCET   Yes, sedation and prep (RN Review required.)    Do you have a history of malignant hyperthermia or adverse reaction to anesthesia?  No    (Females) Are you currently pregnant?   No     Have you been diagnosed or told you have pulmonary hypertension?   No    Do you have an LVAD?  No    Have you been told you have moderate to severe sleep apnea?  No    Have you been told you have COPD, asthma, or any other lung disease?  No    Do you have any heart conditions?  No     Have you ever had an organ transplant?   No    Have you ever had or are you awaiting a heart or lung transplant?   No    Have you had a stroke or transient ischemic attack (TIA aka \"mini stroke\" in the last 6 months?   No    Have you been diagnosed with or been told you have cirrhosis of the liver?   No    Are you currently on dialysis?   No    Do you need assistance transferring?   No    BMI: Estimated body mass index is 26.14 kg/m  as calculated from the following:    Height as of 11/13/23: 1.753 m (5' 9\").    Weight as of 11/13/23: 80.3 kg (177 lb).     Is patients BMI > 40 and scheduling location [facility]?  No    Do you take an injectable medication for weight loss or " diabetes (excluding insulin)?  No    Do you take the medication Naltrexone?  No    Do you take blood thinners?  No       Prep   Are you currently on dialysis or do you have chronic kidney disease?  No    Do you have a diagnosis of diabetes?  No    Do you have a diagnosis of cystic fibrosis (CF)?  No    On a regular basis do you go 3 -5 days between bowel movements?  No    BMI > 40?  No    Preferred Pharmacy:    [id number] [address] AT [facility] [address]  [address]  [facility] [address]  Phone: [phone] Fax: [phone]      Final Scheduling Details   Colonoscopy prep sent?  Golytely Extended Prep    Procedure scheduled  Colonoscopy    Surgeon:  DEL      Date of procedure:  02/14/2024     Pre-OP / PAC:   No - Not required for this site.    Location  [facility] - Per order.    Sedation   MAC/Deep Sedation - Per exclusion criteria.      Patient Reminders:   You will receive a call from a Nurse to review instructions and health history.  This assessment must be completed prior to your procedure.  Failure to complete the Nurse assessment may result in the procedure being cancelled.      On the day of your procedure, please designate an adult(s) who can drive you home stay with you for the next 24 hours. The medicines used in the exam will make you sleepy. You will not be able to drive.      You cannot take public transportation, ride share services, or non-medical taxi service without a responsible caregiver.  Medical transport services are allowed with the requirement that a responsible caregiver will receive you at your destination.  We require that drivers and caregivers are confirmed prior to your procedure.

## 2023-11-27 ENCOUNTER — TRANSFERRED RECORDS (OUTPATIENT)
Dept: HEALTH INFORMATION MANAGEMENT | Facility: CLINIC | Age: 58
End: 2023-11-27
Payer: COMMERCIAL

## 2023-12-11 DIAGNOSIS — I65.21 CAROTID STENOSIS, NON-SYMPTOMATIC, RIGHT: ICD-10-CM

## 2023-12-11 NOTE — TELEPHONE ENCOUNTER
Message to physician:     Date of last visit: 11/13/2023    Date of next visit if scheduled:     Potassium   Date Value Ref Range Status   11/13/2023 4.3 3.4 - 5.3 mmol/L Final   04/20/2022 3.5 3.5 - 5.0 mmol/L Final   04/27/2021 3.7 3.4 - 5.3 mmol/L Final     Creatinine   Date Value Ref Range Status   11/13/2023 1.25 (H) 0.67 - 1.17 mg/dL Final   04/27/2021 1.2 0.8 - 1.5 mg/dL Final     GFR Estimate   Date Value Ref Range Status   11/13/2023 67 >60 mL/min/1.73m2 Final   03/11/2021 >60 >60 mL/min/1.73m2 Final       BP Readings from Last 3 Encounters:   11/13/23 (!) 142/92   08/30/23 129/87   08/26/23 (!) 182/108       Hemoglobin A1C   Date Value Ref Range Status   11/13/2023 5.3 0.0 - 5.6 % Final     Comment:     Normal <5.7%   Prediabetes 5.7-6.4%    Diabetes 6.5% or higher     Note: Adopted from ADA consensus guidelines.   07/17/2020 6.1 (H) 4.1 - 5.7 % Final       Please complete refill and CLOSE ENCOUNTER.  Closing the encounter signifies the refill is complete.

## 2023-12-12 RX ORDER — ATORVASTATIN CALCIUM 20 MG/1
20 TABLET, FILM COATED ORAL DAILY
Qty: 90 TABLET | Refills: 2 | Status: SHIPPED | OUTPATIENT
Start: 2023-12-12

## 2023-12-13 DIAGNOSIS — R39.9 LOWER URINARY TRACT SYMPTOMS (LUTS): ICD-10-CM

## 2023-12-13 RX ORDER — OXYBUTYNIN CHLORIDE 10 MG/1
10 TABLET, EXTENDED RELEASE ORAL DAILY
Qty: 30 TABLET | Refills: 0 | Status: SHIPPED | OUTPATIENT
Start: 2023-12-13 | End: 2024-01-02

## 2023-12-13 NOTE — TELEPHONE ENCOUNTER
oxyBUTYnin ER (DITROPAN XL) 10 MG 24 hr tablet   Last Written Prescription Date:  3/20/2023  Last Fill Quantity: 90,   # refills: 2  Last Office Visit : 8/23/2022  Future Office visit:  1/2/2024  30 Days sent to pharm to cover Pt until visit the begin of January 2024.     Josephine Be RN  Central Triage Red Flags/Med Refills

## 2023-12-15 ENCOUNTER — PRE VISIT (OUTPATIENT)
Dept: UROLOGY | Facility: CLINIC | Age: 58
End: 2023-12-15
Payer: COMMERCIAL

## 2024-01-01 NOTE — PROGRESS NOTES
Virtual visit:     Patient location: Parked car  Provider location: Onsite    Video start time: 1:09 PM  Video end time: 1:15 PM         Assessment and Plan:     Assessment: 58 year old male with a history of BPH with LUTS (frequency, nocturia, hesitancy, and sensation of incomplete emptying) who reports better control of symptoms with combination therapy of Flomax 0.8 mg, finasteride 5 mg and oxybutynin XL 10 mg daily. Denies any complaints and does not have any questions for me today. Due for PSA check so will order today and advised him to have this done at the lab.     Plan:  -PSA  -Continue Flomax 0.8 mg, finasteride 5 mg and oxybutynin XL 10 mg daily. Refills sent.   -Follow up with with in one year.    Mari Graf, CNP  Department of Urology           Chief Complaint:   LUTS follow up         History of Present Illness:    Piter Gaines is a 58 year old male with a history of BPH with LUTS (frequency, nocturia, hesitancy, and sensation of incomplete emptying) who presents for virtual follow up. Symptoms refractory to both Flomax and finasteride. UDS completed in 08/2021, which showed a small bladder capacity and very brief bladder contraction. He was, however, able to empty his bladder completely with no evidence of obstruction. Oxybutynin was added to his regimen and while this did help to some degree, he remained bothered with hourly voiding and nocturia x4-5. Have discussed cystoscopy to evaluate his bladder outlet, though due to concern of pain, the patient has declined. Have also considered pelvic floor involvement and he was referred to PT last year.     His last PSA was checked in 08/2022 and was 3.02.     Today, he states that he has continued to use all three of his medications and symptoms have been under pretty good control. Does not have any concerns to discuss today.          Past Medical History:     Past Medical History:   Diagnosis Date    Acute hyperactive alcohol withdrawal delirium (H)  4/6/2017    Alcohol abuse 11/10/2016    Alcohol abuse     Depression     Esophagitis     Hypertension     Hypertension     Kidney disease     Tobacco abuse     Vasovagal syncope 2/4/2019            Past Surgical History:     Past Surgical History:   Procedure Laterality Date    ESOPHAGOSCOPY, GASTROSCOPY, DUODENOSCOPY (EGD), COMBINED N/A 4/6/2017    Procedure: ESOPHAGOGASTRODUODENOSCOPY (EGD);  Surgeon: Conrad Chao MD;  Location: [facility];  Service:     FOREARM SURGERY Left 1980's    HAND SURGERY Left             Medications     Current Outpatient Medications   Medication    acetaminophen (TYLENOL) 500 MG tablet    adapalene (DIFFERIN) 0.1 % external cream    amLODIPine (NORVASC) 5 MG tablet    aspirin (ASA) 81 MG EC tablet    atorvastatin (LIPITOR) 20 MG tablet    buPROPion (WELLBUTRIN XL) 150 MG 24 hr tablet    cyclobenzaprine (FLEXERIL) 5 MG tablet    dapsone (ACZONE) 25 MG tablet    DULoxetine (CYMBALTA) 30 MG capsule    finasteride (PROSCAR) 5 MG tablet    gabapentin (NEURONTIN) 300 MG capsule    hydrochlorothiazide (HYDRODIURIL) 12.5 MG tablet    hydrocortisone (CORTAID) 1 % external cream    losartan (COZAAR) 50 MG tablet    nicotine (NICODERM CQ) 21 MG/24HR 24 hr patch    nicotine (NICORETTE) 2 MG gum    omeprazole (PRILOSEC) 20 MG DR capsule    oxyBUTYnin ER (DITROPAN XL) 10 MG 24 hr tablet    tamsulosin (FLOMAX) 0.4 MG capsule     No current facility-administered medications for this visit.            Allergies:   No known allergies         Review of Systems:  From intake questionnaire   Negative 14 system review except as noted on HPI, nurse's note.         Physical Exam:   Exam:   No vital signs obtained due to virtual visit.  General Appearance: Well groomed, hygenic  Respiratory: No cough, no respiratory distress or labored breathing  Musculoskeletal: Grossly normal with no gross deficits  Skin: No discoloration or apparent rashes  Neurologic: No tremors  Psychiatric: Alert and  oriented  Further examination is deferred due to the nature of our visit.       Labs and Pathology:    I personally reviewed all applicable laboratory data and went over findings with patient  Significant for:    CBC RESULTS:  Recent Labs   Lab Test 07/28/23  1435 01/21/22  1414 10/04/21  1210 09/01/21  2344   WBC 6.6 6.2 8.2 10.0   HGB 11.5* 12.2* 12.6* 11.6*    199 206 162        BMP RESULTS:  Recent Labs   Lab Test 11/13/23  1153 08/11/23  1624 07/28/23  1435 04/20/22  1201 03/25/21  0952 03/11/21  0750 03/10/21  0658 03/09/21  1817 07/17/20  0951 04/13/20  1755    141 138 139   < > 141 140 140   < > 139   POTASSIUM 4.3 3.5 3.6 3.5   < > 3.8 3.9 3.8   < > 3.7   CHLORIDE 106 106 104 100   < > 105 108* 107   < > 105   CO2 25 23 24 26   < > 29 27 24   < > 22   ANIONGAP 11 12 10 13   < > 7 5 9  --  12   GLC 91 120* 109* 112   < > 94 98 94   < > 110   BUN 17.2 18.9 20.7* 15   < > 12 17 19   < > 36*   CR 1.25* 1.57* 1.65* 1.28   < > 1.12 1.06 1.28   < > 2.28*   GFRESTIMATED 67 51* 48* 66   < > >60 >60 58*  --  30*   GFRESTBLACK  --   --   --   --   --  >60 >60 >60  --  36*   COBY 9.3 8.9 9.0 9.3   < > 8.3* 8.3* 8.6   < > 9.1    < > = values in this interval not displayed.       UA RESULTS:   Recent Labs   Lab Test 10/19/21  1023 08/16/21  1131 07/10/20  1148   SG 1.025 1.025 1.020   URINEPH 5.5 5.5 5.5   NITRITE Negative Negative Negative       PSA RESULTS  PSA   Date Value Ref Range Status   02/01/2021 2.5 0.0 - 3.5 ng/mL Final   08/20/2020 3.6 (H) 0.0 - 3.5 ng/mL Final   12/23/2019 3.0 0.0 - 3.5 ng/mL Final     PSA Tumor Marker   Date Value Ref Range Status   08/18/2022 3.02 0.00 - 4.00 ug/L Final

## 2024-01-02 ENCOUNTER — VIRTUAL VISIT (OUTPATIENT)
Dept: UROLOGY | Facility: CLINIC | Age: 59
End: 2024-01-02
Payer: COMMERCIAL

## 2024-01-02 ENCOUNTER — TELEPHONE (OUTPATIENT)
Dept: UROLOGY | Facility: CLINIC | Age: 59
End: 2024-01-02

## 2024-01-02 VITALS — BODY MASS INDEX: 26.29 KG/M2 | WEIGHT: 178 LBS

## 2024-01-02 DIAGNOSIS — N40.1 BENIGN PROSTATIC HYPERPLASIA WITH NOCTURIA: Primary | ICD-10-CM

## 2024-01-02 DIAGNOSIS — R35.1 BENIGN PROSTATIC HYPERPLASIA WITH NOCTURIA: Primary | ICD-10-CM

## 2024-01-02 DIAGNOSIS — R39.9 LOWER URINARY TRACT SYMPTOMS (LUTS): ICD-10-CM

## 2024-01-02 PROCEDURE — 99213 OFFICE O/P EST LOW 20 MIN: CPT | Mod: 95 | Performed by: NURSE PRACTITIONER

## 2024-01-02 RX ORDER — OXYBUTYNIN CHLORIDE 10 MG/1
10 TABLET, EXTENDED RELEASE ORAL DAILY
Qty: 90 TABLET | Refills: 3 | Status: SHIPPED | OUTPATIENT
Start: 2024-01-02

## 2024-01-02 RX ORDER — FINASTERIDE 5 MG/1
5 TABLET, FILM COATED ORAL DAILY
Qty: 90 TABLET | Refills: 3 | Status: SHIPPED | OUTPATIENT
Start: 2024-01-02

## 2024-01-02 RX ORDER — TAMSULOSIN HYDROCHLORIDE 0.4 MG/1
0.8 CAPSULE ORAL DAILY
Qty: 180 CAPSULE | Refills: 3 | Status: SHIPPED | OUTPATIENT
Start: 2024-01-02

## 2024-01-02 ASSESSMENT — PAIN SCALES - GENERAL: PAINLEVEL: NO PAIN (0)

## 2024-01-02 NOTE — PATIENT INSTRUCTIONS
UROLOGY CLINIC VISIT PATIENT INSTRUCTIONS    -Continue your medications. Refills sent today.   -Will check your PSA level at the lab.   -Follow up with me in one year, or sooner if needed.    If you have any issues, questions or concerns in the meantime, do not hesitate to contact us at [phone] or via [employer].     Mari Graf, CNP  Department of Urology

## 2024-01-02 NOTE — NURSING NOTE
Is the patient currently in the state of [place]? YES    Visit mode:VIDEO    If the visit is dropped, the patient can be reconnected by: VIDEO VISIT: Text to cell phone:   Telephone Information:   Mobile [phone]       Will anyone else be joining the visit? NO  (If patient encounters technical issues they should call [phone]    How would you like to obtain your AVS? MyChart    Are changes needed to the allergy or medication list? Pt declined allergy review and Pt declined med review    Reason for visit: EDMUND BROOKSF

## 2024-01-02 NOTE — LETTER
1/2/2024       RE: Piter Gaines  [address] [place] [address]     Dear Colleague,    Thank you for referring your patient, Pietr Gaines, to the [facility]OLOGY [facility] [place] at [facility]. Please see a copy of my visit note below.    Virtual visit:     Patient location: Parked car  Provider location: Onsite    Video start time: 1:09 PM  Video end time: 1:15 PM         Assessment and Plan:     Assessment: 58 year old male with a history of BPH with LUTS (frequency, nocturia, hesitancy, and sensation of incomplete emptying) who reports better control of symptoms with combination therapy of Flomax 0.8 mg, finasteride 5 mg and oxybutynin XL 10 mg daily. Denies any complaints and does not have any questions for me today. Due for PSA check so will order today and advised him to have this done at the lab.     Plan:  -PSA  -Continue Flomax 0.8 mg, finasteride 5 mg and oxybutynin XL 10 mg daily. Refills sent.   -Follow up with with in one year.    Mari Graf, CNP  Department of Urology           Chief Complaint:   LUTS follow up         History of Present Illness:    Piter Gaines is a 58 year old male with a history of BPH with LUTS (frequency, nocturia, hesitancy, and sensation of incomplete emptying) who presents for virtual follow up. Symptoms refractory to both Flomax and finasteride. UDS completed in 08/2021, which showed a small bladder capacity and very brief bladder contraction. He was, however, able to empty his bladder completely with no evidence of obstruction. Oxybutynin was added to his regimen and while this did help to some degree, he remained bothered with hourly voiding and nocturia x4-5. Have discussed cystoscopy to evaluate his bladder outlet, though due to concern of pain, the patient has declined. Have also considered pelvic floor involvement and he was referred to PT last year.     His last PSA was checked in 08/2022 and was 3.02.      Today, he states that he has continued to use all three of his medications and symptoms have been under pretty good control. Does not have any concerns to discuss today.          Past Medical History:     Past Medical History:   Diagnosis Date    Acute hyperactive alcohol withdrawal delirium (H) 4/6/2017    Alcohol abuse 11/10/2016    Alcohol abuse     Depression     Esophagitis     Hypertension     Hypertension     Kidney disease     Tobacco abuse     Vasovagal syncope 2/4/2019            Past Surgical History:     Past Surgical History:   Procedure Laterality Date    ESOPHAGOSCOPY, GASTROSCOPY, DUODENOSCOPY (EGD), COMBINED N/A 4/6/2017    Procedure: ESOPHAGOGASTRODUODENOSCOPY (EGD);  Surgeon: Conrad Chao MD;  Location: [facility];  Service:     FOREARM SURGERY Left 1980's    HAND SURGERY Left             Medications     Current Outpatient Medications   Medication    acetaminophen (TYLENOL) 500 MG tablet    adapalene (DIFFERIN) 0.1 % external cream    amLODIPine (NORVASC) 5 MG tablet    aspirin (ASA) 81 MG EC tablet    atorvastatin (LIPITOR) 20 MG tablet    buPROPion (WELLBUTRIN XL) 150 MG 24 hr tablet    cyclobenzaprine (FLEXERIL) 5 MG tablet    dapsone (ACZONE) 25 MG tablet    DULoxetine (CYMBALTA) 30 MG capsule    finasteride (PROSCAR) 5 MG tablet    gabapentin (NEURONTIN) 300 MG capsule    hydrochlorothiazide (HYDRODIURIL) 12.5 MG tablet    hydrocortisone (CORTAID) 1 % external cream    losartan (COZAAR) 50 MG tablet    nicotine (NICODERM CQ) 21 MG/24HR 24 hr patch    nicotine (NICORETTE) 2 MG gum    omeprazole (PRILOSEC) 20 MG DR capsule    oxyBUTYnin ER (DITROPAN XL) 10 MG 24 hr tablet    tamsulosin (FLOMAX) 0.4 MG capsule     No current facility-administered medications for this visit.            Allergies:   No known allergies         Review of Systems:  From intake questionnaire   Negative 14 system review except as noted on HPI, nurse's note.         Physical Exam:   Exam:   No vital signs  obtained due to virtual visit.  General Appearance: Well groomed, hygenic  Respiratory: No cough, no respiratory distress or labored breathing  Musculoskeletal: Grossly normal with no gross deficits  Skin: No discoloration or apparent rashes  Neurologic: No tremors  Psychiatric: Alert and oriented  Further examination is deferred due to the nature of our visit.       Labs and Pathology:    I personally reviewed all applicable laboratory data and went over findings with patient  Significant for:    CBC RESULTS:  Recent Labs   Lab Test 07/28/23  1435 01/21/22  1414 10/04/21  1210 09/01/21  2344   WBC 6.6 6.2 8.2 10.0   HGB 11.5* 12.2* 12.6* 11.6*    199 206 162        BMP RESULTS:  Recent Labs   Lab Test 11/13/23  1153 08/11/23  1624 07/28/23  1435 04/20/22  1201 03/25/21  0952 03/11/21  0750 03/10/21  0658 03/09/21  1817 07/17/20  0951 04/13/20  1755    141 138 139   < > 141 140 140   < > 139   POTASSIUM 4.3 3.5 3.6 3.5   < > 3.8 3.9 3.8   < > 3.7   CHLORIDE 106 106 104 100   < > 105 108* 107   < > 105   CO2 25 23 24 26   < > 29 27 24   < > 22   ANIONGAP 11 12 10 13   < > 7 5 9  --  12   GLC 91 120* 109* 112   < > 94 98 94   < > 110   BUN 17.2 18.9 20.7* 15   < > 12 17 19   < > 36*   CR 1.25* 1.57* 1.65* 1.28   < > 1.12 1.06 1.28   < > 2.28*   GFRESTIMATED 67 51* 48* 66   < > >60 >60 58*  --  30*   GFRESTBLACK  --   --   --   --   --  >60 >60 >60  --  36*   OCBY 9.3 8.9 9.0 9.3   < > 8.3* 8.3* 8.6   < > 9.1    < > = values in this interval not displayed.       UA RESULTS:   Recent Labs   Lab Test 10/19/21  1023 08/16/21  1131 07/10/20  1148   SG 1.025 1.025 1.020   URINEPH 5.5 5.5 5.5   NITRITE Negative Negative Negative       PSA RESULTS  PSA   Date Value Ref Range Status   02/01/2021 2.5 0.0 - 3.5 ng/mL Final   08/20/2020 3.6 (H) 0.0 - 3.5 ng/mL Final   12/23/2019 3.0 0.0 - 3.5 ng/mL Final     PSA Tumor Marker   Date Value Ref Range Status   08/18/2022 3.02 0.00 - 4.00 ug/L Final

## 2024-01-02 NOTE — TELEPHONE ENCOUNTER
[facility] Call Center    Phone Message    May a detailed message be left on voicemail: yes     Reason for Call: Pt has appt today at 1pm with Mari Graf. Pt stated he's not feeling well and would like to change appt to video. Writer unable to update.  Please call pt to confirm/reschedule Thank you    Action Taken: Message routed to:  [facility] ([facility] Uro    Travel Screening: Not Applicable

## 2024-01-03 ENCOUNTER — TELEPHONE (OUTPATIENT)
Dept: UROLOGY | Facility: CLINIC | Age: 59
End: 2024-01-03
Payer: COMMERCIAL

## 2024-01-08 DIAGNOSIS — L13.8 LINEAR IGA BULLOUS DERMATOSIS: ICD-10-CM

## 2024-01-10 DIAGNOSIS — I10 ESSENTIAL HYPERTENSION: ICD-10-CM

## 2024-01-12 ENCOUNTER — HOSPITAL ENCOUNTER (EMERGENCY)
Facility: HOSPITAL | Age: 59
Discharge: HOME | End: 2024-01-12
Admitting: EMERGENCY MEDICINE
Payer: COMMERCIAL

## 2024-01-12 VITALS
SYSTOLIC BLOOD PRESSURE: 199 MMHG | HEART RATE: 100 BPM | TEMPERATURE: 98 F | DIASTOLIC BLOOD PRESSURE: 125 MMHG | OXYGEN SATURATION: 97 % | RESPIRATION RATE: 20 BRPM

## 2024-01-12 DIAGNOSIS — K04.7 DENTAL INFECTION: ICD-10-CM

## 2024-01-12 PROCEDURE — 99283 EMERGENCY DEPT VISIT LOW MDM: CPT

## 2024-01-12 RX ORDER — AMLODIPINE BESYLATE 5 MG/1
5 TABLET ORAL DAILY
Qty: 90 TABLET | Refills: 0 | Status: SHIPPED | OUTPATIENT
Start: 2024-01-12 | End: 2024-07-15

## 2024-01-12 ASSESSMENT — ENCOUNTER SYMPTOMS
CHILLS: 0
FEVER: 1
VOMITING: 1
NAUSEA: 1

## 2024-01-13 NOTE — DISCHARGE INSTRUCTIONS
You were seen here today for evaluation of dental pain. You have an infection, I will prescribe you antibiotics. Please take the entire course even if you are feeling better.     You may take Tylenol and ibuprofen for pain/fever, do not exceed 4000 mg of Tylenol per day or 3200 mg ofibuprofen per day.    Call EMERGENCY DENTAL [facility] at [phone]. They are open until 9 pm.    Try clove oil on a cotton ball directly to the painful area.

## 2024-01-13 NOTE — ED TRIAGE NOTES
The patient reports pain in the upper right jaw for 4 days. He thinks something punctured his gums while he was eating.

## 2024-01-13 NOTE — ED PROVIDER NOTES
EMERGENCY DEPARTMENT ENCOUNTER      NAME: Piter Gaines  AGE: 58 year old male  YOB: 1965  MRN: [id number]  EVALUATION DATE & TIME: No admission date for patient encounter.    PCP: Any Truong    ED PROVIDER: Mary Myers PA-C      Chief Complaint   Patient presents with    Dental Pain         FINAL IMPRESSION:  1. Dental infection          ED COURSE & MEDICAL DECISION MAKING:    Pertinent Labs & Imaging studies reviewed. (See chart for details)    58 year old male presents to the Emergency Department for evaluation of dental pain.    Physical exam is remarkable for an uncomfortable but otherwise well-appearing gentleman who is in no acute distress.  Heart and lung sounds are clear diffusely throughout.  The patient has multiple broken teeth in the right upper jaw, no obvious swelling, erythema, or discharge noted.  No evidence of abscess, no facial swelling.  Patient is tolerating his secretions.  Oropharynx is unremarkable.  Vital signs are remarkable for hypertension which is likely situational, otherwise stable and he is afebrile.    I do not think any emergent labs or imaging are indicated at this time.  The patient is hemodynamically stable here and does not have any clinical findings concerning for deep space infection or significant facial cellulitis.  Unfortunately, there is not a drainable abscess at this time.  I will prescribe him Augmentin for antibiosis and also gave him the number for emergency [facility] [place].  Recommend Tylenol and ibuprofen at home, I do not think a prescription for stronger pain medications makes sense since he states Percocet was not helping.  Advised him to return here for any new or worsening symptoms, he is agreeable with this treatment plan and verbalized understanding.    Medical Decision Making    History:  Supplemental history from: Documented in chart  External Record(s) reviewed: Documented in chart    Work Up:  Chart documentation includes differential  considered and any EKGs or imaging independently interpreted by provider, where specified.  In additional to work up documented, I considered the following work up: Documented in chart, if applicable.    External consultation:  Discussion of management with another provider: Documented in chart, if applicable    Complicating factors:  Care impacted by chronic illness: Hypertension and Smoking / Nicotine Use  Care affected by social determinants of health: N/A    Disposition considerations: Discharge. I prescribed additional prescription strength medication(s) as charted. See documentation for any additional details.    ED Course   6:55 PM Performed my initial history and physical exam. Discussed workup in the emergency department, management of symptoms, and likely disposition.   7:05 PM I discussed the plan for discharge with the patient or family and they are agreeable.. We discussed supportive cares at home and reasons for return to the ER including new or worsening symptoms - all questions and concerns addressed. Patient to be discharged by RN.    At the conclusion of the encounter I discussed the results of all of the tests and the disposition. The questions were answered. The patient or family acknowledged understanding and was agreeable with the care plan.     Voice recognition software was used in the creation of this note. Any grammatical or nonsensical errors are due to inherent errors with the software and are not the intention of the writer.     MEDICATIONS GIVEN IN THE EMERGENCY:  Medications - No data to display    NEW PRESCRIPTIONS STARTED AT TODAY'S ER VISIT  Discharge Medication List as of 1/12/2024  7:18 PM        START taking these medications    Details   amoxicillin-clavulanate (AUGMENTIN) 875-125 MG tablet Take 1 tablet by mouth 2 times daily for 10 days, Disp-20 tablet, R-0, E-Prescribe                  =================================================================    HPI    Patient  "information was obtained from: Patient    Use of : N/A       Piter Gaines is a 58 year old male who presents to the ED for evaluation of dental pain.    The patient reports right upper gum pain for the past four days that since worsened today. Describes the pain as throbbing and currently rates it at a \"15/10.\" Endorses an associated subjective fever, nausea, and vomiting with his last episode being three hours ago. He has never had similar pain or symptoms before. He has tried taking Percocet, aspirin, ibuprofen, and Tylenol all which have provided no relief.     Denies chills or any other complaints at this time.    REVIEW OF SYSTEMS   Review of Systems   Constitutional:  Positive for fever (subjective). Negative for chills.   HENT:  Positive for dental problem (right upper gum pain).    Gastrointestinal:  Positive for nausea and vomiting.   All other systems reviewed and are negative.    All other systems reviewed and are negative unless noted in HPI.      PAST MEDICAL HISTORY:  Past Medical History:   Diagnosis Date    Acute hyperactive alcohol withdrawal delirium (H) 4/6/2017    Alcohol abuse 11/10/2016    Alcohol abuse     Depression     Esophagitis     Hypertension     Hypertension     Kidney disease     Tobacco abuse     Vasovagal syncope 2/4/2019       PAST SURGICAL HISTORY:  Past Surgical History:   Procedure Laterality Date    ESOPHAGOSCOPY, GASTROSCOPY, DUODENOSCOPY (EGD), COMBINED N/A 4/6/2017    Procedure: ESOPHAGOGASTRODUODENOSCOPY (EGD);  Surgeon: Conrad Chao MD;  Location: [facility];  Service:     FOREARM SURGERY Left 1980's    HAND SURGERY Left        CURRENT MEDICATIONS:    amoxicillin-clavulanate (AUGMENTIN) 875-125 MG tablet  acetaminophen (TYLENOL) 500 MG tablet  adapalene (DIFFERIN) 0.1 % external cream  amLODIPine (NORVASC) 5 MG tablet  aspirin (ASA) 81 MG EC tablet  atorvastatin (LIPITOR) 20 MG tablet  buPROPion (WELLBUTRIN XL) 150 MG 24 hr tablet  cyclobenzaprine " (FLEXERIL) 5 MG tablet  dapsone (ACZONE) 25 MG tablet  DULoxetine (CYMBALTA) 30 MG capsule  finasteride (PROSCAR) 5 MG tablet  gabapentin (NEURONTIN) 300 MG capsule  hydrochlorothiazide (HYDRODIURIL) 12.5 MG tablet  hydrocortisone (CORTAID) 1 % external cream  losartan (COZAAR) 50 MG tablet  nicotine (NICODERM CQ) 21 MG/24HR 24 hr patch  nicotine (NICORETTE) 2 MG gum  omeprazole (PRILOSEC) 20 MG DR capsule  oxyBUTYnin ER (DITROPAN XL) 10 MG 24 hr tablet  tamsulosin (FLOMAX) 0.4 MG capsule        ALLERGIES:  Allergies   Allergen Reactions    No Known Allergies        FAMILY HISTORY:  Family History   Problem Relation Age of Onset    Bone Cancer Mother     Breast Cancer Mother     Diabetes Father     Kidney Disease Father     Diabetes Sister     Cerebrovascular Disease Brother     Lung Cancer Father     Kidney Disease Brother     Coronary Artery Disease Brother     Stomach Cancer No family hx of        SOCIAL HISTORY:   Social History     Socioeconomic History    Marital status: Single   Tobacco Use    Smoking status: Some Days     Packs/day: 0.50     Years: 10.00     Additional pack years: 0.00     Total pack years: 5.00     Types: Cigarettes    Smokeless tobacco: Never    Tobacco comments:     1/2 pack a day     Substance and Sexual Activity    Alcohol use: Not Currently     Alcohol/week: 14.0 standard drinks of alcohol     Types: 6 Cans of beer, 8 Shots of liquor per week     Comment: quit 5 years ago    Drug use: Yes     Types: Marijuana     Comment: Drug use: last week    Sexual activity: Yes     Partners: Female     Birth control/protection: Condom     Social Determinants of Health     Interpersonal Safety: Low Risk  (11/13/2023)    Interpersonal Safety     Do you feel physically and emotionally safe where you currently live?: Yes     Within the past 12 months, have you been hit, slapped, kicked or otherwise physically hurt by someone?: No     Within the past 12 months, have you been humiliated or emotionally  abused in other ways by your partner or ex-partner?: No       VITALS:  Patient Vitals for the past 24 hrs:   BP Temp Temp src Pulse Resp SpO2   01/12/24 1850 (!) 199/125 98  F (36.7  C) Temporal 100 20 97 %       PHYSICAL EXAM    VITAL SIGNS: BP (!) 199/125   Pulse 100   Temp 98  F (36.7  C) (Temporal)   Resp 20   SpO2 97%   General Appearance: Uncomfortable appearing; Alert, cooperative, normal speech and facial symmetry, appears stated age  Head:  Normocephalic, without obvious abnormality, atraumatic  ENT: Multiple broken teeth in the right upper jaw, no obvious swelling, erythema, or discharge noted.  No evidence of abscess, no facial swelling.  Patient is tolerating his secretions.  Oropharynx is unremarkable.  Cardio:  Regular rate and rhythm, S1 and S2 normal, no murmur, rub    or gallop, 2+ pulses symmetric in all extremities  Pulm:  Clear to auscultation bilaterally, respirations unlabored with no accessory muscle use  Neuro: Patient is awake, alert, and responsive to voice. No gross motor weaknesses or sensory loss; moves all extremities.    LAB:  All pertinent labs reviewed and interpreted.  Labs Ordered and Resulted from Time of ED Arrival to Time of ED Departure - No data to display    RADIOLOGY:  Reviewed all pertinent imaging. Please see official radiology report.  No orders to display         IDyan, am serving as a scribe to document services personally performed by Mary Myers PA-C based on my observation and the provider's statements to me. Mary CARRENO PA-C attest that Dyan Lawler is acting in a scribe capacity, has observed my performance of the services and has documented them in accordance with my direction.     Mary Myers PA-C  Emergency Medicine  [facility] EMERGENCY DEPARTMENT  [address] [facility] [facility] [id number] [phone]  Dept: [phone]       Mary Myers PA-C  01/12/24 0213

## 2024-01-16 ENCOUNTER — PATIENT OUTREACH (OUTPATIENT)
Dept: CARE COORDINATION | Facility: CLINIC | Age: 59
End: 2024-01-16
Payer: COMMERCIAL

## 2024-01-16 NOTE — PROGRESS NOTES
Clinic Care Coordination Contact  Follow Up Progress Note      Assessment: he pt was recently in the ED, I called to check up on the pt and help the pt setup a ED follow up. The pt was at [facility] for dental pain. I called the pt,but got his vm, so I left a vm for the pt to give me a call back.     Care Gaps:    Health Maintenance Due   Topic Date Due    ADVANCE CARE PLANNING  Never done    ZOSTER IMMUNIZATION (3 of 3) 12/01/2020    HEPATITIS B IMMUNIZATION (2 of 3 - 19+ 3-dose series) 08/25/2023    COLORECTAL CANCER SCREENING  08/29/2023    INFLUENZA VACCINE (1) 09/01/2023    COVID-19 Vaccine (5 - 2023-24 season) 09/01/2023    PHQ-9  02/11/2024           Care Plans      Intervention/Education provided during outreach:               Plan:     Care Coordinator will follow up in

## 2024-01-17 ENCOUNTER — PATIENT OUTREACH (OUTPATIENT)
Dept: CARE COORDINATION | Facility: CLINIC | Age: 59
End: 2024-01-17
Payer: COMMERCIAL

## 2024-01-17 NOTE — PROGRESS NOTES
Clinic Care Coordination Contact  Follow Up Progress Note      Assessment:  The pt was recently in the ED, I called to check up on the pt and help the pt setup a ED follow up. The pt was at [facility] for dental pain. I called and talked to the pt, pt stated that he is following up with his dentist.     Care Gaps:    Health Maintenance Due   Topic Date Due    ADVANCE CARE PLANNING  Never done    ZOSTER IMMUNIZATION (3 of 3) 12/01/2020    HEPATITIS B IMMUNIZATION (2 of 3 - 19+ 3-dose series) 08/25/2023    COLORECTAL CANCER SCREENING  08/29/2023    INFLUENZA VACCINE (1) 09/01/2023    COVID-19 Vaccine (5 - 2023-24 season) 09/01/2023    PHQ-9  02/11/2024           Care Plans      Intervention/Education provided during outreach:               Plan:     Care Coordinator will follow up in

## 2024-01-26 DIAGNOSIS — K20.80 ESOPHAGITIS, LOS ANGELES GRADE D: ICD-10-CM

## 2024-01-26 NOTE — TELEPHONE ENCOUNTER
[facility] phone call message- medication clarification/question:    Full Medication Name: omeprazole (PRILOSEC) 20 MG DR capsule     Question: Patient called requesting for refill on medication. If able to fill please send to pharmacy thank you.    [id number] [place], [place] - [address] AT [facility] [address]: Yes    OK to leave a message on voice mail? Yes    Primary language: English      needed? No    Call taken on January 26, 2024 at 2:43 PM by Tayla Yarbrough

## 2024-01-29 RX ORDER — BISACODYL 5 MG/1
TABLET, DELAYED RELEASE ORAL
Qty: 4 TABLET | Refills: 0 | Status: SHIPPED | OUTPATIENT
Start: 2024-01-29

## 2024-02-09 VITALS — BODY MASS INDEX: 26.66 KG/M2 | HEIGHT: 69 IN | WEIGHT: 180 LBS

## 2024-02-13 RX ORDER — SODIUM CHLORIDE, SODIUM LACTATE, POTASSIUM CHLORIDE, CALCIUM CHLORIDE 600; 310; 30; 20 MG/100ML; MG/100ML; MG/100ML; MG/100ML
INJECTION, SOLUTION INTRAVENOUS CONTINUOUS
Status: CANCELLED | OUTPATIENT
Start: 2024-02-13

## 2024-02-13 RX ORDER — LIDOCAINE 40 MG/G
CREAM TOPICAL
Status: CANCELLED | OUTPATIENT
Start: 2024-02-13

## 2024-02-14 NOTE — H&P
No show for colonoscopy.    Anoop Dozier DO  General Surgeon  [facility]  Surgery [facility] - [address]  [place] [address]?  Office: [phone]  Employed by - [employer]  Pager: [phone]     No

## 2024-03-13 DIAGNOSIS — M54.50 CHRONIC LOW BACK PAIN WITHOUT SCIATICA, UNSPECIFIED BACK PAIN LATERALITY: ICD-10-CM

## 2024-03-13 DIAGNOSIS — G89.29 CHRONIC LOW BACK PAIN WITHOUT SCIATICA, UNSPECIFIED BACK PAIN LATERALITY: ICD-10-CM

## 2024-03-13 RX ORDER — POLYETHYLENE GLYCOL 3350, SODIUM CHLORIDE, SODIUM BICARBONATE, POTASSIUM CHLORIDE 420; 11.2; 5.72; 1.48 G/4L; G/4L; G/4L; G/4L
POWDER, FOR SOLUTION ORAL
COMMUNITY
Start: 2024-01-30

## 2024-03-13 RX ORDER — OXYCODONE AND ACETAMINOPHEN 10; 325 MG/1; MG/1
TABLET ORAL
COMMUNITY
Start: 2024-03-11

## 2024-03-15 RX ORDER — DULOXETIN HYDROCHLORIDE 30 MG/1
30 CAPSULE, DELAYED RELEASE ORAL AT BEDTIME
Qty: 90 CAPSULE | Refills: 1 | Status: SHIPPED | OUTPATIENT
Start: 2024-03-15 | End: 2024-06-12

## 2024-05-06 RX ORDER — DAPSONE 25 MG/1
50 TABLET ORAL DAILY
Qty: 180 TABLET | Refills: 0 | Status: SHIPPED | OUTPATIENT
Start: 2024-05-06 | End: 2024-05-28

## 2024-05-28 ENCOUNTER — OFFICE VISIT (OUTPATIENT)
Dept: DERMATOLOGY | Facility: CLINIC | Age: 59
End: 2024-05-28
Payer: COMMERCIAL

## 2024-05-28 DIAGNOSIS — Z79.899 ENCOUNTER FOR LONG-TERM (CURRENT) USE OF HIGH-RISK MEDICATION: Primary | ICD-10-CM

## 2024-05-28 DIAGNOSIS — L13.8 LINEAR IGA BULLOUS DERMATOSIS: ICD-10-CM

## 2024-05-28 DIAGNOSIS — B35.8 TINEA FACIALE: ICD-10-CM

## 2024-05-28 PROCEDURE — 99204 OFFICE O/P NEW MOD 45 MIN: CPT | Performed by: DERMATOLOGY

## 2024-05-28 RX ORDER — BETAMETHASONE DIPROPIONATE 0.05 %
OINTMENT (GRAM) TOPICAL 2 TIMES DAILY
Qty: 50 G | Refills: 2 | Status: SHIPPED | OUTPATIENT
Start: 2024-05-28

## 2024-05-28 RX ORDER — DAPSONE 25 MG/1
50 TABLET ORAL DAILY
Qty: 180 TABLET | Refills: 0 | Status: SHIPPED | OUTPATIENT
Start: 2024-05-28 | End: 2024-08-01

## 2024-05-28 RX ORDER — ECONAZOLE NITRATE 10 MG/G
CREAM TOPICAL 2 TIMES DAILY
Qty: 30 G | Refills: 1 | Status: SHIPPED | OUTPATIENT
Start: 2024-05-28

## 2024-05-28 ASSESSMENT — PAIN SCALES - GENERAL: PAINLEVEL: NO PAIN (0)

## 2024-05-28 NOTE — LETTER
5/28/2024       RE: Piter Gaines  [address] [place] [address]     Dear Colleague,    Thank you for referring your patient, Piter Gaines, to the [facility] DERMATOLOGY CLINIC [place] [facility]. Please see a copy of my visit note below.    [facility] Dermatology Note  Encounter Date: May 28, 2024  Office Visit     Dermatology Problem List:  1. IgA bullous disease  2. Annular facial eruption     ____________________________________________    Assessment & Plan:    # IgA bullous disease.   - OK to continue dapsone 50 mg daily, pending safety labs  - Start betamethasone 0.05% ointment as needed for flares  - Safety labs for dapsone today (CBC, CMP)  - [place] [facility] [place]    # Annular scaly plaques on left cheek.  KOH results were subtle but demonstrated rare apparent hyphal forms.   - Start econazole 1% BID  - If not improved at followup would consider bx to rule out other etiologies (cutaneous LE, sarcoidosis, GA family)    Procedures Performed:   - KOH prep was obtained and interpreted as positive.  - Procedure(s) performed by faculty.     Follow-up: 2 month(s) in-person, or earlier for new or changing lesions    Staff and Medical Student:     Thiago Bee MS3  [facility]    I was present with the medical student who participated in the service and in the documentation.  I have verified the history and personally performed the physical exam and medical decision making.  I agree with the assessment and plan of care as documented in the note.  I personally performed all procedures.    Timothy Villanueva MD  Dermatology Attending  ____________________________________________    CC: Derm Problem (Piter is here today for a groin rash that he has had for 2 years )    HPI:  Mr. Piter Gaines is a(n) 58 year old male who presents today as a new patient for skin check. Patient has been having skin problems  for the past 4 years. It started with his left calf which started itching leading to blistering. This itching and blistering then spread to his left thigh. He received a biopsy that was sent to [facility] and was diagnosed with an IgA bullous disease. He was then started on 50mg dapsone daily and hydrocortisone cream. This has helped keep the blisters at bay with some recurrence but only with one or two blisters. His blisters are now concentrated to his calf and have not come back on his thigh. Patient also complains of a rash that started on his face about the same time as the blistering. His face has never blistered but does itch. He feels like his skin is crawling and popping.     Patient is otherwise feeling well, without additional skin concerns.    Labs:  None reviewed.    Physical Exam:  Vitals: There were no vitals taken for this visit.  SKIN: Focused examination of right calf and face was performed.  - Hyperpigmented patch on left calf  - Annular plaque with central clearing on left cheek  - No other lesions of concern on areas examined.     Medications:  Current Outpatient Medications   Medication Sig Dispense Refill    acetaminophen (TYLENOL) 500 MG tablet Take 1 tablet (500 mg) by mouth every 6 hours as needed for pain 180 tablet 1    adapalene (DIFFERIN) 0.1 % external cream Apply thin layer to beard and hairline region after washing hair/face everyday.  If irritation or peeling develops, skip application for one day. (Patient taking differently: Apply thin layer to beard and hairline region after washing hair/face everyday.  If irritation or peeling develops, skip application for one day.) 45 g 1    amLODIPine (NORVASC) 5 MG tablet Take 1 tablet (5 mg) by mouth daily 90 tablet 0    aspirin (ASA) 81 MG EC tablet Take 1 tablet (81 mg) by mouth daily 90 tablet 3    atorvastatin (LIPITOR) 20 MG tablet Take 1 tablet (20 mg) by mouth daily 90 tablet 2    betamethasone dipropionate (DIPROSONE) 0.05 % external  ointment Apply topically 2 times daily To areas of rash flare up on legs 50 g 2    bisacodyl (DULCOLAX) 5 MG EC tablet Two days prior to exam take two (2) tablets at 4pm. One day prior to exam take two (2) tablets at 4pm 4 tablet 0    buPROPion (WELLBUTRIN XL) 150 MG 24 hr tablet Take 1 tablet (150 mg) by mouth every morning 90 tablet 3    cyclobenzaprine (FLEXERIL) 5 MG tablet Take 1-2 tablets (5-10 mg) by mouth 3 times daily as needed for muscle spasms 60 tablet 0    dapsone (ACZONE) 25 MG tablet Take 2 tablets (50 mg) by mouth daily 180 tablet 0    DULoxetine (CYMBALTA) 30 MG capsule Take 1 capsule (30 mg) by mouth at bedtime 90 capsule 1    finasteride (PROSCAR) 5 MG tablet Take 1 tablet (5 mg) by mouth daily Please keep 1-2-24 clinic appt for refills. 90 tablet 3    gabapentin (NEURONTIN) 300 MG capsule TAKE 1 CAPSULE BY MOUTH IN THE MORNING AND AFTERNOON AND 2 CAPSULES IN THE EVENING 270 capsule 1    hydrochlorothiazide (HYDRODIURIL) 12.5 MG tablet Take 1 tablet (12.5 mg) by mouth daily 90 tablet 3    hydrocortisone (CORTAID) 1 % external cream Apply topically 2 times daily as needed for rash or itching 120 g 1    losartan (COZAAR) 50 MG tablet Take 2 tablets (100 mg) by mouth daily 180 tablet 0    nicotine (NICODERM CQ) 21 MG/24HR 24 hr patch Place 1 patch onto the skin every 24 hours 28 patch 4    nicotine (NICORETTE) 2 MG gum Place 1 each (2 mg) inside cheek every hour as needed for smoking cessation 100 each 4    omeprazole (PRILOSEC) 20 MG DR capsule Take 1 capsule (20 mg) by mouth daily 90 capsule 1    oxyBUTYnin ER (DITROPAN XL) 10 MG 24 hr tablet Take 1 tablet (10 mg) by mouth daily (Please keep visit for 1/2/2024 for further refills) Thank you 90 tablet 3    oxyCODONE-acetaminophen (PERCOCET)  MG per tablet TAKE 1 TABLET BY MOUTH EVERY 4 TO 6 HOURS WHILE AWAKE AS NEEDED FOR SEVERE BREAKTHROUGH PAIN. MAX 3 TABLETS IN 24 HOURS.      polyethylene glycol (GOLYTELY) 236 g suspension Take as directed.  Two days before your exam fill the first container with water. Cover and shake until mixed well. At 5:00pm drink one 8oz glass every 10-15 minutes until half (1/2) of the first container is empty. Store the remainder in the refrigerator. One day before your exam at 5:00pm drink the second half of the first container until it is gone. Before you go to bed mix the second container with water and put in refrigerator. Six hours before your check in time drink one 8oz glass every 10-15 minutes until half of container is empty. Discard the remainder of solution. 8000 mL 0    polyethylene glycol-electrolytes (NULYTELY) 420 g solution AS DIRECTED. PLEASE SEE ATTACHMENT.      tamsulosin (FLOMAX) 0.4 MG capsule Take 2 capsules (0.8 mg) by mouth daily 180 capsule 3    tiZANidine (ZANAFLEX) 4 MG tablet        No current facility-administered medications for this visit.      Past Medical History:   Patient Active Problem List   Diagnosis    Tobacco use disorder    Caloric malnutrition (H24)    Essential hypertension    Moderate episode of recurrent major depressive disorder (H)    Seborrheic Keratosis    Persistent proteinuria    Benign prostatic hyperplasia with nocturia    Linear IgA bullous dermatosis    Coordination of complex care    Normochromic normocytic anemia    Carotid stenosis, asymptomatic, bilateral    Esophagitis, [place] grade D    Rectal hemorrhage    Alcohol abuse    Chronic kidney disease, stage II (mild)    Depression, unspecified depression type     Past Medical History:   Diagnosis Date    Acute hyperactive alcohol withdrawal delirium (H) 04/06/2017    Alcohol abuse 11/10/2016    Alcohol abuse     Depression     Difficulty walking     Esophagitis     Gastroesophageal reflux disease     History of blood transfusion     Hypertension     Hypertension     Kidney disease     Tobacco abuse     Uncomplicated asthma     Vasovagal syncope 02/04/2019        CC Any Truong MD  [address] [place] [address]  [id number]

## 2024-05-28 NOTE — PROGRESS NOTES
[facility] Dermatology Note  Encounter Date: May 28, 2024  Office Visit     Dermatology Problem List:  1. IgA bullous disease  2. Annular facial eruption     ____________________________________________    Assessment & Plan:    # IgA bullous disease.   - OK to continue dapsone 50 mg daily, pending safety labs  - Start betamethasone 0.05% ointment as needed for flares  - Safety labs for dapsone today (CBC, CMP)  - Will attempt to procure [place] [facility]    # Annular scaly plaques on left cheek.  KOH results were subtle but demonstrated rare apparent hyphal forms.   - Start econazole 1% BID  - If not improved at followup would consider bx to rule out other etiologies (cutaneous LE, sarcoidosis, GA family)    Procedures Performed:   - KOH prep was obtained and interpreted as positive.  - Procedure(s) performed by faculty.     Follow-up: 2 month(s) in-person, or earlier for new or changing lesions    Staff and Medical Student:     Thiago Bee MS3  [facility]    I was present with the medical student who participated in the service and in the documentation.  I have verified the history and personally performed the physical exam and medical decision making.  I agree with the assessment and plan of care as documented in the note.  I personally performed all procedures.    Timothy Villanueva MD  Dermatology Attending  ____________________________________________    CC: Derm Problem (Piter is here today for a groin rash that he has had for 2 years )    HPI:  Mr. Piter Gaines is a(n) 58 year old male who presents today as a new patient for skin check. Patient has been having skin problems for the past 4 years. It started with his left calf which started itching leading to blistering. This itching and blistering then spread to his left thigh. He received a biopsy that was sent to [facility] and was diagnosed with an IgA bullous disease. He was then started on 50mg dapsone daily and  hydrocortisone cream. This has helped keep the blisters at bay with some recurrence but only with one or two blisters. His blisters are now concentrated to his calf and have not come back on his thigh. Patient also complains of a rash that started on his face about the same time as the blistering. His face has never blistered but does itch. He feels like his skin is crawling and popping.     Patient is otherwise feeling well, without additional skin concerns.    Labs:  None reviewed.    Physical Exam:  Vitals: There were no vitals taken for this visit.  SKIN: Focused examination of right calf and face was performed.  - Hyperpigmented patch on left calf  - Annular plaque with central clearing on left cheek  - No other lesions of concern on areas examined.     Medications:  Current Outpatient Medications   Medication Sig Dispense Refill    acetaminophen (TYLENOL) 500 MG tablet Take 1 tablet (500 mg) by mouth every 6 hours as needed for pain 180 tablet 1    adapalene (DIFFERIN) 0.1 % external cream Apply thin layer to beard and hairline region after washing hair/face everyday.  If irritation or peeling develops, skip application for one day. (Patient taking differently: Apply thin layer to beard and hairline region after washing hair/face everyday.  If irritation or peeling develops, skip application for one day.) 45 g 1    amLODIPine (NORVASC) 5 MG tablet Take 1 tablet (5 mg) by mouth daily 90 tablet 0    aspirin (ASA) 81 MG EC tablet Take 1 tablet (81 mg) by mouth daily 90 tablet 3    atorvastatin (LIPITOR) 20 MG tablet Take 1 tablet (20 mg) by mouth daily 90 tablet 2    betamethasone dipropionate (DIPROSONE) 0.05 % external ointment Apply topically 2 times daily To areas of rash flare up on legs 50 g 2    bisacodyl (DULCOLAX) 5 MG EC tablet Two days prior to exam take two (2) tablets at 4pm. One day prior to exam take two (2) tablets at 4pm 4 tablet 0    buPROPion (WELLBUTRIN XL) 150 MG 24 hr tablet Take 1 tablet  (150 mg) by mouth every morning 90 tablet 3    cyclobenzaprine (FLEXERIL) 5 MG tablet Take 1-2 tablets (5-10 mg) by mouth 3 times daily as needed for muscle spasms 60 tablet 0    dapsone (ACZONE) 25 MG tablet Take 2 tablets (50 mg) by mouth daily 180 tablet 0    DULoxetine (CYMBALTA) 30 MG capsule Take 1 capsule (30 mg) by mouth at bedtime 90 capsule 1    finasteride (PROSCAR) 5 MG tablet Take 1 tablet (5 mg) by mouth daily Please keep 1-2-24 clinic appt for refills. 90 tablet 3    gabapentin (NEURONTIN) 300 MG capsule TAKE 1 CAPSULE BY MOUTH IN THE MORNING AND AFTERNOON AND 2 CAPSULES IN THE EVENING 270 capsule 1    hydrochlorothiazide (HYDRODIURIL) 12.5 MG tablet Take 1 tablet (12.5 mg) by mouth daily 90 tablet 3    hydrocortisone (CORTAID) 1 % external cream Apply topically 2 times daily as needed for rash or itching 120 g 1    losartan (COZAAR) 50 MG tablet Take 2 tablets (100 mg) by mouth daily 180 tablet 0    nicotine (NICODERM CQ) 21 MG/24HR 24 hr patch Place 1 patch onto the skin every 24 hours 28 patch 4    nicotine (NICORETTE) 2 MG gum Place 1 each (2 mg) inside cheek every hour as needed for smoking cessation 100 each 4    omeprazole (PRILOSEC) 20 MG DR capsule Take 1 capsule (20 mg) by mouth daily 90 capsule 1    oxyBUTYnin ER (DITROPAN XL) 10 MG 24 hr tablet Take 1 tablet (10 mg) by mouth daily (Please keep visit for 1/2/2024 for further refills) Thank you 90 tablet 3    oxyCODONE-acetaminophen (PERCOCET)  MG per tablet TAKE 1 TABLET BY MOUTH EVERY 4 TO 6 HOURS WHILE AWAKE AS NEEDED FOR SEVERE BREAKTHROUGH PAIN. MAX 3 TABLETS IN 24 HOURS.      polyethylene glycol (GOLYTELY) 236 g suspension Take as directed. Two days before your exam fill the first container with water. Cover and shake until mixed well. At 5:00pm drink one 8oz glass every 10-15 minutes until half (1/2) of the first container is empty. Store the remainder in the refrigerator. One day before your exam at 5:00pm drink the second half  of the first container until it is gone. Before you go to bed mix the second container with water and put in refrigerator. Six hours before your check in time drink one 8oz glass every 10-15 minutes until half of container is empty. Discard the remainder of solution. 8000 mL 0    polyethylene glycol-electrolytes (NULYTELY) 420 g solution AS DIRECTED. PLEASE SEE ATTACHMENT.      tamsulosin (FLOMAX) 0.4 MG capsule Take 2 capsules (0.8 mg) by mouth daily 180 capsule 3    tiZANidine (ZANAFLEX) 4 MG tablet        No current facility-administered medications for this visit.      Past Medical History:   Patient Active Problem List   Diagnosis    Tobacco use disorder    Caloric malnutrition (H24)    Essential hypertension    Moderate episode of recurrent major depressive disorder (H)    Seborrheic Keratosis    Persistent proteinuria    Benign prostatic hyperplasia with nocturia    Linear IgA bullous dermatosis    Coordination of complex care    Normochromic normocytic anemia    Carotid stenosis, asymptomatic, bilateral    Esophagitis, [place] grade D    Rectal hemorrhage    Alcohol abuse    Chronic kidney disease, stage II (mild)    Depression, unspecified depression type     Past Medical History:   Diagnosis Date    Acute hyperactive alcohol withdrawal delirium (H) 04/06/2017    Alcohol abuse 11/10/2016    Alcohol abuse     Depression     Difficulty walking     Esophagitis     Gastroesophageal reflux disease     History of blood transfusion     Hypertension     Hypertension     Kidney disease     Tobacco abuse     Uncomplicated asthma     Vasovagal syncope 02/04/2019        CC Any Truong MD  [address] [place] [address] on close of this encounter.

## 2024-05-28 NOTE — NURSING NOTE
Dermatology Rooming Note    Piter Gaines's goals for this visit include:   Chief Complaint   Patient presents with    Derm Problem     Piter is here today for a groin rash that he has had for 2 years      MESERET Frank - Dermatology

## 2024-06-03 ENCOUNTER — LAB (OUTPATIENT)
Dept: LAB | Facility: CLINIC | Age: 59
End: 2024-06-03
Payer: COMMERCIAL

## 2024-06-03 DIAGNOSIS — Z79.899 ENCOUNTER FOR LONG-TERM (CURRENT) USE OF HIGH-RISK MEDICATION: ICD-10-CM

## 2024-06-03 PROBLEM — B35.8 TINEA FACIALE: Status: ACTIVE | Noted: 2024-06-03

## 2024-06-03 LAB
BASOPHILS # BLD AUTO: 0 10E3/UL (ref 0–0.2)
BASOPHILS NFR BLD AUTO: 1 %
EOSINOPHIL # BLD AUTO: 0.1 10E3/UL (ref 0–0.7)
EOSINOPHIL NFR BLD AUTO: 1 %
ERYTHROCYTE [DISTWIDTH] IN BLOOD BY AUTOMATED COUNT: 14.2 % (ref 10–15)
HCT VFR BLD AUTO: 40.3 % (ref 40–53)
HGB BLD-MCNC: 12.8 G/DL (ref 13.3–17.7)
IMM GRANULOCYTES # BLD: 0 10E3/UL
IMM GRANULOCYTES NFR BLD: 0 %
LYMPHOCYTES # BLD AUTO: 1.3 10E3/UL (ref 0.8–5.3)
LYMPHOCYTES NFR BLD AUTO: 23 %
MCH RBC QN AUTO: 27.1 PG (ref 26.5–33)
MCHC RBC AUTO-ENTMCNC: 31.8 G/DL (ref 31.5–36.5)
MCV RBC AUTO: 85 FL (ref 78–100)
MONOCYTES # BLD AUTO: 0.4 10E3/UL (ref 0–1.3)
MONOCYTES NFR BLD AUTO: 6 %
NEUTROPHILS # BLD AUTO: 4.1 10E3/UL (ref 1.6–8.3)
NEUTROPHILS NFR BLD AUTO: 69 %
PLATELET # BLD AUTO: 177 10E3/UL (ref 150–450)
RBC # BLD AUTO: 4.72 10E6/UL (ref 4.4–5.9)
WBC # BLD AUTO: 6 10E3/UL (ref 4–11)

## 2024-06-03 PROCEDURE — 36415 COLL VENOUS BLD VENIPUNCTURE: CPT

## 2024-06-03 PROCEDURE — 85025 COMPLETE CBC W/AUTO DIFF WBC: CPT

## 2024-06-03 PROCEDURE — 80053 COMPREHEN METABOLIC PANEL: CPT

## 2024-06-04 LAB
ALBUMIN SERPL BCG-MCNC: 4.1 G/DL (ref 3.5–5.2)
ALP SERPL-CCNC: 116 U/L (ref 40–150)
ALT SERPL W P-5'-P-CCNC: 19 U/L (ref 0–70)
ANION GAP SERPL CALCULATED.3IONS-SCNC: 12 MMOL/L (ref 7–15)
AST SERPL W P-5'-P-CCNC: 25 U/L (ref 0–45)
BILIRUB SERPL-MCNC: 0.5 MG/DL
BUN SERPL-MCNC: 12 MG/DL (ref 6–20)
CALCIUM SERPL-MCNC: 9.6 MG/DL (ref 8.6–10)
CHLORIDE SERPL-SCNC: 102 MMOL/L (ref 98–107)
CREAT SERPL-MCNC: 1.22 MG/DL (ref 0.67–1.17)
DEPRECATED HCO3 PLAS-SCNC: 24 MMOL/L (ref 22–29)
EGFRCR SERPLBLD CKD-EPI 2021: 69 ML/MIN/1.73M2
GLUCOSE SERPL-MCNC: 127 MG/DL (ref 70–99)
POTASSIUM SERPL-SCNC: 4 MMOL/L (ref 3.4–5.3)
PROT SERPL-MCNC: 8 G/DL (ref 6.4–8.3)
SODIUM SERPL-SCNC: 138 MMOL/L (ref 135–145)

## 2024-06-12 DIAGNOSIS — M54.50 CHRONIC LOW BACK PAIN WITHOUT SCIATICA, UNSPECIFIED BACK PAIN LATERALITY: ICD-10-CM

## 2024-06-12 DIAGNOSIS — G89.29 CHRONIC LOW BACK PAIN WITHOUT SCIATICA, UNSPECIFIED BACK PAIN LATERALITY: ICD-10-CM

## 2024-06-12 RX ORDER — DULOXETIN HYDROCHLORIDE 30 MG/1
CAPSULE, DELAYED RELEASE ORAL
Qty: 90 CAPSULE | Refills: 0 | Status: SHIPPED | OUTPATIENT
Start: 2024-06-12

## 2024-07-14 DIAGNOSIS — I10 ESSENTIAL HYPERTENSION: ICD-10-CM

## 2024-07-15 RX ORDER — AMLODIPINE BESYLATE 5 MG/1
5 TABLET ORAL DAILY
Qty: 90 TABLET | Refills: 0 | Status: SHIPPED | OUTPATIENT
Start: 2024-07-15

## 2024-07-15 NOTE — TELEPHONE ENCOUNTER
Outisde of RN standing orders due to most recent BP was 2023. BP readin/92, routing to PCP to review and fill if appropriate. Bonnie LOPEZ

## 2024-07-22 DIAGNOSIS — K20.80 ESOPHAGITIS, LOS ANGELES GRADE D: ICD-10-CM

## 2024-07-22 NOTE — TELEPHONE ENCOUNTER
Message to physician:     Date of last visit: 11/13/2023    Date of next visit if scheduled:     Potassium   Date Value Ref Range Status   06/03/2024 4.0 3.4 - 5.3 mmol/L Final   04/20/2022 3.5 3.5 - 5.0 mmol/L Final   04/27/2021 3.7 3.4 - 5.3 mmol/L Final     Creatinine   Date Value Ref Range Status   06/03/2024 1.22 (H) 0.67 - 1.17 mg/dL Final   04/27/2021 1.2 0.8 - 1.5 mg/dL Final     GFR Estimate   Date Value Ref Range Status   06/03/2024 69 >60 mL/min/1.73m2 Final   03/11/2021 >60 >60 mL/min/1.73m2 Final       BP Readings from Last 3 Encounters:   11/13/23 (!) 142/92   08/30/23 129/87   08/26/23 (!) 182/108       Hemoglobin A1C   Date Value Ref Range Status   11/13/2023 5.3 0.0 - 5.6 % Final     Comment:     Normal <5.7%   Prediabetes 5.7-6.4%    Diabetes 6.5% or higher     Note: Adopted from ADA consensus guidelines.   07/17/2020 6.1 (H) 4.1 - 5.7 % Final       Please complete refill and CLOSE ENCOUNTER.  Closing the encounter signifies the refill is complete.

## 2024-08-01 ENCOUNTER — OFFICE VISIT (OUTPATIENT)
Dept: DERMATOLOGY | Facility: CLINIC | Age: 59
End: 2024-08-01
Payer: COMMERCIAL

## 2024-08-01 DIAGNOSIS — L13.8 LINEAR IGA BULLOUS DERMATOSIS: ICD-10-CM

## 2024-08-01 DIAGNOSIS — Z79.899 ENCOUNTER FOR LONG-TERM CURRENT USE OF HIGH RISK MEDICATION: Primary | ICD-10-CM

## 2024-08-01 DIAGNOSIS — D49.2 NEOPLASM OF UNSPECIFIED BEHAVIOR OF BONE, SOFT TISSUE, AND SKIN: ICD-10-CM

## 2024-08-01 PROCEDURE — 88312 SPECIAL STAINS GROUP 1: CPT | Mod: 26 | Performed by: DERMATOLOGY

## 2024-08-01 PROCEDURE — 99214 OFFICE O/P EST MOD 30 MIN: CPT | Mod: 25 | Performed by: DERMATOLOGY

## 2024-08-01 PROCEDURE — 88305 TISSUE EXAM BY PATHOLOGIST: CPT | Mod: TC | Performed by: DERMATOLOGY

## 2024-08-01 PROCEDURE — 88312 SPECIAL STAINS GROUP 1: CPT | Mod: TC | Performed by: DERMATOLOGY

## 2024-08-01 PROCEDURE — 11104 PUNCH BX SKIN SINGLE LESION: CPT | Mod: GC | Performed by: DERMATOLOGY

## 2024-08-01 PROCEDURE — 88305 TISSUE EXAM BY PATHOLOGIST: CPT | Mod: 26 | Performed by: DERMATOLOGY

## 2024-08-01 RX ORDER — DAPSONE 25 MG/1
50 TABLET ORAL DAILY
Qty: 180 TABLET | Refills: 0 | Status: SHIPPED | OUTPATIENT
Start: 2024-08-01

## 2024-08-01 ASSESSMENT — PAIN SCALES - GENERAL: PAINLEVEL: NO PAIN (0)

## 2024-08-01 NOTE — PATIENT INSTRUCTIONS
Biopsy Instructions    For the biopsied area, leave the bandaid on for 24 hours. After 24 hours, wash the biopsied area with soap and water and then put on a Aquaphor, Vasaline, Vaniply, or plain petroleum jelly to the area and cover a bandaid. Wash and replace the ointment and a bandaid every day until the area heals. If you develop spreading redness, pus, tendnerness, please call the clinic.     If the biopsy area were to start to bleed, apply firm direct pressure for 15 minutes without peeking. If after 15 minutes, the area is still bleeding, you can repeat the 15 minutes of pressure for 2 more times. But, if after 45 minutes, it is still bleeding, please call the clinic or go to urgent care / the emergency department.     It may take up to 2 weeks to get a result from the biopsy taken today. If you have not received a message or notification of the result after 2 weeks, please call our office.

## 2024-08-01 NOTE — PROGRESS NOTES
[facility] Dermatology Note  Encounter Date: Aug 1, 2024  Office Visit     Dermatology Problem List:  1. IgA bullous disease  - Dapsone 50 mg daily  - Safety labs performed 6/2024  2. Annular facial eruption   S/p punch biopsy 8/1/24  S/p failed econazole cream trial    ____________________________________________    Assessment & Plan:    # IgA bullous disease.   - Continue dapsone 50 mg daily  - Continue betamethasone 0.05% ointment as needed for flares  - Safety labs CBC, CMP done 6/3/2024 reassuring  - Repeat at next visit    # Annular scaly plaques on left cheek.    Unchanged following econazole 1% trial. Given it is unchanged, performed biopsy to investigate other etiologies including cutaneous LE, sarcoidosis, granumola faceii.  - s/p punch biopsy today (see procedure note below)    Procedures Performed:   Punch biopsy procedure  - location(s): left cheek  After discussion of benefits and risks including but not limited to bleeding/bruising, pain/swelling, infection, scar, incomplete removal, nerve damage/numbness, recurrence, and non-diagnostic biopsy, verbal consent and photographs obtained, time-out performed, area cleaned with alcohol, 1% lidocaine injected to obtain anesthesia, 3 mm punch biopsy performed, 4-0 proline sutures used to approximate epidermal edges (to be removed in 10-14 days), Vaseline and bandage applied to wound, post-care instructions provided     Follow-up: pending path results    Staff and Resident:  Gildardo Franks MD,  Dermatology Resident, PGY2     I have seen and examined this patient and agree with the assessment and plan as documented in the resident's note, and was present for all procedures.    Timothy Villanueva MD  Dermatology Attending    ____________________________________________    CC: Derm Problem (Piter is here today for a follow up - skin is doing pretty good )    HPI:  Mr. Piter Gaines is a(n) 58 year old male who presents today as a return  patient for rash on face.  Patient was last seen 5/28/24 at which time he was continued on 50 mg dapsone for IgA bullous disease with safety labs ordered. He was also prescribed econazole for an annular rash on the face following KOH with subtle hyphae noted.    Today, patient notes that the rash on the face is not responding to the econazole. It has not gotten larger or spread. It does not itch and is not painful. He continues on dapsone 50 mg without issues. He notes that he uses the betamethasone every few days on a spot on the left leg that will start to blister if he does not use it.    Patient is otherwise feeling well, without additional skin concerns.    Labs:  None reviewed.    Physical Exam:  Vitals: There were no vitals taken for this visit.  SKIN: Focused examination of face was performed.  - Two annular plaques with central clearing on left cheek  - No other lesions of concern on areas examined.     Medications:  Current Outpatient Medications   Medication Sig Dispense Refill    acetaminophen (TYLENOL) 500 MG tablet Take 1 tablet (500 mg) by mouth every 6 hours as needed for pain 180 tablet 1    adapalene (DIFFERIN) 0.1 % external cream Apply thin layer to beard and hairline region after washing hair/face everyday.  If irritation or peeling develops, skip application for one day. (Patient taking differently: Apply thin layer to beard and hairline region after washing hair/face everyday.  If irritation or peeling develops, skip application for one day.) 45 g 1    amLODIPine (NORVASC) 5 MG tablet TAKE 1 TABLET(5 MG) BY MOUTH DAILY 90 tablet 0    aspirin (ASA) 81 MG EC tablet Take 1 tablet (81 mg) by mouth daily 90 tablet 3    atorvastatin (LIPITOR) 20 MG tablet Take 1 tablet (20 mg) by mouth daily 90 tablet 2    betamethasone dipropionate (DIPROSONE) 0.05 % external ointment Apply topically 2 times daily To areas of rash flare up on legs 50 g 2    bisacodyl (DULCOLAX) 5 MG EC tablet Two days prior to exam  take two (2) tablets at 4pm. One day prior to exam take two (2) tablets at 4pm 4 tablet 0    buPROPion (WELLBUTRIN XL) 150 MG 24 hr tablet Take 1 tablet (150 mg) by mouth every morning 90 tablet 3    cyclobenzaprine (FLEXERIL) 5 MG tablet Take 1-2 tablets (5-10 mg) by mouth 3 times daily as needed for muscle spasms 60 tablet 0    dapsone (ACZONE) 25 MG tablet Take 2 tablets (50 mg) by mouth daily 180 tablet 0    DULoxetine (CYMBALTA) 30 MG capsule TAKE 1 CAPSULE(30 MG) BY MOUTH AT BEDTIME 90 capsule 0    econazole nitrate 1 % external cream Apply topically 2 times daily 30 g 1    finasteride (PROSCAR) 5 MG tablet Take 1 tablet (5 mg) by mouth daily Please keep 1-2-24 clinic appt for refills. 90 tablet 3    gabapentin (NEURONTIN) 300 MG capsule TAKE 1 CAPSULE BY MOUTH IN THE MORNING AND AFTERNOON AND 2 CAPSULES IN THE EVENING 270 capsule 1    hydrochlorothiazide (HYDRODIURIL) 12.5 MG tablet Take 1 tablet (12.5 mg) by mouth daily 90 tablet 3    hydrocortisone (CORTAID) 1 % external cream Apply topically 2 times daily as needed for rash or itching 120 g 1    losartan (COZAAR) 50 MG tablet Take 2 tablets (100 mg) by mouth daily 180 tablet 0    nicotine (NICODERM CQ) 21 MG/24HR 24 hr patch Place 1 patch onto the skin every 24 hours 28 patch 4    nicotine (NICORETTE) 2 MG gum Place 1 each (2 mg) inside cheek every hour as needed for smoking cessation 100 each 4    omeprazole (PRILOSEC) 20 MG DR capsule Take 1 capsule (20 mg) by mouth daily 90 capsule 1    oxyBUTYnin ER (DITROPAN XL) 10 MG 24 hr tablet Take 1 tablet (10 mg) by mouth daily (Please keep visit for 1/2/2024 for further refills) Thank you 90 tablet 3    oxyCODONE-acetaminophen (PERCOCET)  MG per tablet TAKE 1 TABLET BY MOUTH EVERY 4 TO 6 HOURS WHILE AWAKE AS NEEDED FOR SEVERE BREAKTHROUGH PAIN. MAX 3 TABLETS IN 24 HOURS.      polyethylene glycol (GOLYTELY) 236 g suspension Take as directed. Two days before your exam fill the first container with water.  Cover and shake until mixed well. At 5:00pm drink one 8oz glass every 10-15 minutes until half (1/2) of the first container is empty. Store the remainder in the refrigerator. One day before your exam at 5:00pm drink the second half of the first container until it is gone. Before you go to bed mix the second container with water and put in refrigerator. Six hours before your check in time drink one 8oz glass every 10-15 minutes until half of container is empty. Discard the remainder of solution. 8000 mL 0    polyethylene glycol-electrolytes (NULYTELY) 420 g solution AS DIRECTED. PLEASE SEE ATTACHMENT.      tamsulosin (FLOMAX) 0.4 MG capsule Take 2 capsules (0.8 mg) by mouth daily 180 capsule 3    tiZANidine (ZANAFLEX) 4 MG tablet        No current facility-administered medications for this visit.      Past Medical History:   Patient Active Problem List   Diagnosis    Tobacco use disorder    Caloric malnutrition (H24)    Essential hypertension    Moderate episode of recurrent major depressive disorder (H)    Seborrheic Keratosis    Persistent proteinuria    Benign prostatic hyperplasia with nocturia    Linear IgA bullous dermatosis    Encounter for long-term (current) use of high-risk medication    Normochromic normocytic anemia    Carotid stenosis, asymptomatic, bilateral    Esophagitis, [place] grade D    Rectal hemorrhage    Alcohol abuse    Chronic kidney disease, stage II (mild)    Depression, unspecified depression type    Tinea faciale     Past Medical History:   Diagnosis Date    Acute hyperactive alcohol withdrawal delirium (H) 04/06/2017    Alcohol abuse 11/10/2016    Alcohol abuse     Depression     Difficulty walking     Esophagitis     Gastroesophageal reflux disease     History of blood transfusion     Hypertension     Hypertension     Kidney disease     Tobacco abuse     Uncomplicated asthma     Vasovagal syncope 02/04/2019        CC Any Truong MD  [facility] [place] [address] on  close of this encounter.

## 2024-08-01 NOTE — NURSING NOTE
Dermatology Rooming Note    Piter Gaines's goals for this visit include:   Chief Complaint   Patient presents with    Derm Problem     Piter is here today for a follow up - skin is doing pretty good      MESER[facility] Green - Dermatology

## 2024-08-01 NOTE — LETTER
8/1/2024       RE: Piter Gaines  [address] [place] [address]     Dear Colleague,    Thank you for referring your patient, Piter Gaines, to the [facility] DERMATOLOG[facility] [place] [facility]. Please see a copy of my visit note below.    [facility] Dermatology Note  Encounter Date: Aug 1, 2024  Office Visit     Dermatology Problem List:  1. IgA bullous disease  - Dapsone 50 mg daily  - Safety labs performed 6/2024  2. Annular facial eruption   S/p punch biopsy 8/1/24  S/p failed econazole cream trial    ____________________________________________    Assessment & Plan:    # IgA bullous disease.   - Continue dapsone 50 mg daily  - Continue betamethasone 0.05% ointment as needed for flares  - Safety labs CBC, CMP done 6/3/2024 reassuring  - Repeat at next visit    # Annular scaly plaques on left cheek.    Unchanged following econazole 1% trial. Given it is unchanged, performed biopsy to investigate other etiologies including cutaneous LE, sarcoidosis, granumola faceii.  - s/p punch biopsy today (see procedure note below)    Procedures Performed:   Punch biopsy procedure  - location(s): left cheek  After discussion of benefits and risks including but not limited to bleeding/bruising, pain/swelling, infection, scar, incomplete removal, nerve damage/numbness, recurrence, and non-diagnostic biopsy, verbal consent and photographs obtained, time-out performed, area cleaned with alcohol, 1% lidocaine injected to obtain anesthesia, 3 mm punch biopsy performed, 4-0 proline sutures used to approximate epidermal edges (to be removed in 10-14 days), Vaseline and bandage applied to wound, post-care instructions provided     Follow-up: pending path results    Staff and Resident:  Gildardo Franks MD,  Dermatology Resident, PGY2     I have seen and examined this patient and agree with the assessment and plan as documented in the resident's  note, and was present for all procedures.    Timothy Villanueva MD  Dermatology Attending    ____________________________________________    CC: Derm Problem (Piter is here today for a follow up - skin is doing pretty good )    HPI:  Mr. Piter Gaines is a(n) 58 year old male who presents today as a return patient for rash on face.  Patient was last seen 5/28/24 at which time he was continued on 50 mg dapsone for IgA bullous disease with safety labs ordered. He was also prescribed econazole for an annular rash on the face following KOH with subtle hyphae noted.    Today, patient notes that the rash on the face is not responding to the econazole. It has not gotten larger or spread. It does not itch and is not painful. He continues on dapsone 50 mg without issues. He notes that he uses the betamethasone every few days on a spot on the left leg that will start to blister if he does not use it.    Patient is otherwise feeling well, without additional skin concerns.    Labs:  None reviewed.    Physical Exam:  Vitals: There were no vitals taken for this visit.  SKIN: Focused examination of face was performed.  - Two annular plaques with central clearing on left cheek  - No other lesions of concern on areas examined.     Medications:  Current Outpatient Medications   Medication Sig Dispense Refill     acetaminophen (TYLENOL) 500 MG tablet Take 1 tablet (500 mg) by mouth every 6 hours as needed for pain 180 tablet 1     adapalene (DIFFERIN) 0.1 % external cream Apply thin layer to beard and hairline region after washing hair/face everyday.  If irritation or peeling develops, skip application for one day. (Patient taking differently: Apply thin layer to beard and hairline region after washing hair/face everyday.  If irritation or peeling develops, skip application for one day.) 45 g 1     amLODIPine (NORVASC) 5 MG tablet TAKE 1 TABLET(5 MG) BY MOUTH DAILY 90 tablet 0     aspirin (ASA) 81 MG EC tablet Take 1 tablet (81 mg) by mouth  daily 90 tablet 3     atorvastatin (LIPITOR) 20 MG tablet Take 1 tablet (20 mg) by mouth daily 90 tablet 2     betamethasone dipropionate (DIPROSONE) 0.05 % external ointment Apply topically 2 times daily To areas of rash flare up on legs 50 g 2     bisacodyl (DULCOLAX) 5 MG EC tablet Two days prior to exam take two (2) tablets at 4pm. One day prior to exam take two (2) tablets at 4pm 4 tablet 0     buPROPion (WELLBUTRIN XL) 150 MG 24 hr tablet Take 1 tablet (150 mg) by mouth every morning 90 tablet 3     cyclobenzaprine (FLEXERIL) 5 MG tablet Take 1-2 tablets (5-10 mg) by mouth 3 times daily as needed for muscle spasms 60 tablet 0     dapsone (ACZONE) 25 MG tablet Take 2 tablets (50 mg) by mouth daily 180 tablet 0     DULoxetine (CYMBALTA) 30 MG capsule TAKE 1 CAPSULE(30 MG) BY MOUTH AT BEDTIME 90 capsule 0     econazole nitrate 1 % external cream Apply topically 2 times daily 30 g 1     finasteride (PROSCAR) 5 MG tablet Take 1 tablet (5 mg) by mouth daily Please keep 1-2-24 clinic appt for refills. 90 tablet 3     gabapentin (NEURONTIN) 300 MG capsule TAKE 1 CAPSULE BY MOUTH IN THE MORNING AND AFTERNOON AND 2 CAPSULES IN THE EVENING 270 capsule 1     hydrochlorothiazide (HYDRODIURIL) 12.5 MG tablet Take 1 tablet (12.5 mg) by mouth daily 90 tablet 3     hydrocortisone (CORTAID) 1 % external cream Apply topically 2 times daily as needed for rash or itching 120 g 1     losartan (COZAAR) 50 MG tablet Take 2 tablets (100 mg) by mouth daily 180 tablet 0     nicotine (NICODERM CQ) 21 MG/24HR 24 hr patch Place 1 patch onto the skin every 24 hours 28 patch 4     nicotine (NICORETTE) 2 MG gum Place 1 each (2 mg) inside cheek every hour as needed for smoking cessation 100 each 4     omeprazole (PRILOSEC) 20 MG DR capsule Take 1 capsule (20 mg) by mouth daily 90 capsule 1     oxyBUTYnin ER (DITROPAN XL) 10 MG 24 hr tablet Take 1 tablet (10 mg) by mouth daily (Please keep visit for 1/2/2024 for further refills) Thank you 90  tablet 3     oxyCODONE-acetaminophen (PERCOCET)  MG per tablet TAKE 1 TABLET BY MOUTH EVERY 4 TO 6 HOURS WHILE AWAKE AS NEEDED FOR SEVERE BREAKTHROUGH PAIN. MAX 3 TABLETS IN 24 HOURS.       polyethylene glycol (GOLYTELY) 236 g suspension Take as directed. Two days before your exam fill the first container with water. Cover and shake until mixed well. At 5:00pm drink one 8oz glass every 10-15 minutes until half (1/2) of the first container is empty. Store the remainder in the refrigerator. One day before your exam at 5:00pm drink the second half of the first container until it is gone. Before you go to bed mix the second container with water and put in refrigerator. Six hours before your check in time drink one 8oz glass every 10-15 minutes until half of container is empty. Discard the remainder of solution. 8000 mL 0     polyethylene glycol-electrolytes (NULYTELY) 420 g solution AS DIRECTED. PLEASE SEE ATTACHMENT.       tamsulosin (FLOMAX) 0.4 MG capsule Take 2 capsules (0.8 mg) by mouth daily 180 capsule 3     tiZANidine (ZANAFLEX) 4 MG tablet        No current facility-administered medications for this visit.      Past Medical History:   Patient Active Problem List   Diagnosis     Tobacco use disorder     Caloric malnutrition (H24)     Essential hypertension     Moderate episode of recurrent major depressive disorder (H)     Seborrheic Keratosis     Persistent proteinuria     Benign prostatic hyperplasia with nocturia     Linear IgA bullous dermatosis     Encounter for long-term (current) use of high-risk medication     Normochromic normocytic anemia     Carotid stenosis, asymptomatic, bilateral     Esophagitis, [place] grade D     Rectal hemorrhage     Alcohol abuse     Chronic kidney disease, stage II (mild)     Depression, unspecified depression type     Tinea faciale     Past Medical History:   Diagnosis Date     Acute hyperactive alcohol withdrawal delirium (H) 04/06/2017     Alcohol abuse  11/10/2016     Alcohol abuse      Depression      Difficulty walking      Esophagitis      Gastroesophageal reflux disease      History of blood transfusion      Hypertension      Hypertension      Kidney disease      Tobacco abuse      Uncomplicated asthma      Vasovagal syncope 02/04/2019        CC Any Truong MD  [facility] [place] [address] on close of this encounter.      Again, thank you for allowing me to participate in the care of your patient.      Sincerely,    Timothy Villanueva MD

## 2024-08-09 LAB
PATH REPORT.COMMENTS IMP SPEC: NORMAL
PATH REPORT.FINAL DX SPEC: NORMAL
PATH REPORT.GROSS SPEC: NORMAL
PATH REPORT.MICROSCOPIC SPEC OTHER STN: NORMAL
PATH REPORT.RELEVANT HX SPEC: NORMAL

## 2024-08-19 PROBLEM — D49.2 NEOPLASM OF UNSPECIFIED BEHAVIOR OF BONE, SOFT TISSUE, AND SKIN: Status: ACTIVE | Noted: 2024-08-19

## 2024-09-14 ENCOUNTER — HOSPITAL ENCOUNTER (EMERGENCY)
Facility: HOSPITAL | Age: 59
Discharge: HOME | End: 2024-09-14
Admitting: STUDENT IN AN ORGANIZED HEALTH CARE EDUCATION/TRAINING PROGRAM
Payer: COMMERCIAL

## 2024-09-14 ENCOUNTER — APPOINTMENT (OUTPATIENT)
Dept: MRI IMAGING | Facility: HOSPITAL | Age: 59
End: 2024-09-14
Attending: STUDENT IN AN ORGANIZED HEALTH CARE EDUCATION/TRAINING PROGRAM
Payer: COMMERCIAL

## 2024-09-14 VITALS
WEIGHT: 180 LBS | BODY MASS INDEX: 26.66 KG/M2 | OXYGEN SATURATION: 96 % | HEART RATE: 69 BPM | RESPIRATION RATE: 22 BRPM | TEMPERATURE: 97.9 F | HEIGHT: 69 IN | SYSTOLIC BLOOD PRESSURE: 162 MMHG | DIASTOLIC BLOOD PRESSURE: 107 MMHG

## 2024-09-14 DIAGNOSIS — M51.26 PROTRUSION OF LUMBAR INTERVERTEBRAL DISC: ICD-10-CM

## 2024-09-14 LAB
ANION GAP SERPL CALCULATED.3IONS-SCNC: 11 MMOL/L (ref 7–15)
BASOPHILS # BLD AUTO: 0 10E3/UL (ref 0–0.2)
BASOPHILS NFR BLD AUTO: 0 %
BUN SERPL-MCNC: 16.1 MG/DL (ref 6–20)
CALCIUM SERPL-MCNC: 9.6 MG/DL (ref 8.8–10.4)
CHLORIDE SERPL-SCNC: 101 MMOL/L (ref 98–107)
CREAT SERPL-MCNC: 1.31 MG/DL (ref 0.67–1.17)
EGFRCR SERPLBLD CKD-EPI 2021: 63 ML/MIN/1.73M2
EOSINOPHIL # BLD AUTO: 0.2 10E3/UL (ref 0–0.7)
EOSINOPHIL NFR BLD AUTO: 2 %
ERYTHROCYTE [DISTWIDTH] IN BLOOD BY AUTOMATED COUNT: 13.8 % (ref 10–15)
GLUCOSE SERPL-MCNC: 93 MG/DL (ref 70–99)
HCO3 SERPL-SCNC: 28 MMOL/L (ref 22–29)
HCT VFR BLD AUTO: 40.5 % (ref 40–53)
HGB BLD-MCNC: 13 G/DL (ref 13.3–17.7)
HOLD SPECIMEN: NORMAL
HOLD SPECIMEN: NORMAL
IMM GRANULOCYTES # BLD: 0 10E3/UL
IMM GRANULOCYTES NFR BLD: 0 %
LYMPHOCYTES # BLD AUTO: 1.8 10E3/UL (ref 0.8–5.3)
LYMPHOCYTES NFR BLD AUTO: 26 %
MCH RBC QN AUTO: 27.7 PG (ref 26.5–33)
MCHC RBC AUTO-ENTMCNC: 32.1 G/DL (ref 31.5–36.5)
MCV RBC AUTO: 86 FL (ref 78–100)
MONOCYTES # BLD AUTO: 0.5 10E3/UL (ref 0–1.3)
MONOCYTES NFR BLD AUTO: 7 %
NEUTROPHILS # BLD AUTO: 4.6 10E3/UL (ref 1.6–8.3)
NEUTROPHILS NFR BLD AUTO: 64 %
NRBC # BLD AUTO: 0 10E3/UL
NRBC BLD AUTO-RTO: 0 /100
PLATELET # BLD AUTO: 227 10E3/UL (ref 150–450)
POTASSIUM SERPL-SCNC: 3.8 MMOL/L (ref 3.4–5.3)
RBC # BLD AUTO: 4.7 10E6/UL (ref 4.4–5.9)
SODIUM SERPL-SCNC: 140 MMOL/L (ref 135–145)
WBC # BLD AUTO: 7.1 10E3/UL (ref 4–11)

## 2024-09-14 PROCEDURE — 99285 EMERGENCY DEPT VISIT HI MDM: CPT | Mod: 25

## 2024-09-14 PROCEDURE — 255N000002 HC RX 255 OP 636: Performed by: STUDENT IN AN ORGANIZED HEALTH CARE EDUCATION/TRAINING PROGRAM

## 2024-09-14 PROCEDURE — 85025 COMPLETE CBC W/AUTO DIFF WBC: CPT | Performed by: STUDENT IN AN ORGANIZED HEALTH CARE EDUCATION/TRAINING PROGRAM

## 2024-09-14 PROCEDURE — A9585 GADOBUTROL INJECTION: HCPCS | Performed by: STUDENT IN AN ORGANIZED HEALTH CARE EDUCATION/TRAINING PROGRAM

## 2024-09-14 PROCEDURE — 250N000012 HC RX MED GY IP 250 OP 636 PS 637: Performed by: STUDENT IN AN ORGANIZED HEALTH CARE EDUCATION/TRAINING PROGRAM

## 2024-09-14 PROCEDURE — 36415 COLL VENOUS BLD VENIPUNCTURE: CPT | Performed by: STUDENT IN AN ORGANIZED HEALTH CARE EDUCATION/TRAINING PROGRAM

## 2024-09-14 PROCEDURE — 80048 BASIC METABOLIC PNL TOTAL CA: CPT | Performed by: STUDENT IN AN ORGANIZED HEALTH CARE EDUCATION/TRAINING PROGRAM

## 2024-09-14 PROCEDURE — 250N000013 HC RX MED GY IP 250 OP 250 PS 637: Performed by: STUDENT IN AN ORGANIZED HEALTH CARE EDUCATION/TRAINING PROGRAM

## 2024-09-14 PROCEDURE — 72158 MRI LUMBAR SPINE W/O & W/DYE: CPT

## 2024-09-14 RX ORDER — METHYLPREDNISOLONE 4 MG
TABLET, DOSE PACK ORAL
Qty: 21 TABLET | Refills: 0 | Status: SHIPPED | OUTPATIENT
Start: 2024-09-14 | End: 2024-09-16

## 2024-09-14 RX ORDER — METHOCARBAMOL 500 MG/1
500 TABLET, FILM COATED ORAL ONCE
Status: COMPLETED | OUTPATIENT
Start: 2024-09-14 | End: 2024-09-14

## 2024-09-14 RX ORDER — METHOCARBAMOL 500 MG/1
500 TABLET, FILM COATED ORAL 3 TIMES DAILY
Qty: 30 TABLET | Refills: 0 | Status: SHIPPED | OUTPATIENT
Start: 2024-09-14 | End: 2024-09-16

## 2024-09-14 RX ORDER — GADOBUTROL 604.72 MG/ML
8 INJECTION INTRAVENOUS ONCE
Status: COMPLETED | OUTPATIENT
Start: 2024-09-14 | End: 2024-09-14

## 2024-09-14 RX ORDER — DIAZEPAM 5 MG
5 TABLET ORAL ONCE
Status: COMPLETED | OUTPATIENT
Start: 2024-09-14 | End: 2024-09-14

## 2024-09-14 RX ORDER — OXYCODONE HYDROCHLORIDE 5 MG/1
5 TABLET ORAL ONCE
Status: COMPLETED | OUTPATIENT
Start: 2024-09-14 | End: 2024-09-14

## 2024-09-14 RX ORDER — GABAPENTIN 100 MG/1
300 CAPSULE ORAL 3 TIMES DAILY
Qty: 90 CAPSULE | Refills: 0 | Status: SHIPPED | OUTPATIENT
Start: 2024-09-14 | End: 2024-09-24

## 2024-09-14 RX ORDER — PREDNISONE 20 MG/1
40 TABLET ORAL ONCE
Status: COMPLETED | OUTPATIENT
Start: 2024-09-14 | End: 2024-09-14

## 2024-09-14 RX ADMIN — PREDNISONE 40 MG: 20 TABLET ORAL at 21:31

## 2024-09-14 RX ADMIN — METHOCARBAMOL 500 MG: 500 TABLET ORAL at 21:31

## 2024-09-14 RX ADMIN — OXYCODONE HYDROCHLORIDE 5 MG: 5 TABLET ORAL at 18:29

## 2024-09-14 RX ADMIN — GADOBUTROL 8 ML: 604.72 INJECTION INTRAVENOUS at 20:29

## 2024-09-14 RX ADMIN — DIAZEPAM 5 MG: 5 TABLET ORAL at 17:41

## 2024-09-14 ASSESSMENT — COLUMBIA-SUICIDE SEVERITY RATING SCALE - C-SSRS
2. HAVE YOU ACTUALLY HAD ANY THOUGHTS OF KILLING YOURSELF IN THE PAST MONTH?: NO
1. IN THE PAST MONTH, HAVE YOU WISHED YOU WERE DEAD OR WISHED YOU COULD GO TO SLEEP AND NOT WAKE UP?: NO
6. HAVE YOU EVER DONE ANYTHING, STARTED TO DO ANYTHING, OR PREPARED TO DO ANYTHING TO END YOUR LIFE?: NO

## 2024-09-14 ASSESSMENT — ACTIVITIES OF DAILY LIVING (ADL)
ADLS_ACUITY_SCORE: 35

## 2024-09-14 NOTE — ED TRIAGE NOTES
Patient arrives to ED for evaluation of back pain that has become severe in the past week with radiation to the buttocks, hips, groin, and legs bilaterally. Also endorses tingling of the legs bilaterally and subjective fevers at home. Has been unable to ambulate. No OTC analgesics today, states they don't help.
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
## 2024-09-15 NOTE — DISCHARGE INSTRUCTIONS
You were seen in the emergency department today for worsened back pain.  Your MRI shows that you have a protrusion of your L3/L4 disc, this has not changed from previous scans.  No new acute findings today.  I suspect your symptoms are largely due to muscle spasm.    I have started you on a Medrol Dosepak, this is a steroid that can help calm down inflammation.  Take this as written on the package.  I have also been you a prescription for methocarbamol, this is a muscle relaxer and you can take 500 mg up to 3 times a day.  I also recommend application of a heating pad to your low back and glued area to help relax the musculature or warm water soaks.  I have also given you a prescription for gabapentin, take 200 mg three times per day. You can increase to 300mg three times per day if needed.    You may take Ibuprofen up to 400 mg by mouth every 4-6 hours as needed for pain. Do not exceed 2400 mg/day.  You may take Tylenol 325-1000 mg by mouth every 4-6 hours as needed for pain. Do not exceed 1000mg (1g) in 4 hours or 4 g/day from all sources.  You may combine Tylenol and Ibuprofen- up to 400 mg of ibuprofen and 1000 mg of Tylenol at the same time, up to 3 times a day for the pain      The number for the [facility] was provided for you, I recommend calling on Monday to schedule follow-up in clinic.  Continue going to your chiropractor and seeing her pain clinic.  Return to the emergency department if you develop any bowel or bladder incontinence, numbness or tingling around your groin, or sudden severe weakness of one leg.

## 2024-09-16 ENCOUNTER — PATIENT OUTREACH (OUTPATIENT)
Dept: CARE COORDINATION | Facility: CLINIC | Age: 59
End: 2024-09-16

## 2024-09-16 ENCOUNTER — OFFICE VISIT (OUTPATIENT)
Dept: FAMILY MEDICINE | Facility: CLINIC | Age: 59
End: 2024-09-16
Payer: COMMERCIAL

## 2024-09-16 VITALS
HEART RATE: 86 BPM | OXYGEN SATURATION: 97 % | SYSTOLIC BLOOD PRESSURE: 176 MMHG | RESPIRATION RATE: 20 BRPM | DIASTOLIC BLOOD PRESSURE: 106 MMHG | BODY MASS INDEX: 24.37 KG/M2 | WEIGHT: 165 LBS

## 2024-09-16 DIAGNOSIS — M54.50 CHRONIC LOW BACK PAIN WITHOUT SCIATICA, UNSPECIFIED BACK PAIN LATERALITY: ICD-10-CM

## 2024-09-16 DIAGNOSIS — I10 ESSENTIAL HYPERTENSION: ICD-10-CM

## 2024-09-16 DIAGNOSIS — Z09 HOSPITAL DISCHARGE FOLLOW-UP: Primary | ICD-10-CM

## 2024-09-16 DIAGNOSIS — G89.29 CHRONIC LOW BACK PAIN WITHOUT SCIATICA, UNSPECIFIED BACK PAIN LATERALITY: ICD-10-CM

## 2024-09-16 PROCEDURE — 99214 OFFICE O/P EST MOD 30 MIN: CPT | Mod: GC

## 2024-09-16 PROCEDURE — G2211 COMPLEX E/M VISIT ADD ON: HCPCS

## 2024-09-16 RX ORDER — LOSARTAN POTASSIUM 50 MG/1
50 TABLET ORAL DAILY
Qty: 90 TABLET | Refills: 0 | Status: SHIPPED | OUTPATIENT
Start: 2024-09-16

## 2024-09-16 RX ORDER — GABAPENTIN 300 MG/1
300 CAPSULE ORAL 3 TIMES DAILY
Qty: 90 CAPSULE | Refills: 2 | Status: SHIPPED | OUTPATIENT
Start: 2024-09-16

## 2024-09-16 RX ORDER — LIDOCAINE 4 G/G
1 PATCH TOPICAL EVERY 24 HOURS
Qty: 15 PATCH | Refills: 1 | Status: SHIPPED | OUTPATIENT
Start: 2024-09-16

## 2024-09-16 ASSESSMENT — PATIENT HEALTH QUESTIONNAIRE - PHQ9
10. IF YOU CHECKED OFF ANY PROBLEMS, HOW DIFFICULT HAVE THESE PROBLEMS MADE IT FOR YOU TO DO YOUR WORK, TAKE CARE OF THINGS AT HOME, OR GET ALONG WITH OTHER PEOPLE: NOT DIFFICULT AT ALL
SUM OF ALL RESPONSES TO PHQ QUESTIONS 1-9: 0
SUM OF ALL RESPONSES TO PHQ QUESTIONS 1-9: 0

## 2024-09-16 NOTE — PROGRESS NOTES
Preceptor Attestation:  Patient's case reviewed and discussed with the resident, Any Truong MD, and I personally evaluated the patient. I agree with written assessment and plan of care.    Supervising Physician:  Caryl Montgomery MD   [facility]

## 2024-09-16 NOTE — PROGRESS NOTES
Clinic Care Coordination Contact  Follow Up Progress Note      Assessment:  The pt was recently in the ED, I called to check up on the pt and help the pt setup a ED follow up. The pt was at [facility] for back pain. I called and talked to the pt, pt stated that he is doing better. Pt stated that he is coming in today at 1:40pm to see .    Care Gaps:    Health Maintenance Due   Topic Date Due    ADVANCE CARE PLANNING  Never done    ZOSTER IMMUNIZATION (3 of 3) 12/01/2020    HEPATITIS B IMMUNIZATION (2 of 3 - 19+ 3-dose series) 08/25/2023    COLORECTAL CANCER SCREENING  08/29/2023    PHQ-9  02/11/2024    NICOTINE/TOBACCO CESSATION COUNSELING Q 1 YR  07/28/2024    MEDICARE ANNUAL WELLNESS VISIT  07/28/2024    LIPID  07/28/2024    MICROALBUMIN  08/11/2024    INFLUENZA VACCINE (1) 09/01/2024    COVID-19 Vaccine (5 - 2023-24 season) 09/01/2024
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
## 2024-10-09 ENCOUNTER — OFFICE VISIT (OUTPATIENT)
Dept: FAMILY MEDICINE | Facility: CLINIC | Age: 59
End: 2024-10-09
Payer: COMMERCIAL

## 2024-10-09 VITALS
BODY MASS INDEX: 24.22 KG/M2 | WEIGHT: 164 LBS | OXYGEN SATURATION: 98 % | DIASTOLIC BLOOD PRESSURE: 98 MMHG | SYSTOLIC BLOOD PRESSURE: 170 MMHG | HEART RATE: 85 BPM | TEMPERATURE: 97.9 F

## 2024-10-09 DIAGNOSIS — L13.8 LINEAR IGA BULLOUS DERMATOSIS: ICD-10-CM

## 2024-10-09 DIAGNOSIS — K20.80 ESOPHAGITIS, LOS ANGELES GRADE D: ICD-10-CM

## 2024-10-09 DIAGNOSIS — J06.9 VIRAL URI: ICD-10-CM

## 2024-10-09 DIAGNOSIS — I10 ESSENTIAL HYPERTENSION: ICD-10-CM

## 2024-10-09 DIAGNOSIS — R50.9 FEVER, UNSPECIFIED FEVER CAUSE: Primary | ICD-10-CM

## 2024-10-09 DIAGNOSIS — R35.1 BENIGN PROSTATIC HYPERPLASIA WITH NOCTURIA: ICD-10-CM

## 2024-10-09 DIAGNOSIS — N40.1 BENIGN PROSTATIC HYPERPLASIA WITH NOCTURIA: ICD-10-CM

## 2024-10-09 LAB
FLUAV RNA SPEC QL NAA+PROBE: NEGATIVE
FLUBV RNA RESP QL NAA+PROBE: NEGATIVE
RSV RNA SPEC NAA+PROBE: NEGATIVE
SARS-COV-2 RNA RESP QL NAA+PROBE: POSITIVE

## 2024-10-09 PROCEDURE — 99214 OFFICE O/P EST MOD 30 MIN: CPT | Mod: GC

## 2024-10-09 PROCEDURE — 87637 SARSCOV2&INF A&B&RSV AMP PRB: CPT

## 2024-10-09 PROCEDURE — G2211 COMPLEX E/M VISIT ADD ON: HCPCS | Mod: GC

## 2024-10-09 RX ORDER — TAMSULOSIN HYDROCHLORIDE 0.4 MG/1
0.8 CAPSULE ORAL DAILY
Qty: 180 CAPSULE | Refills: 3 | Status: SHIPPED | OUTPATIENT
Start: 2024-10-09

## 2024-10-09 RX ORDER — DAPSONE 25 MG/1
50 TABLET ORAL DAILY
Qty: 180 TABLET | Refills: 0 | Status: SHIPPED | OUTPATIENT
Start: 2024-10-09

## 2024-10-09 NOTE — PROGRESS NOTES
Assessment & Plan     (R50.9) Fever, unspecified  (primary encounter diagnosis)  (J06.9) Viral URI  Comment: Suspect COVID vs flu causing fever and malaise. Will order viral panel today. At this point, recommend supportive cares. Strict return precautions given, including worsening fever, chills, malaise, or productive cough that do not go away. Follow-up PRN  Plan: Symptomatic Influenza A/B, RSV, & SARS-CoV2 PCR        (COVID-19) Nose          (I10) Essential hypertension  Comment: BP today is elevated to 170/98, though with feeling poorly, unable to determine if elevated secondary to illness vs HTN. Will not make changes today. Recommend that patient go to DME store to get prescribed BP cuff, will have staff print out a list of facilities for him. Do not suspect HTN urgency or emergency given no headache, dizziness, chest pain, or other symptoms. Plan for follow-up in 2 weeks once improved.  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 Due to the added complexity in care, I will continue to support Piter in the subsequent management and with ongoing continuity of care.    Follow-up with me in 2 weeks      Subjective   Piter is a 58 year old, presenting for the following health issues:  Hypertension and Fever (Patient has been sick for a week now. Phlegm in throat, runny stool, fever, chills, cold sweats and 16 hours asleep yesterday)    HPI       Here for BP check:  Per my last note on 9/16/2024:  (I10) Essential hypertension  Comment:   - BP today 176/106 today in clinic,  initially 190/110's  - forgot to take BP meds last couple of days due to pain and discomfort, unable to get up to take meds  - doing better now, will take when he gets home  - not taking losartan or hydrochlorothiazide, seems like this was stopped  - will refill losartan today, continue 5mg amlodipine  - DME BP cuff ordered today for pickup  - will need follow-up in 2-3 weeks with me for BP check  Plan: losartan (COZAAR) 50 MG tablet, Home Blood         Pressure Monitor Order for DME - ONLY FOR DME    Blood pressure control meds -   - amlodipine 5mg  - hydrochlorothiazide 12.5mg (??)  - losartan 50mg  - of note also on tamsulosin 0.4mg daily    Today's BP:  BP Readings from Last 6 Encounters:   10/09/24 (!) 170/98   09/16/24 (!) 176/106   09/14/24 (!) 162/107   11/13/23 (!) 142/92   08/30/23 129/87   08/26/23 (!) 182/108   Feeling very poorly today  Also feeling quite tired today, been going on for about a week  Slept 15 hours yesterday  Feeling ok  Gets hot and chilly  Some fever  Has also been coughing up phlegm, yellowish  No shortness of breath  Been nauseous as well, 1-2 episodes of vomiting  No chest pain, occasional palpitations  Having the runs  Did get a spine injection at [facility] in the spine recently, no worsening redness, swelling or warmth  Has been otherwise compliant with meds  Unable to get BP cuff, will ask patient to go to DME store to get cuff      Review of Systems  Constitutional, neuro, ENT, endocrine, pulmonary, cardiac, gastrointestinal, genitourinary, musculoskeletal, integument and psychiatric systems are negative, except as otherwise noted.      Objective    BP (!) 170/98   Pulse 85   Temp 97.9  F (36.6  C) (Oral)   Wt 74.4 kg (164 lb)   SpO2 98%   BMI 24.22 kg/m    Body mass index is 24.22 kg/m .    Physical Exam   General: Alert and oriented x 3. Appears much more fatigued than usual, but no obvious signs of syncope or distress.  Skin: clean, dry, and intact. Noted injection site on  back, no erythema or edema noted, no rash or exudate noted.  HEENT: normocephalic, atraumatic, PERRL, EOMI, no conjunctival injection or icterus, ears and nose normal, no LAD or masses  Resp: clear to ausculation bilaterally, no rales or wheezes  Cardio: RRR, S1 and S2 present  Abdomen: soft, nontender, nondistended, bowel sounds present x 4  Extremities: no erythema or edema today  Psych: tired but calm and with rational thought process            Signed Electronically by: Any Truong MD  Precepted patient with Dr. Edu Hagan.

## 2024-10-10 ENCOUNTER — TELEPHONE (OUTPATIENT)
Dept: NURSING | Facility: CLINIC | Age: 59
End: 2024-10-10
Payer: COMMERCIAL

## 2024-10-10 NOTE — TELEPHONE ENCOUNTER
Patient classified as COVID treatment eligible by Epic high risk algorithm:  No    Coronavirus (COVID-19) Notification    Reason for call  Notify of POSITIVE COVID-19 lab result, assess symptoms,  review [facility] recommendations    Lab Result   Lab test for 2019-nCoV rRt-PCR or SARS-COV-2 PCR  Oropharyngeal AND/OR nasopharyngeal swabs were POSITIVE for 2019-nCoV RNA [OR] SARS-COV-2 RNA (COVID-19) RNA     We have been unable to reach patient by phone at this time to notify of their Positive COVID-19 result.    Left voicemail message requesting a call back to [phone] [facility] for results.        A Positive COVID-19 letter will be sent via [employer] or the mail.    Kaylin Babb

## 2024-10-11 ENCOUNTER — TELEPHONE (OUTPATIENT)
Dept: FAMILY MEDICINE | Facility: CLINIC | Age: 59
End: 2024-10-11

## 2024-10-11 NOTE — TELEPHONE ENCOUNTER
Patient Returning Call    Reason for call:  Patient called wanting to know results from 10/09/2024. Patient did test positive for COVID 19.    Information relayed to patient:  Relayed PCP's annotation.     Patient has additional questions:  Yes    What are your questions/concerns:  Patient is requesting further advise with certain concerns..     Who does the patient want to speak with:  RN    Is an  needed?:  No

## 2024-10-11 NOTE — TELEPHONE ENCOUNTER
Writer called the patient to discuss concerns. Piter wanted to discuss precautions to reduce spreading COVID to others. Writer advised wearing a mask and hand hygiene as the best ways to prevent spread. Piter lives alone, advised no need for a mask in his own home. Recommended medical masks over fabric mask, hand  prior to touching anything. Piter verbalized understanding and agreeable to plan. Writer checked on patients symptoms, symptoms improving at this time. Patient speaking in full sentences, no audible wheezing noted during the call. Writer requested a call back should his symptoms start to worsen again. Piter verbalized understanding and stated he had no further questions or concerns. Bonnie LOPEZ

## 2024-10-14 DIAGNOSIS — M54.50 CHRONIC LOW BACK PAIN WITHOUT SCIATICA, UNSPECIFIED BACK PAIN LATERALITY: ICD-10-CM

## 2024-10-14 DIAGNOSIS — G89.29 CHRONIC LOW BACK PAIN WITHOUT SCIATICA, UNSPECIFIED BACK PAIN LATERALITY: ICD-10-CM

## 2024-10-15 NOTE — PROGRESS NOTES
Preceptor Attestation:  Patient's case reviewed and discussed with Any Truong MD resident and I evaluated the patient. I agree with written assessment and plan of care.  Supervising Physician:  Edu Hagan MD, MD [facility]

## 2024-11-18 DIAGNOSIS — R35.1 BENIGN PROSTATIC HYPERPLASIA WITH NOCTURIA: ICD-10-CM

## 2024-11-18 DIAGNOSIS — N40.1 BENIGN PROSTATIC HYPERPLASIA WITH NOCTURIA: ICD-10-CM

## 2024-11-20 ENCOUNTER — PATIENT OUTREACH (OUTPATIENT)
Dept: FAMILY MEDICINE | Facility: CLINIC | Age: 59
End: 2024-11-20
Payer: COMMERCIAL

## 2024-11-20 RX ORDER — FINASTERIDE 5 MG/1
5 TABLET, FILM COATED ORAL DAILY
Qty: 90 TABLET | Refills: 3 | OUTPATIENT
Start: 2024-11-20

## 2024-12-02 NOTE — PROGRESS NOTES
Assessment & Plan     (I10) Essential hypertension  (primary encounter diagnosis)  Comment: BP today 148/92, still slightly elevated. Will increase losartan from 50mgto 100mg daily, follow-up in 2 weeks. Plan for BMP at that time.  Plan: amLODIPine (NORVASC) 5 MG tablet, losartan         (COZAAR) 100 MG tablet          (R05.1) Acute cough  Comment: Unclear, though likely upper airway reactivity. Differential does include viral vs superimposed bacterial pneumonia, lower suspicion given no myalgias or fevers. Also, given it has been 24 hours, so still too early to test for viruses. Plan to treat with honey, tea, tessalon pearls for now. Plan for follow-up in 2 weeks, precautions to go to ED given. Low threshold for imaging.  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 Due to the added complexity in care, I will continue to support Piter in the subsequent management and with ongoing continuity of care.    Return in about 2 weeks (around 12/17/2024).      Subjective   Piter is a 59 year old, presenting for the following health issues:  Hypertension and Refill Request (Cream for leg,  Atorvastatin.)    [facility]     Here for BP follow-up  Per my last note on 10/9/2024:  (I10) Essential hypertension  Comment: BP today is elevated to 170/98, though with feeling poorly, unable to determine if elevated secondary to illness vs HTN. Will not make changes today. Recommend that patient go to DME store to get prescribed BP cuff, will have staff print out a list of facilities for him. Do not suspect HTN urgency or emergency given no headache, dizziness, chest pain, or other symptoms. Plan for follow-up in 2 weeks once improved.  Plan: follow-up 2 weeks    Today's BP:   BP Readings from Last 6 Encounters:   12/03/24 (!) 148/92   10/09/24 (!) 170/98   09/16/24 (!) 176/106   09/14/24 (!) 162/107   11/13/23 (!) 142/92   08/30/23 129/87     Feeling good  Meds   - amlodipine 5mg daily, losartan 50mg daily  BP is better, will increase losartan to 100mg today      Also has cough today, present for 24-36 hours  Has some phlegm that comes out  Cough is bad enough where he's coughing and can't breathe  No fever, occasional chills  Minor sore throat  No SOB, wheezing, chest pain  No exposures to anyone who has been ill        Review of Systems  Constitutional, HEENT, cardiovascular, pulmonary, gi and gu systems are negative, except as otherwise noted.      Objective    BP (!) 148/92 (BP Location: Right arm, Cuff Size: Adult Regular)   Pulse 85   Temp 98  F (36.7  C)   Wt 75.8 kg (167 lb)   SpO2 98%   BMI 24.66 kg/m    Body mass index is 24.66 kg/m .  Physical Exam   General: Alert and oriented x 3, no acute distress  Skin: clean, dry, and intact  HEENT: normocephalic, atraumatic, PERRL, EOMI, no conjunctival injection or icterus, ears and nose normal, no LAD or masses  Resp: mild L sided expiratory wheeze, R side clear, normal effort. Active cough, produces phlegm.  Cardio: RRR, S1 and S2 present  Abdomen: soft, nontender, nondistended  Extremities: no erythema or edema  Psych: normal mood, normal mentation           Signed Electronically by: Any Truong MD  Precepted patient with Dr. Flor Agudelo.

## 2024-12-03 ENCOUNTER — OFFICE VISIT (OUTPATIENT)
Dept: FAMILY MEDICINE | Facility: CLINIC | Age: 59
End: 2024-12-03
Payer: COMMERCIAL

## 2024-12-03 VITALS
WEIGHT: 167 LBS | TEMPERATURE: 98 F | SYSTOLIC BLOOD PRESSURE: 148 MMHG | BODY MASS INDEX: 24.66 KG/M2 | OXYGEN SATURATION: 98 % | DIASTOLIC BLOOD PRESSURE: 92 MMHG | HEART RATE: 85 BPM

## 2024-12-03 DIAGNOSIS — L13.8 LINEAR IGA BULLOUS DERMATOSIS: ICD-10-CM

## 2024-12-03 DIAGNOSIS — M54.50 CHRONIC LOW BACK PAIN WITHOUT SCIATICA, UNSPECIFIED BACK PAIN LATERALITY: ICD-10-CM

## 2024-12-03 DIAGNOSIS — R05.1 ACUTE COUGH: ICD-10-CM

## 2024-12-03 DIAGNOSIS — I10 ESSENTIAL HYPERTENSION: Primary | ICD-10-CM

## 2024-12-03 DIAGNOSIS — K20.80 ESOPHAGITIS, LOS ANGELES GRADE D: ICD-10-CM

## 2024-12-03 DIAGNOSIS — G89.29 CHRONIC LOW BACK PAIN WITHOUT SCIATICA, UNSPECIFIED BACK PAIN LATERALITY: ICD-10-CM

## 2024-12-03 DIAGNOSIS — N40.1 BENIGN PROSTATIC HYPERPLASIA WITH NOCTURIA: ICD-10-CM

## 2024-12-03 DIAGNOSIS — R35.1 BENIGN PROSTATIC HYPERPLASIA WITH NOCTURIA: ICD-10-CM

## 2024-12-03 DIAGNOSIS — I65.21 CAROTID STENOSIS, NON-SYMPTOMATIC, RIGHT: ICD-10-CM

## 2024-12-03 RX ORDER — LOSARTAN POTASSIUM 50 MG/1
50 TABLET ORAL DAILY
Qty: 90 TABLET | Refills: 0 | Status: CANCELLED | OUTPATIENT
Start: 2024-12-03

## 2024-12-03 RX ORDER — ATORVASTATIN CALCIUM 20 MG/1
20 TABLET, FILM COATED ORAL DAILY
Qty: 90 TABLET | Refills: 2 | Status: SHIPPED | OUTPATIENT
Start: 2024-12-03

## 2024-12-03 RX ORDER — AMLODIPINE BESYLATE 5 MG/1
5 TABLET ORAL DAILY
Qty: 90 TABLET | Refills: 0 | Status: SHIPPED | OUTPATIENT
Start: 2024-12-03

## 2024-12-03 RX ORDER — DAPSONE 25 MG/1
50 TABLET ORAL DAILY
Qty: 180 TABLET | Refills: 0 | Status: SHIPPED | OUTPATIENT
Start: 2024-12-03

## 2024-12-03 RX ORDER — TAMSULOSIN HYDROCHLORIDE 0.4 MG/1
0.8 CAPSULE ORAL DAILY
Qty: 180 CAPSULE | Refills: 3 | Status: SHIPPED | OUTPATIENT
Start: 2024-12-03

## 2024-12-03 RX ORDER — BETAMETHASONE DIPROPIONATE 0.05 %
OINTMENT (GRAM) TOPICAL 2 TIMES DAILY
Qty: 50 G | Refills: 2 | Status: SHIPPED | OUTPATIENT
Start: 2024-12-03

## 2024-12-03 RX ORDER — BENZONATATE 100 MG/1
100 CAPSULE ORAL 3 TIMES DAILY PRN
Qty: 60 CAPSULE | Refills: 0 | Status: SHIPPED | OUTPATIENT
Start: 2024-12-03

## 2024-12-03 RX ORDER — LOSARTAN POTASSIUM 100 MG/1
100 TABLET ORAL DAILY
Qty: 30 TABLET | Refills: 0 | Status: SHIPPED | OUTPATIENT
Start: 2024-12-03

## 2024-12-11 DIAGNOSIS — I10 ESSENTIAL HYPERTENSION: ICD-10-CM

## 2024-12-11 NOTE — TELEPHONE ENCOUNTER
Writer reviewed prescription. Active on medication list however there was a dosage increase from 50mg to 100mg. This prescription is for 50mg daily. Losartan 50 mg daily still on medication list. Routing to  to review and update current medication list to reflect desired dose for patient. Bonnie LOPEZ

## 2024-12-12 RX ORDER — LOSARTAN POTASSIUM 50 MG/1
50 TABLET ORAL DAILY
Qty: 90 TABLET | Refills: 0 | Status: SHIPPED | OUTPATIENT
Start: 2024-12-12

## 2024-12-31 ENCOUNTER — VIRTUAL VISIT (OUTPATIENT)
Dept: UROLOGY | Facility: CLINIC | Age: 59
End: 2024-12-31
Payer: COMMERCIAL

## 2024-12-31 DIAGNOSIS — N40.1 BENIGN PROSTATIC HYPERPLASIA WITH NOCTURIA: ICD-10-CM

## 2024-12-31 DIAGNOSIS — R35.1 BENIGN PROSTATIC HYPERPLASIA WITH NOCTURIA: ICD-10-CM

## 2024-12-31 DIAGNOSIS — R35.1 NOCTURIA: Primary | ICD-10-CM

## 2024-12-31 DIAGNOSIS — R39.9 LOWER URINARY TRACT SYMPTOMS (LUTS): ICD-10-CM

## 2024-12-31 RX ORDER — TAMSULOSIN HYDROCHLORIDE 0.4 MG/1
0.8 CAPSULE ORAL DAILY
Qty: 180 CAPSULE | Refills: 3 | Status: SHIPPED | OUTPATIENT
Start: 2024-12-31

## 2024-12-31 RX ORDER — OXYBUTYNIN CHLORIDE 10 MG/1
10 TABLET, EXTENDED RELEASE ORAL DAILY
Qty: 90 TABLET | Refills: 3 | Status: SHIPPED | OUTPATIENT
Start: 2024-12-31

## 2024-12-31 RX ORDER — FINASTERIDE 5 MG/1
5 TABLET, FILM COATED ORAL DAILY
Qty: 90 TABLET | Refills: 3 | Status: SHIPPED | OUTPATIENT
Start: 2024-12-31

## 2024-12-31 NOTE — NURSING NOTE
Current patient location: [place]    Is the patient currently in the state of [place]? YES    Visit mode:VIDEO    If the visit is dropped, the patient can be reconnected by:VIDEO VISIT: Send to e-mail at: [email or web address]     Will anyone else be joining the visit? Unknown  (If patient encounters technical issues they should call [phone])    Are changes needed to the allergy or medication list? Unknown, unable to verify with patient due to time constraint.    Are refills needed on medications prescribed by this physician? Discuss with provider    Rooming Documentation:  VF unable to complete any rooming due to time constraint as patient was not available until 5 minutes before appointment time.    Reason for visit: RECHECK    Mary SUTTON

## 2024-12-31 NOTE — PATIENT INSTRUCTIONS
UROLOGY CLINIC VISIT PATIENT INSTRUCTIONS    -Continue current medications.   -I have placed a referral to the sleep medicine clinic.   -PSA at the lab.   -Follow up with me in one year or sooner to check in.     If you have any issues, questions or concerns in the meantime, do not hesitate to contact us at [phone] or via [employer].     Mari Graf, CNP  Department of Urology

## 2024-12-31 NOTE — LETTER
12/31/2024       RE: Piter Gaines  [address] [place] [address]     Dear Colleague,    Thank you for referring your patient, Piter Gaines, to the [facility] [place] at [facility]. Please see a copy of my visit note below.    Virtual Visit Details    Type of service:  Video Visit   Video Start Time: 9:05 AM  Video End Time: 9:15 AM    Originating Location (pt. Location): Home  Distant Location (provider location):  Off-site  Platform used for Video Visit: Neeraj    CC: LUTS follow up    Assessment/Plan:  59 year old male with a history of PH with LUTS (frequency, nocturia, hesitancy, and sensation of incomplete emptying) who has been managed on combination of Flomax, finasteride and oxybutynin. Daytime symptoms under decent control but continuing to have bothersome nocturia x4-5. Endorses daytime fatigue/sleepiness and often wakes up with excess saliva in his mouth and needs to spit this out before he can go back to sleep. Recommend a referral to the sleep medicine clinic for sleep apnea eval and he would like to pursue this. Otherwise advised him to limit fluids for a few hours before bed and limit any caffeine in the afternoon hours.   -Continue Flomax, finasteride and oxybutynin- refills sent today.   -Referral placed to sleep medicine clinic for sleep apnea eval.   -Due for PSA- order placed and instructed to have this drawn at the lab.    -Follow up with me in one year or sooner to check in.     Mari Graf, CNP  Department of Urology      Subjective:   59 year old male with a history of BPH with LUTS (frequency, nocturia, hesitancy, and sensation of incomplete emptying) who presents for virtual follow up. Symptoms refractory to both Flomax and finasteride. UDS completed in 08/2021, which showed a small bladder capacity and very brief bladder contraction. He was, however, able to empty his bladder completely with no evidence of  obstruction. Oxybutynin was added to his regimen and while this did help to some degree, he remained bothered with hourly voiding and nocturia x4-5. Have discussed cystoscopy to evaluate his bladder outlet, though due to concern for  pain with this, he has declined to proceed. Have also considered pelvic floor involvement and he was referred to PT last year.     At the time of our last visit one year ago, he reported good symptom control with combination of Flomax, finasteride and oxybutynin.     Today, he states that while daytime symptoms continue to be under decent control with his medications, he has continued to be bothered by nocturia. Continues to get up 4-5 times per night. He has no history of sleep apnea, though he does endorse daytime sleepiness and he often wakes up with extra saliva in his mouth and needs to spit this out before he can go back to sleep. Does not believe he snores. Drinks fluids up until bed time and acknowledges he should cut back on this. Denies any LE edema.     Objective:     Exam:   Patient is a 59 year old male   No vital signs obtained due to virtual visit.  General Appearance: Well groomed, hygenic  Respiratory: No cough, no respiratory distress or labored breathing  Musculoskeletal: Grossly normal with no gross deficits  Skin: No discoloration or apparent rashes  Neurologic: No tremors  Psychiatric: Alert and oriented  Further examination is deferred due to the nature of our visit.             Again, thank you for allowing me to participate in the care of your patient.      Sincerely,    Mari Graf, CNP

## 2024-12-31 NOTE — PROGRESS NOTES
Virtual Visit Details    Type of service:  Video Visit   Video Start Time: 9:05 AM  Video End Time: 9:15 AM    Originating Location (pt. Location): Home  Distant Location (provider location):  Off-site  Platform used for Video Visit: Neeraj    CC: LUTS follow up    Assessment/Plan:  59 year old male with a history of PH with LUTS (frequency, nocturia, hesitancy, and sensation of incomplete emptying) who has been managed on combination of Flomax, finasteride and oxybutynin. Daytime symptoms under decent control but continuing to have bothersome nocturia x4-5. Endorses daytime fatigue/sleepiness and often wakes up with excess saliva in his mouth and needs to spit this out before he can go back to sleep. Recommend a referral to the sleep medicine clinic for sleep apnea eval and he would like to pursue this. Otherwise advised him to limit fluids for a few hours before bed and limit any caffeine in the afternoon hours.   -Continue Flomax, finasteride and oxybutynin- refills sent today.   -Referral placed to sleep medicine clinic for sleep apnea eval.   -Due for PSA- order placed and instructed to have this drawn at the lab.    -Follow up with me in one year or sooner to check in.     Mari Graf, CNP  Department of Urology      Subjective:   59 year old male with a history of BPH with LUTS (frequency, nocturia, hesitancy, and sensation of incomplete emptying) who presents for virtual follow up. Symptoms refractory to both Flomax and finasteride. UDS completed in 08/2021, which showed a small bladder capacity and very brief bladder contraction. He was, however, able to empty his bladder completely with no evidence of obstruction. Oxybutynin was added to his regimen and while this did help to some degree, he remained bothered with hourly voiding and nocturia x4-5. Have discussed cystoscopy to evaluate his bladder outlet, though due to concern for  pain with this, he has declined to proceed. Have also considered pelvic  floor involvement and he was referred to PT last year.     At the time of our last visit one year ago, he reported good symptom control with combination of Flomax, finasteride and oxybutynin.     Today, he states that while daytime symptoms continue to be under decent control with his medications, he has continued to be bothered by nocturia. Continues to get up 4-5 times per night. He has no history of sleep apnea, though he does endorse daytime sleepiness and he often wakes up with extra saliva in his mouth and needs to spit this out before he can go back to sleep. Does not believe he snores. Drinks fluids up until bed time and acknowledges he should cut back on this. Denies any LE edema.     Objective:     Exam:   Patient is a 59 year old male   No vital signs obtained due to virtual visit.  General Appearance: Well groomed, hygenic  Respiratory: No cough, no respiratory distress or labored breathing  Musculoskeletal: Grossly normal with no gross deficits  Skin: No discoloration or apparent rashes  Neurologic: No tremors  Psychiatric: Alert and oriented  Further examination is deferred due to the nature of our visit.

## 2025-01-02 DIAGNOSIS — I10 ESSENTIAL HYPERTENSION: ICD-10-CM

## 2025-01-02 RX ORDER — LOSARTAN POTASSIUM 100 MG/1
100 TABLET ORAL DAILY
Qty: 90 TABLET | Refills: 0 | Status: SHIPPED | OUTPATIENT
Start: 2025-01-02

## 2025-04-10 ENCOUNTER — TELEPHONE (OUTPATIENT)
Dept: SLEEP MEDICINE | Facility: CLINIC | Age: 60
End: 2025-04-10

## 2025-04-10 NOTE — TELEPHONE ENCOUNTER
Patient needs to be rescheduled for their virtual visit due to Reason for Reschedule: Patient Request    Scheduling team, please refer to service line late cancellation/no-show policies and reach out to patient at a later date for rescheduling.    Appointment mode: Video  Provider: paul

## 2025-04-25 ENCOUNTER — HOSPITAL ENCOUNTER (INPATIENT)
Facility: HOSPITAL | Age: 60
LOS: 1 days | Discharge: HOME | DRG: 301 | End: 2025-04-26
Attending: EMERGENCY MEDICINE | Admitting: INTERNAL MEDICINE
Payer: COMMERCIAL

## 2025-04-25 ENCOUNTER — APPOINTMENT (OUTPATIENT)
Dept: CT IMAGING | Facility: HOSPITAL | Age: 60
DRG: 301 | End: 2025-04-25
Attending: EMERGENCY MEDICINE
Payer: COMMERCIAL

## 2025-04-25 DIAGNOSIS — R51.9 ACUTE NONINTRACTABLE HEADACHE, UNSPECIFIED HEADACHE TYPE: ICD-10-CM

## 2025-04-25 DIAGNOSIS — I77.74: ICD-10-CM

## 2025-04-25 DIAGNOSIS — E83.42 HYPOMAGNESEMIA: ICD-10-CM

## 2025-04-25 DIAGNOSIS — I65.23 CAROTID STENOSIS, ASYMPTOMATIC, BILATERAL: Primary | ICD-10-CM

## 2025-04-25 DIAGNOSIS — I10 UNCONTROLLED HYPERTENSION: ICD-10-CM

## 2025-04-25 LAB
ANION GAP SERPL CALCULATED.3IONS-SCNC: 7 MMOL/L (ref 7–15)
BASOPHILS # BLD AUTO: 0.1 10E3/UL (ref 0–0.2)
BASOPHILS NFR BLD AUTO: 1 %
BUN SERPL-MCNC: 13 MG/DL (ref 8–23)
CALCIUM SERPL-MCNC: 9 MG/DL (ref 8.8–10.4)
CHLORIDE SERPL-SCNC: 103 MMOL/L (ref 98–107)
CREAT SERPL-MCNC: 1.33 MG/DL (ref 0.67–1.17)
EGFRCR SERPLBLD CKD-EPI 2021: 62 ML/MIN/1.73M2
EOSINOPHIL # BLD AUTO: 0.1 10E3/UL (ref 0–0.7)
EOSINOPHIL NFR BLD AUTO: 1 %
ERYTHROCYTE [DISTWIDTH] IN BLOOD BY AUTOMATED COUNT: 14 % (ref 10–15)
GLUCOSE SERPL-MCNC: 112 MG/DL (ref 70–99)
HCO3 SERPL-SCNC: 31 MMOL/L (ref 22–29)
HCT VFR BLD AUTO: 35.5 % (ref 40–53)
HGB BLD-MCNC: 12 G/DL (ref 13.3–17.7)
IMM GRANULOCYTES # BLD: 0 10E3/UL
IMM GRANULOCYTES NFR BLD: 1 %
INR PPP: 0.99 (ref 0.85–1.15)
LYMPHOCYTES # BLD AUTO: 1.6 10E3/UL (ref 0.8–5.3)
LYMPHOCYTES NFR BLD AUTO: 21 %
MAGNESIUM SERPL-MCNC: 1.3 MG/DL (ref 1.7–2.3)
MCH RBC QN AUTO: 28.5 PG (ref 26.5–33)
MCHC RBC AUTO-ENTMCNC: 33.8 G/DL (ref 31.5–36.5)
MCV RBC AUTO: 84 FL (ref 78–100)
MONOCYTES # BLD AUTO: 0.5 10E3/UL (ref 0–1.3)
MONOCYTES NFR BLD AUTO: 7 %
NEUTROPHILS # BLD AUTO: 5.2 10E3/UL (ref 1.6–8.3)
NEUTROPHILS NFR BLD AUTO: 70 %
NRBC # BLD AUTO: 0 10E3/UL
NRBC BLD AUTO-RTO: 0 /100
NT-PROBNP SERPL-MCNC: 102 PG/ML (ref 0–900)
PLATELET # BLD AUTO: 202 10E3/UL (ref 150–450)
POTASSIUM SERPL-SCNC: 3.1 MMOL/L (ref 3.4–5.3)
RBC # BLD AUTO: 4.21 10E6/UL (ref 4.4–5.9)
SODIUM SERPL-SCNC: 141 MMOL/L (ref 135–145)
TROPONIN T SERPL HS-MCNC: 7 NG/L
WBC # BLD AUTO: 7.4 10E3/UL (ref 4–11)

## 2025-04-25 PROCEDURE — 80048 BASIC METABOLIC PNL TOTAL CA: CPT | Performed by: EMERGENCY MEDICINE

## 2025-04-25 PROCEDURE — 85004 AUTOMATED DIFF WBC COUNT: CPT | Performed by: EMERGENCY MEDICINE

## 2025-04-25 PROCEDURE — 96365 THER/PROPH/DIAG IV INF INIT: CPT | Mod: 59

## 2025-04-25 PROCEDURE — 83880 ASSAY OF NATRIURETIC PEPTIDE: CPT | Performed by: EMERGENCY MEDICINE

## 2025-04-25 PROCEDURE — 250N000011 HC RX IP 250 OP 636: Mod: JZ | Performed by: EMERGENCY MEDICINE

## 2025-04-25 PROCEDURE — 83735 ASSAY OF MAGNESIUM: CPT | Performed by: EMERGENCY MEDICINE

## 2025-04-25 PROCEDURE — 84484 ASSAY OF TROPONIN QUANT: CPT | Performed by: EMERGENCY MEDICINE

## 2025-04-25 PROCEDURE — 83036 HEMOGLOBIN GLYCOSYLATED A1C: CPT | Performed by: INTERNAL MEDICINE

## 2025-04-25 PROCEDURE — 250N000011 HC RX IP 250 OP 636: Performed by: EMERGENCY MEDICINE

## 2025-04-25 PROCEDURE — 36415 COLL VENOUS BLD VENIPUNCTURE: CPT | Performed by: EMERGENCY MEDICINE

## 2025-04-25 PROCEDURE — 85610 PROTHROMBIN TIME: CPT | Performed by: EMERGENCY MEDICINE

## 2025-04-25 PROCEDURE — 250N000013 HC RX MED GY IP 250 OP 250 PS 637: Performed by: EMERGENCY MEDICINE

## 2025-04-25 PROCEDURE — 96375 TX/PRO/DX INJ NEW DRUG ADDON: CPT

## 2025-04-25 PROCEDURE — 99285 EMERGENCY DEPT VISIT HI MDM: CPT | Mod: 25

## 2025-04-25 PROCEDURE — 70498 CT ANGIOGRAPHY NECK: CPT

## 2025-04-25 PROCEDURE — 93005 ELECTROCARDIOGRAM TRACING: CPT | Performed by: EMERGENCY MEDICINE

## 2025-04-25 RX ORDER — POTASSIUM CHLORIDE 1500 MG/1
40 TABLET, EXTENDED RELEASE ORAL ONCE
Status: COMPLETED | OUTPATIENT
Start: 2025-04-25 | End: 2025-04-25

## 2025-04-25 RX ORDER — LOSARTAN POTASSIUM 50 MG/1
50 TABLET ORAL ONCE
Status: COMPLETED | OUTPATIENT
Start: 2025-04-25 | End: 2025-04-25

## 2025-04-25 RX ORDER — MAGNESIUM SULFATE HEPTAHYDRATE 40 MG/ML
2 INJECTION, SOLUTION INTRAVENOUS ONCE
Status: COMPLETED | OUTPATIENT
Start: 2025-04-25 | End: 2025-04-26

## 2025-04-25 RX ORDER — IOPAMIDOL 755 MG/ML
67 INJECTION, SOLUTION INTRAVASCULAR ONCE
Status: COMPLETED | OUTPATIENT
Start: 2025-04-25 | End: 2025-04-25

## 2025-04-25 RX ORDER — METOCLOPRAMIDE HYDROCHLORIDE 5 MG/ML
10 INJECTION INTRAMUSCULAR; INTRAVENOUS ONCE
Status: COMPLETED | OUTPATIENT
Start: 2025-04-25 | End: 2025-04-25

## 2025-04-25 RX ORDER — AMLODIPINE BESYLATE 5 MG/1
5 TABLET ORAL ONCE
Status: COMPLETED | OUTPATIENT
Start: 2025-04-25 | End: 2025-04-25

## 2025-04-25 RX ORDER — DIPHENHYDRAMINE HYDROCHLORIDE 50 MG/ML
25 INJECTION, SOLUTION INTRAMUSCULAR; INTRAVENOUS ONCE
Status: COMPLETED | OUTPATIENT
Start: 2025-04-25 | End: 2025-04-25

## 2025-04-25 RX ORDER — KETOROLAC TROMETHAMINE 15 MG/ML
15 INJECTION, SOLUTION INTRAMUSCULAR; INTRAVENOUS ONCE
Status: COMPLETED | OUTPATIENT
Start: 2025-04-25 | End: 2025-04-25

## 2025-04-25 RX ADMIN — DIPHENHYDRAMINE HYDROCHLORIDE 25 MG: 50 INJECTION, SOLUTION INTRAMUSCULAR; INTRAVENOUS at 22:19

## 2025-04-25 RX ADMIN — AMLODIPINE BESYLATE 5 MG: 5 TABLET ORAL at 22:19

## 2025-04-25 RX ADMIN — METOCLOPRAMIDE 10 MG: 5 INJECTION, SOLUTION INTRAMUSCULAR; INTRAVENOUS at 22:19

## 2025-04-25 RX ADMIN — MAGNESIUM SULFATE HEPTAHYDRATE 2 G: 2 INJECTION, SOLUTION INTRAVENOUS at 23:12

## 2025-04-25 RX ADMIN — KETOROLAC TROMETHAMINE 15 MG: 15 INJECTION, SOLUTION INTRAMUSCULAR; INTRAVENOUS at 22:19

## 2025-04-25 RX ADMIN — IOPAMIDOL 67 ML: 755 INJECTION, SOLUTION INTRAVENOUS at 23:12

## 2025-04-25 RX ADMIN — POTASSIUM CHLORIDE 40 MEQ: 1500 TABLET, EXTENDED RELEASE ORAL at 23:17

## 2025-04-25 RX ADMIN — LOSARTAN POTASSIUM 50 MG: 50 TABLET, FILM COATED ORAL at 22:20

## 2025-04-25 ASSESSMENT — ACTIVITIES OF DAILY LIVING (ADL)
ADLS_ACUITY_SCORE: 41
ADLS_ACUITY_SCORE: 41

## 2025-04-25 ASSESSMENT — ENCOUNTER SYMPTOMS
VOMITING: 0
HEADACHES: 1
NAUSEA: 1

## 2025-04-26 ENCOUNTER — HEALTH MAINTENANCE LETTER (OUTPATIENT)
Age: 60
End: 2025-04-26

## 2025-04-26 ENCOUNTER — APPOINTMENT (OUTPATIENT)
Dept: MRI IMAGING | Facility: HOSPITAL | Age: 60
DRG: 301 | End: 2025-04-26
Attending: EMERGENCY MEDICINE
Payer: COMMERCIAL

## 2025-04-26 VITALS
WEIGHT: 175.6 LBS | DIASTOLIC BLOOD PRESSURE: 83 MMHG | SYSTOLIC BLOOD PRESSURE: 149 MMHG | HEIGHT: 69 IN | BODY MASS INDEX: 26.01 KG/M2 | TEMPERATURE: 98.6 F | RESPIRATION RATE: 20 BRPM | HEART RATE: 74 BPM | OXYGEN SATURATION: 94 %

## 2025-04-26 PROBLEM — E83.42 HYPOMAGNESEMIA: Status: ACTIVE | Noted: 2025-04-26

## 2025-04-26 PROBLEM — I77.74: Status: ACTIVE | Noted: 2025-04-26

## 2025-04-26 PROBLEM — I10 UNCONTROLLED HYPERTENSION: Status: ACTIVE | Noted: 2025-04-26

## 2025-04-26 PROBLEM — R51.9 ACUTE NONINTRACTABLE HEADACHE, UNSPECIFIED HEADACHE TYPE: Status: ACTIVE | Noted: 2025-04-26

## 2025-04-26 LAB
ALBUMIN SERPL BCG-MCNC: 3.8 G/DL (ref 3.5–5.2)
ALP SERPL-CCNC: 98 U/L (ref 40–150)
ALT SERPL W P-5'-P-CCNC: 13 U/L (ref 0–70)
ANION GAP SERPL CALCULATED.3IONS-SCNC: 10 MMOL/L (ref 7–15)
AST SERPL W P-5'-P-CCNC: 16 U/L (ref 0–45)
BASOPHILS # BLD AUTO: 0.1 10E3/UL (ref 0–0.2)
BASOPHILS NFR BLD AUTO: 1 %
BILIRUB SERPL-MCNC: 0.5 MG/DL
BUN SERPL-MCNC: 11.5 MG/DL (ref 8–23)
CALCIUM SERPL-MCNC: 9.1 MG/DL (ref 8.8–10.4)
CHLORIDE SERPL-SCNC: 102 MMOL/L (ref 98–107)
CHOLEST SERPL-MCNC: 105 MG/DL
CREAT SERPL-MCNC: 1.16 MG/DL (ref 0.67–1.17)
EGFRCR SERPLBLD CKD-EPI 2021: 73 ML/MIN/1.73M2
EOSINOPHIL # BLD AUTO: 0.1 10E3/UL (ref 0–0.7)
EOSINOPHIL NFR BLD AUTO: 2 %
ERYTHROCYTE [DISTWIDTH] IN BLOOD BY AUTOMATED COUNT: 14 % (ref 10–15)
EST. AVERAGE GLUCOSE BLD GHB EST-MCNC: 111 MG/DL
GLUCOSE BLDC GLUCOMTR-MCNC: 122 MG/DL (ref 70–99)
GLUCOSE SERPL-MCNC: 109 MG/DL (ref 70–99)
HBA1C MFR BLD: 5.5 %
HCO3 SERPL-SCNC: 27 MMOL/L (ref 22–29)
HCT VFR BLD AUTO: 35.7 % (ref 40–53)
HDLC SERPL-MCNC: 28 MG/DL
HGB BLD-MCNC: 11.6 G/DL (ref 13.3–17.7)
IMM GRANULOCYTES # BLD: 0 10E3/UL
IMM GRANULOCYTES NFR BLD: 1 %
LDLC SERPL CALC-MCNC: 27 MG/DL
LYMPHOCYTES # BLD AUTO: 1.2 10E3/UL (ref 0.8–5.3)
LYMPHOCYTES NFR BLD AUTO: 20 %
MCH RBC QN AUTO: 27.8 PG (ref 26.5–33)
MCHC RBC AUTO-ENTMCNC: 32.5 G/DL (ref 31.5–36.5)
MCV RBC AUTO: 85 FL (ref 78–100)
MONOCYTES # BLD AUTO: 0.5 10E3/UL (ref 0–1.3)
MONOCYTES NFR BLD AUTO: 8 %
NEUTROPHILS # BLD AUTO: 4 10E3/UL (ref 1.6–8.3)
NEUTROPHILS NFR BLD AUTO: 68 %
NONHDLC SERPL-MCNC: 77 MG/DL
NRBC # BLD AUTO: 0 10E3/UL
NRBC BLD AUTO-RTO: 0 /100
PLATELET # BLD AUTO: 195 10E3/UL (ref 150–450)
POTASSIUM SERPL-SCNC: 3.4 MMOL/L (ref 3.4–5.3)
PROT SERPL-MCNC: 6.9 G/DL (ref 6.4–8.3)
RBC # BLD AUTO: 4.18 10E6/UL (ref 4.4–5.9)
SODIUM SERPL-SCNC: 139 MMOL/L (ref 135–145)
TRIGL SERPL-MCNC: 249 MG/DL
TROPONIN T SERPL HS-MCNC: 8 NG/L
WBC # BLD AUTO: 5.8 10E3/UL (ref 4–11)

## 2025-04-26 PROCEDURE — 250N000013 HC RX MED GY IP 250 OP 250 PS 637: Performed by: EMERGENCY MEDICINE

## 2025-04-26 PROCEDURE — 70549 MR ANGIOGRAPH NECK W/O&W/DYE: CPT

## 2025-04-26 PROCEDURE — 250N000011 HC RX IP 250 OP 636: Mod: JZ | Performed by: INTERNAL MEDICINE

## 2025-04-26 PROCEDURE — 99236 HOSP IP/OBS SAME DATE HI 85: CPT | Performed by: INTERNAL MEDICINE

## 2025-04-26 PROCEDURE — 99222 1ST HOSP IP/OBS MODERATE 55: CPT | Performed by: PSYCHIATRY & NEUROLOGY

## 2025-04-26 PROCEDURE — 999N000226 HC STATISTIC SLP IP EVAL DEFER

## 2025-04-26 PROCEDURE — 80053 COMPREHEN METABOLIC PANEL: CPT | Performed by: INTERNAL MEDICINE

## 2025-04-26 PROCEDURE — 70553 MRI BRAIN STEM W/O & W/DYE: CPT

## 2025-04-26 PROCEDURE — 84484 ASSAY OF TROPONIN QUANT: CPT | Performed by: EMERGENCY MEDICINE

## 2025-04-26 PROCEDURE — 70544 MR ANGIOGRAPHY HEAD W/O DYE: CPT

## 2025-04-26 PROCEDURE — 36415 COLL VENOUS BLD VENIPUNCTURE: CPT | Performed by: INTERNAL MEDICINE

## 2025-04-26 PROCEDURE — 250N000013 HC RX MED GY IP 250 OP 250 PS 637

## 2025-04-26 PROCEDURE — 85004 AUTOMATED DIFF WBC COUNT: CPT | Performed by: INTERNAL MEDICINE

## 2025-04-26 PROCEDURE — 250N000011 HC RX IP 250 OP 636: Mod: JZ | Performed by: EMERGENCY MEDICINE

## 2025-04-26 PROCEDURE — 99207 PR NO BILLABLE SERVICE THIS VISIT: CPT | Performed by: STUDENT IN AN ORGANIZED HEALTH CARE EDUCATION/TRAINING PROGRAM

## 2025-04-26 PROCEDURE — 82465 ASSAY BLD/SERUM CHOLESTEROL: CPT | Performed by: INTERNAL MEDICINE

## 2025-04-26 PROCEDURE — 36415 COLL VENOUS BLD VENIPUNCTURE: CPT | Performed by: EMERGENCY MEDICINE

## 2025-04-26 PROCEDURE — A9585 GADOBUTROL INJECTION: HCPCS | Performed by: EMERGENCY MEDICINE

## 2025-04-26 PROCEDURE — 120N000001 HC R&B MED SURG/OB

## 2025-04-26 PROCEDURE — 82962 GLUCOSE BLOOD TEST: CPT

## 2025-04-26 PROCEDURE — 255N000002 HC RX 255 OP 636: Performed by: EMERGENCY MEDICINE

## 2025-04-26 RX ORDER — HYDROCODONE BITARTRATE AND ACETAMINOPHEN 5; 325 MG/1; MG/1
1 TABLET ORAL EVERY 6 HOURS PRN
Qty: 30 TABLET | Refills: 0 | Status: SHIPPED | OUTPATIENT
Start: 2025-04-26

## 2025-04-26 RX ORDER — ASPIRIN 81 MG/1
81 TABLET, CHEWABLE ORAL ONCE
Status: COMPLETED | OUTPATIENT
Start: 2025-04-26 | End: 2025-04-26

## 2025-04-26 RX ORDER — ASPIRIN 81 MG/1
81 TABLET, CHEWABLE ORAL DAILY
Qty: 30 TABLET | Refills: 0 | Status: SHIPPED | OUTPATIENT
Start: 2025-04-26

## 2025-04-26 RX ORDER — LOSARTAN POTASSIUM 50 MG/1
50 TABLET ORAL ONCE
Status: COMPLETED | OUTPATIENT
Start: 2025-04-26 | End: 2025-04-26

## 2025-04-26 RX ORDER — DULOXETIN HYDROCHLORIDE 30 MG/1
30 CAPSULE, DELAYED RELEASE ORAL AT BEDTIME
COMMUNITY

## 2025-04-26 RX ORDER — HYDROCODONE BITARTRATE AND ACETAMINOPHEN 5; 325 MG/1; MG/1
1 TABLET ORAL EVERY 4 HOURS PRN
Status: DISCONTINUED | OUTPATIENT
Start: 2025-04-26 | End: 2025-04-26 | Stop reason: HOSPADM

## 2025-04-26 RX ORDER — LIDOCAINE 40 MG/G
CREAM TOPICAL
Status: DISCONTINUED | OUTPATIENT
Start: 2025-04-26 | End: 2025-04-26 | Stop reason: HOSPADM

## 2025-04-26 RX ORDER — ASPIRIN 81 MG/1
81 TABLET, CHEWABLE ORAL DAILY
Status: DISCONTINUED | OUTPATIENT
Start: 2025-04-26 | End: 2025-04-26 | Stop reason: HOSPADM

## 2025-04-26 RX ORDER — ROSUVASTATIN CALCIUM 40 MG/1
40 TABLET, COATED ORAL DAILY
Qty: 30 TABLET | Refills: 0 | Status: SHIPPED | OUTPATIENT
Start: 2025-04-26 | End: 2025-04-26

## 2025-04-26 RX ORDER — HYDRALAZINE HYDROCHLORIDE 20 MG/ML
5 INJECTION INTRAMUSCULAR; INTRAVENOUS ONCE
Status: COMPLETED | OUTPATIENT
Start: 2025-04-26 | End: 2025-04-26

## 2025-04-26 RX ORDER — GADOBUTROL 604.72 MG/ML
10 INJECTION INTRAVENOUS ONCE
Status: COMPLETED | OUTPATIENT
Start: 2025-04-26 | End: 2025-04-26

## 2025-04-26 RX ORDER — ACETAMINOPHEN 650 MG/1
650 SUPPOSITORY RECTAL 3 TIMES DAILY
Status: DISCONTINUED | OUTPATIENT
Start: 2025-04-26 | End: 2025-04-26 | Stop reason: HOSPADM

## 2025-04-26 RX ORDER — BISACODYL 5 MG/1
5 TABLET, DELAYED RELEASE ORAL DAILY PRN
COMMUNITY

## 2025-04-26 RX ORDER — ACETAMINOPHEN 325 MG/1
975 TABLET ORAL 3 TIMES DAILY
Status: DISCONTINUED | OUTPATIENT
Start: 2025-04-26 | End: 2025-04-26 | Stop reason: HOSPADM

## 2025-04-26 RX ORDER — ONDANSETRON 2 MG/ML
4 INJECTION INTRAMUSCULAR; INTRAVENOUS EVERY 6 HOURS PRN
Status: DISCONTINUED | OUTPATIENT
Start: 2025-04-26 | End: 2025-04-26 | Stop reason: HOSPADM

## 2025-04-26 RX ORDER — CALCIUM CARBONATE 500 MG/1
1000 TABLET, CHEWABLE ORAL 4 TIMES DAILY PRN
Status: DISCONTINUED | OUTPATIENT
Start: 2025-04-26 | End: 2025-04-26 | Stop reason: HOSPADM

## 2025-04-26 RX ORDER — HYDROMORPHONE HYDROCHLORIDE 1 MG/ML
0.5 INJECTION, SOLUTION INTRAMUSCULAR; INTRAVENOUS; SUBCUTANEOUS ONCE
Status: COMPLETED | OUTPATIENT
Start: 2025-04-26 | End: 2025-04-26

## 2025-04-26 RX ORDER — ONDANSETRON 4 MG/1
4 TABLET, ORALLY DISINTEGRATING ORAL EVERY 6 HOURS PRN
Status: DISCONTINUED | OUTPATIENT
Start: 2025-04-26 | End: 2025-04-26 | Stop reason: HOSPADM

## 2025-04-26 RX ORDER — ASPIRIN 81 MG/1
81 TABLET, CHEWABLE ORAL DAILY
Qty: 30 TABLET | Refills: 0 | Status: SHIPPED | OUTPATIENT
Start: 2025-04-26 | End: 2025-04-26

## 2025-04-26 RX ADMIN — ACETAMINOPHEN 975 MG: 325 TABLET ORAL at 10:16

## 2025-04-26 RX ADMIN — GADOBUTROL 10 ML: 604.72 INJECTION INTRAVENOUS at 04:46

## 2025-04-26 RX ADMIN — HYDROMORPHONE HYDROCHLORIDE 0.5 MG: 1 INJECTION, SOLUTION INTRAMUSCULAR; INTRAVENOUS; SUBCUTANEOUS at 05:53

## 2025-04-26 RX ADMIN — HYDRALAZINE HYDROCHLORIDE 5 MG: 20 INJECTION INTRAMUSCULAR; INTRAVENOUS at 01:08

## 2025-04-26 RX ADMIN — LOSARTAN POTASSIUM 50 MG: 50 TABLET, FILM COATED ORAL at 00:31

## 2025-04-26 RX ADMIN — ASPIRIN 81 MG CHEWABLE TABLET 81 MG: 81 TABLET CHEWABLE at 01:08

## 2025-04-26 ASSESSMENT — ACTIVITIES OF DAILY LIVING (ADL)
ADLS_ACUITY_SCORE: 41
ADLS_ACUITY_SCORE: 42
ADLS_ACUITY_SCORE: 41
ADLS_ACUITY_SCORE: 42

## 2025-04-26 NOTE — MEDICATION SCRIBE - ADMISSION MEDICATION HISTORY
Medication Scribe Admission Medication History    Admission medication history is complete. The information provided in this note is only as accurate as the sources available at the time of the update.    Information Source(s): Patient via in-person    Pertinent Information: Patient states that he took some of the medications from PTA med list a couple of days and last known administration of any other medication is Thursday bedtime.    Changes made to PTA medication list:  Added: None  Deleted: Aspirin, Econazole, Gabapentin, Lidocaine, Nicotine, Polyethylene glycol (per patient)  Changed: None    Allergies reviewed with patient and updates made in EHR: yes    Medication History Completed By: Rosalva Londono 4/26/2025 12:50 AM    PTA Med List   Medication Sig Last Dose/Taking    acetaminophen (TYLENOL) 500 MG tablet Take 1 tablet (500 mg) by mouth every 6 hours as needed for pain Taking As Needed    adapalene (DIFFERIN) 0.1 % external cream Apply thin layer to beard and hairline region after washing hair/face everyday.  If irritation or peeling develops, skip application for one day. Taking    amLODIPine (NORVASC) 5 MG tablet Take 1 tablet (5 mg) by mouth daily. 4/23/2025    atorvastatin (LIPITOR) 20 MG tablet Take 1 tablet (20 mg) by mouth daily. 4/23/2025    benzonatate (TESSALON) 100 MG capsule Take 1 capsule (100 mg) by mouth 3 times daily as needed for cough. Taking As Needed    betamethasone dipropionate (DIPROSONE) 0.05 % external ointment Apply topically 2 times daily. To areas of rash flare up on legs Taking    bisacodyl (DULCOLAX) 5 MG EC tablet Take 5 mg by mouth daily as needed for constipation. Taking As Needed    buPROPion (WELLBUTRIN XL) 150 MG 24 hr tablet Take 1 tablet (150 mg) by mouth every morning 4/23/2025    dapsone (ACZONE) 25 MG tablet Take 2 tablets (50 mg) by mouth daily. 4/23/2025    DULoxetine (CYMBALTA) 30 MG capsule Take 30 mg by mouth at bedtime. 4/24/2025 Bedtime    econazole  nitrate 1 % external cream Apply topically 2 times daily Taking    finasteride (PROSCAR) 5 MG tablet Take 1 tablet (5 mg) by mouth daily. Please keep 1-2-24 clinic appt for refills. 4/25/2025 Morning    hydrochlorothiazide (HYDRODIURIL) 12.5 MG tablet Take 1 tablet (12.5 mg) by mouth daily 4/23/2025    hydrocortisone (CORTAID) 1 % external cream Apply topically 2 times daily as needed for rash or itching Taking As Needed    losartan (COZAAR) 50 MG tablet TAKE 1 TABLET(50 MG) BY MOUTH DAILY 4/25/2025 Morning    omeprazole (PRILOSEC) 20 MG DR capsule Take 1 capsule (20 mg) by mouth daily. 4/25/2025 Morning    oxyBUTYnin ER (DITROPAN XL) 10 MG 24 hr tablet Take 1 tablet (10 mg) by mouth daily. (Please keep visit for 1/2/2024 for further refills) Thank you 4/23/2025    tamsulosin (FLOMAX) 0.4 MG capsule Take 2 capsules (0.8 mg) by mouth daily. 4/25/2025 Morning    tiZANidine (ZANAFLEX) 4 MG tablet Take 1 tablet (4 mg) by mouth 3 times daily as needed for muscle spasms. Taking As Needed

## 2025-04-26 NOTE — INTERIM SUMMARY
Patient is followed by the [facility], PCP Any Truong MD.     Spoke with Resident on call, Santhosh Weber MD who accepted patient to their service.
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
## 2025-04-26 NOTE — PLAN OF CARE
Problem: Adult Inpatient Plan of Care  Goal: Absence of Hospital-Acquired Illness or Injury  Outcome: Progressing  Intervention: Identify and Manage Fall Risk  Recent Flowsheet Documentation  Taken 4/26/2025 0150 by Ramirez Burden RN  Safety Promotion/Fall Prevention:   activity supervised   room door open   room near nurse's station   room organization consistent   safety round/check completed   clutter free environment maintained  Intervention: Prevent Skin Injury  Recent Flowsheet Documentation  Taken 4/26/2025 0150 by Ramirez Burden RN  Body Position: position changed independently  Intervention: Prevent and Manage VTE (Venous Thromboembolism) Risk  Recent Flowsheet Documentation  Taken 4/26/2025 0150 by Ramirez Burden RN  VTE Prevention/Management: SCDs off (sequential compression devices)  Intervention: Prevent Infection  Recent Flowsheet Documentation  Taken 4/26/2025 0150 by Ramirez Burden RN  Infection Prevention:   rest/sleep promoted   hand hygiene promoted   single patient room provided     Problem: Pain Acute  Goal: Optimal Pain Control and Function  Outcome: Progressing  Intervention: Develop Pain Management Plan  Recent Flowsheet Documentation  Taken 4/26/2025 0150 by Ramirez Burden RN  Pain Management Interventions: rest  Intervention: Prevent or Manage Pain  Recent Flowsheet Documentation  Taken 4/26/2025 0150 by Ramirez Burden RN  Medication Review/Management: medications reviewed   Goal Outcome Evaluation:  VSS on RA, A&Ox4, initial pain rating 1/10, reassessed 6/10. One time dose 0.5 dilaudid given. Up SBA, continent B&B. K 3.1 and mag 1.3, repalced, AM rechecks. Will continue with plan of care.

## 2025-04-26 NOTE — ED TRIAGE NOTES
Headache right side of head for last 2 days. Pain also on right side of neck. Does have a hx of hypertension and did not take BP pills today. Denies visual changes or dizziness. Denies photophobia. Is a/o. Some nausea. Took excedrin yesterday but nothing today for the pain     Triage Assessment (Adult)       Row Name 04/25/25 1576          Triage Assessment    Airway WDL WDL
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
## 2025-04-26 NOTE — PROGRESS NOTES
SLP orders received. Chart reviewed and discussed with care team.? Speech-Language Pathology Evaluations not indicated due to no reported or resolved deficits related to speech, language, or cognition. Patient tolerating current diet of Regular and Thin with no s/s aspiration per RN. No acute SLP needs identified.? Patient discharging.? Will complete orders.

## 2025-04-26 NOTE — CONSULTS
"[facility]    Stroke Telephone Note    I was called by Ren Jose on 04/26/25 regarding patient Piter Gaines. The patient is a 59 year old male with history of HTN, tobacco use, depression who presented with 2 days of right sided headache / neck pain and dizziness. No deficits on exam.     Vitals:   BP: (!) 179/95   Pulse: 74   Resp: 17   Temp: 98.7  F (37.1  C)   Weight: 79.7 kg (175 lb 9.6 oz)    Imaging findings:   HEAD CT:  No acute intracranial process.     HEAD CTA:  1.  The right vertebral artery is occluded as it enters the dura.  2.  Otherwise unremarkable.     NECK CTA:  1.  Suspect acute dissection of the nondominant right vertebral artery at the distal V2 segment.  2.  Otherwise unremarkable.    Impression:   Acute right vertebral artery dissection.     Recommendations:   - Stat MRI Brain wwo contrast & MRA Neck w/ contrast  - Aspirin 81 mg x 1; will consider starting heparin gtt pending MRI  - SBP Goal < 180    My recommendations are based on the information provided over the phone by Piter Gaines's in-person providers. They are not intended to replace the clinical judgment of his in-person providers. I was not requested to personally see or examine the patient at this time.     I personally spent a total of 11 minutes on April 26, 2025 consulting with his  medical providers and coordinating care.  This includes personally reviewing the patient's medical record including diagnostic testing, neuroimaging, and laboratory studies.    Kris Owusu MD       Department of Neurology      Securely message with the Vocera Web Console (learn more here)   To page me or covering stroke neurology team member, click here: AMCOM  Choose \"On Call\" tab at top, then select \"NEUROLOGY/ALL SITES\" from middle drop-down box, press Enter, then look for \"stroke\" or \"telestroke\" for your site.    "

## 2025-04-26 NOTE — H&P
[facility]    History and Physical - Hospitalist Service       Date of Admission:  4/25/2025    Assessment & Plan      Piter Gaines is a 59 year old male with a medical history significant for uncontrolled hypertension, EtOH use, nicotine use and other medical comorbidities who is admitted with stroke symptoms.  CTA was initially concerning for vertebral artery dissection, MRI right does not show active vertebral artery dissection.  Patient is being admitted for further inpatient cares    Patient Active Problem List   Diagnosis    Tobacco use disorder    Caloric malnutrition    Essential hypertension    Moderate episode of recurrent major depressive disorder (H)    Seborrheic Keratosis    Persistent proteinuria    Benign prostatic hyperplasia with nocturia    Linear IgA bullous dermatosis    Encounter for long-term current use of high risk medication    Normochromic normocytic anemia    Carotid stenosis, asymptomatic, bilateral    Esophagitis, [place] grade D    Rectal hemorrhage    Alcohol abuse    Chronic kidney disease, stage II (mild)    Depression, unspecified depression type    Tinea faciale    Neoplasm of unspecified behavior of bone, soft tissue, and skin    Hypomagnesemia    Uncontrolled hypertension    Acute nonintractable headache, unspecified headache type    Dissecting hemorrhage of right vertebral artery        #Stroke symptoms  - Patient presented to the ER with headache right-sided neck pain  - CTA positive for possible vertebral artery dissection  - MRI no active dissection  - Patient given aspirin  - Placed on the stroke protocol  - Risk stratification ongoing  - PT OT and speech evaluation  -Check lipid panel/check A1c-/smoking cessation counseling/blood pressure control  - check TTE  - Neurology to see in a.m.  - I did talk to neurology about the MRI.  No need for heparin drip at this time  - Pain control with improvement in headache  - will need a  "statin.    #Vertebral artery suspicious for dissection  - Discussed with neurology  - aspirin for now  - Hold off with heparin drip  A.m. team to follow  -No neurological symptoms at this time.  -Headache improved  MRAreport below                                                                 HEAD MRI:  1.  No acute intracranial abnormality.  2.  Loss of the right V4 flow void.     HEAD MRA:  1.  Absence of flow within the right vertebral artery.  2.  Mild stenosis of the right P2 segment.     NECK MRA:  1.  Minimal flow in the right V1 and V2 segments with absent flow in the right V3 segment as well as adjacent intrinsic T1 signal suspicious for dissection.    #Uncontrolled hypertension  - Likely precipitated symptoms of headache  - Stroke neurologist recommended keeping systolic blood pressure less than 180  - Resume outpatient blood pressure medications      -Nicotine use smoking  - cessation counseling on Wellbutrin  -    Depression  -Resume Cymbalta and bupropion      Rest of patient's medical problems management remains unchanged   # vertebral artery dissection'  # marilyn=pertension  Hypomag  headache     Diet:  NPO  DVT Prophylaxis: Pneumatic Compression Devices  [place] Catheter: Not present  Lines: None     Cardiac Monitoring: None  Code Status:  Full code    Clinically Significant Risk Factors Present on Admission        # Hypokalemia: Lowest K = 3.1 mmol/L in last 2 days, will replace as needed      # Hypomagnesemia: Lowest Mg = 1.3 mg/dL in last 2 days, will replace as needed       # Hypertension: Noted on problem list           # Overweight: Estimated body mass index is 25.93 kg/m  as calculated from the following:    Height as of this encounter: 1.753 m (5' 9\").    Weight as of this encounter: 79.7 kg (175 lb 9.6 oz).              Disposition Plan     Medically Ready for Discharge:            Maren Villela MD  Hospitalist Service  [facility]  Securely message with [employer] (more " info)  Text page via [facility] Paging/Directory     ______________________________________________________________________    Chief Complaint   Right sided headache and neck pain        History of Present Illness     Piter Gaines is a 59 year old male with a medical history significant for uncontrolled hypertension, EtOH use, nicotine use and other medical comorbidities who is admitted with stroke symptoms.  CTA was initially concerning for vertebral artery dissection, MRI right does not show active vertebral artery dissection.  Patient is being admitted for further inpatient cares.    Patient was seen in the ER on admission.  He was awake, alert oriented to place person and time providing history.  Per reports, he was at baseline until the past 2 days when he developed a right-sided headache with neck pain. He further described his headache as a pressure sensation that starts behind his right eye and travels along the right parietal/occipital aspect of his head and down to his neck. His headache is provoked with exertional movements. Denies having headaches in the past. He was  nauseous and dry heaving. He is hypertensive and has judd off of his blood pressure medications.     No complaints of chest pain or any other associated symptoms.    He presented to the ER for further evaluation.  A stroke alert was initiated.  CT head CTA results documented below      IMPRESSION:   HEAD CT:  No acute intracranial process.     HEAD CTA:  1.  The right vertebral artery is occluded as it enters the dura.  2.  Otherwise unremarkable.     NECK CTA:  1.  Suspect acute dissection of the nondominant right vertebral artery at the distal V2 segment.  2.  Otherwise unremarkable.  It is noted that on initial CTA was concerning for possible vertebral artery dissection.    Stroke neurologist recommended that an MRI MRA be done.  Unfortunately there were positive fluctuations which delayed the MRI MRA of the head.  In the interim heparin drip  was recommended due to concern for dissection however it appears it was never started.  Patient was given aspirin 81 mg.  Neurologist also recommended controlling the blood pressure to systolic less than 180.    ER intervention included given patient's antiemetics for nausea and vomiting.  Hydralazine for blood pressure control his Cozaar and the medications documented below.      MEDICATIONS GIVEN IN THE EMERGENCY:  Medications   lidocaine 1 % 0.1-1 mL (has no administration in time range)   lidocaine (LMX4) cream (has no administration in time range)   sodium chloride (PF) 0.9% PF flush 3 mL (has no administration in time range)   sodium chloride (PF) 0.9% PF flush 3 mL (has no administration in time range)   calcium carbonate (TUMS) chewable tablet 1,000 mg (has no administration in time range)   melatonin tablet 5 mg (has no administration in time range)   ondansetron (ZOFRAN ODT) ODT tab 4 mg (has no administration in time range)     Or   ondansetron (ZOFRAN) injection 4 mg (has no administration in time range)   metoclopramide (REGLAN) injection 10 mg (10 mg Intravenous $Given 4/25/25 2219)   diphenhydrAMINE (BENADRYL) injection 25 mg (25 mg Intravenous $Given 4/25/25 2219)   ketorolac (TORADOL) injection 15 mg (15 mg Intravenous $Given 4/25/25 2219)   losartan (COZAAR) tablet 50 mg (50 mg Oral $Given 4/25/25 2220)   amLODIPine (NORVASC) tablet 5 mg (5 mg Oral $Given 4/25/25 2219)   magnesium sulfate 2 g in 50 mL sterile water intermittent infusion (0 g Intravenous Stopped 4/26/25 0005)   potassium chloride elkin ER (KLOR-CON M20) CR tablet 40 mEq (40 mEq Oral $Given 4/25/25 2317)   iopamidol (ISOVUE-370) solution 67 mL (67 mLs Intravenous $Given 4/25/25 2312)   losartan (COZAAR) tablet 50 mg (50 mg Oral $Given 4/26/25 0031)   aspirin (ASA) chewable tablet 81 mg (81 mg Oral $Given 4/26/25 0108)   hydrALAZINE (APRESOLINE) injection 5 mg (5 mg Intravenous $Given 4/26/25 0108)           When I saw the patient in  the ER, his headache was improved.  He had no other neurological symptoms and had no complaints.  MRI of the brain as documented below                                                                   IMPRESSION:  HEAD MRI:  1.  No acute intracranial abnormality.  2.  Loss of the right V4 flow void.     HEAD MRA:  1.  Absence of flow within the right vertebral artery.  2.  Mild stenosis of the right P2 segment.     NECK MRA:  1.  Minimal flow in the right V1 and V2 segments with absent flow in the right V3 segment as well as adjacent intrinsic T1 signal suspicious for dissection.      Case was discussed with AM neurologist about the need to start heparin.  The recommendation is to hold off with heparin.      Past Medical History    Past Medical History:   Diagnosis Date    Acute hyperactive alcohol withdrawal delirium (H) 04/06/2017    Alcohol abuse 11/10/2016    Alcohol abuse     Depression     Difficulty walking     Esophagitis     Gastroesophageal reflux disease     History of blood transfusion     Hypertension     Hypertension     Kidney disease     Tobacco abuse     Uncomplicated asthma     Vasovagal syncope 02/04/2019       Past Surgical History   Past Surgical History:   Procedure Laterality Date    ESOPHAGOSCOPY, GASTROSCOPY, DUODENOSCOPY (EGD), COMBINED N/A 4/6/2017    Procedure: ESOPHAGOGASTRODUODENOSCOPY (EGD);  Surgeon: Conrad Chao MD;  Location: [facility];  Service:     FOREARM SURGERY Left 1980's    HAND SURGERY Left        Prior to Admission Medications   Prior to Admission Medications   Prescriptions Last Dose Informant Patient Reported? Taking?   DULoxetine (CYMBALTA) 30 MG capsule   No No   Sig: TAKE 1 CAPSULE(30 MG) BY MOUTH AT BEDTIME   Lidocaine (LIDOCARE) 4 % Patch   No No   Sig: Place 1 patch over 12 hours onto the skin every 24 hours. To prevent lidocaine toxicity, patient should be patch free for 12 hrs daily.   acetaminophen (TYLENOL) 500 MG tablet   No Yes   Sig: Take 1 tablet  (500 mg) by mouth every 6 hours as needed for pain   adapalene (DIFFERIN) 0.1 % external cream   No Yes   Sig: Apply thin layer to beard and hairline region after washing hair/face everyday.  If irritation or peeling develops, skip application for one day.   amLODIPine (NORVASC) 5 MG tablet 4/23/2025 Morning  No Yes   Sig: Take 1 tablet (5 mg) by mouth daily.   atorvastatin (LIPITOR) 20 MG tablet   No No   Sig: Take 1 tablet (20 mg) by mouth daily.   benzonatate (TESSALON) 100 MG capsule   No No   Sig: Take 1 capsule (100 mg) by mouth 3 times daily as needed for cough.   betamethasone dipropionate (DIPROSONE) 0.05 % external ointment   No No   Sig: Apply topically 2 times daily. To areas of rash flare up on legs   bisacodyl (DULCOLAX) 5 MG EC tablet   No No   Sig: Two days prior to exam take two (2) tablets at 4pm. One day prior to exam take two (2) tablets at 4pm   buPROPion (WELLBUTRIN XL) 150 MG 24 hr tablet   No No   Sig: Take 1 tablet (150 mg) by mouth every morning   dapsone (ACZONE) 25 MG tablet   No No   Sig: Take 2 tablets (50 mg) by mouth daily.   econazole nitrate 1 % external cream   No No   Sig: Apply topically 2 times daily   finasteride (PROSCAR) 5 MG tablet   No No   Sig: Take 1 tablet (5 mg) by mouth daily. Please keep 1-2-24 clinic appt for refills.   gabapentin (NEURONTIN) 100 MG capsule   No No   Sig: Take 3 capsules (300 mg) by mouth 3 times daily for 10 days.   gabapentin (NEURONTIN) 300 MG capsule   No No   Sig: TAKE 1 CAPSULE BY MOUTH IN THE MORNING AND AFTERNOON AND 2 CAPSULES IN THE EVENING   gabapentin (NEURONTIN) 300 MG capsule   No No   Sig: Take 1 capsule (300 mg) by mouth 3 times daily.   hydrochlorothiazide (HYDRODIURIL) 12.5 MG tablet   No No   Sig: Take 1 tablet (12.5 mg) by mouth daily   hydrocortisone (CORTAID) 1 % external cream   No No   Sig: Apply topically 2 times daily as needed for rash or itching   losartan (COZAAR) 100 MG tablet   No No   Sig: Take 1 tablet (100 mg) by  mouth daily.   losartan (COZAAR) 50 MG tablet   No No   Sig: TAKE 1 TABLET(50 MG) BY MOUTH DAILY   nicotine (NICODERM CQ) 21 MG/24HR 24 hr patch   No No   Sig: Place 1 patch onto the skin every 24 hours   nicotine (NICORETTE) 2 MG gum   No No   Sig: Place 1 each (2 mg) inside cheek every hour as needed for smoking cessation   omeprazole (PRILOSEC) 20 MG DR capsule   No No   Sig: Take 1 capsule (20 mg) by mouth daily.   oxyBUTYnin ER (DITROPAN XL) 10 MG 24 hr tablet   No No   Sig: Take 1 tablet (10 mg) by mouth daily. (Please keep visit for 1/2/2024 for further refills) Thank you   polyethylene glycol (GOLYTELY) 236 g suspension   No No   Sig: Take as directed. Two days before your exam fill the first container with water. Cover and shake until mixed well. At 5:00pm drink one 8oz glass every 10-15 minutes until half (1/2) of the first container is empty. Store the remainder in the refrigerator. One day before your exam at 5:00pm drink the second half of the first container until it is gone. Before you go to bed mix the second container with water and put in refrigerator. Six hours before your check in time drink one 8oz glass every 10-15 minutes until half of container is empty. Discard the remainder of solution.   polyethylene glycol-electrolytes (NULYTELY) 420 g solution   Yes No   Sig: AS DIRECTED. PLEASE SEE ATTACHMENT.   tamsulosin (FLOMAX) 0.4 MG capsule   No No   Sig: Take 2 capsules (0.8 mg) by mouth daily.   tiZANidine (ZANAFLEX) 4 MG tablet   No No   Sig: Take 1 tablet (4 mg) by mouth 3 times daily as needed for muscle spasms.      Facility-Administered Medications: None        Review of Systems    The 10 point Review of Systems is negative other than noted in the HPI or here.     Social History   I have reviewed this patient's social history and updated it with pertinent information if needed.  Social History     Tobacco Use    Smoking status: Some Days     Current packs/day: 0.50     Average packs/day:  0.5 packs/day for 10.0 years (5.0 ttl pk-yrs)     Types: Cigarettes    Smokeless tobacco: Never    Tobacco comments:     1/2 pack a day     Substance Use Topics    Alcohol use: Not Currently     Alcohol/week: 14.0 standard drinks of alcohol     Types: 6 Cans of beer, 8 Shots of liquor per week     Comment: quit 5 years ago    Drug use: Yes     Types: Marijuana     Comment: Drug use: last week         Family History   I have reviewed this patient's family history and updated it with pertinent information if needed.  Family History   Problem Relation Age of Onset    Bone Cancer Mother     Breast Cancer Mother     Diabetes Father     Kidney Disease Father     Diabetes Sister     Cerebrovascular Disease Brother     Lung Cancer Father     Kidney Disease Brother     Coronary Artery Disease Brother     Stomach Cancer No family hx of          Allergies   Allergies   Allergen Reactions    No Known Allergies         Physical Exam   Vital Signs: Temp: 98.7  F (37.1  C) Temp src: Oral BP: (!) 179/95 Pulse: 74   Resp: 17 SpO2: 93 % O2 Device: None (Room air)    Weight: 175 lbs 9.6 oz      General Aox3, appropriate affect, NAD, on RA  HEENT  MMM, EOMI, PERRL  Chest Adeq E b/l, No wheezing  Heart RRR, No M/R/G  Abd- Soft, NT, BS+  - Deferred,   Extremity- Moving all extremities, No digital clubbing,   No edema  Neuro- Aox3, CN II- XII intact, No peripheral vision loss  P5/5, T-N, reflexes 2+, plantar reflex downwards,  gait not checked  Skin  Has no tattoo, No skin rash     Medical Decision Making       90 MINUTES SPENT BY ME on the date of service doing chart review, history, exam, documentation & further activities per the note.      ------------------ MEDICAL DECISION MAKING ------------------------------------------------------------------------------------------------------  MANAGEMENT DISCUSSED with the following over the past 24 hours: patient and care team       Data   ------------------------- PAST 24 HR DATA REVIEWED  -----------------------------------------------    I have personally reviewed the following data over the past 24 hrs:    7.4  \   12.0 (L)   / 202     141 103 13.0 /  112 (H)   3.1 (L) 31 (H) 1.33 (H) \     Trop: 8 BNP: 102     INR:  0.99 PTT:  N/A   D-dimer:  N/A Fibrinogen:  N/A       Imaging results reviewed over the past 24 hrs:   Recent Results (from the past 24 hours)   CTA Head Neck with Contrast    Narrative    EXAM: CTA HEAD NECK W CONTRAST  LOCATION: [facility]  DATE: 4/25/2025    INDICATION: Pressure, right sided headache with radiation from the neck up into his head on the right side, sharp pain in the right neck, nausea and vomiting but does not describe feeling off balance.  COMPARISON: 8/26/2023  CONTRAST: ISOVUE [id number]  TECHNIQUE: Head and neck CT angiogram with IV contrast. Noncontrast head CT followed by axial helical CT images of the head and neck vessels obtained during the arterial phase of intravenous contrast administration. Axial 2D reconstructed images and   multiplanar 3D MIP reconstructed images of the head and neck vessels were performed by the technologist. Dose reduction techniques were used. All stenosis measurements made according to NASCET criteria unless otherwise specified.    FINDINGS:   NONCONTRAST HEAD CT:   INTRACRANIAL CONTENTS: No intracranial hemorrhage, extraaxial collection, or mass effect.  No CT evidence of acute infarct. Mild presumed chronic small vessel ischemic changes. Mild generalized volume loss. No hydrocephalus.     VISUALIZED ORBITS/SINUSES/MASTOIDS: Chronic left orbital floor fracture. Mild mucosal thickening scattered about the paranasal sinuses. No middle ear or mastoid effusion.    BONES/SOFT TISSUES: No acute abnormality. There is a scalp lesion at the vertex, stable dating back to 8/26/2023.    HEAD CTA:  ANTERIOR CIRCULATION: No stenosis/occlusion, aneurysm, or high flow vascular malformation. Standard [place]  anatomy.    POSTERIOR CIRCULATION: No stenosis/occlusion, aneurysm, or high flow vascular malformation. The right vertebral artery is occluded as it enters the dura. Normal left vertebral artery     DURAL VENOUS SINUSES: Not well evaluated on a technical basis.    NECK CTA:  RIGHT CAROTID: No measurable stenosis or dissection.    LEFT CAROTID: No measurable stenosis or dissection.    VERTEBRAL ARTERIES: The diminutive nondominant right vertebral artery is attenuated throughout its course, and occluded at the distal V2 segment, an age-indeterminate finding, but given the symptoms, worrisome for an acute dissection. The right vertebral   artery is normal.    AORTIC ARCH: Classic aortic arch anatomy with no significant stenosis at the origin of the great vessels.    NONVASCULAR STRUCTURES: Unremarkable.      Impression    IMPRESSION:   HEAD CT:  No acute intracranial process.    HEAD CTA:  1.  The right vertebral artery is occluded as it enters the dura.  2.  Otherwise unremarkable.    NECK CTA:  1.  Suspect acute dissection of the nondominant right vertebral artery at the distal V2 segment.  2.  Otherwise unremarkable.    Exam discussed with Dr. Jose at 23:57 hours   MR Brain w/o & w Contrast    Narrative    EXAM: MR BRAIN W/O and W CONTRAST, MRA BRAIN ([place]) W/O CONTRAST, MRA NECK (CAROTIDS) W/O and W CONTRAST  LOCATION: [facility]  DATE: 4/26/2025    INDICATION: Right sided headache with neck pain and CT findings of concern for right sided vertebral dissection  COMPARISON:  CTA head and neck April 25, 2025.  CONTRAST: 10 ml gadavist  TECHNIQUE:   1) Routine multiplanar multisequence head MRI without and with intravenous contrast.  2) 3D time-of-flight head MRA without intravenous contrast.  3) Neck MRA without and with IV contrast. Stenosis measurements made according to NASCET criteria unless otherwise specified.    FINDINGS:  HEAD MRI:  INTRACRANIAL CONTENTS: No acute or  subacute infarct. No mass, acute hemorrhage, or extra-axial fluid collections. Patchy nonspecific T2/FLAIR hyperintensities within the cerebral white matter most consistent with mild to moderate chronic microvascular   ischemic change. Mild generalized cerebral atrophy. No hydrocephalus. Normal position of the cerebellar tonsils. No pathologic contrast enhancement.    SELLA: Empty sella morphology with flattening of the pituitary gland along the sellar floor.    OSSEOUS STRUCTURES/SOFT TISSUES: Normal marrow signal. Loss of the right V4 flow void.      ORBITS: No acute abnormality. Remote left orbital floor blowout fracture.      SINUSES/MASTOIDS: Mild to moderate mucosal thickening scattered about the paranasal sinuses. No middle ear or mastoid effusion.     HEAD MRA:   ANTERIOR CIRCULATION: No stenosis/occlusion, aneurysm, or high flow vascular malformation. Developmentally hypoplastic left A1 anterior cerebral artery segment.    POSTERIOR CIRCULATION:  Absence of flow within the right vertebral artery. Mild stenosis of the right P2 segment. No additional stenosis/occlusion, aneurysm, or high flow vascular malformation. Balanced vertebral arteries supply a normal basilar artery.     NECK MRA:   RIGHT CAROTID: No measurable stenosis or dissection.    LEFT CAROTID: No measurable stenosis or dissection.    VERTEBRAL ARTERIES: Left vertebral artery is normal. Minimal flow in the right V1 and V2 segments with absent flow in the right V3 segment as well as adjacent intrinsic T1 signal suspicious for dissection. Dominant left vertebral artery.    AORTIC ARCH: Classic aortic arch anatomy with no significant stenosis at the origin of the great vessels.      Impression    IMPRESSION:  HEAD MRI:  1.  No acute intracranial abnormality.  2.  Loss of the right V4 flow void.    HEAD MRA:  1.  Absence of flow within the right vertebral artery.  2.  Mild stenosis of the right P2 segment.    NECK MRA:  1.  Minimal flow in the right  V1 and V2 segments with absent flow in the right V3 segment as well as adjacent intrinsic T1 signal suspicious for dissection.     MRA Angiogram Head w/o Contrast    Narrative    EXAM: MR BRAIN W/O and W CONTRAST, MRA BRAIN ([place]) W/O CONTRAST, MRA NECK (CAROTIDS) W/O and W CONTRAST  LOCATION: [facility]  DATE: 4/26/2025    INDICATION: Right sided headache with neck pain and CT findings of concern for right sided vertebral dissection  COMPARISON:  CTA head and neck April 25, 2025.  CONTRAST: 10 ml gadavist  TECHNIQUE:   1) Routine multiplanar multisequence head MRI without and with intravenous contrast.  2) 3D time-of-flight head MRA without intravenous contrast.  3) Neck MRA without and with IV contrast. Stenosis measurements made according to NASCET criteria unless otherwise specified.    FINDINGS:  HEAD MRI:  INTRACRANIAL CONTENTS: No acute or subacute infarct. No mass, acute hemorrhage, or extra-axial fluid collections. Patchy nonspecific T2/FLAIR hyperintensities within the cerebral white matter most consistent with mild to moderate chronic microvascular   ischemic change. Mild generalized cerebral atrophy. No hydrocephalus. Normal position of the cerebellar tonsils. No pathologic contrast enhancement.    SELLA: Empty sella morphology with flattening of the pituitary gland along the sellar floor.    OSSEOUS STRUCTURES/SOFT TISSUES: Normal marrow signal. Loss of the right V4 flow void.      ORBITS: No acute abnormality. Remote left orbital floor blowout fracture.      SINUSES/MASTOIDS: Mild to moderate mucosal thickening scattered about the paranasal sinuses. No middle ear or mastoid effusion.     HEAD MRA:   ANTERIOR CIRCULATION: No stenosis/occlusion, aneurysm, or high flow vascular malformation. Developmentally hypoplastic left A1 anterior cerebral artery segment.    POSTERIOR CIRCULATION:  Absence of flow within the right vertebral artery. Mild stenosis of the right P2  segment. No additional stenosis/occlusion, aneurysm, or high flow vascular malformation. Balanced vertebral arteries supply a normal basilar artery.     NECK MRA:   RIGHT CAROTID: No measurable stenosis or dissection.    LEFT CAROTID: No measurable stenosis or dissection.    VERTEBRAL ARTERIES: Left vertebral artery is normal. Minimal flow in the right V1 and V2 segments with absent flow in the right V3 segment as well as adjacent intrinsic T1 signal suspicious for dissection. Dominant left vertebral artery.    AORTIC ARCH: Classic aortic arch anatomy with no significant stenosis at the origin of the great vessels.      Impression    IMPRESSION:  HEAD MRI:  1.  No acute intracranial abnormality.  2.  Loss of the right V4 flow void.    HEAD MRA:  1.  Absence of flow within the right vertebral artery.  2.  Mild stenosis of the right P2 segment.    NECK MRA:  1.  Minimal flow in the right V1 and V2 segments with absent flow in the right V3 segment as well as adjacent intrinsic T1 signal suspicious for dissection.     MRA Angiogram Neck w/o & w Contrast    Narrative    EXAM: MR BRAIN W/O and W CONTRAST, MRA BRAIN ([facility]) W/O CONTRAST, MRA NECK (CAROTIDS) W/O and W CONTRAST  LOCATION: [facility]  DATE: 4/26/2025    INDICATION: Right sided headache with neck pain and CT findings of concern for right sided vertebral dissection  COMPARISON:  CTA head and neck April 25, 2025.  CONTRAST: 10 ml gadavist  TECHNIQUE:   1) Routine multiplanar multisequence head MRI without and with intravenous contrast.  2) 3D time-of-flight head MRA without intravenous contrast.  3) Neck MRA without and with IV contrast. Stenosis measurements made according to NASCET criteria unless otherwise specified.    FINDINGS:  HEAD MRI:  INTRACRANIAL CONTENTS: No acute or subacute infarct. No mass, acute hemorrhage, or extra-axial fluid collections. Patchy nonspecific T2/FLAIR hyperintensities within the cerebral white  matter most consistent with mild to moderate chronic microvascular   ischemic change. Mild generalized cerebral atrophy. No hydrocephalus. Normal position of the cerebellar tonsils. No pathologic contrast enhancement.    SELLA: Empty sella morphology with flattening of the pituitary gland along the sellar floor.    OSSEOUS STRUCTURES/SOFT TISSUES: Normal marrow signal. Loss of the right V4 flow void.      ORBITS: No acute abnormality. Remote left orbital floor blowout fracture.      SINUSES/MASTOIDS: Mild to moderate mucosal thickening scattered about the paranasal sinuses. No middle ear or mastoid effusion.     HEAD MRA:   ANTERIOR CIRCULATION: No stenosis/occlusion, aneurysm, or high flow vascular malformation. Developmentally hypoplastic left A1 anterior cerebral artery segment.    POSTERIOR CIRCULATION:  Absence of flow within the right vertebral artery. Mild stenosis of the right P2 segment. No additional stenosis/occlusion, aneurysm, or high flow vascular malformation. Balanced vertebral arteries supply a normal basilar artery.     NECK MRA:   RIGHT CAROTID: No measurable stenosis or dissection.    LEFT CAROTID: No measurable stenosis or dissection.    VERTEBRAL ARTERIES: Left vertebral artery is normal. Minimal flow in the right V1 and V2 segments with absent flow in the right V3 segment as well as adjacent intrinsic T1 signal suspicious for dissection. Dominant left vertebral artery.    AORTIC ARCH: Classic aortic arch anatomy with no significant stenosis at the origin of the great vessels.      Impression    IMPRESSION:  HEAD MRI:  1.  No acute intracranial abnormality.  2.  Loss of the right V4 flow void.    HEAD MRA:  1.  Absence of flow within the right vertebral artery.  2.  Mild stenosis of the right P2 segment.    NECK MRA:  1.  Minimal flow in the right V1 and V2 segments with absent flow in the right V3 segment as well as adjacent intrinsic T1 signal suspicious for dissection.

## 2025-04-26 NOTE — CONSULTS
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    MRI of the brain today shows no evidence of stroke due to the dissection, I was happy to reassure him about that.  Dissections rarely cause stroke after the initial event.      Aspirin has been started appropriately, continue 81 mg daily for now.  He should follow-up with the neurology clinic in 2 to 3 months to consider repeat imaging and for evaluation of intermittent left-sided numbness and tingling.  No indication for full anticoagulation--discussed with Dr. Villela earlier today.     Vertebral artery dissections are notoriously painful, he likely will need opiate pain medications for 2 to 3 weeks on an as needed basis, though I do need to be careful with prior history of alcohol abuse.  I put in an order for [place] while he is inpatient.  If this is effective it could be continued as outpatient.      OK to discharge from my perspective.             Chief Complaint/HPI:     This patient is a 59-year-old gentleman seen for neurologic evaluation today regarding right vertebral artery dissection.  Beginning Tuesday or Wednesday of this last week he developed severe pain through the right side of the neck and into the right face.  Initially he had some nausea with this but denies any dizziness or weakness or coordination difficulties.  He had tried some Excedrin but it was not very effective for his pain.  Symptoms continued without significant change and he presented to the emergency room last evening.  CTA showed occlusion of the right vertebral artery in about the mid cervical region, suggesting  "dissection.  MR a again shows occlusion in the right vert, also with some T1 signal \"suspicious for dissection.\"  MRI of the brain shows no ischemia.  Here in the ER he has had a dose of ketorolac and a dose of hydromorphone.    During our discussion he does mention some intermittent numbness and tingling involving left leg, arm and sometimes trunk areas, sometimes he might unexpectedly drop something from the left hand.  The symptoms have been intermittent for several years.            Past Medical History:    has a past medical history of Acute hyperactive alcohol withdrawal delirium (H) (04/06/2017), Alcohol abuse (11/10/2016), Alcohol abuse, Depression, Difficulty walking, Esophagitis, Gastroesophageal reflux disease, History of blood transfusion, Hypertension, Hypertension, Kidney disease, Tobacco abuse, Uncomplicated asthma, and Vasovagal syncope (02/04/2019).          Past Surgical History:    has a past surgical history that includes Forearm surgery (Left, 1980's); Hand surgery (Left); and Esophagoscopy, gastroscopy, duodenoscopy (EGD), combined (N/A, 4/6/2017).          Social History:     Social History     Tobacco Use    Smoking status: Some Days     Current packs/day: 0.50     Average packs/day: 0.5 packs/day for 10.0 years (5.0 ttl pk-yrs)     Types: Cigarettes    Smokeless tobacco: Never    Tobacco comments:     1/2 pack a day     Substance Use Topics    Alcohol use: Not Currently     Alcohol/week: 14.0 standard drinks of alcohol     Types: 6 Cans of beer, 8 Shots of liquor per week     Comment: quit 5 years ago             Family History:     Family History   Problem Relation Age of Onset    Bone Cancer Mother     Breast Cancer Mother     Diabetes Father     Kidney Disease Father     Diabetes Sister     Cerebrovascular Disease Brother     Lung Cancer Father     Kidney Disease Brother     Coronary Artery Disease Brother     Stomach Cancer No family hx of                 Allergies:     Allergies "   Allergen Reactions    No Known Allergies              Medications:     Current Facility-Administered Medications:     acetaminophen (TYLENOL) tablet 975 mg, 975 mg, Oral, TID, 975 mg at 04/26/25 1016 **OR** acetaminophen (TYLENOL) Suppository 650 mg, 650 mg, Rectal, TID, Kang Celeste MD    aspirin (ASA) chewable tablet 81 mg, 81 mg, Oral, Daily, Maren Villela MD    calcium carbonate (TUMS) chewable tablet 1,000 mg, 1,000 mg, Oral, 4x Daily PRN, Maren Villela MD    lidocaine (LMX4) cream, , Topical, Q1H PRN, Maren Villela MD    lidocaine (LMX4) cream, , Topical, Q1H PRN, Maren Villela MD    lidocaine 1 % 0.1-1 mL, 0.1-1 mL, Other, Q1H PRN, Maren Villela MD    lidocaine 1 % 0.1-1 mL, 0.1-1 mL, Other, Q1H PRN, Maren Villela MD    melatonin tablet 5 mg, 5 mg, Oral, At Bedtime PRN, Maren Villela MD    ondansetron (ZOFRAN ODT) ODT tab 4 mg, 4 mg, Oral, Q6H PRN **OR** ondansetron (ZOFRAN) injection 4 mg, 4 mg, Intravenous, Q6H PRN, Maren Villela MD    sodium chloride (PF) 0.9% PF flush 3 mL, 3 mL, Intracatheter, Q8H Tika BOLDEN Grace A, MD, 3 mL at 04/26/25 0556    sodium chloride (PF) 0.9% PF flush 3 mL, 3 mL, Intracatheter, q1 min prn, Maren Villela MD    sodium chloride (PF) 0.9% PF flush 3 mL, 3 mL, Intracatheter, Q8H KIMITika Grace A, MD    sodium chloride (PF) 0.9% PF flush 3 mL, 3 mL, Intracatheter, q1 min prn, Maren Villela MD    Current Outpatient Medications:     acetaminophen (TYLENOL) 500 MG tablet, Take 1 tablet (500 mg) by mouth every 6 hours as needed for pain, Disp: 180 tablet, Rfl: 1    adapalene (DIFFERIN) 0.1 % external cream, Apply thin layer to beard and hairline region after washing hair/face everyday.  If irritation or peeling develops, skip application for one day., Disp: 45 g, Rfl: 1    amLODIPine (NORVASC) 5 MG tablet, Take 1 tablet (5 mg) by mouth daily., Disp: 90 tablet, Rfl: 0    atorvastatin (LIPITOR) 20 MG tablet, Take 1 tablet (20 mg) by mouth daily., Disp: 90  tablet, Rfl: 2    benzonatate (TESSALON) 100 MG capsule, Take 1 capsule (100 mg) by mouth 3 times daily as needed for cough., Disp: 60 capsule, Rfl: 0    betamethasone dipropionate (DIPROSONE) 0.05 % external ointment, Apply topically 2 times daily. To areas of rash flare up on legs, Disp: 50 g, Rfl: 2    bisacodyl (DULCOLAX) 5 MG EC tablet, Take 5 mg by mouth daily as needed for constipation., Disp: , Rfl:     buPROPion (WELLBUTRIN XL) 150 MG 24 hr tablet, Take 1 tablet (150 mg) by mouth every morning, Disp: 90 tablet, Rfl: 3    dapsone (ACZONE) 25 MG tablet, Take 2 tablets (50 mg) by mouth daily., Disp: 180 tablet, Rfl: 0    DULoxetine (CYMBALTA) 30 MG capsule, Take 30 mg by mouth at bedtime., Disp: , Rfl:     econazole nitrate 1 % external cream, Apply topically 2 times daily, Disp: 30 g, Rfl: 1    finasteride (PROSCAR) 5 MG tablet, Take 1 tablet (5 mg) by mouth daily. Please keep 1-2-24 clinic appt for refills., Disp: 90 tablet, Rfl: 3    hydrochlorothiazide (HYDRODIURIL) 12.5 MG tablet, Take 1 tablet (12.5 mg) by mouth daily, Disp: 90 tablet, Rfl: 3    hydrocortisone (CORTAID) 1 % external cream, Apply topically 2 times daily as needed for rash or itching, Disp: 120 g, Rfl: 1    losartan (COZAAR) 50 MG tablet, TAKE 1 TABLET(50 MG) BY MOUTH DAILY, Disp: 90 tablet, Rfl: 0    omeprazole (PRILOSEC) 20 MG DR capsule, Take 1 capsule (20 mg) by mouth daily., Disp: 90 capsule, Rfl: 1    oxyBUTYnin ER (DITROPAN XL) 10 MG 24 hr tablet, Take 1 tablet (10 mg) by mouth daily. (Please keep visit for 1/2/2024 for further refills) Thank you, Disp: 90 tablet, Rfl: 3    tamsulosin (FLOMAX) 0.4 MG capsule, Take 2 capsules (0.8 mg) by mouth daily., Disp: 180 capsule, Rfl: 3    tiZANidine (ZANAFLEX) 4 MG tablet, Take 1 tablet (4 mg) by mouth 3 times daily as needed for muscle spasms., Disp: 90 tablet, Rfl: 1    losartan (COZAAR) 100 MG tablet, Take 1 tablet (100 mg) by mouth daily., Disp: 90 tablet, Rfl: 0              Physical  "Exam:      Vitals: BP (!) 149/83 (BP Location: Right arm)   Pulse 74   Temp 98.6  F (37  C) (Oral)   Resp 20   Ht 1.753 m (5' 9\")   Wt 79.7 kg (175 lb 9.6 oz)   SpO2 94%   BMI 25.93 kg/m    BMI= Body mass index is 25.93 kg/m .     Patient is alert and in no acute distress. Neck was supple, no carotid bruits, thyromegaly, lymphadenopathy or JVD noted.   Neurological Exam:   Mental status: Patient is alert and oriented x 3. Speech is clear and fluent    CN II: Visual fields are full to confrontation.  PERRLA.   CN III, IV, VI: EOMI.    CN VII: Face is symmetric    CN VII: Hearing is normal to conversation   CN IX, X: Palate elevates symmetrically. Phonation is normal.    CN XI: Head turning and shoulder shrug are intact   CN XII: Tongue is midline with normal movements and no atrophy or fasciculations.   Motor: Muscle bulk and tone are normal. No pronator drift. Strength is 5/5 bilaterally. No fasciculations noted.   Reflexes: Reflexes are symmetric, a little more brisk on the right than the left    Sensory: Light touch, pinprick, and vibration sense are intact bilaterally.    Coordination: Rapid alternating movements and fine finger movements are intact. There is no dysmetria on finger-to-nose testing.    Gait: gait is independent      Terry Kendrick MD       More than 60 minutes spent reviewing records and results, evaluating patient, discussing with pt and family and on documentation    "

## 2025-04-26 NOTE — PLAN OF CARE
Discharged to home, independent. Stroke education risk factor information given and  reviewed. Will fill prescriptions at outpatient pharmacy. States he has all of his belongings including his cell phone and glasses. Patricia Connor RN    Your risk factors for stroke or TIA (transient ischemic attack):     Your Risk Factors Your Results Goals   [] High blood pressure BP: (!) 149/83 (04/26/25 1020) Less than 120/80   [] Cholesterol          Total 4/26/2025: 105 mg/dL   Less than 150    Triglycerides   4/26/2025: 249 mg/dL Less than 150    LDL 4/26/2025: 27 mg/dL    Less than 70    HDL 4/26/2025: 28 mg/dL         Greater than 40 (men)  Greater than 50 (women)   [] Diabetes                A1C 4/25/2025: 5.5 % Less than 5.7   [] Atrial fibrillation No atrial fibrillation noted on cardiac monitor Manage per physician orders   [] Smoking/tobacco use   Tobacco Use      Smoking status: Some Days        Packs/day: 0.50        Years: 0.5 packs/day for 10.0 years (5.0 ttl pk-yrs)        Types: Cigarettes      Smokeless tobacco: Never      Tobacco comments: 1/2 pack a day     Quit smoking and tobacco   [] Overweight Body mass index is 25.93 kg/m .  Less than 25     Other risk factors include: carotid (neck) artery disease, other heart diseases, prior stroke or TIA, poor diet, lack of exercise, and excessive alcohol consumption.     [x] Written stroke educational materials given to patient including:   - Learning about BE FAST: Stroke Warning Signs and Learning about Risk Factors for Stroke (Healthwise)   - Understanding Stroke: Key Resources After a Stroke (FOD #264149)       Know the warning signs and symptoms of stroke: BE FAST     B = Balance loss   E = Eyesight changes   F = Facial droop or numbness   A = Arm or leg weakness   S = Speech difficulty, slurred speech   T = Time to call 911 for help

## 2025-04-29 ENCOUNTER — ANCILLARY PROCEDURE (OUTPATIENT)
Dept: GENERAL RADIOLOGY | Facility: CLINIC | Age: 60
End: 2025-04-29
Attending: STUDENT IN AN ORGANIZED HEALTH CARE EDUCATION/TRAINING PROGRAM
Payer: COMMERCIAL

## 2025-04-29 ENCOUNTER — OFFICE VISIT (OUTPATIENT)
Dept: DERMATOLOGY | Facility: CLINIC | Age: 60
End: 2025-04-29
Payer: COMMERCIAL

## 2025-04-29 DIAGNOSIS — L63.9 ALOPECIA AREATA: ICD-10-CM

## 2025-04-29 DIAGNOSIS — L13.8 LINEAR IGA BULLOUS DERMATOSIS: ICD-10-CM

## 2025-04-29 DIAGNOSIS — L30.9 FACIAL DERMATITIS: ICD-10-CM

## 2025-04-29 DIAGNOSIS — D86.9 SARCOID: ICD-10-CM

## 2025-04-29 DIAGNOSIS — D86.9 SARCOID: Primary | ICD-10-CM

## 2025-04-29 PROCEDURE — 99214 OFFICE O/P EST MOD 30 MIN: CPT | Performed by: STUDENT IN AN ORGANIZED HEALTH CARE EDUCATION/TRAINING PROGRAM

## 2025-04-29 PROCEDURE — 71046 X-RAY EXAM CHEST 2 VIEWS: CPT | Mod: TC | Performed by: RADIOLOGY

## 2025-04-29 RX ORDER — TACROLIMUS 1 MG/G
OINTMENT TOPICAL 2 TIMES DAILY
Qty: 100 G | Refills: 2 | Status: SHIPPED | OUTPATIENT
Start: 2025-04-29

## 2025-04-29 RX ORDER — DAPSONE 25 MG/1
50 TABLET ORAL DAILY
Qty: 180 TABLET | Refills: 0 | Status: SHIPPED | OUTPATIENT
Start: 2025-04-29

## 2025-04-29 NOTE — PROGRESS NOTES
[facility] Note  Encounter Date: Apr 29, 2025  Office Visit     Dermatology Problem List:  1. IgA bullous disease  - Dapsone 50 mg daily  - Safety labs performed with most recent hospitalization, CBC WNL    2. Annular facial eruption   S/p punch biopsy 8/1/24  S/p failed econazole cream trial    ____________________________________________    Assessment & Plan:    # IgA bullous disease.   - Continue dapsone 50 mg daily  - Continue betamethasone 0.05% ointment as needed for flares  - Safety labs CBC, CMP  wnl  - Repeat at next visit      # Annular scaly plaques on left cheek; not responsive to topical antifungals    DDX:   Left cheek:  - Sarcoidal and tuberculoid granulomatous dermatitis - (see comment and description)    Electronically signed by Ezekiel Oneal MD on 8/9/2024 at 12:24 AM   Comment  UUMA   Clinical photographs were reviewed in conjunction with interpretation of this specimen. Together with the clinical information in the electronic medical record, the findings present in this specimen raise a histopathologic differential diagnosis of cutaneous sarcoidosis and an unusual manifestation of granuloma annulare/actinic granuloma. Features of granuloma faciale are not seen; eosinophils and plasma cells are effectively absent, as are changes of vasculitis. Clinical correlation is recommended.      The etiology remains unclear, I am concerned that this is a skin metaphysic station of sarcoidosis.  He denies any shortness of breath, chest pain.  Denies any other areas of skin rash besides his hair loss.  - CXR today  - Start topical tacrolimus twice daily to affected areas on the cheek and scalp.    # Alopecia areata versus granulomatous disease of the scalp  He states he can pull out the hairs individually and he gets a sticky substance at the base of the hair.    - Start topical tacrolimus twice daily to affected areas, using tacrolimus for simplification.  Can consider  switching to Dev inhibitor versus clobetasol versus [facility] L [facility].      Procedures Performed:   None    Follow-up: 1 month    Staff:    Crystal Alvarez MD PhD  Dermatology, Dermatopathology      ____________________________________________    CC: Derm Problem (Rash-left cheek-present for about 4 years-comes and goes-has tried Differin, betamethasone and hydrocortisone which did not help at all. Has been to a few dermatologist with no improvement.)    HPI:  Mr. Piter Gaines is a(n) 59 year old male who presents today as a return patient for rash on face. And new areas of hair loss.    He last saw Dr. Villanueva which they underwent a trial of topical antifungals, it did not respond.  Proceeded with biopsy.  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  Differential diagnosis includes granuloma annual RA, actinic granuloma or cutaneous sarcoidosis.  He uses betamethasone on his leg if he gets small blisters.  Generally stable, no new blisters with hyperpigmentation.  He was on dapsone with stable labs.  Last took the medication approximately 1 week ago.  Is here to request refills.     Recently had labs with a recent inpatient stay where he was worked up for vascular disease and concern for stroke.      Patient is otherwise feeling well, without additional skin concerns.    Labs:  None reviewed.    Physical Exam:  Vitals: There were no vitals taken for this visit.  SKIN: Focused examination of face was performed.  - Two annular plaques with central clearing on left cheek updated photos obtained today.  -Lower extremity with hyperpigmented plaques, no blisters.                - No other lesions of concern on areas examined.     Medications:  Current Outpatient Medications   Medication Sig Dispense Refill    acetaminophen (TYLENOL) 500 MG tablet Take 1 tablet (500 mg) by mouth every 6 hours as needed for pain 180 tablet 1    adapalene (DIFFERIN) 0.1 % external cream Apply thin layer to beard and hairline  region after washing hair/face everyday.  If irritation or peeling develops, skip application for one day. 45 g 1    amLODIPine (NORVASC) 5 MG tablet Take 1 tablet (5 mg) by mouth daily. 90 tablet 0    aspirin (ASA) 81 MG chewable tablet Take 1 tablet (81 mg) by mouth daily. 30 tablet 0    atorvastatin (LIPITOR) 20 MG tablet Take 1 tablet (20 mg) by mouth daily. 90 tablet 2    benzonatate (TESSALON) 100 MG capsule Take 1 capsule (100 mg) by mouth 3 times daily as needed for cough. 60 capsule 0    betamethasone dipropionate (DIPROSONE) 0.05 % external ointment Apply topically 2 times daily. To areas of rash flare up on legs 50 g 2    bisacodyl (DULCOLAX) 5 MG EC tablet Take 5 mg by mouth daily as needed for constipation.      buPROPion (WELLBUTRIN XL) 150 MG 24 hr tablet Take 1 tablet (150 mg) by mouth every morning 90 tablet 3    dapsone (ACZONE) 25 MG tablet Take 2 tablets (50 mg) by mouth daily. 180 tablet 0    DULoxetine (CYMBALTA) 30 MG capsule Take 30 mg by mouth at bedtime.      econazole nitrate 1 % external cream Apply topically 2 times daily 30 g 1    finasteride (PROSCAR) 5 MG tablet Take 1 tablet (5 mg) by mouth daily. Please keep 1-2-24 clinic appt for refills. 90 tablet 3    hydrochlorothiazide (HYDRODIURIL) 12.5 MG tablet Take 1 tablet (12.5 mg) by mouth daily 90 tablet 3    HYDROcodone-acetaminophen (NORCO) 5-325 MG tablet Take 1 tablet by mouth every 6 hours as needed for moderate pain or severe pain. 30 tablet 0    hydrocortisone (CORTAID) 1 % external cream Apply topically 2 times daily as needed for rash or itching 120 g 1    losartan (COZAAR) 100 MG tablet Take 1 tablet (100 mg) by mouth daily. 90 tablet 0    losartan (COZAAR) 50 MG tablet TAKE 1 TABLET(50 MG) BY MOUTH DAILY 90 tablet 0    omeprazole (PRILOSEC) 20 MG DR capsule Take 1 capsule (20 mg) by mouth daily. 90 capsule 1    oxyBUTYnin ER (DITROPAN XL) 10 MG 24 hr tablet Take 1 tablet (10 mg) by mouth daily. (Please keep visit for 1/2/2024  for further refills) Thank you 90 tablet 3    tamsulosin (FLOMAX) 0.4 MG capsule Take 2 capsules (0.8 mg) by mouth daily. 180 capsule 3    tiZANidine (ZANAFLEX) 4 MG tablet Take 1 tablet (4 mg) by mouth 3 times daily as needed for muscle spasms. 90 tablet 1     No current facility-administered medications for this visit.      Past Medical History:   Patient Active Problem List   Diagnosis    Tobacco use disorder    Caloric malnutrition    Essential hypertension    Moderate episode of recurrent major depressive disorder (H)    Seborrheic Keratosis    Persistent proteinuria    Benign prostatic hyperplasia with nocturia    Linear IgA bullous dermatosis    Encounter for long-term current use of high risk medication    Normochromic normocytic anemia    Carotid stenosis, asymptomatic, bilateral    Esophagitis, Ola grade D    Rectal hemorrhage    Alcohol abuse    Chronic kidney disease, stage II (mild)    Depression, unspecified depression type    Tinea faciale    Neoplasm of unspecified behavior of bone, soft tissue, and skin    Hypomagnesemia    Uncontrolled hypertension    Acute nonintractable headache, unspecified headache type    Dissecting hemorrhage of right vertebral artery     Past Medical History:   Diagnosis Date    Acute hyperactive alcohol withdrawal delirium (H) 04/06/2017    Alcohol abuse 11/10/2016    Alcohol abuse     Depression     Difficulty walking     Esophagitis     Gastroesophageal reflux disease     History of blood transfusion     Hypertension     Hypertension     Kidney disease     Tobacco abuse     Uncomplicated asthma     Vasovagal syncope 02/04/2019

## 2025-04-29 NOTE — PATIENT INSTRUCTIONS
We want you to get a Chest Xray to rule out lung involvement.       Start applying tacrolimus Twice daily to your face and areas of scalp.

## 2025-04-29 NOTE — LETTER
4/29/2025      Piter Gaines  [address] [place] [address]      Dear Colleague,    Thank you for referring your patient, Piter Gaines, to the [facility]. Please see a copy of my visit note below.    [facility] Dermatology Note  Encounter Date: Apr 29, 2025  Office Visit     Dermatology Problem List:  1. IgA bullous disease  - Dapsone 50 mg daily  - Safety labs performed with most recent hospitalization, CBC WNL    2. Annular facial eruption   S/p punch biopsy 8/1/24  S/p failed econazole cream trial    ____________________________________________    Assessment & Plan:    # IgA bullous disease.   - Continue dapsone 50 mg daily  - Continue betamethasone 0.05% ointment as needed for flares  - Safety labs CBC, CMP  wnl  - Repeat at next visit      # Annular scaly plaques on left cheek; not responsive to topical antifungals    DDX:   Left cheek:  - Sarcoidal and tuberculoid granulomatous dermatitis - (see comment and description)    Electronically signed by Ezekiel Oneal MD on 8/9/2024 at 12:24 AM   Comment  [id number]   Clinical photographs were reviewed in conjunction with interpretation of this specimen. Together with the clinical information in the electronic medical record, the findings present in this specimen raise a histopathologic differential diagnosis of cutaneous sarcoidosis and an unusual manifestation of granuloma annulare/actinic granuloma. Features of granuloma faciale are not seen; eosinophils and plasma cells are effectively absent, as are changes of vasculitis. Clinical correlation is recommended.      The etiology remains unclear, I am concerned that this is a skin metaphysic station of sarcoidosis.  He denies any shortness of breath, chest pain.  Denies any other areas of skin rash besides his hair loss.  - CXR today  - Start topical tacrolimus twice daily to affected areas on the cheek and scalp.    # Alopecia areata versus granulomatous  disease of the scalp  He states he can pull out the hairs individually and he gets a sticky substance at the base of the hair.    - Start topical tacrolimus twice daily to affected areas, using tacrolimus for simplification.  Can consider switching to Dev inhibitor versus clobetasol versus [facility].      Procedures Performed:   None    Follow-up: 1 month    Staff:    Crystal Alvarez MD PhD  Dermatology, Dermatopathology      ____________________________________________    CC: Derm Problem (Rash-left cheek-present for about 4 years-comes and goes-has tried Differin, betamethasone and hydrocortisone which did not help at all. Has been to a few dermatologist with no improvement.)    HPI:  Mr. Piter Gaines is a(n) 59 year old male who presents today as a return patient for rash on face. And new areas of hair loss.    He last saw Dr. Villanueva which they underwent a trial of topical antifungals, it did not respond.  Proceeded with biopsy.  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  Differential diagnosis includes granuloma annual RA, actinic granuloma or cutaneous sarcoidosis.  He uses betamethasone on his leg if he gets small blisters.  Generally stable, no new blisters with hyperpigmentation.  He was on dapsone with stable labs.  Last took the medication approximately 1 week ago.  Is here to request refills.     Recently had labs with a recent inpatient stay where he was worked up for vascular disease and concern for stroke.      Patient is otherwise feeling well, without additional skin concerns.    Labs:  None reviewed.    Physical Exam:  Vitals: There were no vitals taken for this visit.  SKIN: Focused examination of face was performed.  - Two annular plaques with central clearing on left cheek updated photos obtained today.  -Lower extremity with hyperpigmented plaques, no blisters.                - No other lesions of concern on areas examined.     Medications:  Current Outpatient  Medications   Medication Sig Dispense Refill     acetaminophen (TYLENOL) 500 MG tablet Take 1 tablet (500 mg) by mouth every 6 hours as needed for pain 180 tablet 1     adapalene (DIFFERIN) 0.1 % external cream Apply thin layer to beard and hairline region after washing hair/face everyday.  If irritation or peeling develops, skip application for one day. 45 g 1     amLODIPine (NORVASC) 5 MG tablet Take 1 tablet (5 mg) by mouth daily. 90 tablet 0     aspirin (ASA) 81 MG chewable tablet Take 1 tablet (81 mg) by mouth daily. 30 tablet 0     atorvastatin (LIPITOR) 20 MG tablet Take 1 tablet (20 mg) by mouth daily. 90 tablet 2     benzonatate (TESSALON) 100 MG capsule Take 1 capsule (100 mg) by mouth 3 times daily as needed for cough. 60 capsule 0     betamethasone dipropionate (DIPROSONE) 0.05 % external ointment Apply topically 2 times daily. To areas of rash flare up on legs 50 g 2     bisacodyl (DULCOLAX) 5 MG EC tablet Take 5 mg by mouth daily as needed for constipation.       buPROPion (WELLBUTRIN XL) 150 MG 24 hr tablet Take 1 tablet (150 mg) by mouth every morning 90 tablet 3     dapsone (ACZONE) 25 MG tablet Take 2 tablets (50 mg) by mouth daily. 180 tablet 0     DULoxetine (CYMBALTA) 30 MG capsule Take 30 mg by mouth at bedtime.       econazole nitrate 1 % external cream Apply topically 2 times daily 30 g 1     finasteride (PROSCAR) 5 MG tablet Take 1 tablet (5 mg) by mouth daily. Please keep 1-2-24 clinic appt for refills. 90 tablet 3     hydrochlorothiazide (HYDRODIURIL) 12.5 MG tablet Take 1 tablet (12.5 mg) by mouth daily 90 tablet 3     HYDROcodone-acetaminophen (NORCO) 5-325 MG tablet Take 1 tablet by mouth every 6 hours as needed for moderate pain or severe pain. 30 tablet 0     hydrocortisone (CORTAID) 1 % external cream Apply topically 2 times daily as needed for rash or itching 120 g 1     losartan (COZAAR) 100 MG tablet Take 1 tablet (100 mg) by mouth daily. 90 tablet 0     losartan (COZAAR) 50 MG  tablet TAKE 1 TABLET(50 MG) BY MOUTH DAILY 90 tablet 0     omeprazole (PRILOSEC) 20 MG DR capsule Take 1 capsule (20 mg) by mouth daily. 90 capsule 1     oxyBUTYnin ER (DITROPAN XL) 10 MG 24 hr tablet Take 1 tablet (10 mg) by mouth daily. (Please keep visit for 1/2/2024 for further refills) Thank you 90 tablet 3     tamsulosin (FLOMAX) 0.4 MG capsule Take 2 capsules (0.8 mg) by mouth daily. 180 capsule 3     tiZANidine (ZANAFLEX) 4 MG tablet Take 1 tablet (4 mg) by mouth 3 times daily as needed for muscle spasms. 90 tablet 1     No current facility-administered medications for this visit.      Past Medical History:   Patient Active Problem List   Diagnosis     Tobacco use disorder     Caloric malnutrition     Essential hypertension     Moderate episode of recurrent major depressive disorder (H)     Seborrheic Keratosis     Persistent proteinuria     Benign prostatic hyperplasia with nocturia     Linear IgA bullous dermatosis     Encounter for long-term current use of high risk medication     Normochromic normocytic anemia     Carotid stenosis, asymptomatic, bilateral     Esophagitis, [place] grade D     Rectal hemorrhage     Alcohol abuse     Chronic kidney disease, stage II (mild)     Depression, unspecified depression type     Tinea faciale     Neoplasm of unspecified behavior of bone, soft tissue, and skin     Hypomagnesemia     Uncontrolled hypertension     Acute nonintractable headache, unspecified headache type     Dissecting hemorrhage of right vertebral artery     Past Medical History:   Diagnosis Date     Acute hyperactive alcohol withdrawal delirium (H) 04/06/2017     Alcohol abuse 11/10/2016     Alcohol abuse      Depression      Difficulty walking      Esophagitis      Gastroesophageal reflux disease      History of blood transfusion      Hypertension      Hypertension      Kidney disease      Tobacco abuse      Uncomplicated asthma      Vasovagal syncope 02/04/2019         Again, thank you for  allowing me to participate in the care of your patient.        Sincerely,        Crystal Alvarez MD    Electronically signed

## 2025-05-01 LAB
ATRIAL RATE - MUSE: 87 BPM
DIASTOLIC BLOOD PRESSURE - MUSE: 101 MMHG
INTERPRETATION ECG - MUSE: NORMAL
P AXIS - MUSE: 75 DEGREES
PR INTERVAL - MUSE: 208 MS
QRS DURATION - MUSE: 92 MS
QT - MUSE: 388 MS
QTC - MUSE: 466 MS
R AXIS - MUSE: 65 DEGREES
SYSTOLIC BLOOD PRESSURE - MUSE: 174 MMHG
T AXIS - MUSE: 66 DEGREES
VENTRICULAR RATE- MUSE: 87 BPM

## 2025-05-12 NOTE — PROGRESS NOTES
Assessment & Plan     (Z09) Hospital discharge follow-up  (primary encounter diagnosis)  (I77.74) Dissecting hemorrhage of right vertebral artery  Comment:   - overall doing well, symptoms (mostly headache) now resolved  - feels much better, no neuro deficits today  - needs neurology follow-up per Dr. Kendrick's note during hospital stay, will place priority referral today  - should continue on ASA indefinitely, no Plavix needed per neuro note  - plan for follow-up if not improving, emergency precautions discussed  Plan: Adult Neurology  Referral          (I10) Essential hypertension  Comment:   - /88 today, in goal range  - not taking hydrochlorothiazide as previously indicated, but continues on losartan 100mg and amlodipine 5mg  - some question of if he misses some doses, is interested in getting pill packs to reduce burden especially given now also requiring ASA and statin for above  - plan to follow BMP, should schedule a full physical in 3 months  Plan: follow-up 3 months    (Z91.89) At risk for polypharmacy  Comment:   - HTN and vertebral artery dissection noted above  - does note he misses meds up to 3x per week given day and nighttime administration  - interested in [facility] pill packs, will place MTM referral today  - plan to visit with her in 2-3 weeks  Plan: Med Therapy Management Referral          (R19.5) Positive colorectal cancer screening using Cologuard test  Comment:   - noted drifting Hb during last hospital stay, normocytic and ongoing however back down to 11.6  - did have a positive Cologuard in 2023, but never went for C-scope given he gets extremely nauseous with bowel prep despite switch over and use of Zofran  - would benefit from discussion with GI to evaluate other potential solutions for prep prior to colonoscopy, will place referral today  - no other symptoms today, plan for follow-up if worsening  Plan: Adult GI  Referral - Consult Only          (N40.1,  R35.1)  Benign prostatic hyperplasia with nocturia  Comment: Refilled  Plan: finasteride (PROSCAR) 5 MG tablet          (R09.81) Nasal congestion  Comment: Ongoing issue, likely allergies. Trial Zyrtec, follow-up if not improving  Plan: cetirizine (ZYRTEC) 5 MG tablet              The longitudinal plan of care for the diagnosis(es)/condition(s) as documented were addressed during this visit. Due to the added complexity in care, I will continue to support Piter in the subsequent management and with ongoing continuity of care.      Follow-up in 3 months      Subjective   Piter is a 59 year old, presenting for the following health issues:  Hypertension and Refill Request      [facility]        Hospital Follow-up Visit:    Hospital/Nursing Home/IP Rehab Facility: [facility]  Most Recent Admission Date: 4/25/2025   Most Recent Admission Diagnosis: Uncontrolled hypertension - I10  Acute nonintractable headache, unspecified headache type - R51.9  Dissecting hemorrhage of right vertebral artery - I77.74  Hypomagnesemia - E83.42     Most Recent Discharge Date: 4/26/2025   Most Recent Discharge Diagnosis: Uncontrolled hypertension - I10  Acute nonintractable headache, unspecified headache type - R51.9  Dissecting hemorrhage of right vertebral artery - I77.74  Hypomagnesemia - E83.42  Carotid stenosis, asymptomatic, bilateral - I65.23   Was the patient in the ICU or did the patient experience delirium during hospitalization?  No  Do you have any other stressors you would like to discuss with your provider? No    Problems taking medications regularly:  Does miss meds 2-3 times per week if he is too stressed out  Medication changes since discharge: None  Problems adhering to non-medication therapy:  None    Summary of hospitalization:  [facility] discharge summary reviewed  Diagnostic Tests/Treatments reviewed.  Follow up needed: none  Other Healthcare Providers Involved in Patient s Care:          "Specialist appointment - neurology needed  Update since discharge: improved.      Plan of care communicated with patient      HTN  Blood pressure control meds -   - amlodipine 5  - losartan 100  - hydrochlorothiazide 12.5 - states he is not taking  Today's BP:   BP Readings from Last 6 Encounters:   05/13/25 139/88   04/26/25 (!) 149/83   12/03/24 (!) 148/92   10/09/24 (!) 170/98   09/16/24 (!) 176/106   09/14/24 (!) 162/107   Feeling much better today  Headache gone  Resolved overall       Otherwise needs a refill on prostate meds.    Of note, noted blood loss anemia on last labs during hospitalization  Hb fluctuant, now down to 11.9  Does have a hx of positive Cologuard in 2023, however did not tolerate the bowel prep  Extended discussion regarding options, a positive FIT test would not be reassuring  Discussed referral to GI        Review of Systems  Constitutional, neuro, ENT, endocrine, pulmonary, cardiac, gastrointestinal, genitourinary, musculoskeletal, integument and psychiatric systems are negative, except as otherwise noted.      Objective    /88   Pulse 89   Temp 97.8  F (36.6  C)   Resp 20   Ht 1.753 m (5' 9\")   Wt 76.2 kg (168 lb)   SpO2 95%   BMI 24.81 kg/m    Body mass index is 24.81 kg/m .    Physical Exam   General: Alert, no acute distress  Skin: clean, dry, and intact  HEENT: normocephalic, atraumatic, spontaneous eye movement, no injection or icterus, ears and nose normal, no neck masses  Resp: normal work of breathing, no wheezing  Cardio: RRR, S1 and S2 present  Abdomen: nondistended, no masses  Neuro: CN II-XII grossly intact, gait normal  Psych: calm, conversational          Signed Electronically by: Any Truong MD  Precepted patient with Dr. Ermelinda Garcia.    "

## 2025-05-13 ENCOUNTER — OFFICE VISIT (OUTPATIENT)
Dept: FAMILY MEDICINE | Facility: CLINIC | Age: 60
End: 2025-05-13
Payer: COMMERCIAL

## 2025-05-13 VITALS
SYSTOLIC BLOOD PRESSURE: 139 MMHG | WEIGHT: 168 LBS | HEIGHT: 69 IN | TEMPERATURE: 97.8 F | RESPIRATION RATE: 20 BRPM | HEART RATE: 89 BPM | BODY MASS INDEX: 24.88 KG/M2 | DIASTOLIC BLOOD PRESSURE: 88 MMHG | OXYGEN SATURATION: 95 %

## 2025-05-13 DIAGNOSIS — R35.1 BENIGN PROSTATIC HYPERPLASIA WITH NOCTURIA: ICD-10-CM

## 2025-05-13 DIAGNOSIS — R09.81 NASAL CONGESTION: ICD-10-CM

## 2025-05-13 DIAGNOSIS — R19.5 POSITIVE COLORECTAL CANCER SCREENING USING COLOGUARD TEST: ICD-10-CM

## 2025-05-13 DIAGNOSIS — N40.1 BENIGN PROSTATIC HYPERPLASIA WITH NOCTURIA: ICD-10-CM

## 2025-05-13 DIAGNOSIS — Z91.89 AT RISK FOR POLYPHARMACY: ICD-10-CM

## 2025-05-13 DIAGNOSIS — Z09 HOSPITAL DISCHARGE FOLLOW-UP: Primary | ICD-10-CM

## 2025-05-13 DIAGNOSIS — I10 ESSENTIAL HYPERTENSION: ICD-10-CM

## 2025-05-13 DIAGNOSIS — I77.74: ICD-10-CM

## 2025-05-13 PROCEDURE — G2211 COMPLEX E/M VISIT ADD ON: HCPCS

## 2025-05-13 PROCEDURE — 3075F SYST BP GE 130 - 139MM HG: CPT

## 2025-05-13 PROCEDURE — 3079F DIAST BP 80-89 MM HG: CPT

## 2025-05-13 PROCEDURE — 99214 OFFICE O/P EST MOD 30 MIN: CPT | Mod: GC

## 2025-05-13 RX ORDER — FINASTERIDE 5 MG/1
5 TABLET, FILM COATED ORAL DAILY
Qty: 90 TABLET | Refills: 3 | Status: SHIPPED | OUTPATIENT
Start: 2025-05-13

## 2025-05-13 RX ORDER — CETIRIZINE HYDROCHLORIDE 5 MG/1
5 TABLET ORAL DAILY
Qty: 90 TABLET | Refills: 1 | Status: SHIPPED | OUTPATIENT
Start: 2025-05-13

## 2025-05-14 ENCOUNTER — DOCUMENTATION ONLY (OUTPATIENT)
Dept: GASTROENTEROLOGY | Facility: CLINIC | Age: 60
End: 2025-05-14
Payer: COMMERCIAL

## 2025-05-15 ENCOUNTER — TRANSFERRED RECORDS (OUTPATIENT)
Dept: HEALTH INFORMATION MANAGEMENT | Facility: CLINIC | Age: 60
End: 2025-05-15

## 2025-05-21 ENCOUNTER — TELEPHONE (OUTPATIENT)
Dept: GASTROENTEROLOGY | Facility: CLINIC | Age: 60
End: 2025-05-21
Payer: COMMERCIAL

## 2025-05-21 NOTE — TELEPHONE ENCOUNTER
[facility] Call Center    Phone Message    May a detailed message be left on voicemail: Yes    Reason for Call: Other: Patient is currently scheduled on 06/18, as visit type New GI Emergent . This is outside the expected timeline for this referral. Patient has been added to the waitlist.      Action Taken: Message routed to:  Other: GI REFERRAL TRIAGE POOL     Travel Screening: Not Applicable

## 2025-05-22 NOTE — TELEPHONE ENCOUNTER
Writer called to help get Pt scheduled for a sooner GI appointment. Pt accepted an in-person clinic visit with Erin Alanis on 5/29/2025 at 10:30 AM. The clinic location was confirmed and verified with the Pt.

## 2025-05-29 ENCOUNTER — VIRTUAL VISIT (OUTPATIENT)
Dept: GASTROENTEROLOGY | Facility: CLINIC | Age: 60
End: 2025-05-29
Attending: STUDENT IN AN ORGANIZED HEALTH CARE EDUCATION/TRAINING PROGRAM
Payer: COMMERCIAL

## 2025-05-29 DIAGNOSIS — R19.5 POSITIVE COLORECTAL CANCER SCREENING USING COLOGUARD TEST: ICD-10-CM

## 2025-05-29 NOTE — PROGRESS NOTES
GI CLINIC VISIT    CC/REFERRING MD:  Ermelinda Garcia      ASSESSMENT/PLAN:    #Positive Cologuard  We discussed different colonoscopy prep options, particularly low volume prep options inlcuding Moviprep and SuPrep. This can even be done as a split dose. He is agreeable to this. I have placed an order for colonoscopy with recommendations to use Suprep or Moviprep.       RTC PRN    Thank you for this consultation.  It was a pleasure to participate in the care of this patient; please contact us with any further questions.       30 minutes spent on the date of the encounter doing chart review, review of outside records, review of test results, patient visit, documentation, and discussion with other provider(s)    This note was created with voice recognition software, and while reviewed for accuracy, typos may remain.    Erin Alanis PA-C  Division of Gastroenterology, Hepatology and Nutrition  [facility]  58 y/o M presents to discuss colonoscopy prep options.     Patient had a positive cologuard in August 2023, but did not complete colonoscopy due to previous issues in the past with completing the prep. He reports that he has previously tried zofran with prep, as well as low volume preps and has not been able to complete these. He otherwise denies abdominal pain, nausea, vomiting, diarrhea, BRBPR or black or tarry stools. Denies any other GI complaints.        PROBLEM LIST  Patient Active Problem List    Diagnosis Date Noted    Hypomagnesemia 04/26/2025     Priority: Medium    Uncontrolled hypertension 04/26/2025     Priority: Medium    Acute nonintractable headache, unspecified headache type 04/26/2025     Priority: Medium    Dissecting hemorrhage of right vertebral artery 04/26/2025     Priority: Medium    Neoplasm of unspecified behavior of bone, soft tissue, and skin 08/19/2024     Priority: Medium    Tinea faciale 06/03/2024     Priority: Medium     Depression, unspecified depression type 04/24/2022     Priority: Medium    Chronic kidney disease, stage II (mild) 03/02/2022     Priority: Medium    Carotid stenosis, asymptomatic, bilateral 05/02/2021     Priority: Medium     -Asympatomatic  -US shows right 50-69% and left 50% (April 2021)  -Taking ASA and moderate intensity statin      Normochromic normocytic anemia 03/28/2021     Priority: Medium     -Due to dapsone  -WNL prior to start of dapsone  -If below 9, then need to consider stopping dapsone      Encounter for long-term current use of high risk medication 03/24/2021     Priority: Medium     Colon Cancer  Indicated (50-75, 40 or 10 years before 1st degree relative age of diagnosis)? yes  Interval (<10 years):TBD  Last Intervention: FIT + in 2017, has not done C-scope yet  Due now? YES  If not done, why? could not tolerate prep x2; message sent to Dr. Rawls for ideas on 3/24/21     Lung Cancer  Indicated (55-80 - 30 pack-year current or former within 15 years)? no     Prostate Cancer  Indicated (50-70 - high risk only)? yes  Interval (<2 years):Q6months  Last Intervention: 3.6 -> 6 -> 2.5   *Has LUTS that have not responded to meds.  Sent to Urology in 3/2021     AAA  Indicated (65+ - smoked 100 cigs ever)? Not yet     PHQ-2  Indicated (all adults)? yes  Interval (<6 months):Q6months  Last Intervention: 11/2019  Due now? Yes  If not done, why? Not enough time     Viral Studies (HIV, HCV)  Indicated? yes  Interval: once  Last Intervention: HIV negative 8/20; HCAb negative 8/20  Due now? no     Vaccines  Immunization History   Administered Date(s) Administered    Influenza Vaccine IM > 6 months Valent IIV4 10/17/2017, 11/08/2018, 10/06/2020    TDAP Vaccine (Boostrix) 08/25/2017    Zoster vaccine, live 10/06/2020           Benign prostatic hyperplasia with nocturia 01/31/2021     Priority: Medium     -Chronic intermittent nocturia, hesitancy, and weak stream for 1-2 years  -PSA 3 (12/19) -> 3.6 (8/20)  -SYLVAIN  shows enlarged prostate in 8/2020  -Flomax since 9/2019, without symptom improvement  -started finasteride in 9/2020  -PSA in 2/1/21 was 2.5  -No improvement in LUTS by 3/21, so sent to Urology (appt 3/26)      Linear IgA bullous dermatosis 01/31/2021     Priority: Medium     -Diagnosed in 7/2020  -Dapsone 50mg daily started in 8/2020 without response  -Dapsone 100mg daily started in 9/2020 with improvement, but not resolution  -patient missed follow up appointments  -Dapsone 150mg daily (max) started on 2/1/21    Goal: complete resolution of lesions  Treatment: once complete resolution of lesions for 1-2 months, then taper off dapsone several weeks      Persistent proteinuria 07/13/2020     Priority: Medium    Seborrheic Keratosis 08/14/2018     Priority: Medium    Moderate episode of recurrent major depressive disorder (H) 08/25/2017     Priority: Medium    Rectal hemorrhage 07/24/2017     Priority: Medium    Alcohol abuse 07/24/2017     Priority: Medium    Caloric malnutrition 04/07/2017     Priority: Medium    Esophagitis, [place] grade D 04/07/2017     Priority: Medium     Formatting of this note might be different from the original.  EGD 4/6/2017. PPI BID. Repeat EGD in 2 months with [facility].      Tobacco use disorder 08/04/2011     Priority: Medium    Essential hypertension 05/11/2011     Priority: Medium     -Goal <140/90  -Controlled as of April 2021  -Taking losartan 100mg and amlodipine 10mg  -Taking ASA and statin         PERTINENT PAST MEDICAL HISTORY:    Past Medical History:   Diagnosis Date    Acute hyperactive alcohol withdrawal delirium (H) 04/06/2017    Alcohol abuse 11/10/2016    Alcohol abuse     Depression     Difficulty walking     Esophagitis     Gastroesophageal reflux disease     History of blood transfusion     Hypertension     Hypertension     Kidney disease     Tobacco abuse     Uncomplicated asthma     Vasovagal syncope 02/04/2019       PREVIOUS SURGERIES:    Past Surgical History:    Procedure Laterality Date    ESOPHAGOSCOPY, GASTROSCOPY, DUODENOSCOPY (EGD), COMBINED N/A 4/6/2017    Procedure: ESOPHAGOGASTRODUODENOSCOPY (EGD);  Surgeon: Conrad Chao MD;  Location: [facility];  Service:     FOREARM SURGERY Left 1980's    HAND SURGERY Left        PREVIOUS ENDOSCOPY:  See chart.    ALLERGIES:     Allergies   Allergen Reactions    No Known Allergies        PERTINENT MEDICATIONS:    Current Outpatient Medications:     acetaminophen (TYLENOL) 500 MG tablet, Take 1 tablet (500 mg) by mouth every 6 hours as needed for pain, Disp: 180 tablet, Rfl: 1    amLODIPine (NORVASC) 5 MG tablet, Take 1 tablet (5 mg) by mouth daily., Disp: 90 tablet, Rfl: 0    aspirin (ASA) 81 MG chewable tablet, Take 1 tablet (81 mg) by mouth daily., Disp: 30 tablet, Rfl: 0    atorvastatin (LIPITOR) 20 MG tablet, Take 1 tablet (20 mg) by mouth daily., Disp: 90 tablet, Rfl: 2    benzonatate (TESSALON) 100 MG capsule, Take 1 capsule (100 mg) by mouth 3 times daily as needed for cough., Disp: 60 capsule, Rfl: 0    betamethasone dipropionate (DIPROSONE) 0.05 % external ointment, Apply topically 2 times daily. To areas of rash flare up on legs, Disp: 50 g, Rfl: 2    bisacodyl (DULCOLAX) 5 MG EC tablet, Take 5 mg by mouth daily as needed for constipation., Disp: , Rfl:     cetirizine (ZYRTEC) 5 MG tablet, Take 1 tablet (5 mg) by mouth daily., Disp: 90 tablet, Rfl: 1    dapsone (ACZONE) 25 MG tablet, Take 2 tablets (50 mg) by mouth daily., Disp: 180 tablet, Rfl: 0    DULoxetine (CYMBALTA) 30 MG capsule, Take 30 mg by mouth at bedtime., Disp: , Rfl:     finasteride (PROSCAR) 5 MG tablet, Take 1 tablet (5 mg) by mouth daily. Please keep 1-2-24 clinic appt for refills., Disp: 90 tablet, Rfl: 3    hydrochlorothiazide (HYDRODIURIL) 12.5 MG tablet, Take 1 tablet (12.5 mg) by mouth daily, Disp: 90 tablet, Rfl: 3    losartan (COZAAR) 100 MG tablet, Take 1 tablet (100 mg) by mouth daily., Disp: 90 tablet, Rfl: 0    omeprazole  (PRILOSEC) 20 MG DR capsule, Take 1 capsule (20 mg) by mouth daily., Disp: 90 capsule, Rfl: 1    oxyBUTYnin ER (DITROPAN XL) 10 MG 24 hr tablet, Take 1 tablet (10 mg) by mouth daily. (Please keep visit for 1/2/2024 for further refills) Thank you, Disp: 90 tablet, Rfl: 3    tacrolimus (PROTOPIC) 0.1 % external ointment, Apply topically 2 times daily. Apply to affected areas on your face and scalp. Use twice daily., Disp: 100 g, Rfl: 2    tamsulosin (FLOMAX) 0.4 MG capsule, Take 2 capsules (0.8 mg) by mouth daily., Disp: 180 capsule, Rfl: 3    tiZANidine (ZANAFLEX) 4 MG tablet, Take 1 tablet (4 mg) by mouth 3 times daily as needed for muscle spasms., Disp: 90 tablet, Rfl: 1    SOCIAL HISTORY:    Social History     Socioeconomic History    Marital status: Single     Spouse name: Not on file    Number of children: Not on file    Years of education: Not on file    Highest education level: Not on file   Occupational History    Not on file   Tobacco Use    Smoking status: Some Days     Current packs/day: 0.50     Average packs/day: 0.5 packs/day for 10.0 years (5.0 ttl pk-yrs)     Types: Cigarettes    Smokeless tobacco: Never    Tobacco comments:     1/2 pack a day     Substance and Sexual Activity    Alcohol use: Not Currently     Alcohol/week: 14.0 standard drinks of alcohol     Types: 6 Cans of beer, 8 Shots of liquor per week     Comment: quit 5 years ago    Drug use: Yes     Types: Marijuana     Comment: Drug use: last week    Sexual activity: Yes     Partners: Female     Birth control/protection: Condom   Other Topics Concern    Parent/sibling w/ CABG, MI or angioplasty before 65F 55M? Not Asked   Social History Narrative    Not on file     Social Drivers of Health     Financial Resource Strain: Not on file   Food Insecurity: Not on file   Transportation Needs: Not on file   Physical Activity: Not on file   Stress: Not on file   Social Connections: Not on file   Interpersonal Safety: Low Risk  (5/13/2025)     Interpersonal Safety     Do you feel physically and emotionally safe where you currently live?: Yes     Within the past 12 months, have you been hit, slapped, kicked or otherwise physically hurt by someone?: No     Within the past 12 months, have you been humiliated or emotionally abused in other ways by your partner or ex-partner?: No   Housing Stability: Not on file       FAMILY HISTORY:    Family History   Problem Relation Age of Onset    Bone Cancer Mother     Breast Cancer Mother     Diabetes Father     Kidney Disease Father     Diabetes Sister     Cerebrovascular Disease Brother     Lung Cancer Father     Kidney Disease Brother     Coronary Artery Disease Brother     Stomach Cancer No family hx of        Past/family/social history reviewed and no changes    PHYSICAL EXAMINATION:  General: Patient appears well in no acute distress.   Skin: No visualized rash or lesions on visualized skin  Eyes: EOMI, no erythema, sclera icterus or discharge noted  Resp: Appears to be breathing comfortably without accessory muscle usage, speaking in full sentences, no cough  MSK: Appears to have normal range of motion based on visualized movements  Neurologic: No apparent tremors, facial movements symmetric  Psych: normal affect , alert and oriented          Video-Visit Details    Video Start Time: 10:31AM    Type of service:  Video Visit    Video End Time:10:38AM    Originating Location (pt. Location): Home        Distant Location (provider location):  On-site    Platform used for Video Visit: Neeraj

## 2025-05-29 NOTE — PATIENT INSTRUCTIONS
It was a pleasure meeting with you today and discussing your healthcare plan. Below is a summary of what we covered:    --colonoscopy was ordered, please schedule this -- I did ask that they give you suprep or moviprep which is lower volume.     Follow up as needed.       Please see below for any additional questions and scheduling guidelines.    Sign up for Betable: Betable patient portal serves as a secure platform for accessing your medical records from the [facility]. Additionally, Betable facilitates easy, timely, and secure messaging with your care team. If you have not signed up, you may do so by using the provided code or calling [phone].    Coordinating your care after your visit:  There are multiple options for scheduling your follow-up care based on your provider's recommendation.    How do I schedule a follow-up clinic appointment:   After your appointment, you may receive scheduling assistance with the Clinic Coordinators by having a seat in the waiting room and a Clinic Coordinator will call you up to schedule.  Virtual visits or after you leave the clinic:  Your provider has placed a follow-up order in the Betable portal for scheduling your return appointment. A member of the scheduling team will contact you to schedule.  Sjappert Scheduling: Timely scheduling through Betable is advised to ensure appointment availability.   Call to schedule: You may schedule your follow-up appointment(s) by calling [phone], option 1.    How do I schedule my endoscopy or colonoscopy procedure:  If a procedure, such as a colonoscopy or upper endoscopy was ordered by your provider, the scheduling team will contact you to schedule this procedure. Or you may choose to call to schedule at   [phone], option 2.  Please allow 20-30 minutes when scheduling a procedure.    How do I get my blood work done? To get your blood work done, you need to schedule a lab appointment at an [facility]  Laboratory. There are multiple ways to schedule:   At the clinic: The Clinic Coordinator you meet after your visit can help you schedule a lab appointment.   SnipSnap scheduling: SnipSnap offers online lab scheduling at all [facility] laboratory locations.   Call to schedule: You can call [phone] to schedule your lab appointment.    How do I schedule my imaging study: To schedule imaging studies, such as CT scans, ultrasounds, MRIs, or X-rays, contact Imaging Services at [phone].    How do I schedule a referral to another doctor: If your provider recommended a referral to another specialist(s), the referral order was placed by your provider. You will receive a phone call to schedule this referral, or you may choose to call the number attached to the referral to self-schedule.    For Post-Visit Question(s):  For any inquiries following today's visit:  Please utilize SnipSnap messaging and allow 48 hours for reply or contact the Call Center during normal business hours at [phone], option 3.  For Emergent After-hours questions, contact the On-Call GI Fellow through the [facility]  at [phone].  In addition, you may contact your Nurse directly using the provided contact information.    Test Results:  Test results will be accessible via SnipSnap in compliance with the 21st Century Cures Act. This means that your results will be available to you at the same time as your provider. Often you may see your results before your provider does. Results are reviewed by staff within two weeks with communication follow-up. Results may be released in the patient portal prior to your care team review.    Prescription Refill(s):  Medication prescribed by your provider will be addressed during your visit. For future refills, please coordinate with your pharmacy. If you have not had a recent clinic visit or routine labs, for your safety, your provider may not be able to refill your prescription.

## 2025-05-29 NOTE — NURSING NOTE
Chief Complaint   Patient presents with    New Patient     He requests these members of his care team be copied on today's visit information:  PCP: Any Truong    Referring Provider:  Ermelinda Garcia MD  [address] [place] [id number]    There were no vitals filed for this visit.  There is no height or weight on file to calculate BMI.    Medications were reconciled.    Oneyda Messina, Clinical Assistant

## 2025-06-05 DIAGNOSIS — K20.80 ESOPHAGITIS, LOS ANGELES GRADE D: ICD-10-CM

## 2025-06-05 DIAGNOSIS — I10 ESSENTIAL HYPERTENSION: ICD-10-CM

## 2025-06-05 DIAGNOSIS — I77.74: ICD-10-CM

## 2025-06-05 RX ORDER — AMLODIPINE BESYLATE 5 MG/1
5 TABLET ORAL DAILY
Qty: 90 TABLET | Refills: 1 | Status: SHIPPED | OUTPATIENT
Start: 2025-06-05

## 2025-06-05 RX ORDER — ASPIRIN 81 MG/1
81 TABLET, CHEWABLE ORAL DAILY
Qty: 90 TABLET | Refills: 1 | Status: SHIPPED | OUTPATIENT
Start: 2025-06-05

## 2025-06-05 RX ORDER — OMEPRAZOLE 20 MG/1
20 CAPSULE, DELAYED RELEASE ORAL DAILY
Qty: 90 CAPSULE | Refills: 1 | Status: SHIPPED | OUTPATIENT
Start: 2025-06-05

## 2025-06-18 ENCOUNTER — TELEPHONE (OUTPATIENT)
Dept: GASTROENTEROLOGY | Facility: CLINIC | Age: 60
End: 2025-06-18

## 2025-06-18 NOTE — TELEPHONE ENCOUNTER
"After writer listed available locations, patient would like to stay in [place]. Referral has been faxed to [facility]        Endoscopy Scheduling Screen    Caller: Writer to patient    Have you had any respiratory illness or flu-like symptoms in the last 10 days?  No    What is your communication preference for Instructions and/or Bowel Prep?   MyChart    What insurance is in the chart?  Other:  [employer]    Ordering/Referring Provider: Erin Alanis PA-C   (If ordering provider performs procedure, schedule with ordering provider unless otherwise instructed. )    BMI: Estimated body mass index is 24.81 kg/m  as calculated from the following:    Height as of 5/13/25: 1.753 m (5' 9\").    Weight as of 5/13/25: 76.2 kg (168 lb).     Sedation Ordered  MAC/deep sedation.   BMI<= 45 45 < BMI <= 48 48 < BMI < = 50  BMI > 50   No Restrictions No [facility]  No [facility]  [facility] with exceptions Hospital Only OR Only       Do you have a history of malignant hyperthermia?  No    (Females) Are you currently pregnant?   No     Have you been diagnosed or told you have pulmonary hypertension?   No    Do you have an LVAD?  No    Have you been told you have moderate to severe sleep apnea?  No.    Have you been told you have COPD, asthma, or any other lung disease?  No    Has your doctor ordered any cardiac tests like echo, angiogram, stress test, ablation, or EKG, that you have not completed yet?  No    Do you  have a history of any heart conditions?  No     Have you ever had or are you waiting for an organ transplant?  No. Continue scheduling, no site restrictions.    Have you had a stroke or transient ischemic attack (TIA aka \"mini stroke\") in the last 2 years?   No.    Have you been diagnosed with or been told you have cirrhosis of the liver?   No.    Are you currently on dialysis?   No    Do you need assistance transferring?   No    BMI: Estimated body mass index is 24.81 kg/m  as calculated from the following:    Height as of 5/13/25: " "1.753 m (5' 9\").    Weight as of 5/13/25: 76.2 kg (168 lb).     Is patients BMI > 40 and scheduling location [facility]?  No    Do you take an injectable or oral medication for weight loss or diabetes (excluding insulin)?  No    Do you take the medication Naltrexone?  No    Do you take blood thinners?  No       Prep   Are you currently on dialysis or do you have chronic kidney disease?  No    Do you have a diagnosis of diabetes?  No    Do you have a diagnosis of cystic fibrosis (CF)?  No    On a regular basis do you go 3 -5 days between bowel movements?  No    BMI > 40?  No    Preferred Pharmacy:    [id number] [address] AT [facility] [address]  [address] [place] [address]  Phone: [phone] Fax: [phone]    "

## 2025-12-16 DIAGNOSIS — L13.8 LINEAR IGA BULLOUS DERMATOSIS: Primary | ICD-10-CM
